# Patient Record
Sex: MALE | Race: WHITE | NOT HISPANIC OR LATINO | Employment: OTHER | URBAN - METROPOLITAN AREA
[De-identification: names, ages, dates, MRNs, and addresses within clinical notes are randomized per-mention and may not be internally consistent; named-entity substitution may affect disease eponyms.]

---

## 2017-08-04 ENCOUNTER — HOSPITAL ENCOUNTER (EMERGENCY)
Facility: MEDICAL CENTER | Age: 67
End: 2017-08-04
Attending: EMERGENCY MEDICINE
Payer: MEDICARE

## 2017-08-04 VITALS
HEIGHT: 72 IN | WEIGHT: 211.86 LBS | TEMPERATURE: 98.8 F | DIASTOLIC BLOOD PRESSURE: 85 MMHG | BODY MASS INDEX: 28.7 KG/M2 | HEART RATE: 88 BPM | OXYGEN SATURATION: 94 % | SYSTOLIC BLOOD PRESSURE: 133 MMHG | RESPIRATION RATE: 17 BRPM

## 2017-08-04 DIAGNOSIS — T82.9XXA COMPLICATION OF VASCULAR DIALYSIS CATHETER, UNSPECIFIED COMPLICATION, INITIAL ENCOUNTER: ICD-10-CM

## 2017-08-04 LAB
ALBUMIN SERPL BCP-MCNC: 3.7 G/DL (ref 3.2–4.9)
ALBUMIN/GLOB SERPL: 1.5 G/DL
ALP SERPL-CCNC: 41 U/L (ref 30–99)
ALT SERPL-CCNC: 38 U/L (ref 2–50)
ANION GAP SERPL CALC-SCNC: 12 MMOL/L (ref 0–11.9)
AST SERPL-CCNC: 15 U/L (ref 12–45)
BASOPHILS # BLD AUTO: 0.2 % (ref 0–1.8)
BASOPHILS # BLD: 0.01 K/UL (ref 0–0.12)
BILIRUB SERPL-MCNC: 0.7 MG/DL (ref 0.1–1.5)
BUN SERPL-MCNC: 17 MG/DL (ref 8–22)
CALCIUM SERPL-MCNC: 9 MG/DL (ref 8.5–10.5)
CHLORIDE SERPL-SCNC: 96 MMOL/L (ref 96–112)
CO2 SERPL-SCNC: 32 MMOL/L (ref 20–33)
CREAT SERPL-MCNC: 4.56 MG/DL (ref 0.5–1.4)
EOSINOPHIL # BLD AUTO: 0.01 K/UL (ref 0–0.51)
EOSINOPHIL NFR BLD: 0.2 % (ref 0–6.9)
ERYTHROCYTE [DISTWIDTH] IN BLOOD BY AUTOMATED COUNT: 55 FL (ref 35.9–50)
GFR SERPL CREATININE-BSD FRML MDRD: 13 ML/MIN/1.73 M 2
GLOBULIN SER CALC-MCNC: 2.4 G/DL (ref 1.9–3.5)
GLUCOSE SERPL-MCNC: 126 MG/DL (ref 65–99)
HCT VFR BLD AUTO: 27.5 % (ref 42–52)
HGB BLD-MCNC: 9.1 G/DL (ref 14–18)
IMM GRANULOCYTES # BLD AUTO: 0.05 K/UL (ref 0–0.11)
IMM GRANULOCYTES NFR BLD AUTO: 0.9 % (ref 0–0.9)
LACTATE BLD-SCNC: 1.8 MMOL/L (ref 0.5–2)
LYMPHOCYTES # BLD AUTO: 0.7 K/UL (ref 1–4.8)
LYMPHOCYTES NFR BLD: 13.1 % (ref 22–41)
MCH RBC QN AUTO: 31.1 PG (ref 27–33)
MCHC RBC AUTO-ENTMCNC: 33.1 G/DL (ref 33.7–35.3)
MCV RBC AUTO: 93.9 FL (ref 81.4–97.8)
MONOCYTES # BLD AUTO: 0.26 K/UL (ref 0–0.85)
MONOCYTES NFR BLD AUTO: 4.9 % (ref 0–13.4)
NEUTROPHILS # BLD AUTO: 4.31 K/UL (ref 1.82–7.42)
NEUTROPHILS NFR BLD: 80.7 % (ref 44–72)
NRBC # BLD AUTO: 0 K/UL
NRBC BLD AUTO-RTO: 0 /100 WBC
PLATELET # BLD AUTO: 189 K/UL (ref 164–446)
PMV BLD AUTO: 9.2 FL (ref 9–12.9)
POTASSIUM SERPL-SCNC: 4.5 MMOL/L (ref 3.6–5.5)
PROT SERPL-MCNC: 6.1 G/DL (ref 6–8.2)
RBC # BLD AUTO: 2.93 M/UL (ref 4.7–6.1)
SODIUM SERPL-SCNC: 140 MMOL/L (ref 135–145)
WBC # BLD AUTO: 5.3 K/UL (ref 4.8–10.8)

## 2017-08-04 PROCEDURE — 85025 COMPLETE CBC W/AUTO DIFF WBC: CPT

## 2017-08-04 PROCEDURE — 83605 ASSAY OF LACTIC ACID: CPT

## 2017-08-04 PROCEDURE — 87040 BLOOD CULTURE FOR BACTERIA: CPT | Mod: 91

## 2017-08-04 PROCEDURE — 80053 COMPREHEN METABOLIC PANEL: CPT

## 2017-08-04 PROCEDURE — 99284 EMERGENCY DEPT VISIT MOD MDM: CPT

## 2017-08-04 ASSESSMENT — LIFESTYLE VARIABLES: DO YOU DRINK ALCOHOL: NO

## 2017-08-04 ASSESSMENT — PAIN SCALES - GENERAL
PAINLEVEL_OUTOF10: 0
PAINLEVEL_OUTOF10: 0

## 2017-08-04 NOTE — ED AVS SNAPSHOT
8/4/2017    Bridger aSvage  [address] [address] [facility] [address]    Dear Bridger:    [facility] wants to ensure your discharge home is safe and you or your loved ones have had all of your questions answered regarding your care after you leave the hospital.    Below is a list of resources and contact information should you have any questions regarding your hospital stay, follow-up instructions, or active medical symptoms.    Questions or Concerns Regarding… Contact   Medical Questions Related to Your Discharge  (7 days a week, 8am-5pm) Contact a Nurse Care Coordinator   [phone]   Medical Questions Not Related to Your Discharge  (24 hours a day / 7 days a week)  Contact the Nurse Health Line   [phone]    Medications or Discharge Instructions Refer to your discharge packet   or contact your [facility] Primary Care Provider   [phone]   Follow-up Appointment(s) Schedule your appointment via [facility]   or contact Scheduling [phone]   Billing Review your statement via [employer]  or contact Billing [phone]   Medical Records Review your records via [employer]   or contact Medical Records [phone]     You may receive a telephone call within two days of discharge. This call is to make certain you understand your discharge instructions and have the opportunity to have any questions answered. You can also easily access your medical information, test results and upcoming appointments via the [employer] free online health management tool. You can learn more and sign up at F[employer]ELUP[employer]. For assistance setting up your [facility] account, please call [phone].    Once again, we want to ensure your discharge home is safe and that you have a clear understanding of any next steps in your care. If you have any questions or concerns, please do not hesitate to contact us, we are here for you. Thank you for choosing [facility] for your healthcare needs.    Sincerely,    Your [facility] Healthcare Team

## 2017-08-04 NOTE — ED AVS SNAPSHOT
[employer] Access Code: [email or web address]  Expires: 9/3/2017 10:18 PM    Your email address is not on file at [facility].  Email Addresses are required for you to sign up for [employer], please contact [phone] to verify your personal information and to provide your email address prior to attempting to register for [employer].    Bridger Gallardo Janette  [address] [address] [place] [address]    [employer]  A secure, online tool to manage your health information     [facility]’s K[employer] Kent® is a secure, online tool that connects you to your personalized health information from the privacy of your home -- day or night - making it very easy for you to manage your healthcare. Once the activation process is completed, you can even access your medical information using the [employer] joanna, which is available for free in the Apple Joanna store or Google Play store.     To learn more about [employer], visit [email or web address]    There are two levels of access available (as shown below):   My Chart Features  [facility] Primary Care Doctor [facility]  Specialists [facility]  Urgent  Care Non-[facility] Primary Care Doctor   Email your healthcare team securely and privately 24/7 X X X    Manage appointments: schedule your next appointment; view details of past/upcoming appointments X      Request prescription refills. X      View recent personal medical records, including lab and immunizations X X X X   View health record, including health history, allergies, medications X X X X   Read reports about your outpatient visits, procedures, consult and ER notes X X X X   See your discharge summary, which is a recap of your hospital and/or ER visit that includes your diagnosis, lab results, and care plan X X  X     How to register for [employer]:  Once your e-mail address has been verified, follow the following steps to sign up for [employer].     1. Go to  [email or web address]  2. Click on the Sign Up Now box, which takes you to the New Member Sign Up page. You will  need to provide the following information:  a. Enter your [employer] Access Code exactly as it appears at the top of this page. (You will not need to use this code after you’ve completed the sign-up process. If you do not sign up before the expiration date, you must request a new code.)   b. Enter your date of birth.   c. Enter your home email address.   d. Click Submit, and follow the next screen’s instructions.  3. Create a Brain Paradet ID. This will be your [employer] login ID and cannot be changed, so think of one that is secure and easy to remember.  4. Create a [employer] password. You can change your password at any time.  5. Enter your Password Reset Question and Answer. This can be used at a later time if you forget your password.   6. Enter your e-mail address. This allows you to receive e-mail notifications when new information is available in [employer].  7. Click Sign Up. You can now view your health information.    For assistance activating your [employer] account, call [phone]

## 2017-08-04 NOTE — ED AVS SNAPSHOT
Home Care Instructions                                                                                                                Bridger Savage   MRN: [id number]    Department:  [facility], Emergency Dept   Date of Visit:  8/4/2017            [facility], Emergency Dept    [address]    [place] [address]    Phone:  [phone]      You were seen by     Lupillo Boss M.D.      Your Diagnosis Was     Complication of vascular dialysis catheter, unspecified complication, initial encounter (CMS-[facility])     T82.9XXA       Medication Information     Review all of your home medications and newly ordered medications with your primary doctor and/or pharmacist as soon as possible. Follow medication instructions as directed by your doctor and/or pharmacist.     Please keep your complete medication list with you and share with your physician. Update the information when medications are discontinued, doses are changed, or new medications (including over-the-counter products) are added; and carry medication information at all times in the event of emergency situations.               Medication List      ASK your doctor about these medications        Instructions    Morning Afternoon Evening Bedtime    Albuterol Sulfate 108 (90 BASE) MCG/ACT Aepb        Inhale 1-2 Puffs by mouth every 6 hours as needed.   Dose:  1-2 Puff                        doxycycline 100 MG Tabs   Commonly known as:  VIBRAMYCIN        Take 1 Tab by mouth 2 times a day.   Dose:  100 mg                        predniSONE 20 MG Tabs   Commonly known as:  DELTASONE        Take 2 Tabs by mouth every day.   Dose:  40 mg                        RENVELA PO        Take  by mouth.                        SENSIPAR PO        Take  by mouth.                                Procedures and tests performed during your visit     Procedure/Test Number of Times Performed    BLOOD CULTURE 2    CBC WITH DIFFERENTIAL 1    COMP  METABOLIC PANEL 1    ESTIMATED GFR 1    LACTIC ACID 1            Patient Information     Patient Information    Following emergency treatment: all patient requiring follow-up care must return either to a private physician or a clinic if your condition worsens before you are able to obtain further medical attention, please return to the emergency room.     Billing Information    At [facility], we work to make the billing process streamlined for our patients.  Our Representatives are here to answer any questions you may have regarding your hospital bill.  If you have insurance coverage and have supplied your insurance information to us, we will submit a claim to your insurer on your behalf.  Should you have any questions regarding your bill, we can be reached online or by phone as follows:  Online: You are able pay your bills online or live chat with our representatives about any billing questions you may have. We are here to help Monday - Friday from 8:00am to 7:30pm and 9:00am - 12:00pm on Saturdays.  Please visit [email or web address]  for more information.   Phone:  [phone] or [phone]    Please note that your emergency physician, surgeon, pathologist, radiologist, anesthesiologist, and other specialists are not employed by [facility] and will therefore bill separately for their services.  Please contact them directly for any questions concerning their bills at the numbers below:     Emergency Physician Services:  [phone]  [facility]:  [phone]  [facility]:  [phone]  [facility]:  [phone]    1. Your final bill may vary from the amount quoted upon discharge if all procedures are not complete at that time, or if your doctor has additional procedures of which we are not aware. You will receive an additional bill if you return to the Emergency Department at [facility] for suture removal regardless of the  facility of which the sutures were placed.     2. Please arrange for settlement of this account at the emergency registration.    3. All self-pay accounts are due in full at the time of treatment.  If you are unable to meet this obligation then payment is expected within 4-5 days.     4. If you have had radiology studies (CT, X-ray, Ultrasound, MRI), you have received a preliminary result during your emergency department visit. Please contact the radiology department [phone] to receive a copy of your final result. Please discuss the Final result with your primary physician or with the follow up physician provided.     Crisis Hotline:  [employer] Crisis Hotline:  3-563-YDBWMVW or 1-618.418.1244  [place] Crisis Hotline:    1-849.975.4021 or 116-872-2028         ED Discharge Follow Up Questions    1. In order to provide you with very good care, we would like to follow up with a phone call in the next few days.  May we have your permission to contact you?     YES /  NO    2. What is the best phone number to call you? (       )_____-__________    3. What is the best time to call you?      Morning  /  Afternoon  /  Evening                   Patient Signature:  ____________________________________________________________    Date:  ____________________________________________________________

## 2017-08-05 NOTE — ED NOTES
Discharge instructions given to patient and daughter; both verbalized understanding. VS WNL upon discharge. Patient ambulatory upon leaving ED w/ daughter.

## 2017-08-05 NOTE — ED PROVIDER NOTES
ED Provider Note    Scribed for Dr. Lupillo Boss M.D. by Laura Cantu. 8/4/2017  7:42 PM    Primary care provider: Pcp Pt States None  Means of arrival: Walk-in   History obtained from: Patient   History limited by: None     CHIEF COMPLAINT  Chief Complaint   Patient presents with   • Sent by MD     sent from access center for possible infection to dialysis port       HPI  Bridger Savage is a 67 y.o. male who presents to the Emergency Department sent from the access center for a possible infection to his dialysis port in his upper right chest. The patient denies pain in this area but it is itchy. He did complete dialysis today using this port. 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 He reports that once this fistula heals they plan to try using it again. Dr. López, nephrology, did this procedure and is the patient's nephrologist. The patient has been on dialysis for 5 years. He states that he was seen at [facility] last week for dizziness, shortness of breath, weakness, fevers, chills. These symptoms have resolved. Per patient, he was called and told that blood cultures from that visit revealed an infection in his blood. The patient has been receiving IV antibiotics at his dialysis appointments. Patient denies fevers, chills, nausea, vomiting, diarrhea, chest pain, shortness of breath.     REVIEW OF SYSTEMS  Pertinent positives include infected dialysis port. Pertinent negatives include no fevers, chills, nausea, vomiting, diarrhea, chest pain, shortness of breath. As above, all other systems reviewed and are negative.   See HPI for further details. C    PAST MEDICAL HISTORY   Kidney disease     SURGICAL HISTORY  patient denies any surgical history    SOCIAL HISTORY  Social History   Substance Use Topics   • Smoking status: Current Every Day Smoker     Types: Cigarettes   • Smokeless tobacco: None   • Alcohol Use: No      History   Drug Use No        FAMILY HISTORY  History reviewed. No pertinent family history.    CURRENT MEDICATIONS  Home Medications     No current facility-administered medications on file prior to encounter.     Current Outpatient Prescriptions on File Prior to Encounter   Medication Sig Dispense Refill   • Cinacalcet HCl (SENSIPAR PO) Take  by mouth.     • Sevelamer Carbonate (RENVELA PO) Take  by mouth.     • Albuterol Sulfate 108 (90 BASE) MCG/ACT AEROSOL POWDER, BREATH ACTIVATED Inhale 1-2 Puffs by mouth every 6 hours as needed. 1 Each 0   • doxycycline (VIBRAMYCIN) 100 MG Tab Take 1 Tab by mouth 2 times a day. 20 Tab 0   • predniSONE (DELTASONE) 20 MG Tab Take 2 Tabs by mouth every day. 14 Tab 0              ALLERGIES  No Known Allergies    PHYSICAL EXAM  VITAL SIGNS: /85 mmHg  Pulse 82  Temp(Src) 37.1 °C (98.8 °F) (Temporal)  Resp 16  Ht 1.829 m (6')  Wt 96.1 kg (211 lb 13.8 oz)  BMI 28.73 kg/m2  SpO2 98%    Constitutional: Well developed, Well nourished, No distress, Non-toxic appearance.   HENT: Normocephalic, Atraumatic, Bilateral external ears normal, Oropharynx moist, No oral exudates.   Eyes: PERRLA, EOMI, Conjunctiva normal, No discharge.   Neck: No tenderness, Supple, No stridor.   Lymphatic: No lymphadenopathy noted.   Cardiovascular: Normal heart rate, Normal rhythm.   Thorax & Lungs: Clear to auscultation bilaterally, No respiratory distress, No wheezing, No crackles. Slight redness around the port to the right chest, purulent drainage to the dressing, non-tender.   Abdomen: Soft, No tenderness, No masses, No pulsatile masses.   Skin: Warm, Dry, No erythema, No rash.   Extremities:, No edema No cyanosis.   Musculoskeletal: No tenderness to palpation or major deformities noted.  Intact distal pulses  Neurologic: Awake, alert. Moves all extremities spontaneously.  Psychiatric: Affect normal, Judgment normal, Mood normal.     LABS  Results for orders placed or performed during the hospital encounter of  08/04/17   CBC WITH DIFFERENTIAL   Result Value Ref Range    WBC 5.3 4.8 - 10.8 K/uL    RBC 2.93 (L) 4.70 - 6.10 M/uL    Hemoglobin 9.1 (L) 14.0 - 18.0 g/dL    Hematocrit 27.5 (L) 42.0 - 52.0 %    MCV 93.9 81.4 - 97.8 fL    MCH 31.1 27.0 - 33.0 pg    MCHC 33.1 (L) 33.7 - 35.3 g/dL    RDW 55.0 (H) 35.9 - 50.0 fL    Platelet Count 189 164 - 446 K/uL    MPV 9.2 9.0 - 12.9 fL    Neutrophils-Polys 80.70 (H) 44.00 - 72.00 %    Lymphocytes 13.10 (L) 22.00 - 41.00 %    Monocytes 4.90 0.00 - 13.40 %    Eosinophils 0.20 0.00 - 6.90 %    Basophils 0.20 0.00 - 1.80 %    Immature Granulocytes 0.90 0.00 - 0.90 %    Nucleated RBC 0.00 /100 WBC    Neutrophils (Absolute) 4.31 1.82 - 7.42 K/uL    Lymphs (Absolute) 0.70 (L) 1.00 - 4.80 K/uL    Monos (Absolute) 0.26 0.00 - 0.85 K/uL    Eos (Absolute) 0.01 0.00 - 0.51 K/uL    Baso (Absolute) 0.01 0.00 - 0.12 K/uL    Immature Granulocytes (abs) 0.05 0.00 - 0.11 K/uL    NRBC (Absolute) 0.00 K/uL   COMP METABOLIC PANEL   Result Value Ref Range    Sodium 140 135 - 145 mmol/L    Potassium 4.5 3.6 - 5.5 mmol/L    Chloride 96 96 - 112 mmol/L    Co2 32 20 - 33 mmol/L    Anion Gap 12.0 (H) 0.0 - 11.9    Glucose 126 (H) 65 - 99 mg/dL    Bun 17 8 - 22 mg/dL    Creatinine 4.56 (H) 0.50 - 1.40 mg/dL    Calcium 9.0 8.5 - 10.5 mg/dL    AST(SGOT) 15 12 - 45 U/L    ALT(SGPT) 38 2 - 50 U/L    Alkaline Phosphatase 41 30 - 99 U/L    Total Bilirubin 0.7 0.1 - 1.5 mg/dL    Albumin 3.7 3.2 - 4.9 g/dL    Total Protein 6.1 6.0 - 8.2 g/dL    Globulin 2.4 1.9 - 3.5 g/dL    A-G Ratio 1.5 g/dL   LACTIC ACID   Result Value Ref Range    Lactic Acid 1.8 0.5 - 2.0 mmol/L   ESTIMATED GFR   Result Value Ref Range    GFR If  16 (A) >60 mL/min/1.73 m 2    GFR If Non  13 (A) >60 mL/min/1.73 m 2   All labs reviewed by me.      COURSE & MEDICAL DECISION MAKING  Pertinent Labs & Imaging studies reviewed. (See chart for details)    7:42 PM - Patient seen and examined at bedside. Ordered for labs  to evaluate his symptoms. The plan of care was discussed with the patient and I answered all of his questions at this time. The patient understands and is agreeable with this plan of care.      8:01 PM Paged nephrology.    8:06 PM Consulted with Dr. Finley (nephrology) to further discuss the details of the patient's case at this time. The port does not need to be removed.       10:05 PM Patient reevaluated at bedside. Discussed that the patient does not need to be admitted. He can continue to get antibiotics at the access center when he has dialysis. Discussed plan for discharge; I advised the patient to return [facility] the [facility] ED with any new or worsening symptoms. Patient was given the opportunity for questions. I addressed all questions or concerns at this time and they verbalize agreement to the plan of care.    Decision Making:  Patient has some drainage around his dialysis temporary catheter and thereby we will develop a infection here and maybe to be removed at some point that I would not pursue that at this point he does not appear toxic and does not appear acutely ill is not febrile his workup is negative he is already on IV antibiotics with each dialysis and dialysis and that will be continued. Did discuss with the nephrologist on call    The patient will return for new or worsening symptoms and is stable at the time of discharge.    The patient is referred to a primary physician for blood pressure management, diabetic screening, and for all other preventative health concerns.    DISPOSITION:  Patient will be discharged home in stable condition.    FOLLOW UP:  No follow-up provider specified.    FINAL IMPRESSION  1. Complication of vascular dialysis catheter, unspecified complication, initial encounter (CMS-[facility])       Laura NG (Drake), am scribing for, and in the presence of, Lupillo Boss M.D..    Electronically signed by: Laura Cantu (Drake), 8/4/2017    Lupillo NG M.D.  personally performed the services described in this documentation, as scribed by Laura Cantu in my presence, and it is both accurate and complete.    The note accurately reflects work and decisions made by me.  Lupillo Boss  8/5/2017  1:13 AM

## 2017-08-09 LAB
BACTERIA BLD CULT: NORMAL
BACTERIA BLD CULT: NORMAL
SIGNIFICANT IND 70042: NORMAL
SIGNIFICANT IND 70042: NORMAL
SITE SITE: NORMAL
SITE SITE: NORMAL
SOURCE SOURCE: NORMAL
SOURCE SOURCE: NORMAL

## 2017-08-27 ENCOUNTER — RESOLUTE PROFESSIONAL BILLING HOSPITAL PROF FEE (OUTPATIENT)
Dept: HOSPITALIST | Facility: MEDICAL CENTER | Age: 67
End: 2017-08-27
Payer: MEDICARE

## 2017-08-27 ENCOUNTER — HOSPITAL ENCOUNTER (INPATIENT)
Facility: MEDICAL CENTER | Age: 67
LOS: 3 days | DRG: 981 | End: 2017-08-30
Attending: EMERGENCY MEDICINE | Admitting: INTERNAL MEDICINE
Payer: MEDICARE

## 2017-08-27 ENCOUNTER — APPOINTMENT (OUTPATIENT)
Dept: RADIOLOGY | Facility: MEDICAL CENTER | Age: 67
DRG: 981 | End: 2017-08-27
Attending: EMERGENCY MEDICINE
Payer: MEDICARE

## 2017-08-27 DIAGNOSIS — J44.1 COPD EXACERBATION (HCC): ICD-10-CM

## 2017-08-27 DIAGNOSIS — J81.0 ACUTE PULMONARY EDEMA (HCC): ICD-10-CM

## 2017-08-27 DIAGNOSIS — J44.1 ACUTE EXACERBATION OF CHRONIC OBSTRUCTIVE PULMONARY DISEASE (COPD) (HCC): ICD-10-CM

## 2017-08-27 PROBLEM — I50.9 CHF (CONGESTIVE HEART FAILURE) (HCC): Status: ACTIVE | Noted: 2017-08-27

## 2017-08-27 PROBLEM — R09.02 HYPOXIA: Status: ACTIVE | Noted: 2017-08-27

## 2017-08-27 PROBLEM — Z99.2 ESRD (END STAGE RENAL DISEASE) ON DIALYSIS (HCC): Status: ACTIVE | Noted: 2017-08-27

## 2017-08-27 PROBLEM — D64.9 NORMOCYTIC ANEMIA: Status: ACTIVE | Noted: 2017-08-27

## 2017-08-27 PROBLEM — N18.6 ESRD (END STAGE RENAL DISEASE) ON DIALYSIS (HCC): Status: ACTIVE | Noted: 2017-08-27

## 2017-08-27 PROBLEM — R07.9 CHEST PAIN: Status: ACTIVE | Noted: 2017-08-27

## 2017-08-27 LAB
ALBUMIN SERPL BCP-MCNC: 3.7 G/DL (ref 3.2–4.9)
ALBUMIN/GLOB SERPL: 1.5 G/DL
ALP SERPL-CCNC: 53 U/L (ref 30–99)
ALT SERPL-CCNC: 19 U/L (ref 2–50)
ANION GAP SERPL CALC-SCNC: 13 MMOL/L (ref 0–11.9)
AST SERPL-CCNC: 24 U/L (ref 12–45)
BASOPHILS # BLD AUTO: 0.4 % (ref 0–1.8)
BASOPHILS # BLD: 0.03 K/UL (ref 0–0.12)
BILIRUB SERPL-MCNC: 1.3 MG/DL (ref 0.1–1.5)
BUN SERPL-MCNC: 15 MG/DL (ref 8–22)
CALCIUM SERPL-MCNC: 8.8 MG/DL (ref 8.4–10.2)
CHLORIDE SERPL-SCNC: 95 MMOL/L (ref 96–112)
CO2 SERPL-SCNC: 27 MMOL/L (ref 20–33)
CREAT SERPL-MCNC: 8.38 MG/DL (ref 0.5–1.4)
EKG IMPRESSION: NORMAL
EOSINOPHIL # BLD AUTO: 0.05 K/UL (ref 0–0.51)
EOSINOPHIL NFR BLD: 0.6 % (ref 0–6.9)
ERYTHROCYTE [DISTWIDTH] IN BLOOD BY AUTOMATED COUNT: 57.8 FL (ref 35.9–50)
GFR SERPL CREATININE-BSD FRML MDRD: 6 ML/MIN/1.73 M 2
GLOBULIN SER CALC-MCNC: 2.4 G/DL (ref 1.9–3.5)
GLUCOSE SERPL-MCNC: 93 MG/DL (ref 65–99)
HAV IGM SERPL QL IA: NEGATIVE
HBV CORE IGM SER QL: NEGATIVE
HBV SURFACE AB SERPL IA-ACNC: <3.1 MIU/ML (ref 0–10)
HBV SURFACE AG SER QL: NEGATIVE
HCT VFR BLD AUTO: 28.9 % (ref 42–52)
HCV AB SER QL: NEGATIVE
HGB BLD-MCNC: 9.6 G/DL (ref 14–18)
IMM GRANULOCYTES # BLD AUTO: 0.04 K/UL (ref 0–0.11)
IMM GRANULOCYTES NFR BLD AUTO: 0.5 % (ref 0–0.9)
LACTATE BLD-SCNC: 1.53 MMOL/L (ref 0.5–2)
LACTATE BLD-SCNC: 2.27 MMOL/L (ref 0.5–2)
LYMPHOCYTES # BLD AUTO: 0.97 K/UL (ref 1–4.8)
LYMPHOCYTES NFR BLD: 11.4 % (ref 22–41)
MCH RBC QN AUTO: 31.3 PG (ref 27–33)
MCHC RBC AUTO-ENTMCNC: 33.2 G/DL (ref 33.7–35.3)
MCV RBC AUTO: 94.1 FL (ref 81.4–97.8)
MONOCYTES # BLD AUTO: 0.66 K/UL (ref 0–0.85)
MONOCYTES NFR BLD AUTO: 7.8 % (ref 0–13.4)
NEUTROPHILS # BLD AUTO: 6.73 K/UL (ref 1.82–7.42)
NEUTROPHILS NFR BLD: 79.3 % (ref 44–72)
NRBC # BLD AUTO: 0 K/UL
NRBC BLD AUTO-RTO: 0 /100 WBC
PLATELET # BLD AUTO: 210 K/UL (ref 164–446)
PMV BLD AUTO: 9.5 FL (ref 9–12.9)
POTASSIUM SERPL-SCNC: 4.1 MMOL/L (ref 3.6–5.5)
PROCALCITONIN SERPL-MCNC: 0.5 NG/ML
PROT SERPL-MCNC: 6.1 G/DL (ref 6–8.2)
RBC # BLD AUTO: 3.07 M/UL (ref 4.7–6.1)
SODIUM SERPL-SCNC: 135 MMOL/L (ref 135–145)
SPECIMEN DRAWN FROM PATIENT: ABNORMAL
SPECIMEN DRAWN FROM PATIENT: NORMAL
TROPONIN I SERPL-MCNC: 0.05 NG/ML (ref 0–0.04)
TROPONIN I SERPL-MCNC: 0.05 NG/ML (ref 0–0.04)
TROPONIN I SERPL-MCNC: 0.06 NG/ML (ref 0–0.04)
WBC # BLD AUTO: 8.5 K/UL (ref 4.8–10.8)

## 2017-08-27 PROCEDURE — 700102 HCHG RX REV CODE 250 W/ 637 OVERRIDE(OP): Performed by: INTERNAL MEDICINE

## 2017-08-27 PROCEDURE — 5A1D60Z PERFORMANCE OF URINARY FILTRATION, MULTIPLE: ICD-10-PCS | Performed by: INTERNAL MEDICINE

## 2017-08-27 PROCEDURE — 700101 HCHG RX REV CODE 250: Performed by: INTERNAL MEDICINE

## 2017-08-27 PROCEDURE — 93005 ELECTROCARDIOGRAM TRACING: CPT

## 2017-08-27 PROCEDURE — 86706 HEP B SURFACE ANTIBODY: CPT

## 2017-08-27 PROCEDURE — 700102 HCHG RX REV CODE 250 W/ 637 OVERRIDE(OP): Performed by: EMERGENCY MEDICINE

## 2017-08-27 PROCEDURE — 700101 HCHG RX REV CODE 250

## 2017-08-27 PROCEDURE — 700101 HCHG RX REV CODE 250: Performed by: EMERGENCY MEDICINE

## 2017-08-27 PROCEDURE — A9270 NON-COVERED ITEM OR SERVICE: HCPCS | Performed by: INTERNAL MEDICINE

## 2017-08-27 PROCEDURE — 700102 HCHG RX REV CODE 250 W/ 637 OVERRIDE(OP)

## 2017-08-27 PROCEDURE — 83605 ASSAY OF LACTIC ACID: CPT

## 2017-08-27 PROCEDURE — 99285 EMERGENCY DEPT VISIT HI MDM: CPT

## 2017-08-27 PROCEDURE — 36415 COLL VENOUS BLD VENIPUNCTURE: CPT

## 2017-08-27 PROCEDURE — A9270 NON-COVERED ITEM OR SERVICE: HCPCS | Performed by: EMERGENCY MEDICINE

## 2017-08-27 PROCEDURE — 700105 HCHG RX REV CODE 258: Performed by: INTERNAL MEDICINE

## 2017-08-27 PROCEDURE — 94640 AIRWAY INHALATION TREATMENT: CPT

## 2017-08-27 PROCEDURE — 700111 HCHG RX REV CODE 636 W/ 250 OVERRIDE (IP): Performed by: EMERGENCY MEDICINE

## 2017-08-27 PROCEDURE — 96375 TX/PRO/DX INJ NEW DRUG ADDON: CPT

## 2017-08-27 PROCEDURE — 84145 PROCALCITONIN (PCT): CPT

## 2017-08-27 PROCEDURE — 90935 HEMODIALYSIS ONE EVALUATION: CPT

## 2017-08-27 PROCEDURE — 99221 1ST HOSP IP/OBS SF/LOW 40: CPT | Mod: AI | Performed by: INTERNAL MEDICINE

## 2017-08-27 PROCEDURE — 87040 BLOOD CULTURE FOR BACTERIA: CPT | Mod: 91

## 2017-08-27 PROCEDURE — 84484 ASSAY OF TROPONIN QUANT: CPT

## 2017-08-27 PROCEDURE — A9270 NON-COVERED ITEM OR SERVICE: HCPCS

## 2017-08-27 PROCEDURE — 93005 ELECTROCARDIOGRAM TRACING: CPT | Performed by: EMERGENCY MEDICINE

## 2017-08-27 PROCEDURE — 700111 HCHG RX REV CODE 636 W/ 250 OVERRIDE (IP)

## 2017-08-27 PROCEDURE — 71010 DX-CHEST-PORTABLE (1 VIEW): CPT

## 2017-08-27 PROCEDURE — 80053 COMPREHEN METABOLIC PANEL: CPT

## 2017-08-27 PROCEDURE — 96365 THER/PROPH/DIAG IV INF INIT: CPT

## 2017-08-27 PROCEDURE — 770020 HCHG ROOM/CARE - TELE (206)

## 2017-08-27 PROCEDURE — 85025 COMPLETE CBC W/AUTO DIFF WBC: CPT

## 2017-08-27 PROCEDURE — 304562 HCHG STAT O2 MASK/CANNULA

## 2017-08-27 PROCEDURE — 80074 ACUTE HEPATITIS PANEL: CPT

## 2017-08-27 PROCEDURE — 94760 N-INVAS EAR/PLS OXIMETRY 1: CPT

## 2017-08-27 PROCEDURE — 700105 HCHG RX REV CODE 258

## 2017-08-27 PROCEDURE — 5A09457 ASSISTANCE WITH RESPIRATORY VENTILATION, 24-96 CONSECUTIVE HOURS, CONTINUOUS POSITIVE AIRWAY PRESSURE: ICD-10-PCS | Performed by: EMERGENCY MEDICINE

## 2017-08-27 PROCEDURE — 700111 HCHG RX REV CODE 636 W/ 250 OVERRIDE (IP): Performed by: INTERNAL MEDICINE

## 2017-08-27 RX ORDER — TIOTROPIUM BROMIDE 18 UG/1
18 CAPSULE ORAL; RESPIRATORY (INHALATION) DAILY
Status: ON HOLD | COMMUNITY
End: 2019-05-21

## 2017-08-27 RX ORDER — AZITHROMYCIN 250 MG/1
500 TABLET, FILM COATED ORAL ONCE
Status: COMPLETED | OUTPATIENT
Start: 2017-08-27 | End: 2017-08-27

## 2017-08-27 RX ORDER — SODIUM CHLORIDE 9 MG/ML
250 INJECTION, SOLUTION INTRAVENOUS
Status: DISCONTINUED | OUTPATIENT
Start: 2017-08-27 | End: 2017-08-30 | Stop reason: HOSPADM

## 2017-08-27 RX ORDER — IPRATROPIUM BROMIDE AND ALBUTEROL SULFATE 2.5; .5 MG/3ML; MG/3ML
3 SOLUTION RESPIRATORY (INHALATION) 4 TIMES DAILY
Status: ON HOLD | COMMUNITY
End: 2017-08-30

## 2017-08-27 RX ORDER — MECLIZINE HYDROCHLORIDE 25 MG/1
25 TABLET ORAL 3 TIMES DAILY PRN
Status: SHIPPED | COMMUNITY
End: 2017-08-27

## 2017-08-27 RX ORDER — ROPINIROLE 1 MG/1
1 TABLET, FILM COATED ORAL
COMMUNITY
End: 2018-08-16

## 2017-08-27 RX ORDER — METHYLPREDNISOLONE SODIUM SUCCINATE 125 MG/2ML
125 INJECTION, POWDER, LYOPHILIZED, FOR SOLUTION INTRAMUSCULAR; INTRAVENOUS ONCE
Status: COMPLETED | OUTPATIENT
Start: 2017-08-27 | End: 2017-08-27

## 2017-08-27 RX ORDER — HEPARIN SODIUM 5000 [USP'U]/ML
5000 INJECTION, SOLUTION INTRAVENOUS; SUBCUTANEOUS EVERY 8 HOURS
Status: DISCONTINUED | OUTPATIENT
Start: 2017-08-27 | End: 2017-08-28

## 2017-08-27 RX ORDER — CINACALCET 60 MG/1
60 TABLET, FILM COATED ORAL EVERY EVENING
Status: ON HOLD | COMMUNITY
End: 2019-09-30

## 2017-08-27 RX ORDER — METHYLPREDNISOLONE SODIUM SUCCINATE 40 MG/ML
40 INJECTION, POWDER, LYOPHILIZED, FOR SOLUTION INTRAMUSCULAR; INTRAVENOUS EVERY 6 HOURS
Status: DISCONTINUED | OUTPATIENT
Start: 2017-08-27 | End: 2017-08-28

## 2017-08-27 RX ORDER — ALBUTEROL SULFATE 90 UG/1
2 AEROSOL, METERED RESPIRATORY (INHALATION) EVERY 6 HOURS PRN
COMMUNITY
End: 2018-09-22

## 2017-08-27 RX ORDER — BISACODYL 10 MG
10 SUPPOSITORY, RECTAL RECTAL
Status: DISCONTINUED | OUTPATIENT
Start: 2017-08-27 | End: 2017-08-30 | Stop reason: HOSPADM

## 2017-08-27 RX ORDER — SEVELAMER CARBONATE 800 MG/1
800 TABLET, FILM COATED ORAL
Status: ON HOLD | COMMUNITY
End: 2019-09-30

## 2017-08-27 RX ORDER — ACETAMINOPHEN 325 MG/1
650 TABLET ORAL EVERY 6 HOURS PRN
Status: DISCONTINUED | OUTPATIENT
Start: 2017-08-27 | End: 2017-08-30 | Stop reason: HOSPADM

## 2017-08-27 RX ORDER — SEVELAMER HYDROCHLORIDE 800 MG/1
1600 TABLET, FILM COATED ORAL 2 TIMES DAILY PRN
Status: DISCONTINUED | OUTPATIENT
Start: 2017-08-27 | End: 2017-08-30 | Stop reason: HOSPADM

## 2017-08-27 RX ORDER — NITROGLYCERIN 0.4 MG/1
TABLET SUBLINGUAL
Status: COMPLETED
Start: 2017-08-27 | End: 2017-08-27

## 2017-08-27 RX ORDER — IPRATROPIUM BROMIDE AND ALBUTEROL SULFATE 2.5; .5 MG/3ML; MG/3ML
3 SOLUTION RESPIRATORY (INHALATION)
Status: COMPLETED | OUTPATIENT
Start: 2017-08-27 | End: 2017-08-27

## 2017-08-27 RX ORDER — POLYETHYLENE GLYCOL 3350 17 G/17G
1 POWDER, FOR SOLUTION ORAL
Status: DISCONTINUED | OUTPATIENT
Start: 2017-08-27 | End: 2017-08-30 | Stop reason: HOSPADM

## 2017-08-27 RX ORDER — SEVELAMER HYDROCHLORIDE 800 MG/1
3200 TABLET, FILM COATED ORAL
Status: DISCONTINUED | OUTPATIENT
Start: 2017-08-27 | End: 2017-08-30 | Stop reason: HOSPADM

## 2017-08-27 RX ORDER — IPRATROPIUM BROMIDE AND ALBUTEROL SULFATE 2.5; .5 MG/3ML; MG/3ML
3 SOLUTION RESPIRATORY (INHALATION)
Status: DISCONTINUED | OUTPATIENT
Start: 2017-08-27 | End: 2017-08-29

## 2017-08-27 RX ORDER — BUDESONIDE AND FORMOTEROL FUMARATE DIHYDRATE 160; 4.5 UG/1; UG/1
2 AEROSOL RESPIRATORY (INHALATION)
Status: DISCONTINUED | OUTPATIENT
Start: 2017-08-27 | End: 2017-08-29

## 2017-08-27 RX ORDER — GUAIFENESIN 200 MG/1
600 TABLET ORAL 3 TIMES DAILY
COMMUNITY
End: 2017-12-28

## 2017-08-27 RX ORDER — ONDANSETRON 4 MG/1
4 TABLET, ORALLY DISINTEGRATING ORAL EVERY 4 HOURS PRN
Status: DISCONTINUED | OUTPATIENT
Start: 2017-08-27 | End: 2017-08-30 | Stop reason: HOSPADM

## 2017-08-27 RX ORDER — CINACALCET 30 MG/1
60 TABLET, FILM COATED ORAL EVERY EVENING
Status: DISCONTINUED | OUTPATIENT
Start: 2017-08-27 | End: 2017-08-30 | Stop reason: HOSPADM

## 2017-08-27 RX ORDER — HYDROCODONE BITARTRATE AND ACETAMINOPHEN 5; 325 MG/1; MG/1
1-2 TABLET ORAL EVERY 4 HOURS PRN
Status: SHIPPED | COMMUNITY
End: 2017-08-27

## 2017-08-27 RX ORDER — NITROGLYCERIN 0.4 MG/1
0.4 TABLET SUBLINGUAL ONCE
Status: COMPLETED | OUTPATIENT
Start: 2017-08-27 | End: 2017-08-27

## 2017-08-27 RX ORDER — LABETALOL HYDROCHLORIDE 5 MG/ML
10 INJECTION, SOLUTION INTRAVENOUS EVERY 4 HOURS PRN
Status: DISCONTINUED | OUTPATIENT
Start: 2017-08-27 | End: 2017-08-30 | Stop reason: HOSPADM

## 2017-08-27 RX ORDER — TIOTROPIUM BROMIDE 18 UG/1
1 CAPSULE ORAL; RESPIRATORY (INHALATION)
Status: DISCONTINUED | OUTPATIENT
Start: 2017-08-28 | End: 2017-08-29

## 2017-08-27 RX ORDER — ONDANSETRON 2 MG/ML
4 INJECTION INTRAMUSCULAR; INTRAVENOUS EVERY 4 HOURS PRN
Status: DISCONTINUED | OUTPATIENT
Start: 2017-08-27 | End: 2017-08-30 | Stop reason: HOSPADM

## 2017-08-27 RX ORDER — AMOXICILLIN 250 MG
2 CAPSULE ORAL 2 TIMES DAILY
Status: DISCONTINUED | OUTPATIENT
Start: 2017-08-27 | End: 2017-08-30 | Stop reason: HOSPADM

## 2017-08-27 RX ORDER — TIOTROPIUM BROMIDE 18 UG/1
1 CAPSULE ORAL; RESPIRATORY (INHALATION) DAILY
Status: DISCONTINUED | OUTPATIENT
Start: 2017-08-28 | End: 2017-08-27

## 2017-08-27 RX ORDER — ROPINIROLE 1 MG/1
1 TABLET, FILM COATED ORAL
Status: DISCONTINUED | OUTPATIENT
Start: 2017-08-27 | End: 2017-08-30 | Stop reason: HOSPADM

## 2017-08-27 RX ORDER — AZITHROMYCIN 250 MG/1
250 TABLET, FILM COATED ORAL DAILY
Status: DISCONTINUED | OUTPATIENT
Start: 2017-08-28 | End: 2017-08-28

## 2017-08-27 RX ADMIN — NITROGLYCERIN 0.4 MG: 0.4 TABLET SUBLINGUAL at 13:51

## 2017-08-27 RX ADMIN — CINACALCET HYDROCHLORIDE 60 MG: 30 TABLET, COATED ORAL at 20:27

## 2017-08-27 RX ADMIN — AZITHROMYCIN 500 MG: 250 TABLET, FILM COATED ORAL at 17:38

## 2017-08-27 RX ADMIN — AMPICILLIN SODIUM AND SULBACTAM SODIUM 3 G: 2; 1 INJECTION, POWDER, FOR SOLUTION INTRAMUSCULAR; INTRAVENOUS at 23:15

## 2017-08-27 RX ADMIN — ASPIRIN 81 MG: 81 TABLET, COATED ORAL at 17:38

## 2017-08-27 RX ADMIN — IPRATROPIUM BROMIDE AND ALBUTEROL SULFATE 3 ML: .5; 3 SOLUTION RESPIRATORY (INHALATION) at 19:10

## 2017-08-27 RX ADMIN — HEPARIN SODIUM 5000 UNITS: 5000 INJECTION, SOLUTION INTRAVENOUS; SUBCUTANEOUS at 17:39

## 2017-08-27 RX ADMIN — BUDESONIDE AND FORMOTEROL FUMARATE DIHYDRATE 2 PUFF: 160; 4.5 AEROSOL RESPIRATORY (INHALATION) at 19:22

## 2017-08-27 RX ADMIN — IPRATROPIUM BROMIDE AND ALBUTEROL SULFATE 3 ML: .5; 3 SOLUTION RESPIRATORY (INHALATION) at 12:59

## 2017-08-27 RX ADMIN — AMPICILLIN AND SULBACTAM 3 G: 2; 1 INJECTION, POWDER, FOR SOLUTION INTRAVENOUS at 15:25

## 2017-08-27 RX ADMIN — ROPINIROLE HYDROCHLORIDE 1 MG: 1 TABLET, FILM COATED ORAL at 20:27

## 2017-08-27 RX ADMIN — METHYLPREDNISOLONE SODIUM SUCCINATE 40 MG: 40 INJECTION, POWDER, FOR SOLUTION INTRAMUSCULAR; INTRAVENOUS at 23:14

## 2017-08-27 RX ADMIN — RENAGEL 3200 MG: 800 TABLET ORAL at 17:39

## 2017-08-27 RX ADMIN — METHYLPREDNISOLONE SODIUM SUCCINATE 125 MG: 125 INJECTION, POWDER, FOR SOLUTION INTRAMUSCULAR; INTRAVENOUS at 13:24

## 2017-08-27 RX ADMIN — METHYLPREDNISOLONE SODIUM SUCCINATE 40 MG: 40 INJECTION, POWDER, FOR SOLUTION INTRAMUSCULAR; INTRAVENOUS at 18:36

## 2017-08-27 RX ADMIN — IPRATROPIUM BROMIDE AND ALBUTEROL SULFATE 3 ML: .5; 3 SOLUTION RESPIRATORY (INHALATION) at 16:47

## 2017-08-27 RX ADMIN — IPRATROPIUM BROMIDE AND ALBUTEROL SULFATE 3 ML: .5; 3 SOLUTION RESPIRATORY (INHALATION) at 22:35

## 2017-08-27 RX ADMIN — NITROGLYCERIN 1 INCH: 20 OINTMENT TOPICAL at 13:42

## 2017-08-27 ASSESSMENT — PAIN SCALES - GENERAL
PAINLEVEL_OUTOF10: 0

## 2017-08-27 ASSESSMENT — ENCOUNTER SYMPTOMS
STRIDOR: 0
DEPRESSION: 0
MYALGIAS: 0
PALPITATIONS: 0
ABDOMINAL PAIN: 0
CONSTIPATION: 0
SPUTUM PRODUCTION: 0
VOMITING: 0
FEVER: 0
DIZZINESS: 0
TINGLING: 0
CHILLS: 0
HEADACHES: 0
DIARRHEA: 0
COUGH: 1
NAUSEA: 0
SHORTNESS OF BREATH: 1
WEAKNESS: 0
LOSS OF CONSCIOUSNESS: 0
FALLS: 0

## 2017-08-27 ASSESSMENT — PATIENT HEALTH QUESTIONNAIRE - PHQ9
SUM OF ALL RESPONSES TO PHQ9 QUESTIONS 1 AND 2: 0
1. LITTLE INTEREST OR PLEASURE IN DOING THINGS: NOT AT ALL
2. FEELING DOWN, DEPRESSED, IRRITABLE, OR HOPELESS: NOT AT ALL
SUM OF ALL RESPONSES TO PHQ QUESTIONS 1-9: 0

## 2017-08-27 ASSESSMENT — LIFESTYLE VARIABLES
EVER_SMOKED: YES
PACK_YEARS: 100
EVER_SMOKED: YES
ALCOHOL_USE: NO

## 2017-08-27 NOTE — PROGRESS NOTES
Admitted from ED, alert and oriented, SOB noted on O2 at 4 liters sating at 90-92%, denies any pain, updated with plan of care, for echo and dialysis as ordered.oriented to room, RT protocol, will continue to monitor.

## 2017-08-27 NOTE — FLOWSHEET NOTE
SVN given in ER.  Breath sounds very diminished with a few mild wheezes that cleared with treatment.  Improved aeration post treatment.     08/27/17 1259   RT Assessment of Delivered Medications   Evaluation of Medication Delivery Daily Yes-- Pt /Family has been Instructed in use of Respiratory Medications and Adverse Reactions   SVN Group   #SVN Performed Yes   Given By: Mouthpiece   Respiratory WDL   Respiratory (WDL) X   Chest Exam   Work Of Breathing / Effort Mild   Respiration (!) 22   Pulse (!) 107   Heart Rate (Monitored) (!) 106   Breath Sounds   RUL Breath Sounds Diminished   RML Breath Sounds Diminished   RLL Breath Sounds Diminished   AMALIA Breath Sounds Diminished   LLL Breath Sounds Diminished   Oxygen   Home O2 Use Prior To Admission? No   Pulse Oximetry 92 %   O2 (LPM) 4   O2 Daily Delivery Respiratory  Silicone Nasal Cannula

## 2017-08-27 NOTE — ASSESSMENT & PLAN NOTE
- improved after dialuysis  Workup negative  Kelly ruvalcaba tear noted on endoscopy  Will follow up with GI in one week after discharge

## 2017-08-27 NOTE — ED NOTES
Patient in room with wife, complains of shortness of breath which has increased in past few days. Today unable to get relief from home nebulizer treatments and inhalers. Patient on 3days/wk dialysis.

## 2017-08-27 NOTE — ED NOTES
"EKG performed - No stemi. Due to VS pt roomed immediately. RA sat 80 %, , Resp 26    Chief Complaint   Patient presents with   • Shortness of Breath     Pt BIB c/o shortness of breath \"for a few days\"      /100   Pulse (!) 111   Temp 36.3 °C (97.4 °F)   Resp (!) 22   Ht 1.829 m (6')   Wt 96.9 kg (213 lb 10 oz)   SpO2 91%   BMI 28.97 kg/m²     "

## 2017-08-27 NOTE — H&P
" Hospital Medicine History and Physical    Date of Service  8/27/2017    Chief Complaint  Chief Complaint   Patient presents with   • Shortness of Breath     Pt BIB c/o shortness of breath \"for a few days\"        History of Presenting Illness  67 y.o. male who presented 8/27/2017 withShortness of breath. Patient states this started on Friday night with shortness of breath, then he developed a dry cough. On Saturday he became much worse, this persisted through today. Patient states today he had a slight chest pain which is why he came to the emergency department. Patient described the pain is located under his left breast, and ache, 1 out of 10 at its worse. Patient does have a history of end-stage renal disease, does hemodialysis Monday Wednesday and Friday, patient states she has not missed any dialysis nor have they had to stop a dialysis session early. Patient states he was recently in [place] with shortness of breath, was also on antibiotics for MRSA, I do not have this information currently.    Primary Care Physician  King Cho D.O.    Consultants  Nephrology-Dr. López    Code Status  Full    Review of Systems  Review of Systems   Constitutional: Negative for chills, fever and malaise/fatigue.   HENT: Negative for congestion.    Respiratory: Positive for cough and shortness of breath. Negative for sputum production and stridor.    Cardiovascular: Positive for chest pain. Negative for palpitations and leg swelling.   Gastrointestinal: Negative for abdominal pain, constipation, diarrhea, nausea and vomiting.   Genitourinary: Negative for dysuria and urgency.   Musculoskeletal: Negative for falls and myalgias.   Neurological: Negative for dizziness, tingling, loss of consciousness, weakness and headaches.   Psychiatric/Behavioral: Negative for depression and suicidal ideas.   All other systems reviewed and are negative.         Past Medical History  Past Medical History:   Diagnosis Date   • Chronic " obstructive pulmonary disease (CMS-[facility])    • Congestive heart failure (CMS-HCC)    • Renal disorder        Surgical History  No past surgical history on file.    Medications  No current facility-administered medications on file prior to encounter.      No current outpatient prescriptions on file prior to encounter.       Family History  No family history on file.    Social History  Social History   Substance Use Topics   • Smoking status: Former Smoker     Packs/day: 2.00     Years: 50.00     Types: Cigarettes     Quit date: 2017   • Smokeless tobacco: Not on file   • Alcohol use No       Allergies  Allergies   Allergen Reactions   • Chloraprep One Step Itching        Physical Exam  Laboratory   Hemodynamics  Temp (24hrs), Av.3 °C (97.4 °F), Min:36.3 °C (97.4 °F), Max:36.3 °C (97.4 °F)   Temperature: 36.3 °C (97.4 °F)  Pulse  Av  Min: 105  Max: 123 Heart Rate (Monitored): (!) 105  Blood Pressure : 136/99, NIBP: 144/86      Respiratory      Respiration: (!) 24, Pulse Oximetry: 98 %, O2 Daily Delivery Respiratory : Silicone Nasal Cannula     Given By:: Mouthpiece, Work Of Breathing / Effort: Mild  RUL Breath Sounds: Diminished, RML Breath Sounds: Diminished, RLL Breath Sounds: Diminished, AMALIA Breath Sounds: Diminished, LLL Breath Sounds: Diminished    Physical Exam   Constitutional: He is oriented to person, place, and time. He appears well-developed.  Non-toxic appearance. No distress.   HENT:   Head: Normocephalic and atraumatic.   Mouth/Throat: Oropharynx is clear and moist. No oropharyngeal exudate.   Eyes: Right eye exhibits no discharge. Left eye exhibits no discharge.   Neck: Neck supple. No tracheal deviation, no edema and no erythema present.   Cardiovascular: Normal rate and regular rhythm.  Exam reveals no gallop and no friction rub.    No murmur heard.  Pulmonary/Chest: Effort normal. No stridor. Tachypnea noted. No respiratory distress. He has decreased breath sounds in the right upper  field, the right middle field, the right lower field, the left upper field, the left middle field and the left lower field. He has no wheezes. He has rales. He exhibits no tenderness.   Abdominal: Soft. Bowel sounds are normal. He exhibits no distension. There is no tenderness.   Musculoskeletal: Normal range of motion. He exhibits no edema or tenderness.   Lymphadenopathy:     He has no cervical adenopathy.   Neurological: He is alert and oriented to person, place, and time. No cranial nerve deficit.   Skin: Skin is warm and dry. No rash noted. He is not diaphoretic. No erythema.   Psychiatric: He has a normal mood and affect. His behavior is normal. Judgment and thought content normal.   Nursing note and vitals reviewed.      Recent Labs      08/27/17   1236   WBC  8.5   RBC  3.07*   HEMOGLOBIN  9.6*   HEMATOCRIT  28.9*   MCV  94.1   MCH  31.3   MCHC  33.2*   RDW  57.8*   PLATELETCT  210   MPV  9.5     Recent Labs      08/27/17   1236   SODIUM  135   POTASSIUM  4.1   CHLORIDE  95*   CO2  27   GLUCOSE  93   BUN  15   CREATININE  8.38*   CALCIUM  8.8     Recent Labs      08/27/17   1236   ALTSGPT  19   ASTSGOT  24   ALKPHOSPHAT  53   TBILIRUBIN  1.3   GLUCOSE  93                 Lab Results   Component Value Date    TROPONINI 0.05 (H) 08/27/2017       Imaging  Chest x-ray-1.  Cardiomegaly with pulmonary edema.  2.  Bibasilar atelectasis and small bilateral pleural effusions.   Assessment/Plan     I anticipate this patient will require at least two midnights for appropriate medical management, necessitating inpatient admission.    Hypoxia   Assessment & Plan    - Due to COPD exacerbation as well as fluid overload  - Was significant upon arrival, patient was satting 80% on room air, is not on oxygen at home        COPD exacerbation (CMS-[facility])   Assessment & Plan    - Significant decrease in breath sounds  - Likely secondary to fluid overload  - will be started on antibiotics due to severity of COPD exacerbation,  however I think pneumonia is less likely  - Patient was recently on antibiotics at [place], attempt to obtain records, if he was on adequate antibiotics will not need them during the stay        Normocytic anemia   Assessment & Plan    - No sign of gross bleeding        CHF (congestive heart failure) (CMS-HCC)   Assessment & Plan    - CHF is noted as a past medical history, no echocardiogram here  - Await echo, patient is not currently medically managed if this is his history        ESRD (end stage renal disease) on dialysis (CMS-HCC)   Assessment & Plan    - Nephrology has been consultation  - Await recommendations, patient may need additional hemodialysis due to fluid overload        Chest pain   Assessment & Plan    - Unlikely to be an acute event however will trend troponins and obtain an echocardiogram  - Monitor patient on telemetry  - No increase with deep inspiration            VTE prophylaxis:Heparin sq.

## 2017-08-27 NOTE — ASSESSMENT & PLAN NOTE
-improved with dialysis  Respiratory therapy  Oxygen need at 3 liters today, patient on no home oxygen  Will order oxygen for discharge home

## 2017-08-27 NOTE — ED PROVIDER NOTES
"ED Provider Note    CHIEF COMPLAINT  Chief Complaint   Patient presents with   • Shortness of Breath     Pt BIB c/o shortness of breath \"for a few days\"        HPI  Bridger Savage is a 67 y.o. male who presents to the emergency department with shortness of breath. He's been dealing with shortness of breath related to his COPD as well as pulmonary edema related to volume overload recently. He's been hospitalized several times at [facility] for this. Describes that he doesn't think that he is getting enough fluid off while he has had outpatient dialysis. He has been compliant with dialysis Monday, Wednesday, Friday. Gradually progressive shortness of breath over the last several days. Mild dry cough. Positive orthopnea and PND. No leg swelling. No pleuritic chest pain or hemoptysis. He has not had a fever.    REVIEW OF SYSTEMS  As per HPI, otherwise a 10 point review of systems is negative    PAST MEDICAL HISTORY  MRSA, COPD, congestive heart failure, end-stage renal disease on dialysis    SOCIAL HISTORY  Social History   Substance Use Topics   • Smoking status: Former Smoker     Packs/day: 2.00     Years: 50.00     Types: Cigarettes     Quit date: 8/13/2017   • Smokeless tobacco: Not on file   • Alcohol use No       SURGICAL HISTORY  No past surgical history on file.    CURRENT MEDICATIONS  Home Medications    **Home medications have not yet been reviewed for this encounter**         ALLERGIES  Allergies   Allergen Reactions   • Chloraprep One Step Itching       PHYSICAL EXAM  VITAL SIGNS: /100   Pulse (!) 107   Temp 36.3 °C (97.4 °F)   Resp (!) 22   Ht 1.829 m (6')   Wt 96.9 kg (213 lb 10 oz)   SpO2 92%   BMI 28.97 kg/m²    Constitutional: Awake and alert, Pale, chronically ill-appearing  HENT:  Atraumatic, Normocephalic.Oropharynx moist mucus membranes, Nose normal inspection.   Eyes: Normal inspection  Neck: Supple  Cardiovascular: Tachycardic heart rate, Normal rhythm.  Symmetric peripheral pulses. "   Thorax & Lungs: Tachypnea and breathless. Decreased breath sounds throughout  Abdomen: Bowel sounds normal, soft, non-distended, nontender, no mass  Skin: Pale  Back: No tenderness  Extremities: No remarkable edema. No asymmetric swelling.  Neurologic: Grossly normal   Psychiatric: Anxious appearing    RADIOLOGY/PROCEDURES  DX-CHEST-PORTABLE (1 VIEW)   Final Result      1.  Cardiomegaly with pulmonary edema.      2.  Bibasilar atelectasis and small bilateral pleural effusions.         Imaging is interpreted by radiologist    Labs:  Results for orders placed or performed during the hospital encounter of 08/27/17   Lactic acid (lactate)   Result Value Ref Range    Lactic Acid 2.27 (H) 0.50 - 2.00 mmol/L    Specimen Venous    CBC WITH DIFFERENTIAL   Result Value Ref Range    WBC 8.5 4.8 - 10.8 K/uL    RBC 3.07 (L) 4.70 - 6.10 M/uL    Hemoglobin 9.6 (L) 14.0 - 18.0 g/dL    Hematocrit 28.9 (L) 42.0 - 52.0 %    MCV 94.1 81.4 - 97.8 fL    MCH 31.3 27.0 - 33.0 pg    MCHC 33.2 (L) 33.7 - 35.3 g/dL    RDW 57.8 (H) 35.9 - 50.0 fL    Platelet Count 210 164 - 446 K/uL    MPV 9.5 9.0 - 12.9 fL    Neutrophils-Polys 79.30 (H) 44.00 - 72.00 %    Lymphocytes 11.40 (L) 22.00 - 41.00 %    Monocytes 7.80 0.00 - 13.40 %    Eosinophils 0.60 0.00 - 6.90 %    Basophils 0.40 0.00 - 1.80 %    Immature Granulocytes 0.50 0.00 - 0.90 %    Nucleated RBC 0.00 /100 WBC    Neutrophils (Absolute) 6.73 1.82 - 7.42 K/uL    Lymphs (Absolute) 0.97 (L) 1.00 - 4.80 K/uL    Monos (Absolute) 0.66 0.00 - 0.85 K/uL    Eos (Absolute) 0.05 0.00 - 0.51 K/uL    Baso (Absolute) 0.03 0.00 - 0.12 K/uL    Immature Granulocytes (abs) 0.04 0.00 - 0.11 K/uL    NRBC (Absolute) 0.00 K/uL   COMP METABOLIC PANEL   Result Value Ref Range    Sodium 135 135 - 145 mmol/L    Potassium 4.1 3.6 - 5.5 mmol/L    Chloride 95 (L) 96 - 112 mmol/L    Co2 27 20 - 33 mmol/L    Anion Gap 13.0 (H) 0.0 - 11.9    Glucose 93 65 - 99 mg/dL    Bun 15 8 - 22 mg/dL    Creatinine 8.38 (HH) 0.50 -  1.40 mg/dL    Calcium 8.8 8.4 - 10.2 mg/dL    AST(SGOT) 24 12 - 45 U/L    ALT(SGPT) 19 2 - 50 U/L    Alkaline Phosphatase 53 30 - 99 U/L    Total Bilirubin 1.3 0.1 - 1.5 mg/dL    Albumin 3.7 3.2 - 4.9 g/dL    Total Protein 6.1 6.0 - 8.2 g/dL    Globulin 2.4 1.9 - 3.5 g/dL    A-G Ratio 1.5 g/dL   TROPONIN   Result Value Ref Range    Troponin I 0.05 (H) 0.00 - 0.04 ng/mL   ESTIMATED GFR   Result Value Ref Range    GFR If  8 (A) >60 mL/min/1.73 m 2    GFR If Non African American 6 (A) >60 mL/min/1.73 m 2   EKG (NOW)   Result Value Ref Range    Report       [facility] Emergency Dept.    Test Date:  2017  Pt Name:    AURELIA DAVIS                  Department: [facility]  MRN:        [id number]                      [facility]   Gender:     M                            Technician: JUAN ANTONIO  :        1950                   Requested By:ER TRIAGE PROTOCOL  Order #:    [id number]                    Reading MD: ENEIDA LAMAS MD    Measurements  Intervals                                Axis  Rate:       109                          P:          77  NM:         124                          QRS:        27  QRSD:       104                          T:          78  QT:         376  QTc:        507    Interpretive Statements  SINUS TACHYCARDIA  BORDERLINE T WAVE ABNORMALITIES  PROLONGED QT INTERVAL  No previous ECG available for comparison    Electronically Signed On 2017 13:17:59 PDT by ENEIDA LAMAS MD           COURSE & MEDICAL DECISION MAKING  Patient presents to the ER with shortness of breath. He has remarkably diminished breath sounds throughout. Describes orthopnea and PND however. He was hypoxic and placed on supplemental oxygen. He is given albuterol and Atrovent nebulizer treatment. 125 mg of Solu-Medrol was administered. Workup was obtained with results as above. His pulmonary edema likely contributed into his COPD exacerbation. I consulted Dr. Ruiz for  admission. I consulted nephrology for dialysis.    FINAL IMPRESSION  1.Acute hypoxic respiratory failure   2.COPD with exacerbation  3.Pulmonary edema  4. End-stage renal disease        This dictation was created using voice recognition software. The accuracy of the dictation is limited to the abilities of the software.  The nursing notes were reviewed and certain aspects of this information were incorporated into this note.      Electronically signed by: Jaziel Jensen, 8/27/2017 1:21 PM

## 2017-08-27 NOTE — ED NOTES
Nic from Lab called with critical result of 8.38 at 1313. Critical lab result read back to Nic.   Dr. Jensen notified of critical lab result at 8.38.  Critical lab result read back by Dr. Jensen.

## 2017-08-27 NOTE — ED NOTES
"Med rec updated and complete  Allergies reviewed  Pt was not sure of his medications, Called [facility] @ [phone] to verify all prescription medications.    Pt states \"No vitamins\".  No antibiotics in the last 30 days\".  Pt states \"I was taking CHANTIX not constantly, I had my RX last about 6 months, some days I would take it and then I would not take on other days\".  \"I smoke and take CHANTIX\".  I informed the doctor.    "

## 2017-08-27 NOTE — FLOWSHEET NOTE
Patient now on the floors, placed on respiratory protocol and nebulizers Q4.     08/27/17 1645   RT Assessment of Delivered Medications   Evaluation of Medication Delivery Daily Yes-- Pt /Family has been Instructed in use of Respiratory Medications and Adverse Reactions   SVN Group   #SVN Performed Yes   Given By: Mouthpiece   Chest Exam   Work Of Breathing / Effort Mild   Respiration (!) 24   Pulse (!) 105   Breath Sounds   RUL Breath Sounds Clear   RML Breath Sounds Diminished   RLL Breath Sounds Diminished   AMALIA Breath Sounds Clear   LLL Breath Sounds Diminished   Oximetry   #Pulse Oximetry (Single Determination) Yes   Oxygen   Home O2 Use Prior To Admission? No   Pulse Oximetry 90 %   O2 (LPM) 4   O2 Daily Delivery Respiratory  Silicone Nasal Cannula
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
## 2017-08-28 PROBLEM — T18.128A ESOPHAGEAL OBSTRUCTION DUE TO FOOD IMPACTION: Status: ACTIVE | Noted: 2017-08-28

## 2017-08-28 LAB
ANION GAP SERPL CALC-SCNC: 15 MMOL/L (ref 0–11.9)
BUN SERPL-MCNC: 19 MG/DL (ref 8–22)
CALCIUM SERPL-MCNC: 9 MG/DL (ref 8.4–10.2)
CHLORIDE SERPL-SCNC: 91 MMOL/L (ref 96–112)
CO2 SERPL-SCNC: 28 MMOL/L (ref 20–33)
CREAT SERPL-MCNC: 10.05 MG/DL (ref 0.5–1.4)
ERYTHROCYTE [DISTWIDTH] IN BLOOD BY AUTOMATED COUNT: 57.1 FL (ref 35.9–50)
GFR SERPL CREATININE-BSD FRML MDRD: 5 ML/MIN/1.73 M 2
GLUCOSE SERPL-MCNC: 128 MG/DL (ref 65–99)
HAV IGM SERPL QL IA: NEGATIVE
HBV CORE IGM SER QL: NEGATIVE
HBV SURFACE AG SER QL: NEGATIVE
HCT VFR BLD AUTO: 28.8 % (ref 42–52)
HCV AB SER QL: NEGATIVE
HGB BLD-MCNC: 9.5 G/DL (ref 14–18)
MCH RBC QN AUTO: 31.1 PG (ref 27–33)
MCHC RBC AUTO-ENTMCNC: 33 G/DL (ref 33.7–35.3)
MCV RBC AUTO: 94.4 FL (ref 81.4–97.8)
PLATELET # BLD AUTO: 219 K/UL (ref 164–446)
PMV BLD AUTO: 9.4 FL (ref 9–12.9)
POTASSIUM SERPL-SCNC: 4.7 MMOL/L (ref 3.6–5.5)
RBC # BLD AUTO: 3.05 M/UL (ref 4.7–6.1)
SODIUM SERPL-SCNC: 134 MMOL/L (ref 135–145)
TROPONIN I SERPL-MCNC: 0.05 NG/ML (ref 0–0.04)
WBC # BLD AUTO: 5.6 K/UL (ref 4.8–10.8)

## 2017-08-28 PROCEDURE — 94640 AIRWAY INHALATION TREATMENT: CPT

## 2017-08-28 PROCEDURE — A9270 NON-COVERED ITEM OR SERVICE: HCPCS | Performed by: INTERNAL MEDICINE

## 2017-08-28 PROCEDURE — 90935 HEMODIALYSIS ONE EVALUATION: CPT

## 2017-08-28 PROCEDURE — 85027 COMPLETE CBC AUTOMATED: CPT

## 2017-08-28 PROCEDURE — 160035 HCHG PACU - 1ST 60 MINS PHASE I: Performed by: INTERNAL MEDICINE

## 2017-08-28 PROCEDURE — 500066 HCHG BITE BLOCK, ECT: Performed by: INTERNAL MEDICINE

## 2017-08-28 PROCEDURE — 96375 TX/PRO/DX INJ NEW DRUG ADDON: CPT

## 2017-08-28 PROCEDURE — 84484 ASSAY OF TROPONIN QUANT: CPT

## 2017-08-28 PROCEDURE — A9270 NON-COVERED ITEM OR SERVICE: HCPCS | Performed by: HOSPITALIST

## 2017-08-28 PROCEDURE — 700105 HCHG RX REV CODE 258: Performed by: INTERNAL MEDICINE

## 2017-08-28 PROCEDURE — 700111 HCHG RX REV CODE 636 W/ 250 OVERRIDE (IP)

## 2017-08-28 PROCEDURE — 160208 HCHG ENDO MINUTES - EA ADDL 1 MIN LEVEL 4: Performed by: INTERNAL MEDICINE

## 2017-08-28 PROCEDURE — 700102 HCHG RX REV CODE 250 W/ 637 OVERRIDE(OP): Performed by: INTERNAL MEDICINE

## 2017-08-28 PROCEDURE — 99233 SBSQ HOSP IP/OBS HIGH 50: CPT | Performed by: HOSPITALIST

## 2017-08-28 PROCEDURE — 700111 HCHG RX REV CODE 636 W/ 250 OVERRIDE (IP): Performed by: INTERNAL MEDICINE

## 2017-08-28 PROCEDURE — 160002 HCHG RECOVERY MINUTES (STAT): Performed by: INTERNAL MEDICINE

## 2017-08-28 PROCEDURE — 700102 HCHG RX REV CODE 250 W/ 637 OVERRIDE(OP): Performed by: HOSPITALIST

## 2017-08-28 PROCEDURE — 160048 HCHG OR STATISTICAL LEVEL 1-5: Performed by: INTERNAL MEDICINE

## 2017-08-28 PROCEDURE — 80048 BASIC METABOLIC PNL TOTAL CA: CPT

## 2017-08-28 PROCEDURE — 700101 HCHG RX REV CODE 250

## 2017-08-28 PROCEDURE — C9113 INJ PANTOPRAZOLE SODIUM, VIA: HCPCS | Performed by: INTERNAL MEDICINE

## 2017-08-28 PROCEDURE — 99285 EMERGENCY DEPT VISIT HI MDM: CPT

## 2017-08-28 PROCEDURE — 502240 HCHG MISC OR SUPPLY RC 0272: Performed by: INTERNAL MEDICINE

## 2017-08-28 PROCEDURE — 0D968ZZ DRAINAGE OF STOMACH, VIA NATURAL OR ARTIFICIAL OPENING ENDOSCOPIC: ICD-10-PCS | Performed by: INTERNAL MEDICINE

## 2017-08-28 PROCEDURE — 160203 HCHG ENDO MINUTES - 1ST 30 MINS LEVEL 4: Performed by: INTERNAL MEDICINE

## 2017-08-28 PROCEDURE — 0DC58ZZ EXTIRPATION OF MATTER FROM ESOPHAGUS, VIA NATURAL OR ARTIFICIAL OPENING ENDOSCOPIC: ICD-10-PCS | Performed by: INTERNAL MEDICINE

## 2017-08-28 PROCEDURE — 94760 N-INVAS EAR/PLS OXIMETRY 1: CPT

## 2017-08-28 PROCEDURE — 700101 HCHG RX REV CODE 250: Performed by: INTERNAL MEDICINE

## 2017-08-28 PROCEDURE — 96365 THER/PROPH/DIAG IV INF INIT: CPT

## 2017-08-28 PROCEDURE — 770020 HCHG ROOM/CARE - TELE (206)

## 2017-08-28 PROCEDURE — 160009 HCHG ANES TIME/MIN: Performed by: INTERNAL MEDICINE

## 2017-08-28 RX ORDER — MORPHINE SULFATE 4 MG/ML
2 INJECTION, SOLUTION INTRAMUSCULAR; INTRAVENOUS EVERY 4 HOURS PRN
Status: DISCONTINUED | OUTPATIENT
Start: 2017-08-28 | End: 2017-08-30 | Stop reason: HOSPADM

## 2017-08-28 RX ORDER — PANTOPRAZOLE SODIUM 40 MG/10ML
80 INJECTION, POWDER, LYOPHILIZED, FOR SOLUTION INTRAVENOUS ONCE
Status: DISCONTINUED | OUTPATIENT
Start: 2017-08-28 | End: 2017-08-28

## 2017-08-28 RX ORDER — SODIUM CHLORIDE, SODIUM LACTATE, POTASSIUM CHLORIDE, CALCIUM CHLORIDE 600; 310; 30; 20 MG/100ML; MG/100ML; MG/100ML; MG/100ML
INJECTION, SOLUTION INTRAVENOUS
Status: DISCONTINUED | OUTPATIENT
Start: 2017-08-28 | End: 2017-08-30 | Stop reason: HOSPADM

## 2017-08-28 RX ORDER — HEPARIN SODIUM 1000 [USP'U]/ML
1500 INJECTION, SOLUTION INTRAVENOUS; SUBCUTANEOUS
Status: DISCONTINUED | OUTPATIENT
Start: 2017-08-28 | End: 2017-08-30 | Stop reason: HOSPADM

## 2017-08-28 RX ORDER — SUCRALFATE ORAL 1 G/10ML
1 SUSPENSION ORAL EVERY 6 HOURS
Status: DISCONTINUED | OUTPATIENT
Start: 2017-08-28 | End: 2017-08-30 | Stop reason: HOSPADM

## 2017-08-28 RX ORDER — MORPHINE SULFATE 4 MG/ML
4 INJECTION, SOLUTION INTRAMUSCULAR; INTRAVENOUS EVERY 4 HOURS PRN
Status: DISCONTINUED | OUTPATIENT
Start: 2017-08-28 | End: 2017-08-30 | Stop reason: HOSPADM

## 2017-08-28 RX ADMIN — IPRATROPIUM BROMIDE AND ALBUTEROL SULFATE 3 ML: .5; 3 SOLUTION RESPIRATORY (INHALATION) at 02:33

## 2017-08-28 RX ADMIN — METHYLPREDNISOLONE SODIUM SUCCINATE 40 MG: 40 INJECTION, POWDER, FOR SOLUTION INTRAMUSCULAR; INTRAVENOUS at 05:53

## 2017-08-28 RX ADMIN — MORPHINE SULFATE 2 MG: 4 INJECTION INTRAVENOUS at 20:59

## 2017-08-28 RX ADMIN — ASPIRIN 81 MG: 81 TABLET, COATED ORAL at 07:49

## 2017-08-28 RX ADMIN — AMPICILLIN SODIUM AND SULBACTAM SODIUM 3 G: 2; 1 INJECTION, POWDER, FOR SOLUTION INTRAMUSCULAR; INTRAVENOUS at 05:53

## 2017-08-28 RX ADMIN — SODIUM CHLORIDE, SODIUM LACTATE, POTASSIUM CHLORIDE, CALCIUM CHLORIDE: 600; 310; 30; 20 INJECTION, SOLUTION INTRAVENOUS at 14:12

## 2017-08-28 RX ADMIN — SODIUM CHLORIDE 80 MG: 9 INJECTION, SOLUTION INTRAVENOUS at 16:38

## 2017-08-28 RX ADMIN — SUCRALFATE 1 G: 1 SUSPENSION ORAL at 23:42

## 2017-08-28 RX ADMIN — IPRATROPIUM BROMIDE AND ALBUTEROL SULFATE 3 ML: .5; 3 SOLUTION RESPIRATORY (INHALATION) at 06:50

## 2017-08-28 RX ADMIN — SODIUM CHLORIDE 8 MG/HR: 9 INJECTION, SOLUTION INTRAVENOUS at 16:59

## 2017-08-28 RX ADMIN — TIOTROPIUM BROMIDE 1 CAPSULE: 18 CAPSULE ORAL; RESPIRATORY (INHALATION) at 06:50

## 2017-08-28 RX ADMIN — LIDOCAINE HYDROCHLORIDE 15 ML: 20 SOLUTION OROPHARYNGEAL at 11:41

## 2017-08-28 RX ADMIN — IPRATROPIUM BROMIDE AND ALBUTEROL SULFATE 3 ML: .5; 3 SOLUTION RESPIRATORY (INHALATION) at 19:44

## 2017-08-28 RX ADMIN — MORPHINE SULFATE 4 MG: 4 INJECTION INTRAVENOUS at 23:42

## 2017-08-28 RX ADMIN — SUCRALFATE 1 G: 1 SUSPENSION ORAL at 17:41

## 2017-08-28 RX ADMIN — BUDESONIDE AND FORMOTEROL FUMARATE DIHYDRATE 2 PUFF: 160; 4.5 AEROSOL RESPIRATORY (INHALATION) at 06:51

## 2017-08-28 RX ADMIN — RENAGEL 3200 MG: 800 TABLET ORAL at 07:49

## 2017-08-28 RX ADMIN — BUDESONIDE AND FORMOTEROL FUMARATE DIHYDRATE 2 PUFF: 160; 4.5 AEROSOL RESPIRATORY (INHALATION) at 19:44

## 2017-08-28 RX ADMIN — IPRATROPIUM BROMIDE AND ALBUTEROL SULFATE 3 ML: .5; 3 SOLUTION RESPIRATORY (INHALATION) at 10:34

## 2017-08-28 RX ADMIN — IPRATROPIUM BROMIDE AND ALBUTEROL SULFATE 3 ML: .5; 3 SOLUTION RESPIRATORY (INHALATION) at 16:26

## 2017-08-28 RX ADMIN — METHYLPREDNISOLONE SODIUM SUCCINATE 40 MG: 40 INJECTION, POWDER, FOR SOLUTION INTRAMUSCULAR; INTRAVENOUS at 12:24

## 2017-08-28 RX ADMIN — AZITHROMYCIN 250 MG: 250 TABLET, FILM COATED ORAL at 07:49

## 2017-08-28 ASSESSMENT — ENCOUNTER SYMPTOMS
SHORTNESS OF BREATH: 0
STRIDOR: 0
COUGH: 0
SORE THROAT: 1
CHILLS: 0
SHORTNESS OF BREATH: 1
ABDOMINAL PAIN: 1
FEVER: 0
FLANK PAIN: 0
GASTROINTESTINAL NEGATIVE: 1
DIZZINESS: 0
BRUISES/BLEEDS EASILY: 0
NEUROLOGICAL NEGATIVE: 1
VOMITING: 0
MYALGIAS: 0
PSYCHIATRIC NEGATIVE: 1
WEAKNESS: 0
RESPIRATORY NEGATIVE: 1
MUSCULOSKELETAL NEGATIVE: 1
CONSTITUTIONAL NEGATIVE: 1
EYES NEGATIVE: 1
NERVOUS/ANXIOUS: 0
LOSS OF CONSCIOUSNESS: 0
CARDIOVASCULAR NEGATIVE: 1
BACK PAIN: 0
DEPRESSION: 0
NAUSEA: 0
WEIGHT LOSS: 0

## 2017-08-28 ASSESSMENT — PAIN SCALES - GENERAL
PAINLEVEL_OUTOF10: ASSUMED PAIN PRESENT
PAINLEVEL_OUTOF10: ASSUMED PAIN PRESENT
PAINLEVEL_OUTOF10: 0
PAINLEVEL_OUTOF10: 5
PAINLEVEL_OUTOF10: 0
PAINLEVEL_OUTOF10: ASSUMED PAIN PRESENT
PAINLEVEL_OUTOF10: 0

## 2017-08-28 NOTE — CARE PLAN
Problem: Safety  Goal: Will remain free from injury    Intervention: Provide assistance with mobility   08/28/17 1044   OTHER   Assistance / Tolerance Standby Assist;Assistance of One;General Weakness

## 2017-08-28 NOTE — FLOWSHEET NOTE
08/28/17 1034   Interdisciplinary Plan of Care-Goals (Indications)   Obstructive Ventilatory Defect or Pulmonary Disease without Obvious Obstruction History / Diagnosis   Interdisciplinary Plan of Care-Outcomes    Bronchodilator Outcome Patient at Stable Baseline   RT Assessment of Delivered Medications   Evaluation of Medication Delivery Daily Yes-- Pt /Family has been Instructed in use of Respiratory Medications and Adverse Reactions   SVN Group   #SVN Performed Yes   Given By: Mouthpiece   Respiratory WDL   Respiratory (WDL) X   Chest Exam   Respiration 18   Heart Rate (Monitored) (!) 104   Breath Sounds   Pre/Post Intervention Pre Intervention Assessment   RUL Breath Sounds Diminished;Clear   RML Breath Sounds Diminished;Clear   RLL Breath Sounds Diminished   AMALIA Breath Sounds Diminished;Clear   LLL Breath Sounds Diminished   Oxygen   O2 (LPM) 3.5

## 2017-08-28 NOTE — FLOWSHEET NOTE
08/28/17 1627   Interdisciplinary Plan of Care-Goals (Indications)   Obstructive Ventilatory Defect or Pulmonary Disease without Obvious Obstruction History / Diagnosis   Interdisciplinary Plan of Care-Outcomes    Bronchodilator Outcome Patient at Stable Baseline   SVN Group   #SVN Performed Yes   Given By: Mouthpiece   Respiratory WDL   Respiratory (WDL) X   Chest Exam   Respiration 18   Heart Rate (Monitored) (!) 104   Breath Sounds   Pre/Post Intervention Pre Intervention Assessment   RUL Breath Sounds Clear;Diminished   RML Breath Sounds Clear;Diminished   RLL Breath Sounds Diminished   AMALIA Breath Sounds Diminished   LLL Breath Sounds Diminished   Oximetry   #Pulse Oximetry (Single Determination) Yes   Oxygen   Pulse Oximetry 90 %   O2 (LPM) 3.5   O2 Daily Delivery Respiratory  Silicone Nasal Cannula

## 2017-08-28 NOTE — FLOWSHEET NOTE
08/27/17 1913   Events/Summary/Plan   Non-Invasive Resp Device Site Inspection Completed Intact   Interdisciplinary Plan of Care-Goals (Indications)   Obstructive Ventilatory Defect or Pulmonary Disease without Obvious Obstruction History / Diagnosis;PFT / Reduced Airflow;Strong Subjective / Objective Improvement   Interdisciplinary Plan of Care-Outcomes    Bronchodilator Outcome Improvement in Airflow (peak flow, PFT);Improved Vital Signs and Measures of Gas Exchange;Patient at Stable Baseline   Education   Education Yes - Pt. / Family has been Instructed in use of Respiratory Equipment;Yes - Pt. / Family has been Instructed in use of Respiratory Medications and Adverse Reactions   RT Assessment of Delivered Medications   Evaluation of Medication Delivery Daily Yes-- Pt /Family has been Instructed in use of Respiratory Medications and Adverse Reactions   SVN Group   #SVN Performed Yes   Given By: Mouthpiece   MDI/DPI Group   #MDI/DPI Given MDI/DPI x 1   Respiratory WDL   Respiratory (WDL) X   Chest Exam   Work Of Breathing / Effort Mild   Respiration 20   Pulse (!) 109   Breath Sounds   Pre/Post Intervention Pre Intervention Assessment   RUL Breath Sounds Clear   RML Breath Sounds Fine Crackles   RLL Breath Sounds Fine Crackles   AMALIA Breath Sounds Clear   LLL Breath Sounds Fine Crackles   Secretions   Cough Non Productive;Strong;Dry   How Sputum Obtained Spontaneous   Sputum Amount Unable to Evaluate   Sputum Color Unable to Evaluate   Sputum Consistency Unable to Evaluate   Oximetry   #Pulse Oximetry (Single Determination) Yes   Oxygen   Home O2 Use Prior To Admission? No   Pulse Oximetry 94 %   O2 (LPM) 4   O2 Daily Delivery Respiratory  Silicone Nasal Cannula

## 2017-08-28 NOTE — CARE PLAN
Problem: Oxygenation:  Goal: Maintain adequate oxygenation dependent on patient condition  Outcome: PROGRESSING SLOWER THAN EXPECTED  Titrate and maintain saturation by pulse oximetry >90%. Current fiO2 4 lpm nasal cannula. Pt does not use home O2. Goals to titrate to room air     Intervention: Levels of oxygenation will improve to baseline  Pt on room air at baseline       Problem: Bronchoconstriction:  Goal: Improve in air movement and diminished wheezing  Outcome: PROGRESSING AS EXPECTED  No wheezing auscultated at this time      Improvement in airflow    Improved vital signs and measures of gas exchange    Improved patient appearance with subjective improvement of symptom alleviation after treatment.      Intervention: Implement inhaled treatments  Duoneb Q4 and PRN and Symbicor t BID    Spiriva will start in AM

## 2017-08-28 NOTE — FLOWSHEET NOTE
08/28/17 0651   Events/Summary/Plan   Non-Invasive Resp Device Site Inspection Completed Intact   Interdisciplinary Plan of Care-Goals (Indications)   Obstructive Ventilatory Defect or Pulmonary Disease without Obvious Obstruction History / Diagnosis;PFT / Reduced Airflow   Interdisciplinary Plan of Care-Outcomes    Bronchodilator Outcome Patient at Stable Baseline   Education   Education Yes - Pt. / Family has been Instructed in use of Respiratory Medications and Adverse Reactions;Yes - Pt. / Family has been Instructed in use of Respiratory Equipment   SVN Group   #SVN Performed Yes   Given By: Mouthpiece   Date SVN Last Changed 08/27/17   Date SVN Next Change Due (Q 7 Days) 09/03/17   MDI/DPI Group   #MDI/DPI Given MDI/DPI x 2   Respiratory WDL   Respiratory (WDL) WDL   Chest Exam   Respiration 18   Heart Rate (Monitored) (!) 102   Breath Sounds   Pre/Post Intervention Pre Intervention Assessment   RUL Breath Sounds Diminished   RML Breath Sounds Diminished   RLL Breath Sounds Diminished   AMALIA Breath Sounds Diminished   LLL Breath Sounds Diminished   Oximetry   #Pulse Oximetry (Single Determination) Yes   Oxygen   Home O2 Use Prior To Admission? No   Pulse Oximetry 92 %   O2 (LPM) 3.5   O2 Daily Delivery Respiratory  Silicone Nasal Cannula   Breath sounds with fine crackles on the right after TX,

## 2017-08-28 NOTE — PROGRESS NOTES
Report received. Care assumed. Resting in bed. Dialysis in progress, dialysis nurse at bedside. Alert and oriented. Respirations even and unlabored. NC in place. No c/o. Call light in reach.

## 2017-08-28 NOTE — PROGRESS NOTES
Resting on and off. Lab in for draw, tolerated well. No c/o. Repositions self. Calls as needed for assist to bathroom. Call light in reach.     Tele Report:  Sinus Tach  Rare PVC  -[phone]  0.16/ 0.08/ 0.40

## 2017-08-28 NOTE — PROGRESS NOTES
[facility] Nephrology Progress Note, Adult, Complex               Author: Caesar López Date & Time created: 8/28/2017  9:42 AM     Interval History:  The patient is a 67-year-old gentleman with past   medical history of end-stage renal disease, on hemodialysis Monday, Wednesday,   and Friday; history of COPD, congestive heart failure, renal osteodystrophy,   anemia of chronic kidney disease, hypertension who presented to the emergency   room at [facility] with complaints of shortness of breath that has   been getting progressively worse over the past 2 days.  He last had his   dialysis uneventfully on Friday.  He was recently discharged past several   months from [facility] with pneumonia.  Nephrology was   called to evaluate him for his ESRD and shortness of breath.    Daily Nephrology Summary:  8/28/17 - S/p PUF yesterday and tolerated well. Feels better today but still some mild SOB.    Review of Systems:  Review of Systems   Constitutional: Negative.    HENT: Negative.    Eyes: Negative.    Respiratory: Positive for shortness of breath.    Cardiovascular: Negative.    Gastrointestinal: Negative.    Musculoskeletal: Negative.    Skin: Negative.    Endo/Heme/Allergies: Negative.    Psychiatric/Behavioral: Negative.        Physical Exam:  Physical Exam   Constitutional: He is oriented to person, place, and time. He appears well-developed and well-nourished.   HENT:   Head: Normocephalic and atraumatic.   Eyes: EOM are normal. Pupils are equal, round, and reactive to light.   Neck: Normal range of motion. Neck supple.   Cardiovascular: Normal rate, regular rhythm and normal heart sounds.    Abdominal: Soft. Bowel sounds are normal.   Musculoskeletal: Normal range of motion.   Neurological: He is alert and oriented to person, place, and time.   Skin: Skin is warm and dry.   Nursing note and vitals reviewed.      Labs:  Recent Labs      08/27/17   1231  08/27/17   1552    ISTATSPEC  Venous  Venous     Recent Labs      17   1552  17   2135  17   0311   TROPONINI  0.06*  0.05*  0.05*     Recent Labs      17   1236  17   0311   SODIUM  135  134*   POTASSIUM  4.1  4.7   CHLORIDE  95*  91*   CO2     BUN  15  19   CREATININE  8.38*  10.05*   CALCIUM  8.8  9.0     Recent Labs      17   1236  17   0311   ALTSGPT  19   --    ASTSGOT  24   --    ALKPHOSPHAT  53   --    TBILIRUBIN  1.3   --    GLUCOSE  93  128*     Recent Labs      17   1236  17   0311   RBC  3.07*  3.05*   HEMOGLOBIN  9.6*  9.5*   HEMATOCRIT  28.9*  28.8*   PLATELETCT  210  219     Recent Labs      17   1236  17   0311   WBC  8.5  5.6   NEUTSPOLYS  79.30*   --    LYMPHOCYTES  11.40*   --    MONOCYTES  7.80   --    EOSINOPHILS  0.60   --    BASOPHILS  0.40   --    ASTSGOT  24   --    ALTSGPT  19   --    ALKPHOSPHAT  53   --    TBILIRUBIN  1.3   --            Hemodynamics:  Temp (24hrs), Av.6 °C (97.8 °F), Min:36.3 °C (97.4 °F), Max:36.7 °C (98.1 °F)  Temperature: 36.7 °C (98.1 °F)  Pulse  Av.5  Min: 103  Max: 123Heart Rate (Monitored): (!) 102  Blood Pressure : 133/78, NIBP: 153/96     Respiratory:    Respiration: 18, Pulse Oximetry: 93 %, O2 Daily Delivery Respiratory : Silicone Nasal Cannula     Given By:: Mouthpiece, #MDI/DPI Given: MDI/DPI x 2, Work Of Breathing / Effort: Mild  RUL Breath Sounds: Diminished, RML Breath Sounds: Diminished, RLL Breath Sounds: Diminished, AMALIA Breath Sounds: Diminished, LLL Breath Sounds: Diminished  Fluids:    Intake/Output Summary (Last 24 hours) at 17 0942  Last data filed at 17 0600   Gross per 24 hour   Intake             2020 ml   Output             3500 ml   Net            -1480 ml     Weight: 97 kg (213 lb 13.5 oz)  GI/Nutrition:  Orders Placed This Encounter   Procedures   • Diet Order     Standing Status:   Standing     Number of Occurrences:   1     Order Specific Question:   Diet:      Answer:   Regular [1]     Medical Decision Making, by Problem:  Active Hospital Problems    Diagnosis   • COPD exacerbation (CMS-HCC) [J44.1]   • Hypoxia [R09.02]   • Chest pain [R07.9]   • ESRD (end stage renal disease) on dialysis (CMS-HCC) [N18.6, Z99.2]   • CHF (congestive heart failure) (CMS-HCC) [I50.9]   • Normocytic anemia [D64.9]         Reviewed items::  Labs reviewed, Medications reviewed and Radiology images reviewed    ASSESSMENT:  1.  End-stage renal disease, on hemodialysis Monday, Wednesday, and Friday.  2.  Renal osteodystrophy.  3.  Anemia of chronic kidney disease.  4.  Volume overload/mild pulmonary congestion.  5.  Possible chronic obstructive pulmonary disease exacerbation, concurrent.  6.  History of hypertension.  7.  Recent pneumonia treated at [place], had methicillin-resistant   Staphylococcus aureus.    HD today  ? Whether SOB is COPD component more than CHF.  ECHO today

## 2017-08-28 NOTE — FLOWSHEET NOTE
08/28/17 0234   Events/Summary/Plan   Non-Invasive Resp Device Site Inspection Completed Intact   Interdisciplinary Plan of Care-Goals (Indications)   Obstructive Ventilatory Defect or Pulmonary Disease without Obvious Obstruction History / Diagnosis;PFT / Reduced Airflow;Physical Exam / Hyperinflation / Wheezing (bronchospasm)   Interdisciplinary Plan of Care-Outcomes    Bronchodilator Outcome Improvement in Airflow (peak flow, PFT);Improved Patient Appearance with Decreased use of Accessory Muscles;Improved Vital Signs and Measures of Gas Exchange   Education   Education Yes - Pt. / Family has been Instructed in use of Respiratory Equipment;Yes - Pt. / Family has been Instructed in use of Respiratory Medications and Adverse Reactions   RT Assessment of Delivered Medications   Evaluation of Medication Delivery Daily Yes-- Pt /Family has been Instructed in use of Respiratory Medications and Adverse Reactions   SVN Group   #SVN Performed Yes   Given By: Mouthpiece   Chest Exam   Work Of Breathing / Effort Mild   Respiration 18   Pulse (!) 103   Breath Sounds   Pre/Post Intervention Post Intervention Assessment   RUL Breath Sounds Diminished   RML Breath Sounds Diminished   RLL Breath Sounds Diminished   AMALIA Breath Sounds Diminished   LLL Breath Sounds Diminished   Secretions   Cough Non Productive   Oximetry   #Pulse Oximetry (Single Determination) Yes   Oxygen   Pulse Oximetry 96 %   O2 (LPM) 4   O2 Daily Delivery Respiratory  Silicone Nasal Cannula

## 2017-08-28 NOTE — CONSULTS
DATE OF SERVICE:  08/27/2017    REASON FOR CONSULTATION:  This is nephrology consultation for evaluation and   management of end-stage renal disease and shortness of breath.    REQUESTING PHYSICIAN:  From the emergency room at [facility].    HISTORY OF PRESENT ILLNESS:  The patient is a 67-year-old gentleman with past   medical history of end-stage renal disease, on hemodialysis Monday, Wednesday,   and Friday; history of COPD, congestive heart failure, renal osteodystrophy,   anemia of chronic kidney disease, hypertension who presented to the emergency   room at [facility] with complaints of shortness of breath that has   been getting progressively worse over the past 2 days.  He last had his   dialysis uneventfully on Friday.  He was recently discharged past several   months from [facility] with pneumonia.  Nephrology was   called to evaluate him for his ESRD and shortness of breath.    PAST MEDICAL HISTORY:  ESRD, on hemodialysis Monday, Wednesday, and Friday,   renal osteodystrophy, anemia of chronic kidney disease, hypertension, COPD,   congestive heart failure.    PAST SURGICAL HISTORY:  AV fistula creation, PermCath placement and AV fistula   revision.    MEDICATIONS:  Completely reviewed and is in the hospital record.    SOCIAL HISTORY:  He is a former smoker, 100-pack year.  He quit on 08/13/2017.    Does not drink alcohol or use IV drugs.    ALLERGIES:  CHLORAPREP CAUSES ITCHING.    FAMILY HISTORY:  Noncontributory for kidney disease.    REVIEW OF SYSTEMS:  PULMONARY:  He has mild dyspnea now, currently on hemodialysis, getting a pure   ultrafiltration done.  No cough or wheezing.  CARDIOVASCULAR:  No chest pain, pedal edema, orthopnea.  SKIN:  With no evidence of rash, pruritus.  GASTROINTESTINAL:  No nausea, vomiting, diarrhea or constipation.  MUSCULOSKELETAL:  Negative for falls or myalgias.  All other systems reviewed   and  negative.    PHYSICAL EXAMINATION:  VITAL SIGNS:  On examination, vitals revealing a blood pressure of 154/105   with a pulse of 114, temperature 36.6.  GENERAL:  He is lying in bed, no major hemodynamic distress.  HEENT:  Normocephalic, atraumatic.  NECK:  Supple, no JVD, no thyromegaly.  CHEST:  Clear bilaterally.  CARDIOVASCULAR:  S1, S2 heard.  ABDOMEN:  Soft, nontender, nondistended.  Bowel sounds are present.  EXTREMITIES:  Without any evidence of cyanosis, clubbing or edema.  SKIN:  With no evidence of rash, pruritus, or wounds.  NEUROLOGIC:  Nonfocal, moving all 4 extremities.    LABORATORY DATA:  White count 8.5, hemoglobin is 9.6, platelet count 210.    Sodium 135, potassium 4.1, chloride 95, 27 bicarbonate, glucose 93, BUN 15 and   creatinine of 8.3.  His albumin is 3.7.    ASSESSMENT:  1.  End-stage renal disease, on hemodialysis Monday, Wednesday, and Friday.  2.  Renal osteodystrophy.  3.  Anemia of chronic kidney disease.  4.  Volume overload/mild pulmonary congestion.  5.  Possible chronic obstructive pulmonary disease exacerbation, concurrent.  6.  History of hypertension.  7.  Recent pneumonia treated at [place], had methicillin-resistant   Staphylococcus aureus.    PLAN:  We will arrange for pure ultrafiltration today for 2-1/2 hours and try   to remove 3 liters as tolerated.  We will arrange for hemodialysis again   tomorrow per his regular Monday, Wednesday, and Friday schedule.    Thank you for allowing us to care for the patient.  We will follow him closely   with you.       ____________________________________     MD MIKIE KAUR / DIONNE    DD:  08/27/2017 19:00:35  DT:  08/27/2017 20:56:04    D#:  [id number]  Job#:  [id number]

## 2017-08-28 NOTE — PROGRESS NOTES
Resting on and off. Respirations even and unlabored. IV ABX hung and infusing without issue. Refused due heparin. No c/o. Call light in reach.

## 2017-08-28 NOTE — PROGRESS NOTES
Due medications given without issue. Dialysis continues without issue. No c/o. Call light in reach.

## 2017-08-28 NOTE — PROGRESS NOTES
"Pt states he is unable to swallow any food or liquid  \"everything gets stuck\"  Pt trying to induce vomiting by sticking end of toothbrush down his throat  MD notified  "

## 2017-08-28 NOTE — OR NURSING
1524- received report from JERMAINE Man.  Pt has OPA in place.  VSS.  1540-VSS.  Pt arouses to voice, VSS on non rebreather mask. Denies pain/nausea. abd distended, flatulance present.  BS Qx4.    1555- VSS.  Report called to Fidelina DEAN.  Pt meets criteria for transfer. Pt has upper denture and lower partial in mouth.

## 2017-08-28 NOTE — CARE PLAN
Problem: Safety  Goal: Will remain free from injury  Outcome: PROGRESSING AS EXPECTED  Oriented to room and equipment. Call light instructions given, in reach at all times. Slipper socks in place. Floor dry and uncluttered. Bed locked and in lowest position.     Problem: Knowledge Deficit  Goal: Knowledge of disease process/condition, treatment plan, diagnostic tests, and medications will improve  Outcome: PROGRESSING AS EXPECTED  Plan of care discussed with patient. Opportunity given for questions. States understanding.

## 2017-08-28 NOTE — PROGRESS NOTES
PUF for 2.5 hrs. UF 3 liters. Tolerated tx well. Good B/T from left UE AVF. He is alert. No complaint. No distress noted. Report given to his primary RN.

## 2017-08-28 NOTE — PROGRESS NOTES
Pt is able to swallow small amount of water but not all is getting through per pt  Again attempting to induce vomiting   Pt given a small amount of GI cocktail and instructed to allow it to slowly dissolve.    Pt offered hot coffee and inst to sip it slowly  Pt refuses states he can not get anything down

## 2017-08-28 NOTE — PROGRESS NOTES
"Sister states he is now sticking a metal fork down his throat trying to induce vomitting   Pt and sister again instructed to stop attempting vomiting  Pt notified that MD will be ordering xrays in order to find out what is obstructing his swallowing  Sister asking \"and when is that gonna happen ! \"  MD notified  "

## 2017-08-28 NOTE — CARE PLAN
Problem: Communication  Goal: The ability to communicate needs accurately and effectively will improve    Intervention: [place] patient and significant other/support system to call light to alert staff of needs   08/28/17 1044   OTHER   Oriented to: All of the Following : Location of Bathroom, Visiting Policy, Unit Routine, Call Light and Bedside Controls, Bedside Rail Policy, Smoking Policy, Rights and Responsibilities, Bedside Report, and Patient Education Notebook

## 2017-08-28 NOTE — PROGRESS NOTES
Resting with eyes closed. Respirations even and unlabored. NC in place. IV ABX infused without issue. No c/o. Call light in reach.

## 2017-08-28 NOTE — FLOWSHEET NOTE
08/27/17 2236   Events/Summary/Plan   Non-Invasive Resp Device Site Inspection Completed Intact   Interdisciplinary Plan of Care-Goals (Indications)   Obstructive Ventilatory Defect or Pulmonary Disease without Obvious Obstruction History / Diagnosis;PFT / Reduced Airflow;Physical Exam / Hyperinflation / Wheezing (bronchospasm)   Interdisciplinary Plan of Care-Outcomes    Bronchodilator Outcome Improvement in Airflow (peak flow, PFT);Improved Patient Appearance with Decreased use of Accessory Muscles;Improved Vital Signs and Measures of Gas Exchange   Education   Education Yes - Pt. / Family has been Instructed in use of Respiratory Equipment;Yes - Pt. / Family has been Instructed in use of Respiratory Medications and Adverse Reactions   RT Assessment of Delivered Medications   Evaluation of Medication Delivery Daily Yes-- Pt /Family has been Instructed in use of Respiratory Medications and Adverse Reactions   SVN Group   #SVN Performed Yes   Given By: Mouthpiece   Chest Exam   Work Of Breathing / Effort Mild   Respiration 20   Pulse (!) 109   Breath Sounds   Pre/Post Intervention Pre Intervention Assessment   RUL Breath Sounds Diminished   RML Breath Sounds Diminished   RLL Breath Sounds Diminished   AMALIA Breath Sounds Diminished   LLL Breath Sounds Diminished   Secretions   Cough Non Productive   Oximetry   #Pulse Oximetry (Single Determination) Yes   Oxygen   Pulse Oximetry 94 %   O2 (LPM) 4   O2 Daily Delivery Respiratory  Silicone Nasal Cannula

## 2017-08-28 NOTE — OR NURSING
1454 received from endo  resp spont Dr Collazo inserted oral airway    nrb mask in place  abd very round but soft  1520  Report to Moshe

## 2017-08-28 NOTE — CATH LAB
1403 pt to pre op to assume care. 1419 Patient allergies and NPO status verified, home medication reconciliation completed and belongings secured. Patient verbalizes understanding of pain scale, expected course of stay and plan of care. Surgical site verified with patient. IVF established.

## 2017-08-28 NOTE — OP REPORT
DATE OF SERVICE:  08/28/2017    PROCEDURE PERFORMED:  Esophagogastroduodenoscopy with esophageal food   disimpaction.    INDICATION FOR PROCEDURE:  Acute esophageal obstruction.    POSTPROCEDURE DIAGNOSES:  1.  Esophagogastroduodenoscopy revealing food obstruction involving the entire   esophagus with disimpaction utilizing a Keller net basket removing the majority   of the food out through the mouth.  2.  Appreciation of an 8 cm distal esophageal Kelly-Solorio tear 8x2 cm with   active oozing slowing down.    PHYSICIAN:  Pankaj Choudhury MD    ANESTHESIOLOGIST:  Lázaro Collazo MD    MEDICATIONS:  General anesthesia.    CONSENT:  Procedure risks and benefits reviewed thoroughly with the patient,   risks including but not limited to bleeding, perforation, side effects of   medication were informed.  Patient voiced understanding and agreed to proceed.    DESCRIPTION OF PROCEDURE:  Patient was maintained in the supine position.    After intubation and sedation, a forward viewing gastroscope was passed   carefully and easily under direct vision into the esophagus.  The entire   esophagus was full of food.  Utilizing a 32 mm Keller net basket, the food was   removed sequentially out through the mouth.  After which a therapeutic   gastroscope was advanced and distally some of the food was pushed into the   stomach.  From 35-43 cm, there was an approximately 7-8x2 cm Kelly-Solorio   tear with oozing throughout.  There was no active bleeding and the oozing was   slowing down.  The stomach revealed no murtaza abnormalities, but it was full of   food, blood and debris, so clear visualization was incomplete.  Stomach was   suctioned, desufflated, the scope was removed.    COMPLICATIONS:  None.    BLOOD LOSS:  5 mL.    SPECIMENS:  None.    RECOMMENDATIONS:  1.  Acute esophageal food obstruction, resolved by disimpaction.  2.  Appreciation of very large Kelly-Solorio tear.  For this type of tear,   options for treatment considering  the amount of oozing was minimal would be   n.p.o. status for the next 24-48 hours, Carafate liquid formulation every 6   hours, Protonix bolus with drip for the next 24-48 hours, discontinuation   methylprednisolone to accelerate healing, keeping the head of the bed greater   than 30 degrees to avoid reflux episodes and as well as educating the patient   to avoid self-induced vomiting as he was very aggressive with this when this   impaction occurred earlier this morning.  The above plan was reviewed   thoroughly with the patient's hospitalist, Dr. Soni.  All orders are   present in the electronic medical record.  We will follow up with the patient   tomorrow.       ____________________________________     Pankaj MD LAURA Solitario / NTS    DD:  08/28/2017 14:58:10  DT:  08/28/2017 16:58:42    D#:  [id number]  Job#:  [id number]

## 2017-08-29 LAB
LV EJECT FRACT  99904: 40
LV EJECT FRACT MOD 2C 99903: 30.26
LV EJECT FRACT MOD 4C 99902: 39.61
LV EJECT FRACT MOD BP 99901: 32.02

## 2017-08-29 PROCEDURE — C9113 INJ PANTOPRAZOLE SODIUM, VIA: HCPCS | Performed by: INTERNAL MEDICINE

## 2017-08-29 PROCEDURE — A9270 NON-COVERED ITEM OR SERVICE: HCPCS | Performed by: INTERNAL MEDICINE

## 2017-08-29 PROCEDURE — 700102 HCHG RX REV CODE 250 W/ 637 OVERRIDE(OP): Performed by: INTERNAL MEDICINE

## 2017-08-29 PROCEDURE — 700111 HCHG RX REV CODE 636 W/ 250 OVERRIDE (IP): Performed by: INTERNAL MEDICINE

## 2017-08-29 PROCEDURE — 93306 TTE W/DOPPLER COMPLETE: CPT

## 2017-08-29 PROCEDURE — 93306 TTE W/DOPPLER COMPLETE: CPT | Mod: 26 | Performed by: INTERNAL MEDICINE

## 2017-08-29 PROCEDURE — 770020 HCHG ROOM/CARE - TELE (206)

## 2017-08-29 PROCEDURE — 99232 SBSQ HOSP IP/OBS MODERATE 35: CPT | Performed by: INTERNAL MEDICINE

## 2017-08-29 PROCEDURE — 94760 N-INVAS EAR/PLS OXIMETRY 1: CPT

## 2017-08-29 PROCEDURE — 700105 HCHG RX REV CODE 258: Performed by: INTERNAL MEDICINE

## 2017-08-29 PROCEDURE — 90935 HEMODIALYSIS ONE EVALUATION: CPT

## 2017-08-29 PROCEDURE — 94640 AIRWAY INHALATION TREATMENT: CPT

## 2017-08-29 PROCEDURE — 700101 HCHG RX REV CODE 250: Performed by: HOSPITALIST

## 2017-08-29 RX ORDER — IPRATROPIUM BROMIDE AND ALBUTEROL SULFATE 2.5; .5 MG/3ML; MG/3ML
3 SOLUTION RESPIRATORY (INHALATION)
Status: DISCONTINUED | OUTPATIENT
Start: 2017-08-29 | End: 2017-08-29

## 2017-08-29 RX ORDER — BUDESONIDE AND FORMOTEROL FUMARATE DIHYDRATE 160; 4.5 UG/1; UG/1
2 AEROSOL RESPIRATORY (INHALATION) 2 TIMES DAILY
Status: DISCONTINUED | OUTPATIENT
Start: 2017-08-29 | End: 2017-08-30 | Stop reason: HOSPADM

## 2017-08-29 RX ORDER — TIOTROPIUM BROMIDE 18 UG/1
1 CAPSULE ORAL; RESPIRATORY (INHALATION) DAILY
Status: DISCONTINUED | OUTPATIENT
Start: 2017-08-30 | End: 2017-08-30 | Stop reason: HOSPADM

## 2017-08-29 RX ORDER — IPRATROPIUM BROMIDE AND ALBUTEROL SULFATE 2.5; .5 MG/3ML; MG/3ML
3 SOLUTION RESPIRATORY (INHALATION)
Status: DISCONTINUED | OUTPATIENT
Start: 2017-08-29 | End: 2017-08-30 | Stop reason: HOSPADM

## 2017-08-29 RX ADMIN — CINACALCET HYDROCHLORIDE 60 MG: 30 TABLET, COATED ORAL at 20:50

## 2017-08-29 RX ADMIN — SUCRALFATE 1 G: 1 SUSPENSION ORAL at 11:37

## 2017-08-29 RX ADMIN — TIOTROPIUM BROMIDE 1 CAPSULE: 18 CAPSULE ORAL; RESPIRATORY (INHALATION) at 06:46

## 2017-08-29 RX ADMIN — MORPHINE SULFATE 2 MG: 4 INJECTION INTRAVENOUS at 08:22

## 2017-08-29 RX ADMIN — SUCRALFATE 1 G: 1 SUSPENSION ORAL at 05:26

## 2017-08-29 RX ADMIN — IPRATROPIUM BROMIDE AND ALBUTEROL SULFATE 3 ML: .5; 3 SOLUTION RESPIRATORY (INHALATION) at 10:28

## 2017-08-29 RX ADMIN — ROPINIROLE HYDROCHLORIDE 1 MG: 1 TABLET, FILM COATED ORAL at 20:50

## 2017-08-29 RX ADMIN — SODIUM CHLORIDE 8 MG/HR: 9 INJECTION, SOLUTION INTRAVENOUS at 14:32

## 2017-08-29 RX ADMIN — BUDESONIDE AND FORMOTEROL FUMARATE DIHYDRATE 2 PUFF: 160; 4.5 AEROSOL RESPIRATORY (INHALATION) at 06:46

## 2017-08-29 RX ADMIN — BUDESONIDE AND FORMOTEROL FUMARATE DIHYDRATE 2 PUFF: 160; 4.5 AEROSOL RESPIRATORY (INHALATION) at 20:50

## 2017-08-29 RX ADMIN — IPRATROPIUM BROMIDE AND ALBUTEROL SULFATE 3 ML: .5; 3 SOLUTION RESPIRATORY (INHALATION) at 06:46

## 2017-08-29 RX ADMIN — SUCRALFATE 1 G: 1 SUSPENSION ORAL at 17:56

## 2017-08-29 RX ADMIN — SODIUM CHLORIDE 8 MG/HR: 9 INJECTION, SOLUTION INTRAVENOUS at 03:06

## 2017-08-29 ASSESSMENT — ENCOUNTER SYMPTOMS
CHILLS: 0
CONSTITUTIONAL NEGATIVE: 1
ABDOMINAL PAIN: 0
BRUISES/BLEEDS EASILY: 0
PALPITATIONS: 0
NAUSEA: 0
SHORTNESS OF BREATH: 0
MYALGIAS: 0
DIAPHORESIS: 0
FEVER: 0
COUGH: 0
SHORTNESS OF BREATH: 1
PSYCHIATRIC NEGATIVE: 1
GASTROINTESTINAL NEGATIVE: 1
DIZZINESS: 0
MUSCULOSKELETAL NEGATIVE: 1
HEADACHES: 0
VOMITING: 0
CARDIOVASCULAR NEGATIVE: 1
EYES NEGATIVE: 1

## 2017-08-29 ASSESSMENT — PAIN SCALES - GENERAL
PAINLEVEL_OUTOF10: 2
PAINLEVEL_OUTOF10: 4
PAINLEVEL_OUTOF10: 0
PAINLEVEL_OUTOF10: 7

## 2017-08-29 NOTE — FLOWSHEET NOTE
08/29/17 1028   Interdisciplinary Plan of Care-Goals (Indications)   Obstructive Ventilatory Defect or Pulmonary Disease without Obvious Obstruction History / Diagnosis   Interdisciplinary Plan of Care-Outcomes    Bronchodilator Outcome Patient at Stable Baseline   Education   Education Yes - Pt. / Family has been Instructed in use of Respiratory Medications and Adverse Reactions;Yes - Pt. / Family has been Instructed in use of Respiratory Equipment   RT Assessment of Delivered Medications   Evaluation of Medication Delivery Daily Yes-- Pt /Family has been Instructed in use of Respiratory Medications and Adverse Reactions   SVN Group   #SVN Performed Yes   Given By: Mouthpiece   Respiratory WDL   Respiratory (WDL) X   Chest Exam   Respiration 16   Heart Rate (Monitored) 100   Breath Sounds   Pre/Post Intervention Pre Intervention Assessment   RUL Breath Sounds Diminished;Clear   RML Breath Sounds Diminished   RLL Breath Sounds Diminished   AMALIA Breath Sounds Diminished;Clear   LLL Breath Sounds Diminished   Oximetry   #Pulse Oximetry (Single Determination) Yes   Oxygen   Pulse Oximetry 93 %   O2 (LPM) 3   O2 Daily Delivery Respiratory  Silicone Nasal Cannula

## 2017-08-29 NOTE — PROGRESS NOTES
Resting in bed. Alert and oriented. C/o back pain and headache. MD paged, orders received. Medicated per MAR. Dialysis complete. VSS. Call light in reach.

## 2017-08-29 NOTE — PROGRESS NOTES
Resting with eyes closed after pain medication given. IV infusing without issue. Repositions self. Respirations even and unlabored. NC in place. Call light in reach.

## 2017-08-29 NOTE — PROGRESS NOTES
Gastroenterology Progress Note    Date of Service: 08/29/2017    Subjective: Pt reports that he is doing well today. No new complaints other than hunger      Objective:    Vitals -   Vitals:    08/28/17 2347 08/29/17 0529 08/29/17 0649 08/29/17 0744   BP: 127/81 134/85  136/94   Pulse: (!) 110 100 96 (!) 105   Resp: 20 20 20 20   Temp: 36.7 °C (98 °F) 36.6 °C (97.9 °F)  36.3 °C (97.3 °F)   SpO2: 90% 92% 95% 96%   Weight:       Height:           Gen: AAOx3, NAD  HEENT: anicteric, Nares patent, MMM  CVS: regular rhythm, normal rate  Pulm: CTAB, no wheezes  Abd: soft Nt, Nd, normal bowel sounds  Ext: no edema    Labs:  Lab Results   Component Value Date/Time    SODIUM 134 (L) 08/28/2017 03:11 AM    POTASSIUM 4.7 08/28/2017 03:11 AM    CHLORIDE 91 (L) 08/28/2017 03:11 AM    CO2 28 08/28/2017 03:11 AM    GLUCOSE 128 (H) 08/28/2017 03:11 AM    BUN 19 08/28/2017 03:11 AM    CREATININE 10.05 (HH) 08/28/2017 03:11 AM      Lab Results   Component Value Date/Time    WBC 5.6 08/28/2017 03:11 AM    RBC 3.05 (L) 08/28/2017 03:11 AM    HEMOGLOBIN 9.5 (L) 08/28/2017 03:11 AM    HEMATOCRIT 28.8 (L) 08/28/2017 03:11 AM    MCV 94.4 08/28/2017 03:11 AM    MCH 31.1 08/28/2017 03:11 AM    MCHC 33.0 (L) 08/28/2017 03:11 AM    MPV 9.4 08/28/2017 03:11 AM    NEUTSPOLYS 79.30 (H) 08/27/2017 12:36 PM    LYMPHOCYTES 11.40 (L) 08/27/2017 12:36 PM    MONOCYTES 7.80 08/27/2017 12:36 PM    EOSINOPHILS 0.60 08/27/2017 12:36 PM    BASOPHILS 0.40 08/27/2017 12:36 PM      No results found for: PROTHROMBTM, INR     Assessment: This is a 68 yo male with ESRD who was admitted for SOB due to volume overload and COPD. He developed acute esophageal food obstruction. Now s/p EGD and food removal. A large tear was identified in the esophagus after food was removed.    Recommendations:  1) Ok for clear liquids only today  2) continue PPI infusion for 24 more hours  3) would start soft diet tomorrow. Pt will need soft diet for  at least 1 week  4) Recommend outpatient EGD in 1 month to assess esophagus and to consider dilation to ensure there are no other food impactions.

## 2017-08-29 NOTE — PROGRESS NOTES
[facility] Hospitalist Progress Note    Date of Service: 2017    Chief Complaint  67 y.o. male admitted 2017 with shortness of breath and evidence of fluid overload.    Interval Problem Update  He is breathing easier today after dialysis.    Consultants/Specialty  GI   nephrology    Disposition  Home        Review of Systems   Constitutional: Negative for chills, diaphoresis and fever.   Respiratory: Negative for cough and shortness of breath.    Cardiovascular: Negative for chest pain, palpitations and leg swelling.   Gastrointestinal: Negative for abdominal pain, nausea and vomiting.   Genitourinary: Negative for frequency and urgency.   Musculoskeletal: Negative for myalgias.   Skin: Negative for rash.   Neurological: Negative for dizziness and headaches.   Endo/Heme/Allergies: Does not bruise/bleed easily.      Physical Exam  Laboratory/Imaging   Hemodynamics  Temp (24hrs), Av.4 °C (97.6 °F), Min:36.1 °C (97 °F), Max:36.7 °C (98 °F)   Temperature: 36.3 °C (97.3 °F)  Pulse  Av.8  Min: 96  Max: 123 Heart Rate (Monitored): 100  Blood Pressure : 128/82      Respiratory      Respiration: 20, Pulse Oximetry: 93 %, O2 Daily Delivery Respiratory : Silicone Nasal Cannula     Given By:: Mouthpiece, #MDI/DPI Given: MDI/DPI x 2, Work Of Breathing / Effort: Mild  RUL Breath Sounds: Diminished;Clear, RML Breath Sounds: Diminished, RLL Breath Sounds: Diminished, AMALIA Breath Sounds: Diminished;Clear, LLL Breath Sounds: Diminished    Fluids    Intake/Output Summary (Last 24 hours) at 17 1458  Last data filed at 17 1300   Gross per 24 hour   Intake             1350 ml   Output             2000 ml   Net             -650 ml       Nutrition  Orders Placed This Encounter   Procedures   • DIET ORDER     Standing Status:   Standing     Number of Occurrences:   1     Order Specific Question:   Diet:     Answer:   Clear Liquid [10]     Physical Exam   Constitutional: He is oriented to person, place, and time.  No distress.   HENT:   Mouth/Throat: Oropharynx is clear and moist.   Eyes: Conjunctivae are normal. No scleral icterus.   Neck: Neck supple.   Cardiovascular: Normal rate, regular rhythm and intact distal pulses.    Pulmonary/Chest: No respiratory distress. He has no wheezes. He has no rales.   Abdominal: Soft. Bowel sounds are normal. He exhibits no distension.   Musculoskeletal: He exhibits no edema.   Neurological: He is alert and oriented to person, place, and time.   Skin: Skin is warm and dry. He is not diaphoretic.   Nursing note and vitals reviewed.      Recent Labs      08/27/17   1236  08/28/17   0311   WBC  8.5  5.6   RBC  3.07*  3.05*   HEMOGLOBIN  9.6*  9.5*   HEMATOCRIT  28.9*  28.8*   MCV  94.1  94.4   MCH  31.3  31.1   MCHC  33.2*  33.0*   RDW  57.8*  57.1*   PLATELETCT  210  219   MPV  9.5  9.4     Recent Labs      08/27/17   1236  08/28/17   0311   SODIUM  135  134*   POTASSIUM  4.1  4.7   CHLORIDE  95*  91*   CO2  27  28   GLUCOSE  93  128*   BUN  15  19   CREATININE  8.38*  10.05*   CALCIUM  8.8  9.0                      Assessment/Plan     Hypoxia   Assessment & Plan    - Due to COPD exacerbation as well as fluid overload  Now improving, may need home oxygen        COPD exacerbation (CMS-HCC)   Assessment & Plan    -improved with dialysis  Respiratory therapy  Oxygen need at 3 liters today, patient on no home oxygen        Esophageal obstruction due to food impaction   Assessment & Plan    Clear liquid diet today  Move to GI soft diet tomorrow  ppi therapy        Normocytic anemia   Assessment & Plan    - No sign of gross bleeding        CHF (congestive heart failure) (CMS-HCC)   Assessment & Plan    -resolved with dialysis and fluid removal        ESRD (end stage renal disease) on dialysis (CMS-HCC)   Assessment & Plan    - Nephrology has been consultation  Dialysis today        Chest pain   Assessment & Plan    - improved after dialuysis  Workup negative  Kelly ruvalcaba tear noted on  endoscopy  Will follow up with GI in one week after discharge            Reviewed items::  Labs reviewed and Medications reviewed  Wylie catheter::  No Wylie  DVT prophylaxis pharmacological::  Heparin  Ulcer Prophylaxis::  Yes

## 2017-08-29 NOTE — PROGRESS NOTES
Report received. Care assumed. Resting in bed. Alert and oriented. Aware of NPO status. Upset cannot have anything to drink. Dialysis in progress. IV infusing without issue. Call light in reach.

## 2017-08-29 NOTE — FLOWSHEET NOTE
08/29/17 0649   Interdisciplinary Plan of Care-Goals (Indications)   Obstructive Ventilatory Defect or Pulmonary Disease without Obvious Obstruction History / Diagnosis   Interdisciplinary Plan of Care-Outcomes    Bronchodilator Outcome Patient at Stable Baseline   RT Assessment of Delivered Medications   Evaluation of Medication Delivery Daily Yes-- Pt /Family has been Instructed in use of Respiratory Medications and Adverse Reactions   SVN Group   #SVN Performed Yes   Given By: Mouthpiece   Date SVN Last Changed 08/27/17   Date SVN Next Change Due (Q 7 Days) 09/03/17   MDI/DPI Group   #MDI/DPI Given MDI/DPI x 2   Respiratory WDL   Respiratory (WDL) X   Chest Exam   Respiration 20   Pulse 96   Breath Sounds   Pre/Post Intervention Pre Intervention Assessment   RUL Breath Sounds Clear   RML Breath Sounds Diminished   RLL Breath Sounds Diminished   AMALIA Breath Sounds Clear   LLL Breath Sounds Diminished   Secretions   Cough Dry;Non Productive   How Sputum Obtained Spontaneous   Oximetry   #Pulse Oximetry (Single Determination) Yes   Oxygen   Pulse Oximetry 95 %   O2 (LPM) 4   O2 Daily Delivery Respiratory  Silicone Nasal Cannula

## 2017-08-29 NOTE — PROGRESS NOTES
Continues to rest. Respirations even and unlabored. Repositions self. IV infusing without issue. No further c/o pain. Call light in reach.     Tele Report:   Sinus Tach   -115  Occasional PVC  0.14/ 0.08/ 0.38

## 2017-08-29 NOTE — CARE PLAN
Problem: Knowledge Deficit  Goal: Knowledge of the prescribed therapeutic regimen will improve  Outcome: PROGRESSING AS EXPECTED  Discussed current NPO status and rationale, states understanding.     Problem: Pain Management  Goal: Pain level will decrease to patient's comfort goal  Outcome: PROGRESSING AS EXPECTED  Uses 0-10 scale appropriately. Pharmacologic and nonpharmacologic interventions in place.

## 2017-08-29 NOTE — CONSULTS
DATE OF SERVICE:  08/28/2017    REFERRING PHYSICIAN:  Dr. Soni.    REASON FOR CONSULTATION:  Acute food obstruction.    HISTORY OF PRESENT ILLNESS:  This is a 67-year-old gentleman who reports after   eating breakfast this morning and swallowing his [place] toast, he has acute   food obstruction with inability to handle liquids as well as saliva.  He feels   that when he continues to drink, there is significant delay in the upper   esophagus.  He reports history of this occurring more frequently over the past   several months.  He reports a 5-pound weight loss.  He has a personal history   of 100-pack-year history of tobacco consumption discontinued in _____ of this   year, 2 weeks ago.  Patient was admitted to the hospital for a non-GI issue,   specifically for evaluation and treatment of shortness of breath secondary to   fluid overload due to end-stage renal disease requiring dialysis and COPD   exacerbation secondary to fluid overload.  Patient's hospitalization was   uncomplicated.  His pulmonary status improved.  He was evaluated by   nephrology, who performed ultrafiltration yesterday with approximately 3   liters removed.    PAST MEDICAL HISTORY:  1.  Gastroesophageal reflux disease.  2.  End-stage renal disease, on hemodialysis Monday, Wednesday, and Friday.  3.  Renal osteodystrophy.  4.  Anemia of chronic disease.  5.  Hypertension.  6.  COPD.  7.  Congestive heart failure.    PAST SURGICAL HISTORY:  Perm-A-Cath placement, AV fistula revision.    MEDICATIONS:  As an inpatient, Unasyn, aspirin, Zithromax, Symbicort,   Sensipar, heparin, ipratropium, methylprednisolone, Requip, Senokot, Renagel,   Spiriva.    ALLERGIES:  TO CHLORAPREP.    FAMILY HISTORY:  Noncontributory for acute esophageal obstruction.    SOCIAL HISTORY:  A 100-pack-year tobacco consumption discontinued 8/13/2017.    No alcohol, no illicit drug use.    REVIEW OF SYSTEMS:  CONSTITUTIONAL:  No nausea, vomiting, fevers or  chills.  CARDIOVASCULAR:  No chest pain, palpitations.  PULMONARY:  No shortness of breath, dyspnea on exertion.  GASTROINTESTINAL:  As above.  DERMATOLOGIC:  No rash or ulcer.  GENITOURINARY:  No dysuria, hematuria.    LABORATORY DATA:  White blood cell count is 5.6, H and H of 9.5/28.8, platelet   count 219.  Sodium is 134, potassium 4.7, chloride 91, BUN and creatinine of   19/10.05.    IMPRESSION:  This is a 67-year-old gentleman with acute esophageal food   obstruction with history of gastroesophageal reflux disease and history of   100-pack-year history of tobacco, who presented to the hospital for shortness   of breath secondary to fluid overload secondary to end-stage renal disease and   chronic obstructive pulmonary disease exacerbation, now well controlled after   appropriate treatment with ultrafiltration performed yesterday whose risk   factors for esophageal obstruction include reflux disease with esophagitis,   squamous cell carcinoma secondary to tobacco consumption, adenocarcinoma   secondary to tobacco consumption, gastroesophageal reflux disease, Zenker's   diverticulum versus _____ esophagitis versus stricture web.  Risks and   benefits of endoscopic evaluation reviewed thoroughly with the patient, risks   including but not limited to bleeding, perforation, side effects of medication   were informed.  Patient voiced understanding and agreed to proceed.  Patient   is at higher risk due to his end-stage renal disease concomitant   hospitalization for fluid overload, chronic obstructive pulmonary disease   exacerbation, concomitant anticoagulant use that could not be stopped,   concomitant steroid use.  Risks of bleeding and perforation were reviewed.    Patient voiced understanding but ultimately will need to perform endoscopic   evaluation for disimpaction and further evaluation and treatment depending   findings.  Patient voiced understanding.       ____________________________________     Pankaj M.  MD LAURA Choudhury / DIONNE    DD:  08/28/2017 14:07:42  DT:  08/28/2017 17:35:30    D#:  [id number]  Job#:  [id number]

## 2017-08-29 NOTE — CARE PLAN
Problem: Communication  Goal: The ability to communicate needs accurately and effectively will improve    Intervention: Eliot patient and significant other/support system to call light to alert staff of needs   08/29/17 1125   OTHER   Oriented to: All of the Following : Location of Bathroom, Visiting Policy, Unit Routine, Call Light and Bedside Controls, Bedside Rail Policy, Smoking Policy, Rights and Responsibilities, Bedside Report, and Patient Education Notebook

## 2017-08-29 NOTE — FLOWSHEET NOTE
08/28/17 1946   Events/Summary/Plan   Events/Summary/Plan svn mdi   Interdisciplinary Plan of Care-Goals (Indications)   Obstructive Ventilatory Defect or Pulmonary Disease without Obvious Obstruction History / Diagnosis   Interdisciplinary Plan of Care-Outcomes    Bronchodilator Outcome Patient at Stable Baseline   Education   Education Yes - Pt. / Family has been Instructed in use of Respiratory Equipment;Yes - Pt. / Family has been Instructed in use of Respiratory Medications and Adverse Reactions   RT Assessment of Delivered Medications   Evaluation of Medication Delivery Daily Yes-- Pt /Family has been Instructed in use of Respiratory Medications and Adverse Reactions   SVN Group   #SVN Performed Yes   Given By: Mouthpiece   MDI/DPI Group   #MDI/DPI Given MDI/DPI x 1   Respiratory WDL   Respiratory (WDL) X   Chest Exam   Pulse (!) 102   Breath Sounds   Pre/Post Intervention Post Intervention Assessment   RUL Breath Sounds Clear;Diminished   RML Breath Sounds Clear;Diminished   RLL Breath Sounds Diminished   AMALIA Breath Sounds Diminished   LLL Breath Sounds Diminished   Oximetry   #Pulse Oximetry (Single Determination) Yes   Oxygen   O2 (LPM) 95   O2 Daily Delivery Respiratory  Silicone Nasal Cannula

## 2017-08-29 NOTE — PROGRESS NOTES
Patient tolerated his HD today. 3.5 hrs with 1500 net UF. L UE AVF with good B/T. Report given to his primary RN. Dialysis RN waited for 90 minutes for patient to return from procedure.

## 2017-08-29 NOTE — PROGRESS NOTES
[facility] Nephrology Progress Note, Adult, Complex               Author: Caesar López Date & Time created: 8/29/2017  9:48 AM     Interval History:  The patient is a 67-year-old gentleman with past   medical history of end-stage renal disease, on hemodialysis Monday, Wednesday,   and Friday; history of COPD, congestive heart failure, renal osteodystrophy,   anemia of chronic kidney disease, hypertension who presented to the emergency   room at [facility] with complaints of shortness of breath that has   been getting progressively worse over the past 2 days.  He last had his   dialysis uneventfully on Friday.  He was recently discharged past several   months from [facility] with pneumonia.  Nephrology was   called to evaluate him for his ESRD and shortness of breath.    Daily Nephrology Summary:  8/28/17 - S/p PUF yesterday and tolerated well. Feels better today but still some mild SOB.  8/29/17 - 1.5 L UF yesterday without complications. S/p Kelly Solorio Tear on EGD.    Review of Systems:  Review of Systems   Constitutional: Negative.    HENT: Negative.    Eyes: Negative.    Respiratory: Positive for shortness of breath.    Cardiovascular: Negative.    Gastrointestinal: Negative.    Musculoskeletal: Negative.    Skin: Negative.    Endo/Heme/Allergies: Negative.    Psychiatric/Behavioral: Negative.        Physical Exam:  Physical Exam   Constitutional: He is oriented to person, place, and time. He appears well-developed and well-nourished.   HENT:   Head: Normocephalic and atraumatic.   Eyes: EOM are normal. Pupils are equal, round, and reactive to light.   Neck: Normal range of motion. Neck supple.   Cardiovascular: Normal rate, regular rhythm and normal heart sounds.    Abdominal: Soft. Bowel sounds are normal.   Musculoskeletal: Normal range of motion.   Neurological: He is alert and oriented to person, place, and time.   Skin: Skin is warm and dry.   Nursing note and  vitals reviewed.      Labs:  Recent Labs      17   1231  17   1552   ISTATSPEC  Venous  Venous     Recent Labs      17   1552  17   2135  17   0311   TROPONINI  0.06*  0.05*  0.05*     Recent Labs      17   1236  17   0311   SODIUM  135  134*   POTASSIUM  4.1  4.7   CHLORIDE  95*  91*   CO2  27  28   BUN  15  19   CREATININE  8.38*  10.05*   CALCIUM  8.8  9.0     Recent Labs      17   1236  17   0311   ALTSGPT  19   --    ASTSGOT  24   --    ALKPHOSPHAT  53   --    TBILIRUBIN  1.3   --    GLUCOSE  93  128*     Recent Labs      17   1236  17   0311   RBC  3.07*  3.05*   HEMOGLOBIN  9.6*  9.5*   HEMATOCRIT  28.9*  28.8*   PLATELETCT  210  219     Recent Labs      17   1236  17   0311   WBC  8.5  5.6   NEUTSPOLYS  79.30*   --    LYMPHOCYTES  11.40*   --    MONOCYTES  7.80   --    EOSINOPHILS  0.60   --    BASOPHILS  0.40   --    ASTSGOT  24   --    ALTSGPT  19   --    ALKPHOSPHAT  53   --    TBILIRUBIN  1.3   --            Hemodynamics:  Temp (24hrs), Av.4 °C (97.5 °F), Min:36 °C (96.8 °F), Max:36.7 °C (98 °F)  Temperature: 36.3 °C (97.3 °F)  Pulse  Av.9  Min: 96  Max: 123Heart Rate (Monitored): (!) 104  Blood Pressure : 136/94     Respiratory:    Respiration: 20, Pulse Oximetry: 96 %, O2 Daily Delivery Respiratory : Silicone Nasal Cannula     Given By:: Mouthpiece, #MDI/DPI Given: MDI/DPI x 2, Work Of Breathing / Effort: Mild  RUL Breath Sounds: Clear, RML Breath Sounds: Diminished, RLL Breath Sounds: Diminished, AMALIA Breath Sounds: Clear, LLL Breath Sounds: Diminished  Fluids:    Intake/Output Summary (Last 24 hours) at 17 0948  Last data filed at 17 0600   Gross per 24 hour   Intake              780 ml   Output             2000 ml   Net            -1220 ml        GI/Nutrition:  Orders Placed This Encounter   Procedures   • DIET ORDER     Standing Status:   Standing     Number of Occurrences:   1     Order Specific  Question:   Diet:     Answer:   Clear Liquid [10]     Medical Decision Making, by Problem:  Active Hospital Problems    Diagnosis   • COPD exacerbation (CMS-HCC) [J44.1]   • Hypoxia [R09.02]   • Chest pain [R07.9]   • ESRD (end stage renal disease) on dialysis (CMS-HCC) [N18.6, Z99.2]   • CHF (congestive heart failure) (CMS-HCC) [I50.9]   • Normocytic anemia [D64.9]         Reviewed items::  Labs reviewed, Medications reviewed and Radiology images reviewed    ASSESSMENT:  1.  End-stage renal disease, on hemodialysis Monday, Wednesday, and Friday.  2.  Renal osteodystrophy.  3.  Anemia of chronic kidney disease.  4.  Volume overload/mild pulmonary congestion.  5.  Possible chronic obstructive pulmonary disease exacerbation, concurrent.  6.  History of hypertension.  7.  Recent pneumonia treated at [facility], had methicillin-resistant   Staphylococcus aureus.  8. Mallor Solorio tear on EGD    PUF today

## 2017-08-29 NOTE — CARE PLAN
Problem: Oxygenation:  Goal: Maintain adequate oxygenation dependent on patient condition  Outcome: PROGRESSING SLOWER THAN EXPECTED  Patient remains on 3 lpm NC with saturations 93 %.  Patient wears no oxygen at home.    Problem: Bronchoconstriction:  Goal: Improve in air movement and diminished wheezing  Outcome: MET Date Met: 08/29/17  Patient has no wheezing noted.  Breath sounds decreased with no improvement after TX.  Intervention: Implement inhaled treatments  Treatments QID per RT protocol.  Intervention: Evaluate and manage medication effects  No subjective improvement noted after TX.

## 2017-08-29 NOTE — PROGRESS NOTES
Pt from surgery via pts bed  Skin warm and dry no distress  Color pale  Dialysis nurse at bedside to start tx  Pt remains npo with hob up 30 degress  MD at bedside to see pt

## 2017-08-29 NOTE — CARE PLAN
Problem: Safety  Goal: Will remain free from injury    Intervention: Provide assistance with mobility   08/29/17 1125   OTHER   Assistance / Tolerance Standby Assist;Assistance of One;General Weakness;Tolerates Well

## 2017-08-29 NOTE — PROGRESS NOTES
Hemodialysis treatment ordered today per Dr. López x 2.5 hours. Treatment initiated at 1215, ended at 1445.     Patient tolerated tx, tachycardic at times during HD, UF Goal met; see paper flow sheet for details.     Net UF 2,500 mL.     Needles removed from access site. Dressings applied and sites held x 10 minutes; verified no bleeding. Positive bruit/thrill post tx. Staff RN to monitor AVF for breakthrough bleeding. Should breakthrough bleeding occur, staff RN to apply pressure to access sites until bleeding resolved. Notify Dialysis and Nephrologist for follow-up.    Report given to Primary RN.

## 2017-08-29 NOTE — PROGRESS NOTES
[facility] Hospitalist Progress Note    Date of Service: 2017    Chief Complaint  67 y.o. male admitted 2017 withcopd exacerbation and volume overload, dialysis patient    Interval Problem Update  Patient c/o dysphagia following ingestion of Maori toast at breakfast, GI cocktail ordered, no improvement, pt retching to try to relive obstruction. Discomfort is around collarbone level. No stridor or respiratory compromise    Consultants/Specialty  GI    Disposition  TBD        Review of Systems   Constitutional: Negative.  Negative for chills, fever, malaise/fatigue and weight loss.   HENT: Positive for sore throat.    Respiratory: Negative.  Negative for cough, shortness of breath and stridor.    Cardiovascular: Negative.  Negative for chest pain and leg swelling.   Gastrointestinal: Positive for abdominal pain. Negative for nausea and vomiting.   Genitourinary: Negative.  Negative for dysuria and flank pain.   Musculoskeletal: Negative.  Negative for back pain and myalgias.   Neurological: Negative.  Negative for dizziness, loss of consciousness and weakness.   Endo/Heme/Allergies: Negative.  Does not bruise/bleed easily.   Psychiatric/Behavioral: Negative.  Negative for depression. The patient is not nervous/anxious.    All other systems reviewed and are negative.     Physical Exam  Laboratory/Imaging   Hemodynamics  Temp (24hrs), Av.4 °C (97.6 °F), Min:36 °C (96.8 °F), Max:36.7 °C (98.1 °F)   Temperature: 36.7 °C (98 °F)  Pulse  Av.6  Min: 100  Max: 123 Heart Rate (Monitored): (!) 104  Blood Pressure : 137/76      Respiratory      Respiration: 20, Pulse Oximetry: 92 %, O2 Daily Delivery Respiratory : Silicone Nasal Cannula     Given By:: Mouthpiece, #MDI/DPI Given: MDI/DPI x 1, Work Of Breathing / Effort: Increased Work of Breathing;Tachypnea  RUL Breath Sounds: Clear;Diminished, RML Breath Sounds: Clear;Diminished, RLL Breath Sounds: Diminished, AMALIA Breath Sounds: Diminished, LLL Breath Sounds:  Diminished    Fluids    Intake/Output Summary (Last 24 hours) at 08/28/17 2227  Last data filed at 08/28/17 2044   Gross per 24 hour   Intake             2060 ml   Output             2000 ml   Net               60 ml       Nutrition  Orders Placed This Encounter   Procedures   • DIET NPO     Standing Status:   Standing     Number of Occurrences:   1     Order Specific Question:   Restrict to:     Answer:   Sips with Medications [3]     Physical Exam    Recent Labs      08/27/17   1236  08/28/17   0311   WBC  8.5  5.6   RBC  3.07*  3.05*   HEMOGLOBIN  9.6*  9.5*   HEMATOCRIT  28.9*  28.8*   MCV  94.1  94.4   MCH  31.3  31.1   MCHC  33.2*  33.0*   RDW  57.8*  57.1*   PLATELETCT  210  219   MPV  9.5  9.4     Recent Labs      08/27/17   1236  08/28/17   0311   SODIUM  135  134*   POTASSIUM  4.1  4.7   CHLORIDE  95*  91*   CO2  27  28   GLUCOSE  93  128*   BUN  15  19   CREATININE  8.38*  10.05*   CALCIUM  8.8  9.0                      Assessment/Plan     Hypoxia   Assessment & Plan    - Due to COPD exacerbation as well as fluid overload  - Was significant upon arrival, patient was satting 80% on room air, is not on oxygen at home        COPD exacerbation (CMS-HCC)   Assessment & Plan    -improved with dialysis  D.c steroids for healing of MW tear        Esophageal obstruction due to food impaction   Assessment & Plan    Stat EGD, Dr. Choudhury consulted  Food impaction removed  Large Kelly Solorio tear noted  NPO for the next 1-3 days per DR. Choudhury  HOB elevated at all times  PPI dirp        Normocytic anemia   Assessment & Plan    - No sign of gross bleeding        CHF (congestive heart failure) (CMS-HCC)   Assessment & Plan    -resol roni with dialysis        ESRD (end stage renal disease) on dialysis (CMS-HCC)   Assessment & Plan    - Nephrology has been consultation  - Await recommendations, patient may need additional hemodialysis due to fluid overload        Chest pain   Assessment & Plan    - improved after  dialuysis  Workup negative          Quality-Core Measures

## 2017-08-30 ENCOUNTER — PATIENT OUTREACH (OUTPATIENT)
Dept: HEALTH INFORMATION MANAGEMENT | Facility: OTHER | Age: 67
End: 2017-08-30

## 2017-08-30 VITALS
TEMPERATURE: 97.6 F | SYSTOLIC BLOOD PRESSURE: 134 MMHG | DIASTOLIC BLOOD PRESSURE: 78 MMHG | RESPIRATION RATE: 16 BRPM | HEIGHT: 72 IN | WEIGHT: 213.85 LBS | BODY MASS INDEX: 28.96 KG/M2 | OXYGEN SATURATION: 98 % | HEART RATE: 93 BPM

## 2017-08-30 PROBLEM — R07.9 CHEST PAIN: Status: RESOLVED | Noted: 2017-08-27 | Resolved: 2017-08-30

## 2017-08-30 PROCEDURE — 700102 HCHG RX REV CODE 250 W/ 637 OVERRIDE(OP): Performed by: INTERNAL MEDICINE

## 2017-08-30 PROCEDURE — A9270 NON-COVERED ITEM OR SERVICE: HCPCS | Performed by: INTERNAL MEDICINE

## 2017-08-30 PROCEDURE — 700111 HCHG RX REV CODE 636 W/ 250 OVERRIDE (IP): Performed by: INTERNAL MEDICINE

## 2017-08-30 PROCEDURE — 94760 N-INVAS EAR/PLS OXIMETRY 1: CPT

## 2017-08-30 PROCEDURE — 94640 AIRWAY INHALATION TREATMENT: CPT

## 2017-08-30 PROCEDURE — 700105 HCHG RX REV CODE 258: Performed by: INTERNAL MEDICINE

## 2017-08-30 PROCEDURE — 99239 HOSP IP/OBS DSCHRG MGMT >30: CPT | Performed by: INTERNAL MEDICINE

## 2017-08-30 PROCEDURE — 700101 HCHG RX REV CODE 250: Performed by: INTERNAL MEDICINE

## 2017-08-30 PROCEDURE — 90935 HEMODIALYSIS ONE EVALUATION: CPT

## 2017-08-30 PROCEDURE — C9113 INJ PANTOPRAZOLE SODIUM, VIA: HCPCS | Performed by: INTERNAL MEDICINE

## 2017-08-30 RX ORDER — OMEPRAZOLE 20 MG/1
20 CAPSULE, DELAYED RELEASE ORAL DAILY
Qty: 30 CAP | Refills: 6 | Status: SHIPPED | OUTPATIENT
Start: 2017-08-30 | End: 2018-08-16

## 2017-08-30 RX ORDER — IPRATROPIUM BROMIDE AND ALBUTEROL SULFATE 2.5; .5 MG/3ML; MG/3ML
3 SOLUTION RESPIRATORY (INHALATION) 4 TIMES DAILY
Qty: 30 BULLET | Refills: 0 | Status: ON HOLD | OUTPATIENT
Start: 2017-08-30 | End: 2019-05-21

## 2017-08-30 RX ORDER — OMEPRAZOLE 20 MG/1
20 CAPSULE, DELAYED RELEASE ORAL DAILY
Status: DISCONTINUED | OUTPATIENT
Start: 2017-08-30 | End: 2017-08-30 | Stop reason: HOSPADM

## 2017-08-30 RX ORDER — SUCRALFATE ORAL 1 G/10ML
1 SUSPENSION ORAL EVERY 6 HOURS
Qty: 560 ML | Refills: 0 | Status: SHIPPED | OUTPATIENT
Start: 2017-08-30 | End: 2017-09-13

## 2017-08-30 RX ADMIN — TIOTROPIUM BROMIDE 1 CAPSULE: 18 CAPSULE ORAL; RESPIRATORY (INHALATION) at 07:50

## 2017-08-30 RX ADMIN — RENAGEL 3200 MG: 800 TABLET ORAL at 08:11

## 2017-08-30 RX ADMIN — ASPIRIN 81 MG: 81 TABLET, COATED ORAL at 08:12

## 2017-08-30 RX ADMIN — ERYTHROPOIETIN 10000 UNITS: 10000 INJECTION, SOLUTION INTRAVENOUS; SUBCUTANEOUS at 15:47

## 2017-08-30 RX ADMIN — RENAGEL 3200 MG: 800 TABLET ORAL at 12:09

## 2017-08-30 RX ADMIN — SUCRALFATE 1 G: 1 SUSPENSION ORAL at 17:52

## 2017-08-30 RX ADMIN — RENAGEL 3200 MG: 800 TABLET ORAL at 17:52

## 2017-08-30 RX ADMIN — SUCRALFATE 1 G: 1 SUSPENSION ORAL at 12:10

## 2017-08-30 RX ADMIN — SODIUM CHLORIDE 8 MG/HR: 9 INJECTION, SOLUTION INTRAVENOUS at 00:38

## 2017-08-30 RX ADMIN — OMEPRAZOLE 20 MG: 20 CAPSULE, DELAYED RELEASE ORAL at 12:09

## 2017-08-30 RX ADMIN — IPRATROPIUM BROMIDE AND ALBUTEROL SULFATE 3 ML: .5; 3 SOLUTION RESPIRATORY (INHALATION) at 07:46

## 2017-08-30 RX ADMIN — BUDESONIDE AND FORMOTEROL FUMARATE DIHYDRATE 2 PUFF: 160; 4.5 AEROSOL RESPIRATORY (INHALATION) at 07:50

## 2017-08-30 RX ADMIN — HEPARIN SODIUM 1500 UNITS: 1000 INJECTION, SOLUTION INTRAVENOUS; SUBCUTANEOUS at 15:25

## 2017-08-30 RX ADMIN — SUCRALFATE 1 G: 1 SUSPENSION ORAL at 06:18

## 2017-08-30 RX ADMIN — SUCRALFATE 1 G: 1 SUSPENSION ORAL at 00:37

## 2017-08-30 ASSESSMENT — ENCOUNTER SYMPTOMS
CONSTITUTIONAL NEGATIVE: 1
GASTROINTESTINAL NEGATIVE: 1
SHORTNESS OF BREATH: 1
PSYCHIATRIC NEGATIVE: 1
MUSCULOSKELETAL NEGATIVE: 1
CARDIOVASCULAR NEGATIVE: 1
EYES NEGATIVE: 1

## 2017-08-30 ASSESSMENT — PAIN SCALES - GENERAL
PAINLEVEL_OUTOF10: 0
PAINLEVEL_OUTOF10: 0

## 2017-08-30 NOTE — PROGRESS NOTES
Tele Summary Report::    Rhythm: SR - ST  HR: 80's to low 100's  WI: 0.14  QRS: 0.10  QT: 0.38  Ectopy: fPVC, triplets, 4 beats of V-Tach

## 2017-08-30 NOTE — DISCHARGE SUMMARY
"CHIEF COMPLAINT ON ADMISSION  Chief Complaint   Patient presents with   • Shortness of Breath     Pt BIB c/o shortness of breath \"for a few days\"        CODE STATUS  Full Code    HPI & HOSPITAL COURSE  This is a 67 y.o. male here with respiratory failure and hypoxia with evidence of fluid overload on chest xray. He was admitted and treated with dialysis as well as supplemental oxygen and respiratory therapy. He had improvement in his work of breathing but continued to require supplemental oxgyen.  He is set up with home oxygen for discharge. He did have food impaction in the esophagus with difficulty swallowing. GI did perform endoscopy with wily ruvalcaba tear visualized and he was treated with carafate and prilosec.    Therefore, he is discharged in good and stable condition with close outpatient follow-up.    SPECIFIC OUTPATIENT FOLLOW-UP  Primary care provider in 2 weeks    DISCHARGE PROBLEM LIST  Active Problems:    COPD exacerbation (CMS-HCC) POA: Unknown    Hypoxia POA: Unknown    ESRD (end stage renal disease) on dialysis (CMS-HCC) POA: Unknown    CHF (congestive heart failure) (CMS-HCC) POA: Unknown    Normocytic anemia POA: Unknown    Esophageal obstruction due to food impaction POA: Unknown  Resolved Problems:    Chest pain POA: Unknown      FOLLOW UP  Future Appointments  Date Time Provider Department Center   8/31/2017 8:30 AM CARE MANAGER [facility]   9/6/2017 10:20 AM Tabby Turner M.D. [facility] PEGGY Choudhury M.D.  [address] [place] [address]  [phone]      The hospital  left a voicemail with the office to call you directly to schedule a follow up appointment in 4 weeks. If you do not receive a call within 2 days please call to schedule an appointment. Thank you.      MEDICATIONS ON DISCHARGE   Bridger Savage   Home Medication Instructions OLENA:[id number]    Printed on:08/30/17 8127   Medication Information                      albuterol 108 (90 Base) MCG/ACT Aero Soln " inhalation aerosol  Inhale 2 Puffs by mouth every 6 hours as needed for Shortness of Breath.             aspirin EC (ECOTRIN) 81 MG Tablet Delayed Response  Take 81 mg by mouth every day.             Cinacalcet HCl (SENSIPAR) 60 MG Tab  Take 60 mg by mouth every evening.             fluticasone-salmeterol (ADVAIR) 250-50 MCG/DOSE AEROSOL POWDER, BREATH ACTIVATED  Inhale 1 Puff by mouth PRN.             guaifenesin 200 MG tablet  Take 600 mg by mouth 3 times a day.             ipratropium-albuterol (DUONEB) 0.5-2.5 (3) MG/3ML nebulizer solution  3 mL by Nebulization route 4 times a day.             omeprazole (PRILOSEC) 20 MG delayed-release capsule  Take 1 Cap by mouth every day.             ropinirole (REQUIP) 1 MG Tab  Take 1 mg by mouth every bedtime.             Sevelamer Carbonate (RENVELA) 800 MG Tab  Take 800 mg by mouth 3 times a day, with meals. 4 caps each meals., 2 cap with snacks.             sucralfate (CARAFATE) 1 GM/10ML Suspension  Take 10 mL by mouth every 6 hours for 14 days.             tiotropium (SPIRIVA) 18 MCG Cap  Inhale 18 mcg by mouth every day.                 DIET  Orders Placed This Encounter   Procedures   • DIET ORDER     Standing Status:   Standing     Number of Occurrences:   1     Order Specific Question:   Diet:     Answer:   Low Fiber(GI Soft) [2]       ACTIVITY  As tolerated.  Weight bearing as tolerated      CONSULTATIONS  Nephrology Dr. Najjar GI Dr. Nourani    PROCEDURES  Dialysis  Endoscopy for food removal    LABORATORY  Lab Results   Component Value Date/Time    SODIUM 134 (L) 08/28/2017 03:11 AM    POTASSIUM 4.7 08/28/2017 03:11 AM    CHLORIDE 91 (L) 08/28/2017 03:11 AM    CO2 28 08/28/2017 03:11 AM    GLUCOSE 128 (H) 08/28/2017 03:11 AM    BUN 19 08/28/2017 03:11 AM    CREATININE 10.05 (HH) 08/28/2017 03:11 AM        Lab Results   Component Value Date/Time    WBC 5.6 08/28/2017 03:11 AM    HEMOGLOBIN 9.5 (L) 08/28/2017 03:11 AM    HEMATOCRIT 28.8 (L) 08/28/2017 03:11 AM     PLATELETCT 219 08/28/2017 03:11 AM        Total time of the discharge process exceeds 45 minutes

## 2017-08-30 NOTE — PROGRESS NOTES
[facility] Nephrology Progress Note, Adult, Complex               Author: Caesar López Date & Time created: 8/30/2017  9:34 AM     Interval History:  The patient is a 67-year-old gentleman with past   medical history of end-stage renal disease, on hemodialysis Monday, Wednesday,   and Friday; history of COPD, congestive heart failure, renal osteodystrophy,   anemia of chronic kidney disease, hypertension who presented to the emergency   room at [facility] with complaints of shortness of breath that has   been getting progressively worse over the past 2 days.  He last had his   dialysis uneventfully on Friday.  He was recently discharged past several   months from  with pneumonia.  Nephrology was   called to evaluate him for his ESRD and shortness of breath.    Daily Nephrology Summary:  8/28/17 - S/p PUF yesterday and tolerated well. Feels better today but still some mild SOB.  8/29/17 - 1.5 L UF yesterday without complications. S/p Kelly Solorio Tear on EGD.  8/30/17 - No new overnight renal events. S/p PUFF yesterday and tolerated well. Net UF was 2500 mL.    Review of Systems:  Review of Systems   Constitutional: Negative.    HENT: Negative.    Eyes: Negative.    Respiratory: Positive for shortness of breath.    Cardiovascular: Negative.    Gastrointestinal: Negative.    Musculoskeletal: Negative.    Skin: Negative.    Endo/Heme/Allergies: Negative.    Psychiatric/Behavioral: Negative.        Physical Exam:  Physical Exam   Constitutional: He is oriented to person, place, and time. He appears well-developed and well-nourished.   HENT:   Head: Normocephalic and atraumatic.   Eyes: EOM are normal. Pupils are equal, round, and reactive to light.   Neck: Normal range of motion. Neck supple.   Cardiovascular: Normal rate, regular rhythm and normal heart sounds.    Abdominal: Soft. Bowel sounds are normal.   Musculoskeletal: Normal range of motion.   Neurological: He is  alert and oriented to person, place, and time.   Skin: Skin is warm and dry.   Nursing note and vitals reviewed.      Labs:  Recent Labs      17   1231  17   1552   ISTATSPEC  Venous  Venous     Recent Labs      17   1552  17   2135  17   0311   TROPONINI  0.06*  0.05*  0.05*     Recent Labs      17   1236  17   0311   SODIUM  135  134*   POTASSIUM  4.1  4.7   CHLORIDE  95*  91*   CO2     BUN  15  19   CREATININE  8.38*  10.05*   CALCIUM  8.8  9.0     Recent Labs      17   1236  17   0311   ALTSGPT  19   --    ASTSGOT  24   --    ALKPHOSPHAT  53   --    TBILIRUBIN  1.3   --    GLUCOSE  93  128*     Recent Labs      17   1236  17   0311   RBC  3.07*  3.05*   HEMOGLOBIN  9.6*  9.5*   HEMATOCRIT  28.9*  28.8*   PLATELETCT  210  219     Recent Labs      17   1236  17   0311   WBC  8.5  5.6   NEUTSPOLYS  79.30*   --    LYMPHOCYTES  11.40*   --    MONOCYTES  7.80   --    EOSINOPHILS  0.60   --    BASOPHILS  0.40   --    ASTSGOT  24   --    ALTSGPT  19   --    ALKPHOSPHAT  53   --    TBILIRUBIN  1.3   --            Hemodynamics:  Temp (24hrs), Av.4 °C (97.6 °F), Min:36.3 °C (97.3 °F), Max:36.7 °C (98.1 °F)  Temperature: 36.3 °C (97.4 °F)  Pulse  Av.5  Min: 88  Max: 123Heart Rate (Monitored): 100  Blood Pressure : 138/89     Respiratory:    Respiration: 20, Pulse Oximetry: 88 %, O2 Daily Delivery Respiratory : Room Air with O2 Available     Given By:: Mouthpiece, #MDI/DPI Given: MDI/DPI x 2, Work Of Breathing / Effort: Mild  RUL Breath Sounds: Clear, RML Breath Sounds: Diminished, RLL Breath Sounds: Diminished;Fine Crackles, AMALIA Breath Sounds: Clear, LLL Breath Sounds: Diminished;Fine Crackles  Fluids:    Intake/Output Summary (Last 24 hours) at 17 0934  Last data filed at 17 1445   Gross per 24 hour   Intake             1100 ml   Output             3000 ml   Net            -1900 ml        GI/Nutrition:  Orders Placed  This Encounter   Procedures   • DIET ORDER     Standing Status:   Standing     Number of Occurrences:   1     Order Specific Question:   Diet:     Answer:   Low Fiber(GI Soft) [2]     Medical Decision Making, by Problem:  Active Hospital Problems    Diagnosis   • COPD exacerbation (CMS-HCC) [J44.1]   • Hypoxia [R09.02]   • Chest pain [R07.9]   • ESRD (end stage renal disease) on dialysis (CMS-HCC) [N18.6, Z99.2]   • CHF (congestive heart failure) (CMS-HCC) [I50.9]   • Normocytic anemia [D64.9]         Reviewed items::  Labs reviewed, Medications reviewed and Radiology images reviewed    ASSESSMENT:  1.  End-stage renal disease, on hemodialysis Monday, Wednesday, and Friday.  2.  Renal osteodystrophy.  3.  Anemia of chronic kidney disease.  4.  Volume overload/mild pulmonary congestion.  5.  Possible chronic obstructive pulmonary disease exacerbation, concurrent.  6.  History of hypertension.  7.  Recent pneumonia treated at [place], had methicillin-resistant   Staphylococcus aureus.  8. Mallor Solorio tear on EGD    HD today per regular MWF schedule

## 2017-08-30 NOTE — DISCHARGE PLANNING
Care Transition Team Assessment    Information Source  Orientation : Oriented x 4  Information Given By: Patient  Who is responsible for making decisions for patient? : Patient    Elopement Risk  Legal Hold: No  Ambulatory or Self Mobile in Wheelchair: No-Not an Elopement Risk  Elopement Risk: Not at Risk for Elopement    Interdisciplinary Discharge Planning  Does Admitting Nurse Feel This Could be a Complex Discharge?: No  Primary Care Physician: DR. JONES  Lives with - Patient's Self Care Capacity: Spouse  Patient or legal guardian wants to designate a caregiver (see row info): No  Support Systems: Spouse / Significant Other, Family Member(s)  Housing / Facility: [address]  Do You Take your Prescribed Medications Regularly: Yes  Able to Return to Previous ADL's: Yes  Mobility Issues: No  Prior Services: None  Patient Expects to be Discharged to:: HOME  Assistance Needed: Unknown at this Time  Durable Medical Equipment: Not Applicable    Discharge Preparedness  What is your plan after discharge?: Home with help  What are your discharge supports?: Spouse    Finances  Financial Barriers to Discharge: No  Prescription Coverage: Yes    Vision / Hearing Impairment  Vision Impairment : Yes  Right Eye Vision: Impaired, Wears Glasses  Left Eye Vision: Impaired, Wears Glasses  Hearing Impairment : Yes  Hearing Impairment: Right Ear, Hearing Device Not Available    Values / Beliefs / Concerns  Values / Beliefs Concerns : No  Special Hospitalization Concerns: NO    Advance Directive  Advance Directive?: None  Advance Directive offered?: AD Booklet refused    Domestic Abuse  Have you ever been the victim of abuse or violence?: No  Physical Abuse or Sexual Abuse: No  Verbal Abuse or Emotional Abuse: No  Possible Abuse Reported to:: Not Applicable    Anticipated Discharge Information  Anticipated discharge disposition: Home

## 2017-08-30 NOTE — FACE TO FACE
Face to Face Note  -  Durable Medical Equipment    Alva Mcclelland M.D. - NPI: [id number]  I certify that this patient is under my care and that they had a durable medical equipment(DME)face to face encounter by myself that meets the physician DME face-to-face encounter requirements with this patient on:    Date of encounter:   Patient:                    MRN:                       YOB: 2015  Bridger Gallardo [facility]  [id number]  1950     The encounter with the patient was in whole, or in part, for the following medical condition, which is the primary reason for durable medical equipment:  COPD    I certify that, based on my findings, the following durable medical equipment is medically necessary:  Oxygen.    HOME O2 Saturation Measurements:(Values must be present for Home Oxygen orders)  Room air sat at rest: 90  Room air sat with amb: 84  With liters of O2: 2, O2 sat at rest with O2: 94  With Liters of O2: 2, O2 sat with amb with O2 : 90  Is the patient mobile?: Yes    My Clinical findings support the need for the above equipment due to:  Hypoxia    Supporting Symptoms: shortness of breath worse on exertion

## 2017-08-30 NOTE — DISCHARGE PLANNING
Current documentation indicates a potential for acute inpatient rehabilitation. Please consider a PMRreferral to assist with discharge planning. Will need therapy evaluations.

## 2017-08-30 NOTE — PROGRESS NOTES
Report received from RAMSES Boone RN. POC discussed, pt resting comfortably in bed, safety precautions in place.

## 2017-08-30 NOTE — DOCUMENTATION QUERY
DOCUMENTATION QUERY    PROVIDERS: Please select “Cosign w/ note”to reply to query.    To better represent the severity of illness of your patient, please review the following information and exercise your independent professional judgment in responding to this query.     CHF is documented in the History and Physical and Progress Notes. Based upon the clinical findings, risk factors, and treatment, can this diagnosis be further specified?    • Acute Systolic heart failure   • Chronic Systolic heart failure  • Acute on Chronic Systolic heart failure  • Acute Diastolic heart failure   • Chronic Diastolic heart failure  • Acute on Chronic Diastolic heart failure  • Acute Systolic and Diastolic heart failure  • Chronic Systolic and Diastolic heart failure  • Acute on Chronic Systolic and diastolic heart failure  • Other explanation of clinical findings  • Unable to determine           The medical record reflects the following:   Clinical Findings 67 yr old pt w/ESRD on HD admitted for c/o SOB   -fluid overload   -COPD exac  -renal osteodystrophy   -anemia of chronic disease  -HTN   -CHF ECHO -> EF 40%   -Trop 0.05 peaked at 0.06   -acute esophageal obstruction (EGD) found Kelly-Solorio tear    Treatment -hemodialysis   -support care   -EGD for removal of food obstruction in the entire esophagus   Risk Factors  clinical status as noted in clinical findings    Location within medical record  History and Physical, Progress Notes and Lab Results      Thank you,   Cielo Cartwright RN  Clinical   Ext [phone]

## 2017-08-30 NOTE — CARE PLAN
Problem: Safety  Goal: Will remain free from falls    Intervention: Implement fall precautions  Room/floor clear. Non skid socks on. Proper signs up. Bed alarm on, bed low and locked. Call light and belonging within reach. Hourly rounding in place to make sure needs are met.        Problem: Infection  Goal: Will remain free from infection    Intervention: Implement standard precautions and perform hand washing before and after patient contact  Hand washing every encounter. IV ports scrubbed with alcohol when hanging medicine. Patient watch for s/s of infection. Patient taught to report s/s of infection, verbalized understanding.        Problem: Venous Thromboembolism (VTW)/Deep Vein Thrombosis (DVT) Prevention:  Goal: Patient will participate in Venous Thrombosis (VTE)/Deep Vein Thrombosis (DVT)Prevention Measures    Intervention: Encourage patient to perform ankle flex, foot rotation, and knee flex exercises in addition to other prophylatic measures every hour while awake  Encouraged to perform flexion of the feet while in bed and awake, verbalized understanding.       Problem: Respiratory:  Goal: Respiratory status will improve    Intervention: Educate and encourage coughing and deep breathing  Encouraged to perform coughing and deep breathing, verbalized understanding.

## 2017-08-30 NOTE — PROGRESS NOTES
Gastroenterology Progress Note    Date of Service: 08/30/2017    Subjective:Pt reports that he was able to tolerate breakfast. Some throat/chest discomfort with swallowing      Objective:    Vitals -   Vitals:    08/30/17 0451 08/30/17 0712 08/30/17 0740 08/30/17 0746   BP: 123/79 138/89     Pulse: 89 89 88 96   Resp: 18 18 18 20   Temp: 36.4 °C (97.5 °F) 36.3 °C (97.4 °F)     SpO2: 98% 95% 96% 88%   Weight:       Height:           Gen: AAOx3, NAD  HEENT: anicteric, Nares patent, MMM  CVS: regular rhythm, normal rate  Pulm: CTAB, no wheezes  Abd: soft Nt, Nd, normal bowel sounds      Labs:  Lab Results   Component Value Date/Time    SODIUM 134 (L) 08/28/2017 03:11 AM    POTASSIUM 4.7 08/28/2017 03:11 AM    CHLORIDE 91 (L) 08/28/2017 03:11 AM    CO2 28 08/28/2017 03:11 AM    GLUCOSE 128 (H) 08/28/2017 03:11 AM    BUN 19 08/28/2017 03:11 AM    CREATININE 10.05 (HH) 08/28/2017 03:11 AM      Lab Results   Component Value Date/Time    WBC 5.6 08/28/2017 03:11 AM    RBC 3.05 (L) 08/28/2017 03:11 AM    HEMOGLOBIN 9.5 (L) 08/28/2017 03:11 AM    HEMATOCRIT 28.8 (L) 08/28/2017 03:11 AM    MCV 94.4 08/28/2017 03:11 AM    MCH 31.1 08/28/2017 03:11 AM    MCHC 33.0 (L) 08/28/2017 03:11 AM    MPV 9.4 08/28/2017 03:11 AM    NEUTSPOLYS 79.30 (H) 08/27/2017 12:36 PM    LYMPHOCYTES 11.40 (L) 08/27/2017 12:36 PM    MONOCYTES 7.80 08/27/2017 12:36 PM    EOSINOPHILS 0.60 08/27/2017 12:36 PM    BASOPHILS 0.40 08/27/2017 12:36 PM      No results found for: PROTHROMBTM, INR     Assessment: This is a 68 yo male with ESRD who was admitted for SOB due to volume overload and COPD. He developed acute esophageal food obstruction. Now s/p EGD and food removal. A large tear was identified in the esophagus after food was removed.      Plan:  1) Continue GI soft diet for one week. Pt needs to chew food well  2) stop protonix infusion switch to oral PPI. Pt should be discharged on PPI to help control stomach acid and  allow esophageal healing  3) Pt should f/u with [facility] in 1 month for repeat EGD to assess for stricture and to prevent future food impactions  4) from the GI standpoint, this patient is ok to be discharged since he is tolerating soft diet    Please call GI if we can be of further assistance

## 2017-08-30 NOTE — PROGRESS NOTES
BS report received. Patient in bed, alert and oriented. No c/o pain, nausea or sob at this time. HOB> 30 degrees per MD order. POC discussed, verbalized understanding. Bed low and locked. Call light and belonging within reach and demonstrated use of call light. Fall precaution in effect. Hourly rounding in place.

## 2017-08-30 NOTE — FLOWSHEET NOTE
Oximetry 96% on 2 L.  Room air trial x 5 minutes with oximetry down to 88%.     08/30/17 0746   Interdisciplinary Plan of Care-Goals (Indications)   Obstructive Ventilatory Defect or Pulmonary Disease without Obvious Obstruction History / Diagnosis   RT Assessment of Delivered Medications   Evaluation of Medication Delivery Daily Yes-- Pt /Family has been Instructed in use of Respiratory Medications and Adverse Reactions   SVN Group   #SVN Performed Yes   Given By: Mouthpiece   MDI/DPI Group   #MDI/DPI Given MDI/DPI x 2   Chest Exam   Respiration 20   Pulse 96   Breath Sounds   RUL Breath Sounds Clear   RML Breath Sounds Diminished   RLL Breath Sounds Diminished;Fine Crackles   AMALIA Breath Sounds Clear   LLL Breath Sounds Diminished;Fine Crackles   Oximetry   Continuous Oximetry Yes   Oxygen   Home O2 Use Prior To Admission? No   Pulse Oximetry 88 %   O2 (LPM) 0   O2 Daily Delivery Respiratory  Room Air with O2 Available

## 2017-08-31 ENCOUNTER — PATIENT OUTREACH (OUTPATIENT)
Dept: HEALTH INFORMATION MANAGEMENT | Facility: OTHER | Age: 67
End: 2017-08-31

## 2017-08-31 NOTE — PROGRESS NOTES
D/c instructions explained to daughter Zee. All questions answered. Patient left the building with staff in stable condition and with home O2.

## 2017-08-31 NOTE — PROGRESS NOTES
D/c instructions discussed with patient especially  new prescriptions, home O2, and appointment, verbalized understanding and no additional questions.

## 2017-08-31 NOTE — PROGRESS NOTES
2109  Paged Tanvi Sanz again to get prescriptions for albuterol ampule for home instead of albuterol inhaler.     2113  Spoke to Tanvi Sanz APRN. Per Tanvi will send prescription of the albuterol ampule to patient's pharmacy.

## 2017-08-31 NOTE — PROGRESS NOTES
Report given to RAMSES Boone RN. POC discussed, pt resting comfortably in bed, safety precautions in place.

## 2017-08-31 NOTE — PROGRESS NOTES
08/31/2017 0830 - Discharge Outreach attempt - LM  09/05/2017 1320 - Discharge Outreach - received call from patient. States he just needs to clarify when his appt is with the discharge clinic. Appt clarified and patient stated he will be there. No other questions.

## 2017-08-31 NOTE — PROGRESS NOTES
Hd treatment ordered by Dr López started at 1525 and ended at 1753 with net UF of 2014 ml. Hd treatment ended 1 hr and 2 minutes early due to infiltration on left FA AVF. See flow sheet for details.

## 2017-08-31 NOTE — PROGRESS NOTES
Daughter arrived to  patient. Has more questions re nebulizer vs inhaler. RN called Toya RT to explain better.

## 2017-08-31 NOTE — DISCHARGE INSTRUCTIONS
Discharge Instructions    Discharged to home by car with relative. Discharged via wheelchair, hospital escort: Yes.  Special equipment needed: Oxygen    Be sure to schedule a follow-up appointment with your primary care doctor or any specialists as instructed.     Discharge Plan:   Smoking Cessation Offered: Patient Refused  Influenza Vaccine Indication: Not indicated: Previously immunized this influenza season and > 8 years of age    I understand that a diet low in cholesterol, fat, and sodium is recommended for good health. Unless I have been given specific instructions below for another diet, I accept this instruction as my diet prescription.   Other diet: Low Fiber GI Soft     Special Instructions:     HF Patient Discharge Instructions  · Monitor your weight daily, and maintain a weight chart, to track your weight changes.   · Activity as tolerated, unless your Doctor has ordered otherwise. Other activity order: As Tolerated   · Follow a low fat, low cholesterol, low salt diet unless instructed otherwise by your Doctor. Read the labels on the back of food products and track your intake of fat, cholesterol and salt.   · Fluid Restriction No. If a Fluid Restriction has been ordered by your Doctor, measure fluids with a measuring cup to ensure that you are not exceeding the restriction.   · No smoking.  · Oxygen Yes. If your Doctor has ordered that you wear Oxygen at home, it is important to wear it as ordered.  · Did you receive an explanation from staff on the importance of taking each of your medications and why it is necessary to stay on the medications the physician/care provider has ordered? Yes  · Do you have any questions concerning how to manage your heart failure and what to do should you have any increased signs and symptoms after you go home? No  · Do you feel like your heart failure care team involved you in the care treatment plan and allowed you to make decisions regarding your care while in the  hospital and addressed any discharge needs you might have? No    See the educational handout provided at discharge for more information on monitoring your daily weight, activity and diet. This also explains more about Heart Failure, symptoms of a flare-up and some of the tests that you have undergone.     Warning Signs of a Flare-Up include:  · Swelling in the ankles or lower legs.  · Shortness of breath, while at rest, or while doing normal activities.   · Shortness of breath at night when in bed, or coughing in bed.   · Requiring more pillows to sleep at night, or needing to sit up at night to sleep.  · Feeling weak, dizzy or fatigued.     When to call your Doctor:  · Call [facility] about questions regarding the discharge instructions you were given [phone].  (Discharge Unit Med Tele )  · Call your Primary Care Physician or Cardiologist if:   1. You experience any pain radiating to your jaw or neck.  2. You have any difficulty breathing.  3. You experience weight gain of 2 lbs in a day or 5 lbs in a week.   4. You feel any palpitations or irregular heartbeats.  5. You become dizzy or lose consciousness.   If you have had an angiogram or had a pacemaker or AICD placed, and experience:  1. Bleeding, drainage or swelling at the surgical / puncture site.  2. Fever greater than 100.0 F  3. Shock from internal defibrillator.  4. Cool and / or numb extremities.      · Is patient discharged on Warfarin / Coumadin?   No     · Is patient Post Blood Transfusion?  No      Gastroentology Plan:    1) Continue GI soft diet for one week. Pt needs to chew food well  2) stop protonix infusion switch to oral PPI. Pt should be discharged on PPI to help control stomach acid and allow esophageal healing  3) Pt should f/u with [facility] in 1 month for repeat EGD to assess for stricture and to prevent future food impactions  4) from the GI standpoint, this patient is ok to be  discharged since he is tolerating soft diet      Depression / Suicide Risk    As you are discharged from this [facility] facility, it is important to learn how to keep safe from harming yourself.    Recognize the warning signs:  · Abrupt changes in personality, positive or negative- including increase in energy   · Giving away possessions  · Change in eating patterns- significant weight changes-  positive or negative  · Change in sleeping patterns- unable to sleep or sleeping all the time   · Unwillingness or inability to communicate  · Depression  · Unusual sadness, discouragement and loneliness  · Talk of wanting to die  · Neglect of personal appearance   · Rebelliousness- reckless behavior  · Withdrawal from people/activities they love  · Confusion- inability to concentrate     If you or a loved one observes any of these behaviors or has concerns about self-harm, here's what you can do:  · Talk about it- your feelings and reasons for harming yourself  · Remove any means that you might use to hurt yourself (examples: pills, rope, extension cords, firearm)  · Get professional help from the community (Mental Health, Substance Abuse, psychological counseling)  · Do not be alone:Call your Safe Contact- someone whom you trust who will be there for you.  · Call your local CRISIS HOTLINE 548-1342 or 056-289-1545  · Call your local Children's Mobile Crisis Response Team [place] (649) 883-6246 or www.Anki  · Call the toll free National Suicide Prevention Hotlines   · National Suicide Prevention Lifeline 670-004-DBZI (0222)  · National Hope Line Network 800-SUICIDE (225-3538)

## 2017-08-31 NOTE — DISCHARGE PLANNING
Outpatient Dialysis Note    Unclear where patient dialyzes. Will follow up in am with patient to confirm MWF schedule and facility.    Dialysis Coonrdinator-[facility],  Brandon Toth [phone]

## 2017-08-31 NOTE — DISCHARGE PLANNING
9/1/2017 13:53    Confirmed patient is active at:    [facility]  [address], [place] [address]  Phone: [phone]: [phone]     Schedule: Monday, Wednesday, Friday  Time: 07:00am    Dialysis Coordinator- [facility] [phone]    Outpatient Dialysis Note     Unclear where patient dialyzes. Will follow up in am with patient to confirm MWF schedule and facility.     Dialysis Coordinator[facility],  Brandon Toth [phone]

## 2017-08-31 NOTE — PROGRESS NOTES
Spoke to Candy Sanz, A.P.N. D/t patient requesting prescriptions for nebulizer or albuterol for home. Per Tanvi Sanz APN, will place prescriptions and patient should be able to pick it up sometime tomorrow.

## 2017-09-14 ENCOUNTER — OFFICE VISIT (OUTPATIENT)
Dept: CARDIOLOGY | Facility: MEDICAL CENTER | Age: 67
End: 2017-09-14
Payer: MEDICARE

## 2017-09-14 VITALS
BODY MASS INDEX: 27.3 KG/M2 | DIASTOLIC BLOOD PRESSURE: 90 MMHG | OXYGEN SATURATION: 92 % | HEART RATE: 107 BPM | WEIGHT: 201.6 LBS | SYSTOLIC BLOOD PRESSURE: 136 MMHG | HEIGHT: 72 IN

## 2017-09-14 DIAGNOSIS — I51.89 LEFT VENTRICULAR SYSTOLIC DYSFUNCTION, NYHA CLASS 2: ICD-10-CM

## 2017-09-14 DIAGNOSIS — I50.20 ACC/AHA STAGE C SYSTOLIC HEART FAILURE (HCC): ICD-10-CM

## 2017-09-14 DIAGNOSIS — Z99.2 ESRD (END STAGE RENAL DISEASE) ON DIALYSIS (HCC): ICD-10-CM

## 2017-09-14 DIAGNOSIS — J44.1 COPD EXACERBATION (HCC): ICD-10-CM

## 2017-09-14 DIAGNOSIS — N18.6 ESRD (END STAGE RENAL DISEASE) ON DIALYSIS (HCC): ICD-10-CM

## 2017-09-14 PROCEDURE — 99205 OFFICE O/P NEW HI 60 MIN: CPT | Performed by: INTERNAL MEDICINE

## 2017-09-14 RX ORDER — CARVEDILOL 6.25 MG/1
6.25 TABLET ORAL 2 TIMES DAILY WITH MEALS
Qty: 60 TAB | Refills: 11 | Status: SHIPPED | OUTPATIENT
Start: 2017-09-14 | End: 2017-09-22

## 2017-09-14 ASSESSMENT — ENCOUNTER SYMPTOMS
DIZZINESS: 0
ABDOMINAL PAIN: 0
PALPITATIONS: 0
CLAUDICATION: 0
WEIGHT LOSS: 0
COUGH: 0
DEPRESSION: 0
HEADACHES: 0
EYE DISCHARGE: 0
NAUSEA: 0
SENSORY CHANGE: 0
PND: 0
BLURRED VISION: 0
FEVER: 0
BRUISES/BLEEDS EASILY: 0
CHILLS: 0
EYE PAIN: 0
LOSS OF CONSCIOUSNESS: 0
VOMITING: 0
HALLUCINATIONS: 0
BLOOD IN STOOL: 0
SHORTNESS OF BREATH: 1
ORTHOPNEA: 0
SPEECH CHANGE: 0
FALLS: 0
DOUBLE VISION: 0
MYALGIAS: 0

## 2017-09-14 NOTE — PROGRESS NOTES
Subjective:   Bridger Savage is a 67 y.o. male who presents today For cardiac care and evaluation in the heart failure clinic after his recent hospital stay. Patient was found to be volume overloaded. He does have a history of end-stage renal disease for which he is currently on hemodialysis. This has been going on for 5 years. He doesn't know the reason for that. His left ventricular systolic function was found to be 40% as well. No prior ischemic workup that we know of.    Patient is feeling better these days. Does get winded upon walking up inclines or for distance. No symptoms at rest or with daily living activities.      Past Medical History:   Diagnosis Date   • Chronic obstructive pulmonary disease (CMS-HCC)    • Congestive heart failure (CMS-HCC)    • Renal disorder      Past Surgical History:   Procedure Laterality Date   • GASTROSCOPY-ENDO N/A 8/28/2017    Procedure: GASTROSCOPY-ENDO;  Surgeon: Pankaj Choudhury M.D.;  Location: SURGERY [facility];  Service: EUS     Family History   Problem Relation Age of Onset   • Heart Disease Mother      History   Smoking Status   • Former Smoker   • Packs/day: 2.00   • Years: 50.00   • Types: Cigarettes   • Quit date: 8/13/2017   Smokeless Tobacco   • Never Used     Allergies   Allergen Reactions   • Chloraprep One Step Itching     Outpatient Encounter Prescriptions as of 9/14/2017   Medication Sig Dispense Refill   • carvedilol (COREG) 6.25 MG Tab Take 1 Tab by mouth 2 times a day, with meals. 60 Tab 11   • omeprazole (PRILOSEC) 20 MG delayed-release capsule Take 1 Cap by mouth every day. 30 Cap 6   • ipratropium-albuterol (DUONEB) 0.5-2.5 (3) MG/3ML nebulizer solution 3 mL by Nebulization route 4 times a day. 30 Bullet 0   • albuterol 108 (90 Base) MCG/ACT Aero Soln inhalation aerosol Inhale 2 Puffs by mouth every 6 hours as needed for Shortness of Breath.     • fluticasone-salmeterol (ADVAIR) 250-50 MCG/DOSE AEROSOL POWDER, BREATH ACTIVATED Inhale 1 Puff by  mouth PRN.     • tiotropium (SPIRIVA) 18 MCG Cap Inhale 18 mcg by mouth every day.     • aspirin EC (ECOTRIN) 81 MG Tablet Delayed Response Take 81 mg by mouth every day.     • ropinirole (REQUIP) 1 MG Tab Take 1 mg by mouth every bedtime.     • Cinacalcet HCl (SENSIPAR) 60 MG Tab Take 60 mg by mouth every evening.     • Sevelamer Carbonate (RENVELA) 800 MG Tab Take 800 mg by mouth 3 times a day, with meals. 4 caps each meals., 2 cap with snacks.     • guaifenesin 200 MG tablet Take 600 mg by mouth 3 times a day.       No facility-administered encounter medications on file as of 9/14/2017.      Review of Systems   Constitutional: Negative for chills, fever, malaise/fatigue and weight loss.   HENT: Negative for ear discharge, ear pain, hearing loss and nosebleeds.    Eyes: Negative for blurred vision, double vision, pain and discharge.   Respiratory: Positive for shortness of breath. Negative for cough.    Cardiovascular: Negative for chest pain, palpitations, orthopnea, claudication, leg swelling and PND.   Gastrointestinal: Negative for abdominal pain, blood in stool, melena, nausea and vomiting.   Genitourinary: Negative for dysuria and hematuria.   Musculoskeletal: Negative for falls, joint pain and myalgias.   Skin: Negative for itching and rash.   Neurological: Negative for dizziness, sensory change, speech change, loss of consciousness and headaches.   Endo/Heme/Allergies: Negative for environmental allergies. Does not bruise/bleed easily.   Psychiatric/Behavioral: Negative for depression, hallucinations and suicidal ideas.        Objective:   /90   Pulse (!) 107   Ht 1.829 m (6')   Wt 91.4 kg (201 lb 9.6 oz)   SpO2 92%   BMI 27.34 kg/m²     Physical Exam   Constitutional: He is oriented to person, place, and time. He appears well-developed and well-nourished.   HENT:   Head: Normocephalic and atraumatic.   Eyes: EOM are normal.   Neck: Normal range of motion. No JVD present.   Cardiovascular:  Normal rate, regular rhythm, normal heart sounds and intact distal pulses.  Exam reveals no gallop and no friction rub.    No murmur heard.  Bilateral femoral pulses are 2+, bilateral dorsalis pedis pulses are 2+, bilateral posterior tibialis pulses are 2+.   Pulmonary/Chest: No respiratory distress. He has no wheezes. He has no rales. He exhibits no tenderness.   Abdominal: Soft. Bowel sounds are normal. There is no tenderness. There is no rebound and no guarding.   The is no presence of abdominal bruits   Musculoskeletal: Normal range of motion. He exhibits no edema or tenderness.   Neurological: He is alert and oriented to person, place, and time.   Skin: Skin is warm and dry.   Psychiatric: He has a normal mood and affect.   Nursing note and vitals reviewed.      Assessment:     1. ACC/AHA stage C systolic heart failure (CMS-HCC)  carvedilol (COREG) 6.25 MG Tab    NM-CARDIAC PET   2. Left ventricular systolic dysfunction, NYHA class 2  carvedilol (COREG) 6.25 MG Tab    NM-CARDIAC PET   3. COPD exacerbation (CMS-HCC)     4. ESRD (end stage renal disease) on dialysis (CMS-HCC)         Medical Decision Making:  Today's Assessment / Status / Plan:   Today, based on physical examination findings, patient is euvolemic. No JVD, lungs are clear to auscultation, no pitting edema in bilateral lower extremities, no ascites.    His dry weight is 200 pounds.    He is not on cardio myopathy medical therapy at this point.  I will start patient on carvedilol at 6.25 twice a day.  We will add further therapies in the future based on guidelines after carvedilol is titrated.    I will also submit for myocardial PET scan stress test to workup for coronary ischemia.    No indication for ICD at this time.    Patient will be enrolled into or heart failure program at [facility].    I will see patient back in our Heart Failure Clinic with lab tests and studies results in 1 weeks.    I thank you Dr. Cho for referring patient to our Heart  Failure Clinic today.

## 2017-09-14 NOTE — LETTER
[facility]   [address]  [place] [address]  Phone: [phone]  Fax: [phone]              Bridger Savage  1950    Encounter Date: 9/14/2017    Lizzy Haider M.D.          PROGRESS NOTE:  Subjective:   Bridger Savage is a 67 y.o. male who presents today For cardiac care and evaluation in the heart failure clinic after his recent hospital stay. Patient was found to be volume overloaded. He does have a history of end-stage renal disease for which he is currently on hemodialysis. This has been going on for 5 years. He doesn't know the reason for that. His left ventricular systolic function was found to be 40% as well. No prior ischemic workup that we know of.    Patient is feeling better these days. Does get winded upon walking up inclines or for distance. No symptoms at rest or with daily living activities.      Past Medical History:   Diagnosis Date   • Chronic obstructive pulmonary disease (CMS-HCC)    • Congestive heart failure (CMS-HCC)    • Renal disorder      Past Surgical History:   Procedure Laterality Date   • GASTROSCOPY-ENDO N/A 8/28/2017    Procedure: GASTROSCOPY-ENDO;  Surgeon: Pankaj Choudhury M.D.;  Location: SURGERY [facility];  Service: EUS     Family History   Problem Relation Age of Onset   • Heart Disease Mother      History   Smoking Status   • Former Smoker   • Packs/day: 2.00   • Years: 50.00   • Types: Cigarettes   • Quit date: 8/13/2017   Smokeless Tobacco   • Never Used     Allergies   Allergen Reactions   • Chloraprep One Step Itching     Outpatient Encounter Prescriptions as of 9/14/2017   Medication Sig Dispense Refill   • carvedilol (COREG) 6.25 MG Tab Take 1 Tab by mouth 2 times a day, with meals. 60 Tab 11   • omeprazole (PRILOSEC) 20 MG delayed-release capsule Take 1 Cap by mouth every day. 30 Cap 6   • ipratropium-albuterol (DUONEB) 0.5-2.5 (3) MG/3ML nebulizer solution 3 mL by Nebulization route 4  times a day. 30 Bullet 0   • albuterol 108 (90 Base) MCG/ACT Aero Soln inhalation aerosol Inhale 2 Puffs by mouth every 6 hours as needed for Shortness of Breath.     • fluticasone-salmeterol (ADVAIR) 250-50 MCG/DOSE AEROSOL POWDER, BREATH ACTIVATED Inhale 1 Puff by mouth PRN.     • tiotropium (SPIRIVA) 18 MCG Cap Inhale 18 mcg by mouth every day.     • aspirin EC (ECOTRIN) 81 MG Tablet Delayed Response Take 81 mg by mouth every day.     • ropinirole (REQUIP) 1 MG Tab Take 1 mg by mouth every bedtime.     • Cinacalcet HCl (SENSIPAR) 60 MG Tab Take 60 mg by mouth every evening.     • Sevelamer Carbonate (RENVELA) 800 MG Tab Take 800 mg by mouth 3 times a day, with meals. 4 caps each meals., 2 cap with snacks.     • guaifenesin 200 MG tablet Take 600 mg by mouth 3 times a day.       No facility-administered encounter medications on file as of 9/14/2017.      Review of Systems   Constitutional: Negative for chills, fever, malaise/fatigue and weight loss.   HENT: Negative for ear discharge, ear pain, hearing loss and nosebleeds.    Eyes: Negative for blurred vision, double vision, pain and discharge.   Respiratory: Positive for shortness of breath. Negative for cough.    Cardiovascular: Negative for chest pain, palpitations, orthopnea, claudication, leg swelling and PND.   Gastrointestinal: Negative for abdominal pain, blood in stool, melena, nausea and vomiting.   Genitourinary: Negative for dysuria and hematuria.   Musculoskeletal: Negative for falls, joint pain and myalgias.   Skin: Negative for itching and rash.   Neurological: Negative for dizziness, sensory change, speech change, loss of consciousness and headaches.   Endo/Heme/Allergies: Negative for environmental allergies. Does not bruise/bleed easily.   Psychiatric/Behavioral: Negative for depression, hallucinations and suicidal ideas.        Objective:   /90   Pulse (!) 107   Ht 1.829 m (6')   Wt 91.4 kg (201 lb 9.6 oz)   SpO2 92%   BMI 27.34 kg/m²       Physical Exam   Constitutional: He is oriented to person, place, and time. He appears well-developed and well-nourished.   HENT:   Head: Normocephalic and atraumatic.   Eyes: EOM are normal.   Neck: Normal range of motion. No JVD present.   Cardiovascular: Normal rate, regular rhythm, normal heart sounds and intact distal pulses.  Exam reveals no gallop and no friction rub.    No murmur heard.  Bilateral femoral pulses are 2+, bilateral dorsalis pedis pulses are 2+, bilateral posterior tibialis pulses are 2+.   Pulmonary/Chest: No respiratory distress. He has no wheezes. He has no rales. He exhibits no tenderness.   Abdominal: Soft. Bowel sounds are normal. There is no tenderness. There is no rebound and no guarding.   The is no presence of abdominal bruits   Musculoskeletal: Normal range of motion. He exhibits no edema or tenderness.   Neurological: He is alert and oriented to person, place, and time.   Skin: Skin is warm and dry.   Psychiatric: He has a normal mood and affect.   Nursing note and vitals reviewed.      Assessment:     1. ACC/AHA stage C systolic heart failure (CMS-HCC)  carvedilol (COREG) 6.25 MG Tab    NM-CARDIAC PET   2. Left ventricular systolic dysfunction, NYHA class 2  carvedilol (COREG) 6.25 MG Tab    NM-CARDIAC PET   3. COPD exacerbation (CMS-HCC)     4. ESRD (end stage renal disease) on dialysis (CMS-HCC)         Medical Decision Making:  Today's Assessment / Status / Plan:   Today, based on physical examination findings, patient is euvolemic. No JVD, lungs are clear to auscultation, no pitting edema in bilateral lower extremities, no ascites.    His dry weight is 200 pounds.    He is not on cardio myopathy medical therapy at this point.  I will start patient on carvedilol at 6.25 twice a day.  We will add further therapies in the future based on guidelines after carvedilol is titrated.    I will also submit for myocardial PET scan stress test to workup for coronary ischemia.    No  indication for ICD at this time.    Patient will be enrolled into or heart failure program at [facility].    I will see patient back in our Heart Failure Clinic with lab tests and studies results in 1 weeks.    I thank you Dr. Cho for referring patient to our Heart Failure Clinic today.        King Cho D.O.  [address]   [address]  [facility] [address]  VIA Facsimile: [phone]

## 2017-09-20 ENCOUNTER — HOSPITAL ENCOUNTER (OUTPATIENT)
Dept: RADIOLOGY | Facility: MEDICAL CENTER | Age: 67
End: 2017-09-20
Attending: INTERNAL MEDICINE
Payer: MEDICARE

## 2017-09-20 DIAGNOSIS — I50.20 ACC/AHA STAGE C SYSTOLIC HEART FAILURE (HCC): ICD-10-CM

## 2017-09-20 DIAGNOSIS — I51.89 LEFT VENTRICULAR SYSTOLIC DYSFUNCTION, NYHA CLASS 2: ICD-10-CM

## 2017-09-20 PROCEDURE — A9555 RB82 RUBIDIUM: HCPCS

## 2017-09-22 ENCOUNTER — OFFICE VISIT (OUTPATIENT)
Dept: CARDIOLOGY | Facility: MEDICAL CENTER | Age: 67
End: 2017-09-22
Payer: MEDICARE

## 2017-09-22 VITALS
DIASTOLIC BLOOD PRESSURE: 90 MMHG | HEIGHT: 72 IN | WEIGHT: 201 LBS | BODY MASS INDEX: 27.22 KG/M2 | OXYGEN SATURATION: 90 % | HEART RATE: 97 BPM | SYSTOLIC BLOOD PRESSURE: 122 MMHG

## 2017-09-22 DIAGNOSIS — I50.20 ACC/AHA STAGE C SYSTOLIC HEART FAILURE (HCC): ICD-10-CM

## 2017-09-22 DIAGNOSIS — I42.8 NON-ISCHEMIC CARDIOMYOPATHY (HCC): ICD-10-CM

## 2017-09-22 DIAGNOSIS — D64.9 NORMOCYTIC ANEMIA: ICD-10-CM

## 2017-09-22 DIAGNOSIS — R06.09 DYSPNEA ON EXERTION: ICD-10-CM

## 2017-09-22 DIAGNOSIS — I51.89 LEFT VENTRICULAR SYSTOLIC DYSFUNCTION, NYHA CLASS 2: ICD-10-CM

## 2017-09-22 DIAGNOSIS — Z99.2 ESRD (END STAGE RENAL DISEASE) ON DIALYSIS (HCC): ICD-10-CM

## 2017-09-22 DIAGNOSIS — N18.6 ESRD (END STAGE RENAL DISEASE) ON DIALYSIS (HCC): ICD-10-CM

## 2017-09-22 PROCEDURE — 94620 PR PULMONARY STRESS TESTING,SIMPLE: CPT | Performed by: INTERNAL MEDICINE

## 2017-09-22 PROCEDURE — 99215 OFFICE O/P EST HI 40 MIN: CPT | Mod: 25 | Performed by: INTERNAL MEDICINE

## 2017-09-22 RX ORDER — CARVEDILOL 12.5 MG/1
12.5 TABLET ORAL 2 TIMES DAILY WITH MEALS
Qty: 60 TAB | Refills: 11 | Status: SHIPPED | OUTPATIENT
Start: 2017-09-22 | End: 2017-10-30

## 2017-09-22 ASSESSMENT — ENCOUNTER SYMPTOMS
MYALGIAS: 0
DIZZINESS: 0
WEIGHT LOSS: 0
PALPITATIONS: 0
SPEECH CHANGE: 0
LOSS OF CONSCIOUSNESS: 0
COUGH: 0
EYE PAIN: 0
DEPRESSION: 0
HEADACHES: 0
BLOOD IN STOOL: 0
PND: 0
FEVER: 0
BLURRED VISION: 0
VOMITING: 0
HALLUCINATIONS: 0
FALLS: 0
ORTHOPNEA: 0
BRUISES/BLEEDS EASILY: 0
DOUBLE VISION: 0
SENSORY CHANGE: 0
CHILLS: 0
ABDOMINAL PAIN: 0
EYE DISCHARGE: 0
CLAUDICATION: 0
NAUSEA: 0
SHORTNESS OF BREATH: 1

## 2017-09-22 ASSESSMENT — MINNESOTA LIVING WITH HEART FAILURE QUESTIONNAIRE (MLHF)
MAKING YOU FEEL DEPRESSED: 1
MAKING YOU WORRY: 1
TIRED, FATIGUED OR LOW ON ENERGY: 5
WALKING ABOUT OR CLIMBING STAIRS DIFFICULT: 3
DIFFICULTY WITH RECREATIONAL PASTIMES, SPORTS, HOBBIES: 4
HAVING TO SIT OR LIE DOWN DURING THE DAY: 3
WORKING AROUND THE HOUSE OR YARD DIFFICULT: 4
DIFFICULTY SLEEPING WELL AT NIGHT: 2
DIFFICULTY TO CONCENTRATE OR REMEMBERING THINGS: 3
MAKING YOU STAY IN A HOSPITAL: 1
TOTAL_SCORE: 57
SWELLING IN ANKLES OR LEGS: 1
DIFFICULTY WITH SEXUAL ACTIVITIES: 5
GIVING YOU SIDE EFFECTS FROM TREATMENTS: 1
LOSS OF SELF CONTROL IN YOUR LIFE: 2
FEELING LIKE A BURDEN TO FAMILY AND FRIENDS: 2
MAKING YOU SHORT OF BREATH: 5
EATING LESS FOODS YOU LIKE: 4
COSTING YOU MONEY FOR MEDICAL CARE: 1
DIFFICULTY WORKING TO EARN A LIVING: 5
DIFFICULTY SOCIALIZING WITH FAMILY OR FRIENDS: 2
DIFFICULTY GOING AWAY FROM HOME: 2

## 2017-09-22 ASSESSMENT — 6 MINUTE WALK TEST (6MWT): TOTAL DISTANCE WALKED (METERS): 115.82

## 2017-09-22 ASSESSMENT — NEW YORK HEART ASSOCIATION (NYHA) CLASSIFICATION: NYHA FUNCTIONAL CLASS: CLASS II

## 2017-09-22 NOTE — PROGRESS NOTES
Heart Failure New Appointment     6MWT- 115.82 meters  MLWHF- 57    OP Heart Failure  Vitals  Appointment Type: Heart Failure New  Weight: 91.2 kg (201 lb)  How Weight Obtained: Stand Up Scale  Height: 182.9 cm (6')  BMI (Calculated): 27.26  Blood Pressure : 122/90  Pulse: 97    System Assessment  NYHA Functional Class Assessment: Class II  ACC/AHA HF Stage: C    Smoking Hx  Have you Ever Smoked: Yes  Have you Smoked in the Last 12 Mos: Yes  Have you Quit Smoking: Yes  Confirm Quit Date: 17     Alcohol Hx  Do you Drink?: No    Illicit Drug Hx  Illicit Drug History: No    MN Living with Heart Failure  Swelling in Ankles or Legs: 1  Having to Sit or Lie Down During the Day: 3  Walking About or Climbing Stairs Difficult: 3  Working Around the House or Yard Difficult: 4  Difficulty Going Away from Home: 2  Difficulty Sleeping Well at Night: 2  Difficulty Socializing with Family or Friends: 2  Difficulty Working to Earn a Livin  Difficulty with Recreational Pastimes, Sports, Hobbies: 4  Difficulty with Sexual Activities: 5  Eating Less Foods You Like: 4  Making you Short of Breath: 5  Tired, Fatigued or Low on Energy: 5  Making you Stay in a Hospital: 1  Costing you Money for Medical Care?: 1  Giving you Side Effects from Treatments: 1  Feeling like a [place] to Family and Friends: 2  Loss of Self Control in your Life: 2  Making You Worry: 1  Difficulty to Concentrate or Remembering Things: 3  Making you Feel Depressed: 1  MLHF Total Score : 57    6 Minute Walk Test  Baseline to end of test: 2:31  Total meters walked: 115.82       LEDY Olson RN  [phone]

## 2017-09-22 NOTE — PROGRESS NOTES
Subjective:   Bridger Savage is a 67 y.o. male who presents today For cardiac care and evaluation in the heart failure clinic after his recent hospital stay. Patient was found to be volume overloaded. He does have a history of end-stage renal disease for which he is currently on hemodialysis. This has been going on for 5 years. He doesn't know the reason for that. His left ventricular systolic function was found to be 40% as well. No prior ischemic workup that we know of.     Patient is feeling better these days. Does get winded upon walking up inclines or for distance. No symptoms at rest or with daily living activities.    He is tolerating carvedilol 6.25 mg by mouth twice a day.    Patient was able to complete 116 m during his 6 minute walk test. his O2 saturation at baseline was 90% and at the end of the test, the O2 saturation was 97%. he reported 3 level of dyspnea on Trevon scale.      Past Medical History:   Diagnosis Date   • Chronic obstructive pulmonary disease (CMS-HCC)    • Congestive heart failure (CMS-HCC)    • Renal disorder      Past Surgical History:   Procedure Laterality Date   • GASTROSCOPY-ENDO N/A 8/28/2017    Procedure: GASTROSCOPY-ENDO;  Surgeon: Pankaj Choudhury M.D.;  Location: SURGERY [facility];  Service: EUS     Family History   Problem Relation Age of Onset   • Heart Disease Mother      History   Smoking Status   • Former Smoker   • Packs/day: 2.00   • Years: 50.00   • Types: Cigarettes   • Quit date: 8/13/2017   Smokeless Tobacco   • Never Used     Allergies   Allergen Reactions   • Chloraprep One Step Itching     Outpatient Encounter Prescriptions as of 9/22/2017   Medication Sig Dispense Refill   • carvedilol (COREG) 12.5 MG Tab Take 1 Tab by mouth 2 times a day, with meals. 60 Tab 11   • omeprazole (PRILOSEC) 20 MG delayed-release capsule Take 1 Cap by mouth every day. 30 Cap 6   • ipratropium-albuterol (DUONEB) 0.5-2.5 (3) MG/3ML nebulizer solution 3 mL by Nebulization route  4 times a day. 30 Bullet 0   • albuterol 108 (90 Base) MCG/ACT Aero Soln inhalation aerosol Inhale 2 Puffs by mouth every 6 hours as needed for Shortness of Breath.     • fluticasone-salmeterol (ADVAIR) 250-50 MCG/DOSE AEROSOL POWDER, BREATH ACTIVATED Inhale 1 Puff by mouth PRN.     • tiotropium (SPIRIVA) 18 MCG Cap Inhale 18 mcg by mouth every day.     • aspirin EC (ECOTRIN) 81 MG Tablet Delayed Response Take 81 mg by mouth every day.     • ropinirole (REQUIP) 1 MG Tab Take 1 mg by mouth every bedtime.     • Cinacalcet HCl (SENSIPAR) 60 MG Tab Take 60 mg by mouth every evening.     • Sevelamer Carbonate (RENVELA) 800 MG Tab Take 800 mg by mouth 3 times a day, with meals. 4 caps each meals., 2 cap with snacks.     • [DISCONTINUED] carvedilol (COREG) 6.25 MG Tab Take 1 Tab by mouth 2 times a day, with meals. 60 Tab 11   • guaifenesin 200 MG tablet Take 600 mg by mouth 3 times a day.       No facility-administered encounter medications on file as of 9/22/2017.      Review of Systems   Constitutional: Negative for chills, fever, malaise/fatigue and weight loss.   HENT: Negative for ear discharge, ear pain, hearing loss and nosebleeds.    Eyes: Negative for blurred vision, double vision, pain and discharge.   Respiratory: Positive for shortness of breath. Negative for cough.    Cardiovascular: Negative for chest pain, palpitations, orthopnea, claudication, leg swelling and PND.   Gastrointestinal: Negative for abdominal pain, blood in stool, melena, nausea and vomiting.   Genitourinary: Negative for dysuria and hematuria.   Musculoskeletal: Negative for falls, joint pain and myalgias.   Skin: Negative for itching and rash.   Neurological: Negative for dizziness, sensory change, speech change, loss of consciousness and headaches.   Endo/Heme/Allergies: Negative for environmental allergies. Does not bruise/bleed easily.   Psychiatric/Behavioral: Negative for depression, hallucinations and suicidal ideas.        Objective:    /90   Pulse 97   Ht 1.829 m (6')   Wt 91.2 kg (201 lb)   SpO2 90%   BMI 27.26 kg/m²     Physical Exam   Constitutional: He is oriented to person, place, and time. He appears well-developed and well-nourished.   HENT:   Head: Normocephalic and atraumatic.   Eyes: EOM are normal.   Neck: Normal range of motion. No JVD present.   Cardiovascular: Normal rate, regular rhythm, normal heart sounds and intact distal pulses.  Exam reveals no gallop and no friction rub.    No murmur heard.  Bilateral femoral pulses are 2+, bilateral dorsalis pedis pulses are 2+, bilateral posterior tibialis pulses are 2+.   Pulmonary/Chest: No respiratory distress. He has no wheezes. He has no rales. He exhibits no tenderness.   Abdominal: Soft. Bowel sounds are normal. There is no tenderness. There is no rebound and no guarding.   The is no presence of abdominal bruits   Musculoskeletal: Normal range of motion.   Neurological: He is alert and oriented to person, place, and time.   Skin: Skin is warm and dry.   Psychiatric: He has a normal mood and affect.   Nursing note and vitals reviewed.      Assessment:     1. ACC/AHA stage C systolic heart failure (CMS-HCC)  [facility] PULMONARY STRESS TESTING,SIMPLE    carvedilol (COREG) 12.5 MG Tab   2. Left ventricular systolic dysfunction, NYHA class 2  [facility] PULMONARY STRESS TESTING,SIMPLE    carvedilol (COREG) 12.5 MG Tab   3. ESRD (end stage renal disease) on dialysis (CMS-HCC)     4. Normocytic anemia     5. Non-ischemic cardiomyopathy (CMS-HCC)  [facility] PULMONARY STRESS TESTING,SIMPLE    carvedilol (COREG) 12.5 MG Tab   6. Dyspnea on exertion  [facility] PULMONARY STRESS TESTING,SIMPLE       Medical Decision Making:  Today's Assessment / Status / Plan:   Today, based on physical examination findings, patient is euvolemic. No JVD, lungs are clear to auscultation, no pitting edema in bilateral lower extremities, no ascites.     His dry weight is 200 pounds.     Will increase Carvedilol to 12.5 mg po twice  daily.    We will add further therapies in the future based on guidelines after carvedilol is titrated.     Myocardial PET scan stress was negative for coronary ischemia.      No indication for ICD at this time.     I will see patient back in our Heart Failure Clinic with lab tests and studies results in 1 month.     I thank you Dr. Cho for referring patient to our Heart Failure Clinic today.

## 2017-10-30 ENCOUNTER — OFFICE VISIT (OUTPATIENT)
Dept: CARDIOLOGY | Facility: MEDICAL CENTER | Age: 67
End: 2017-10-30
Payer: MEDICARE

## 2017-10-30 VITALS
BODY MASS INDEX: 27.09 KG/M2 | HEIGHT: 72 IN | SYSTOLIC BLOOD PRESSURE: 140 MMHG | HEART RATE: 90 BPM | WEIGHT: 200 LBS | DIASTOLIC BLOOD PRESSURE: 70 MMHG

## 2017-10-30 DIAGNOSIS — I51.89 LEFT VENTRICULAR SYSTOLIC DYSFUNCTION, NYHA CLASS 2: ICD-10-CM

## 2017-10-30 DIAGNOSIS — Z99.2 ESRD (END STAGE RENAL DISEASE) ON DIALYSIS (HCC): ICD-10-CM

## 2017-10-30 DIAGNOSIS — N18.6 ESRD (END STAGE RENAL DISEASE) ON DIALYSIS (HCC): ICD-10-CM

## 2017-10-30 DIAGNOSIS — I42.8 NON-ISCHEMIC CARDIOMYOPATHY (HCC): ICD-10-CM

## 2017-10-30 DIAGNOSIS — I50.20 ACC/AHA STAGE C SYSTOLIC HEART FAILURE (HCC): ICD-10-CM

## 2017-10-30 PROCEDURE — 99214 OFFICE O/P EST MOD 30 MIN: CPT | Performed by: INTERNAL MEDICINE

## 2017-10-30 RX ORDER — CARVEDILOL 25 MG/1
25 TABLET ORAL 2 TIMES DAILY WITH MEALS
Qty: 60 TAB | Refills: 11 | Status: SHIPPED | OUTPATIENT
Start: 2017-10-30 | End: 2018-11-16 | Stop reason: SDUPTHER

## 2017-10-30 ASSESSMENT — ENCOUNTER SYMPTOMS
MYALGIAS: 0
DEPRESSION: 0
BLOOD IN STOOL: 0
SPEECH CHANGE: 0
ORTHOPNEA: 0
SHORTNESS OF BREATH: 1
DIZZINESS: 0
PND: 0
LOSS OF CONSCIOUSNESS: 0
COUGH: 0
CLAUDICATION: 0
BLURRED VISION: 0
EYE PAIN: 0
PALPITATIONS: 0
NAUSEA: 0
FEVER: 0
WEIGHT LOSS: 0
VOMITING: 0
ABDOMINAL PAIN: 0
FALLS: 0
SENSORY CHANGE: 0
DOUBLE VISION: 0
BRUISES/BLEEDS EASILY: 0
CHILLS: 0
EYE DISCHARGE: 0
HALLUCINATIONS: 0
HEADACHES: 0

## 2017-10-30 NOTE — LETTER
[facility]   [address]  [place] [address]  Phone: [phone]  Fax: [phone]              Bridger Savage  1950    Encounter Date: 10/30/2017    Lizzy Haider M.D.          PROGRESS NOTE:  Subjective:   Bridger Savage is a 67 y.o. male who presents today for cardiac care and evaluation in the heart failure clinic after his recent hospital stay. Patient was found to be volume overloaded. He does have a history of end-stage renal disease for which he is currently on hemodialysis. This has been going on for 5 years. He doesn't know the reason for that. His left ventricular systolic function was found to be 40% as well. No prior ischemic workup that we know of.     Patient is feeling better these days. Does get winded upon walking up inclines or for distance. No symptoms at rest or with daily living activities.     He is tolerating carvedilol 12.5 mg by mouth twice a day.     At prior visit, patient was able to complete 116 m during his 6 minute walk test. his O2 saturation at baseline was 90% and at the end of the test, the O2 saturation was 97%. he reported 3 level of dyspnea on Trevon scale.    Past Medical History:   Diagnosis Date   • Chronic obstructive pulmonary disease (CMS-HCC)    • Congestive heart failure (CMS-HCC)    • Renal disorder      Past Surgical History:   Procedure Laterality Date   • GASTROSCOPY-ENDO N/A 8/28/2017    Procedure: GASTROSCOPY-ENDO;  Surgeon: Pankaj Choudhury M.D.;  Location: SURGERY [facility];  Service: EUS     Family History   Problem Relation Age of Onset   • Heart Disease Mother      History   Smoking Status   • Former Smoker   • Packs/day: 2.00   • Years: 50.00   • Types: Cigarettes   • Quit date: 8/13/2017   Smokeless Tobacco   • Never Used     Allergies   Allergen Reactions   • Chloraprep One Step Itching     Outpatient Encounter Prescriptions as of 10/30/2017   Medication Sig Dispense Refill   •  carvedilol (COREG) 25 MG Tab Take 1 Tab by mouth 2 times a day, with meals. 60 Tab 11   • ipratropium-albuterol (DUONEB) 0.5-2.5 (3) MG/3ML nebulizer solution 3 mL by Nebulization route 4 times a day. 30 Bullet 0   • albuterol 108 (90 Base) MCG/ACT Aero Soln inhalation aerosol Inhale 2 Puffs by mouth every 6 hours as needed for Shortness of Breath.     • fluticasone-salmeterol (ADVAIR) 250-50 MCG/DOSE AEROSOL POWDER, BREATH ACTIVATED Inhale 1 Puff by mouth PRN.     • tiotropium (SPIRIVA) 18 MCG Cap Inhale 18 mcg by mouth every day.     • aspirin EC (ECOTRIN) 81 MG Tablet Delayed Response Take 81 mg by mouth every day.     • ropinirole (REQUIP) 1 MG Tab Take 1 mg by mouth every bedtime.     • Cinacalcet HCl (SENSIPAR) 60 MG Tab Take 60 mg by mouth every evening.     • Sevelamer Carbonate (RENVELA) 800 MG Tab Take 800 mg by mouth 3 times a day, with meals. 4 caps each meals., 2 cap with snacks.     • [DISCONTINUED] carvedilol (COREG) 12.5 MG Tab Take 1 Tab by mouth 2 times a day, with meals. 60 Tab 11   • omeprazole (PRILOSEC) 20 MG delayed-release capsule Take 1 Cap by mouth every day. 30 Cap 6   • guaifenesin 200 MG tablet Take 600 mg by mouth 3 times a day.       No facility-administered encounter medications on file as of 10/30/2017.      Review of Systems   Constitutional: Negative for chills, fever, malaise/fatigue and weight loss.   HENT: Negative for ear discharge, ear pain, hearing loss and nosebleeds.    Eyes: Negative for blurred vision, double vision, pain and discharge.   Respiratory: Positive for shortness of breath. Negative for cough.    Cardiovascular: Negative for chest pain, palpitations, orthopnea, claudication, leg swelling and PND.   Gastrointestinal: Negative for abdominal pain, blood in stool, melena, nausea and vomiting.   Genitourinary: Negative for dysuria and hematuria.   Musculoskeletal: Negative for falls, joint pain and myalgias.   Skin: Negative for itching and rash.   Neurological:  Negative for dizziness, sensory change, speech change, loss of consciousness and headaches.   Endo/Heme/Allergies: Negative for environmental allergies. Does not bruise/bleed easily.   Psychiatric/Behavioral: Negative for depression, hallucinations and suicidal ideas.        Objective:   /70   Pulse 90   Ht 1.829 m (6')   Wt 90.7 kg (200 lb)   BMI 27.12 kg/m²      Physical Exam   Constitutional: He is oriented to person, place, and time. No distress.   HENT:   Head: Normocephalic and atraumatic.   Eyes: EOM are normal.   Neck: Normal range of motion. No JVD present.   Cardiovascular: Normal rate, regular rhythm, normal heart sounds and intact distal pulses.  Exam reveals no gallop and no friction rub.    No murmur heard.  Bilateral femoral pulses are 2+, bilateral dorsalis pedis pulses are 2+, bilateral posterior tibialis pulses are 2+.   Pulmonary/Chest: No respiratory distress. He has no wheezes. He has no rales. He exhibits no tenderness.   Abdominal: Soft. Bowel sounds are normal. There is no tenderness. There is no rebound and no guarding.   The is no presence of abdominal bruits   Musculoskeletal: Normal range of motion.   Neurological: He is alert and oriented to person, place, and time.   Skin: Skin is warm and dry.   Psychiatric: He has a normal mood and affect.   Nursing note and vitals reviewed.      Assessment:     1. ACC/AHA stage C systolic heart failure (CMS-[facility])  carvedilol (COREG) 25 MG Tab   2. Left ventricular systolic dysfunction, NYHA class 2  carvedilol (COREG) 25 MG Tab   3. ESRD (end stage renal disease) on dialysis (CMS-HCC)     4. Non-ischemic cardiomyopathy (CMS-HCC)         Medical Decision Making:  Today's Assessment / Status / Plan:   Today, based on physical examination findings, patient is euvolemic. No JVD, lungs are clear to auscultation, no pitting edema in bilateral lower extremities, no ascites.     His dry weight is 200 pounds.     Will increase Carvedilol to 25 mg po  twice daily.     We will add further therapies in the future based on guidelines after carvedilol is titrated.     Myocardial PET scan stress was negative for coronary ischemia.      No indication for ICD at this time.     I will see patient back in our Heart Failure Clinic with lab tests and studies results in 1 month.     I thank you Dr. Cho for referring patient to our Heart Failure Clinic today.      King Cho D.O.  [address]  [place] [address]  VIA Facsimile: [phone]

## 2017-10-31 NOTE — PROGRESS NOTES
Subjective:   Bridger Savage is a 67 y.o. male who presents today for cardiac care and evaluation in the heart failure clinic after his recent hospital stay. Patient was found to be volume overloaded. He does have a history of end-stage renal disease for which he is currently on hemodialysis. This has been going on for 5 years. He doesn't know the reason for that. His left ventricular systolic function was found to be 40% as well. No prior ischemic workup that we know of.     Patient is feeling better these days. Does get winded upon walking up inclines or for distance. No symptoms at rest or with daily living activities.     He is tolerating carvedilol 12.5 mg by mouth twice a day.     At prior visit, patient was able to complete 116 m during his 6 minute walk test. his O2 saturation at baseline was 90% and at the end of the test, the O2 saturation was 97%. he reported 3 level of dyspnea on Trevon scale.    Past Medical History:   Diagnosis Date   • Chronic obstructive pulmonary disease (CMS-HCC)    • Congestive heart failure (CMS-HCC)    • Renal disorder      Past Surgical History:   Procedure Laterality Date   • GASTROSCOPY-ENDO N/A 8/28/2017    Procedure: GASTROSCOPY-ENDO;  Surgeon: Pankaj Choudhury M.D.;  Location: SURGERY [facility];  Service: EUS     Family History   Problem Relation Age of Onset   • Heart Disease Mother      History   Smoking Status   • Former Smoker   • Packs/day: 2.00   • Years: 50.00   • Types: Cigarettes   • Quit date: 8/13/2017   Smokeless Tobacco   • Never Used     Allergies   Allergen Reactions   • Chloraprep One Step Itching     Outpatient Encounter Prescriptions as of 10/30/2017   Medication Sig Dispense Refill   • carvedilol (COREG) 25 MG Tab Take 1 Tab by mouth 2 times a day, with meals. 60 Tab 11   • ipratropium-albuterol (DUONEB) 0.5-2.5 (3) MG/3ML nebulizer solution 3 mL by Nebulization route 4 times a day. 30 Bullet 0   • albuterol 108 (90 Base) MCG/ACT Aero Soln  inhalation aerosol Inhale 2 Puffs by mouth every 6 hours as needed for Shortness of Breath.     • fluticasone-salmeterol (ADVAIR) 250-50 MCG/DOSE AEROSOL POWDER, BREATH ACTIVATED Inhale 1 Puff by mouth PRN.     • tiotropium (SPIRIVA) 18 MCG Cap Inhale 18 mcg by mouth every day.     • aspirin EC (ECOTRIN) 81 MG Tablet Delayed Response Take 81 mg by mouth every day.     • ropinirole (REQUIP) 1 MG Tab Take 1 mg by mouth every bedtime.     • Cinacalcet HCl (SENSIPAR) 60 MG Tab Take 60 mg by mouth every evening.     • Sevelamer Carbonate (RENVELA) 800 MG Tab Take 800 mg by mouth 3 times a day, with meals. 4 caps each meals., 2 cap with snacks.     • [DISCONTINUED] carvedilol (COREG) 12.5 MG Tab Take 1 Tab by mouth 2 times a day, with meals. 60 Tab 11   • omeprazole (PRILOSEC) 20 MG delayed-release capsule Take 1 Cap by mouth every day. 30 Cap 6   • guaifenesin 200 MG tablet Take 600 mg by mouth 3 times a day.       No facility-administered encounter medications on file as of 10/30/2017.      Review of Systems   Constitutional: Negative for chills, fever, malaise/fatigue and weight loss.   HENT: Negative for ear discharge, ear pain, hearing loss and nosebleeds.    Eyes: Negative for blurred vision, double vision, pain and discharge.   Respiratory: Positive for shortness of breath. Negative for cough.    Cardiovascular: Negative for chest pain, palpitations, orthopnea, claudication, leg swelling and PND.   Gastrointestinal: Negative for abdominal pain, blood in stool, melena, nausea and vomiting.   Genitourinary: Negative for dysuria and hematuria.   Musculoskeletal: Negative for falls, joint pain and myalgias.   Skin: Negative for itching and rash.   Neurological: Negative for dizziness, sensory change, speech change, loss of consciousness and headaches.   Endo/Heme/Allergies: Negative for environmental allergies. Does not bruise/bleed easily.   Psychiatric/Behavioral: Negative for depression, hallucinations and suicidal  ideas.        Objective:   /70   Pulse 90   Ht 1.829 m (6')   Wt 90.7 kg (200 lb)   BMI 27.12 kg/m²     Physical Exam   Constitutional: He is oriented to person, place, and time. No distress.   HENT:   Head: Normocephalic and atraumatic.   Eyes: EOM are normal.   Neck: Normal range of motion. No JVD present.   Cardiovascular: Normal rate, regular rhythm, normal heart sounds and intact distal pulses.  Exam reveals no gallop and no friction rub.    No murmur heard.  Bilateral femoral pulses are 2+, bilateral dorsalis pedis pulses are 2+, bilateral posterior tibialis pulses are 2+.   Pulmonary/Chest: No respiratory distress. He has no wheezes. He has no rales. He exhibits no tenderness.   Abdominal: Soft. Bowel sounds are normal. There is no tenderness. There is no rebound and no guarding.   The is no presence of abdominal bruits   Musculoskeletal: Normal range of motion.   Neurological: He is alert and oriented to person, place, and time.   Skin: Skin is warm and dry.   Psychiatric: He has a normal mood and affect.   Nursing note and vitals reviewed.      Assessment:     1. ACC/AHA stage C systolic heart failure (CMS-[facility])  carvedilol (COREG) 25 MG Tab   2. Left ventricular systolic dysfunction, NYHA class 2  carvedilol (COREG) 25 MG Tab   3. ESRD (end stage renal disease) on dialysis (CMS-HCC)     4. Non-ischemic cardiomyopathy (CMS-HCC)         Medical Decision Making:  Today's Assessment / Status / Plan:   Today, based on physical examination findings, patient is euvolemic. No JVD, lungs are clear to auscultation, no pitting edema in bilateral lower extremities, no ascites.     His dry weight is 200 pounds.     Will increase Carvedilol to 25 mg po twice daily.     We will add further therapies in the future based on guidelines after carvedilol is titrated.     Myocardial PET scan stress was negative for coronary ischemia.      No indication for ICD at this time.     I will see patient back in our Heart  Failure Clinic with lab tests and studies results in 1 month.     I thank you Dr. Cho for referring patient to our Heart Failure Clinic today.

## 2017-12-28 ENCOUNTER — OFFICE VISIT (OUTPATIENT)
Dept: CARDIOLOGY | Facility: MEDICAL CENTER | Age: 67
End: 2017-12-28
Payer: MEDICARE

## 2017-12-28 VITALS
HEART RATE: 73 BPM | SYSTOLIC BLOOD PRESSURE: 160 MMHG | BODY MASS INDEX: 28.17 KG/M2 | HEIGHT: 72 IN | OXYGEN SATURATION: 91 % | WEIGHT: 208 LBS | DIASTOLIC BLOOD PRESSURE: 90 MMHG

## 2017-12-28 DIAGNOSIS — I42.8 NON-ISCHEMIC CARDIOMYOPATHY (HCC): ICD-10-CM

## 2017-12-28 DIAGNOSIS — I10 HTN (HYPERTENSION), MALIGNANT: ICD-10-CM

## 2017-12-28 DIAGNOSIS — I51.89 LEFT VENTRICULAR SYSTOLIC DYSFUNCTION, NYHA CLASS 2: ICD-10-CM

## 2017-12-28 DIAGNOSIS — N18.6 ESRD (END STAGE RENAL DISEASE) ON DIALYSIS (HCC): ICD-10-CM

## 2017-12-28 DIAGNOSIS — R06.09 DYSPNEA ON EXERTION: ICD-10-CM

## 2017-12-28 DIAGNOSIS — Z99.2 ESRD (END STAGE RENAL DISEASE) ON DIALYSIS (HCC): ICD-10-CM

## 2017-12-28 DIAGNOSIS — I50.20 ACC/AHA STAGE C SYSTOLIC HEART FAILURE (HCC): ICD-10-CM

## 2017-12-28 PROCEDURE — 99215 OFFICE O/P EST HI 40 MIN: CPT | Mod: 25 | Performed by: INTERNAL MEDICINE

## 2017-12-28 PROCEDURE — 94620 PR PULMONARY STRESS TESTING,SIMPLE: CPT | Performed by: INTERNAL MEDICINE

## 2017-12-28 RX ORDER — CARVEDILOL 12.5 MG/1
TABLET ORAL
COMMUNITY
Start: 2017-10-29 | End: 2017-12-28

## 2017-12-28 RX ORDER — AZITHROMYCIN 250 MG/1
TABLET, FILM COATED ORAL
COMMUNITY
Start: 2017-12-11 | End: 2018-08-16

## 2017-12-28 RX ORDER — LISINOPRIL 10 MG/1
10 TABLET ORAL DAILY
Qty: 30 TAB | Refills: 11 | Status: ON HOLD | OUTPATIENT
Start: 2017-12-28 | End: 2019-05-21

## 2017-12-28 RX ORDER — POLYETHYLENE GLYCOL-3350 AND ELECTROLYTES 236; 6.74; 5.86; 2.97; 22.74 G/274.31G; G/274.31G; G/274.31G; G/274.31G; G/274.31G
POWDER, FOR SOLUTION ORAL
COMMUNITY
Start: 2017-10-25 | End: 2017-12-28

## 2017-12-28 ASSESSMENT — MINNESOTA LIVING WITH HEART FAILURE QUESTIONNAIRE (MLHF)
TOTAL_SCORE: 42
DIFFICULTY WITH RECREATIONAL PASTIMES, SPORTS, HOBBIES: 4
DIFFICULTY TO CONCENTRATE OR REMEMBERING THINGS: 2
MAKING YOU SHORT OF BREATH: 5
DIFFICULTY GOING AWAY FROM HOME: 0
MAKING YOU WORRY: 0
COSTING YOU MONEY FOR MEDICAL CARE: 0
DIFFICULTY WITH SEXUAL ACTIVITIES: 5
MAKING YOU STAY IN A HOSPITAL: 0
WORKING AROUND THE HOUSE OR YARD DIFFICULT: 1
EATING LESS FOODS YOU LIKE: 5
DIFFICULTY WORKING TO EARN A LIVING: 5
FEELING LIKE A BURDEN TO FAMILY AND FRIENDS: 3
GIVING YOU SIDE EFFECTS FROM TREATMENTS: 0
TIRED, FATIGUED OR LOW ON ENERGY: 4
DIFFICULTY SLEEPING WELL AT NIGHT: 2
DIFFICULTY SOCIALIZING WITH FAMILY OR FRIENDS: 1
LOSS OF SELF CONTROL IN YOUR LIFE: 5
SWELLING IN ANKLES OR LEGS: 0
MAKING YOU FEEL DEPRESSED: 0
WALKING ABOUT OR CLIMBING STAIRS DIFFICULT: 0
HAVING TO SIT OR LIE DOWN DURING THE DAY: 0

## 2017-12-28 ASSESSMENT — ENCOUNTER SYMPTOMS
DIZZINESS: 0
DIAPHORESIS: 0
MYALGIAS: 0
DOUBLE VISION: 0
FALLS: 0
HEADACHES: 0
SENSORY CHANGE: 0
SHORTNESS OF BREATH: 1
MEMORY LOSS: 0
DEPRESSION: 0
COUGH: 0
ABDOMINAL PAIN: 0
FEVER: 0
PALPITATIONS: 0
BLURRED VISION: 0
BRUISES/BLEEDS EASILY: 0

## 2017-12-28 ASSESSMENT — NEW YORK HEART ASSOCIATION (NYHA) CLASSIFICATION: NYHA FUNCTIONAL CLASS: CLASS II

## 2017-12-28 NOTE — PROGRESS NOTES
Subjective:   Bridger Savage is a 67 y.o. male who presents today for cardiac care and evaluation in the heart failure clinic for chronic heart failure care. He does have a history of end-stage renal disease for which he is currently on hemodialysis. This has been going on for 5 years. He doesn't know the reason for that. His left ventricular systolic function was found to be 40% as well. No prior ischemic workup that we know of.     Patient is feeling better these days. Does get winded upon walking up inclines or for distance. No symptoms at rest or with daily living activities.     He is tolerating carvedilol 25 mg by mouth twice a day.     At prior visit, patient was able to complete 116 m during his 6 minute walk test. his O2 saturation at baseline was 90% and at the end of the test, the O2 saturation was 97%. he reported 3 level of dyspnea on Trevon scale.       Past Medical History:   Diagnosis Date   • Chronic obstructive pulmonary disease (CMS-HCC)    • Congestive heart failure (CMS-HCC)    • Renal disorder      Past Surgical History:   Procedure Laterality Date   • GASTROSCOPY-ENDO N/A 8/28/2017    Procedure: GASTROSCOPY-ENDO;  Surgeon: Pankaj Choudhury M.D.;  Location: SURGERY [facility];  Service: EUS     Family History   Problem Relation Age of Onset   • Heart Disease Mother      History   Smoking Status   • Former Smoker   • Packs/day: 2.00   • Years: 50.00   • Types: Cigarettes   • Quit date: 8/13/2017   Smokeless Tobacco   • Never Used     Allergies   Allergen Reactions   • Chloraprep One Step Itching     Outpatient Encounter Prescriptions as of 12/28/2017   Medication Sig Dispense Refill   • lisinopril (PRINIVIL) 10 MG Tab Take 1 Tab by mouth every day. 30 Tab 11   • carvedilol (COREG) 25 MG Tab Take 1 Tab by mouth 2 times a day, with meals. 60 Tab 11   • omeprazole (PRILOSEC) 20 MG delayed-release capsule Take 1 Cap by mouth every day. 30 Cap 6   • ipratropium-albuterol (DUONEB) 0.5-2.5 (3)  MG/3ML nebulizer solution 3 mL by Nebulization route 4 times a day. 30 Bullet 0   • albuterol 108 (90 Base) MCG/ACT Aero Soln inhalation aerosol Inhale 2 Puffs by mouth every 6 hours as needed for Shortness of Breath.     • fluticasone-salmeterol (ADVAIR) 250-50 MCG/DOSE AEROSOL POWDER, BREATH ACTIVATED Inhale 1 Puff by mouth PRN.     • tiotropium (SPIRIVA) 18 MCG Cap Inhale 18 mcg by mouth every day.     • aspirin EC (ECOTRIN) 81 MG Tablet Delayed Response Take 81 mg by mouth every day.     • ropinirole (REQUIP) 1 MG Tab Take 1 mg by mouth every bedtime.     • Cinacalcet HCl (SENSIPAR) 60 MG Tab Take 60 mg by mouth every evening.     • Sevelamer Carbonate (RENVELA) 800 MG Tab Take 800 mg by mouth 3 times a day, with meals. 4 caps each meals., 2 cap with snacks.     • azithromycin (ZITHROMAX) 250 MG Tab      • [DISCONTINUED] carvedilol (COREG) 12.5 MG Tab      • [DISCONTINUED] GAVILYTE-G 236 g Recon Soln      • [DISCONTINUED] guaifenesin 200 MG tablet Take 600 mg by mouth 3 times a day.       No facility-administered encounter medications on file as of 12/28/2017.      Review of Systems   Constitutional: Negative for diaphoresis and fever.   HENT: Negative for nosebleeds.    Eyes: Negative for blurred vision and double vision.   Respiratory: Positive for shortness of breath. Negative for cough.    Cardiovascular: Negative for chest pain and palpitations.   Gastrointestinal: Negative for abdominal pain.   Genitourinary: Negative for dysuria and frequency.   Musculoskeletal: Negative for falls and myalgias.   Skin: Negative for rash.   Neurological: Negative for dizziness, sensory change and headaches.   Endo/Heme/Allergies: Does not bruise/bleed easily.   Psychiatric/Behavioral: Negative for depression and memory loss.        Objective:   /90   Pulse 73   Ht 1.829 m (6')   Wt 94.3 kg (208 lb)   SpO2 91%   BMI 28.21 kg/m²     Physical Exam   Constitutional: He is oriented to person, place, and time. He  appears well-developed and well-nourished.   HENT:   Head: Normocephalic and atraumatic.   Eyes: EOM are normal.   Neck: Normal range of motion. No JVD present.   Cardiovascular: Normal rate, regular rhythm, normal heart sounds and intact distal pulses.  Exam reveals no gallop and no friction rub.    No murmur heard.  Bilateral femoral pulses are 2+, bilateral dorsalis pedis pulses are 2+, bilateral posterior tibialis pulses are 2+.   Pulmonary/Chest: No respiratory distress. He has no wheezes. He has no rales. He exhibits no tenderness.   Abdominal: Soft. Bowel sounds are normal. There is no tenderness. There is no rebound and no guarding.   The is no presence of abdominal bruits   Musculoskeletal: Normal range of motion.   Neurological: He is alert and oriented to person, place, and time.   Skin: Skin is warm and dry.   Psychiatric: He has a normal mood and affect.   Nursing note and vitals reviewed.      Assessment:     1. ACC/AHA stage C systolic heart failure (CMS-HCC)  CO PULMONARY STRESS TESTING,SIMPLE    lisinopril (PRINIVIL) 10 MG Tab   2. Left ventricular systolic dysfunction, NYHA class 2  CO PULMONARY STRESS TESTING,SIMPLE    lisinopril (PRINIVIL) 10 MG Tab   3. Non-ischemic cardiomyopathy (CMS-HCC)  CO PULMONARY STRESS TESTING,SIMPLE    lisinopril (PRINIVIL) 10 MG Tab   4. Dyspnea on exertion  CO PULMONARY STRESS TESTING,SIMPLE   5. ESRD (end stage renal disease) on dialysis (CMS-[facility])     6. HTN (hypertension), malignant  lisinopril (PRINIVIL) 10 MG Tab       Medical Decision Making:  Today's Assessment / Status / Plan:   Today, based on physical examination findings, patient is euvolemic. No JVD, lungs are clear to auscultation, no pitting edema in bilateral lower extremities, no ascites.    Dry weight is 208 lbs.    Will start Lisinopril for HTN at 10 mg daily.  Continue Coreg 25 mg po bid.  No Spironolactone due to ESRD.

## 2017-12-28 NOTE — LETTER
[facility]   [address]  [place] [address]  Phone: [phone]  Fax: [phone]              Bridger Savage  1950    Encounter Date: 12/28/2017    Lizzy Haider M.D.          PROGRESS NOTE:  Subjective:   Bridger Savage is a 67 y.o. male who presents today for cardiac care and evaluation in the heart failure clinic for chronic heart failure care. He does have a history of end-stage renal disease for which he is currently on hemodialysis. This has been going on for 5 years. He doesn't know the reason for that. His left ventricular systolic function was found to be 40% as well. No prior ischemic workup that we know of.     Patient is feeling better these days. Does get winded upon walking up inclines or for distance. No symptoms at rest or with daily living activities.     He is tolerating carvedilol 25 mg by mouth twice a day.     At prior visit, patient was able to complete 116 m during his 6 minute walk test. his O2 saturation at baseline was 90% and at the end of the test, the O2 saturation was 97%. he reported 3 level of dyspnea on Trevon scale.       Past Medical History:   Diagnosis Date   • Chronic obstructive pulmonary disease (CMS-HCC)    • Congestive heart failure (CMS-HCC)    • Renal disorder      Past Surgical History:   Procedure Laterality Date   • GASTROSCOPY-ENDO N/A 8/28/2017    Procedure: GASTROSCOPY-ENDO;  Surgeon: Pankaj Choudhury M.D.;  Location: SURGERY [facility];  Service: EUS     Family History   Problem Relation Age of Onset   • Heart Disease Mother      History   Smoking Status   • Former Smoker   • Packs/day: 2.00   • Years: 50.00   • Types: Cigarettes   • Quit date: 8/13/2017   Smokeless Tobacco   • Never Used     Allergies   Allergen Reactions   • Chloraprep One Step Itching     Outpatient Encounter Prescriptions as of 12/28/2017   Medication Sig Dispense Refill   • lisinopril (PRINIVIL) 10 MG Tab Take 1 Tab  by mouth every day. 30 Tab 11   • carvedilol (COREG) 25 MG Tab Take 1 Tab by mouth 2 times a day, with meals. 60 Tab 11   • omeprazole (PRILOSEC) 20 MG delayed-release capsule Take 1 Cap by mouth every day. 30 Cap 6   • ipratropium-albuterol (DUONEB) 0.5-2.5 (3) MG/3ML nebulizer solution 3 mL by Nebulization route 4 times a day. 30 Bullet 0   • albuterol 108 (90 Base) MCG/ACT Aero Soln inhalation aerosol Inhale 2 Puffs by mouth every 6 hours as needed for Shortness of Breath.     • fluticasone-salmeterol (ADVAIR) 250-50 MCG/DOSE AEROSOL POWDER, BREATH ACTIVATED Inhale 1 Puff by mouth PRN.     • tiotropium (SPIRIVA) 18 MCG Cap Inhale 18 mcg by mouth every day.     • aspirin EC (ECOTRIN) 81 MG Tablet Delayed Response Take 81 mg by mouth every day.     • ropinirole (REQUIP) 1 MG Tab Take 1 mg by mouth every bedtime.     • Cinacalcet HCl (SENSIPAR) 60 MG Tab Take 60 mg by mouth every evening.     • Sevelamer Carbonate (RENVELA) 800 MG Tab Take 800 mg by mouth 3 times a day, with meals. 4 caps each meals., 2 cap with snacks.     • azithromycin (ZITHROMAX) 250 MG Tab      • [DISCONTINUED] carvedilol (COREG) 12.5 MG Tab      • [DISCONTINUED] GAVILYTE-G 236 g Recon Soln      • [DISCONTINUED] guaifenesin 200 MG tablet Take 600 mg by mouth 3 times a day.       No facility-administered encounter medications on file as of 12/28/2017.      Review of Systems   Constitutional: Negative for diaphoresis and fever.   HENT: Negative for nosebleeds.    Eyes: Negative for blurred vision and double vision.   Respiratory: Positive for shortness of breath. Negative for cough.    Cardiovascular: Negative for chest pain and palpitations.   Gastrointestinal: Negative for abdominal pain.   Genitourinary: Negative for dysuria and frequency.   Musculoskeletal: Negative for falls and myalgias.   Skin: Negative for rash.   Neurological: Negative for dizziness, sensory change and headaches.   Endo/Heme/Allergies: Does not bruise/bleed easily.      Psychiatric/Behavioral: Negative for depression and memory loss.        Objective:   /90   Pulse 73   Ht 1.829 m (6')   Wt 94.3 kg (208 lb)   SpO2 91%   BMI 28.21 kg/m²      Physical Exam   Constitutional: He is oriented to person, place, and time. He appears well-developed and well-nourished.   HENT:   Head: Normocephalic and atraumatic.   Eyes: EOM are normal.   Neck: Normal range of motion. No JVD present.   Cardiovascular: Normal rate, regular rhythm, normal heart sounds and intact distal pulses.  Exam reveals no gallop and no friction rub.    No murmur heard.  Bilateral femoral pulses are 2+, bilateral dorsalis pedis pulses are 2+, bilateral posterior tibialis pulses are 2+.   Pulmonary/Chest: No respiratory distress. He has no wheezes. He has no rales. He exhibits no tenderness.   Abdominal: Soft. Bowel sounds are normal. There is no tenderness. There is no rebound and no guarding.   The is no presence of abdominal bruits   Musculoskeletal: Normal range of motion.   Neurological: He is alert and oriented to person, place, and time.   Skin: Skin is warm and dry.   Psychiatric: He has a normal mood and affect.   Nursing note and vitals reviewed.      Assessment:     1. ACC/AHA stage C systolic heart failure (CMS-HCC)  [place] PULMONARY STRESS TESTING,SIMPLE    lisinopril (PRINIVIL) 10 MG Tab   2. Left ventricular systolic dysfunction, NYHA class 2  TX PULMONARY STRESS TESTING,SIMPLE    lisinopril (PRINIVIL) 10 MG Tab   3. Non-ischemic cardiomyopathy (CMS-[facility])  TX PULMONARY STRESS TESTING,SIMPLE    lisinopril (PRINIVIL) 10 MG Tab   4. Dyspnea on exertion  TX PULMONARY STRESS TESTING,SIMPLE   5. ESRD (end stage renal disease) on dialysis (CMS-[facility])     6. HTN (hypertension), malignant  lisinopril (PRINIVIL) 10 MG Tab       Medical Decision Making:  Today's Assessment / Status / Plan:   Today, based on physical examination findings, patient is euvolemic. No JVD, lungs are clear to auscultation, no pitting  edema in bilateral lower extremities, no ascites.    Dry weight is 208 lbs.    Will start Lisinopril for HTN at 10 mg daily.  Continue Coreg 25 mg po bid.  No Spironolactone due to ESRD.          King Cho D.O.  [address]  [address]  [place] [address]  VIA Facsimile: [phone]

## 2017-12-29 NOTE — PROGRESS NOTES
Reevaluation of 6MWT/MLWHF Questionnaire    Date: 2017  6MWT: 115.82 meters. Walked for 2:31 minutes  MLWHF: 57    Date: 2017  6MWT: 271 meters  MLWHF: 42    OP Heart Failure  Vitals  Appointment Type: Heart Failure Established  Weight: 94.3 kg (208 lb)  How Weight Obtained: Stand Up Scale  Height: 182.9 cm (6')  BMI (Calculated): 28.21  Blood Pressure : 160/90  Pulse: 73    System Assessment  NYHA Functional Class Assessment: Class II  ACC/AHA HF Stage: C    Smoking Hx  Have you Ever Smoked: Yes  Have you Smoked in the Last 12 Mos: Yes  Have you Quit Smoking: Yes  Confirm Quit Date: 17     MN Living with Heart Failure  Swelling in Ankles or Legs: 0  Having to Sit or Lie Down During the Day: 0  Walking About or Climbing Stairs Difficult: 0  Working Around the House or Yard Difficult: 1  Difficulty Going Away from Home: 0  Difficulty Sleeping Well at Night: 2  Difficulty Socializing with Family or Friends: 1  Difficulty Working to Earn a Livin  Difficulty with Recreational Pastimes, Sports, Hobbies: 4  Difficulty with Sexual Activities: 5  Eating Less Foods You Like: 5  Making you Short of Breath: 5  Tired, Fatigued or Low on Energy: 4  Making you Stay in a Hospital: 0  Costing you Money for Medical Care?: 0  Giving you Side Effects from Treatments: 0  Feeling like a [place] to Family and Friends: 3  Loss of Self Control in your Life: 5  Making You Worry: 0  Difficulty to Concentrate or Remembering Things: 2  Making you Feel Depressed: 0  MLHF Total Score : 42    6 Minute Walk Test             LEDY Olson RN  [phone]

## 2018-01-03 ENCOUNTER — HOSPITAL ENCOUNTER (OUTPATIENT)
Dept: RADIOLOGY | Facility: MEDICAL CENTER | Age: 68
End: 2018-01-03
Attending: NURSE PRACTITIONER
Payer: MEDICARE

## 2018-01-03 DIAGNOSIS — Z99.2: ICD-10-CM

## 2018-01-03 PROCEDURE — 71046 X-RAY EXAM CHEST 2 VIEWS: CPT

## 2018-01-24 DIAGNOSIS — Z01.810 PRE-OPERATIVE CARDIOVASCULAR EXAMINATION: ICD-10-CM

## 2018-01-24 DIAGNOSIS — Z01.812 PRE-OPERATIVE LABORATORY EXAMINATION: ICD-10-CM

## 2018-01-24 LAB
EKG IMPRESSION: NORMAL
ERYTHROCYTE [DISTWIDTH] IN BLOOD BY AUTOMATED COUNT: 55.9 FL (ref 35.9–50)
HCT VFR BLD AUTO: 41.6 % (ref 42–52)
HGB BLD-MCNC: 14 G/DL (ref 14–18)
MCH RBC QN AUTO: 32.6 PG (ref 27–33)
MCHC RBC AUTO-ENTMCNC: 33.7 G/DL (ref 33.7–35.3)
MCV RBC AUTO: 96.7 FL (ref 81.4–97.8)
PLATELET # BLD AUTO: 213 K/UL (ref 164–446)
PMV BLD AUTO: 9.9 FL (ref 9–12.9)
RBC # BLD AUTO: 4.3 M/UL (ref 4.7–6.1)
WBC # BLD AUTO: 7.9 K/UL (ref 4.8–10.8)

## 2018-01-24 PROCEDURE — 85027 COMPLETE CBC AUTOMATED: CPT

## 2018-01-24 PROCEDURE — 80048 BASIC METABOLIC PNL TOTAL CA: CPT

## 2018-01-24 PROCEDURE — 93005 ELECTROCARDIOGRAM TRACING: CPT

## 2018-01-24 PROCEDURE — 93010 ELECTROCARDIOGRAM REPORT: CPT | Performed by: INTERNAL MEDICINE

## 2018-01-24 PROCEDURE — 36415 COLL VENOUS BLD VENIPUNCTURE: CPT

## 2018-01-24 NOTE — OR NURSING
Patient uses oxygen 3.5 liters prn. [facility] is the provider. Advised to bring oxygen tank in case he needs it for the ride home.     He is on dialysis and has a fistula in his left fore arm    Has a history of MRSA but has no open wounds at this time.

## 2018-01-25 LAB
ANION GAP SERPL CALC-SCNC: 13 MMOL/L (ref 0–11.9)
BUN SERPL-MCNC: 14 MG/DL (ref 8–22)
CALCIUM SERPL-MCNC: 9.3 MG/DL (ref 8.4–10.2)
CHLORIDE SERPL-SCNC: 90 MMOL/L (ref 96–112)
CO2 SERPL-SCNC: 32 MMOL/L (ref 20–33)
CREAT SERPL-MCNC: 5.86 MG/DL (ref 0.5–1.4)
GLUCOSE SERPL-MCNC: 75 MG/DL (ref 65–99)
POTASSIUM SERPL-SCNC: 4.2 MMOL/L (ref 3.6–5.5)
SODIUM SERPL-SCNC: 135 MMOL/L (ref 135–145)

## 2018-02-01 ENCOUNTER — HOSPITAL ENCOUNTER (OUTPATIENT)
Facility: MEDICAL CENTER | Age: 68
End: 2018-02-01
Attending: INTERNAL MEDICINE | Admitting: INTERNAL MEDICINE
Payer: MEDICARE

## 2018-02-01 VITALS
HEIGHT: 72 IN | OXYGEN SATURATION: 95 % | DIASTOLIC BLOOD PRESSURE: 78 MMHG | BODY MASS INDEX: 27.26 KG/M2 | WEIGHT: 201.28 LBS | HEART RATE: 62 BPM | SYSTOLIC BLOOD PRESSURE: 137 MMHG | TEMPERATURE: 97 F | RESPIRATION RATE: 16 BRPM

## 2018-02-01 LAB — PATHOLOGY CONSULT NOTE: NORMAL

## 2018-02-01 PROCEDURE — 160035 HCHG PACU - 1ST 60 MINS PHASE I: Performed by: INTERNAL MEDICINE

## 2018-02-01 PROCEDURE — 502240 HCHG MISC OR SUPPLY RC 0272: Performed by: INTERNAL MEDICINE

## 2018-02-01 PROCEDURE — 160036 HCHG PACU - EA ADDL 30 MINS PHASE I: Performed by: INTERNAL MEDICINE

## 2018-02-01 PROCEDURE — 160204 HCHG ENDO MINUTES - 1ST 30 MINS LEVEL 5: Performed by: INTERNAL MEDICINE

## 2018-02-01 PROCEDURE — 501629 HCHG TUBE, LUKI TRAP STERILE DISP: Performed by: INTERNAL MEDICINE

## 2018-02-01 PROCEDURE — 88305 TISSUE EXAM BY PATHOLOGIST: CPT | Mod: 59

## 2018-02-01 PROCEDURE — 160025 RECOVERY II MINUTES (STATS): Performed by: INTERNAL MEDICINE

## 2018-02-01 PROCEDURE — 160048 HCHG OR STATISTICAL LEVEL 1-5: Performed by: INTERNAL MEDICINE

## 2018-02-01 PROCEDURE — 160046 HCHG PACU - 1ST 60 MINS PHASE II: Performed by: INTERNAL MEDICINE

## 2018-02-01 PROCEDURE — 700111 HCHG RX REV CODE 636 W/ 250 OVERRIDE (IP)

## 2018-02-01 PROCEDURE — 500066 HCHG BITE BLOCK, ECT: Performed by: INTERNAL MEDICINE

## 2018-02-01 PROCEDURE — 700101 HCHG RX REV CODE 250

## 2018-02-01 PROCEDURE — 700105 HCHG RX REV CODE 258: Performed by: INTERNAL MEDICINE

## 2018-02-01 PROCEDURE — 160002 HCHG RECOVERY MINUTES (STAT): Performed by: INTERNAL MEDICINE

## 2018-02-01 PROCEDURE — 160209 HCHG ENDO MINUTES - EA ADDL 1 MIN LEVEL 5: Performed by: INTERNAL MEDICINE

## 2018-02-01 PROCEDURE — 160009 HCHG ANES TIME/MIN: Performed by: INTERNAL MEDICINE

## 2018-02-01 RX ORDER — LIDOCAINE HYDROCHLORIDE 10 MG/ML
INJECTION, SOLUTION INFILTRATION; PERINEURAL
Status: DISCONTINUED
Start: 2018-02-01 | End: 2018-02-01 | Stop reason: HOSPADM

## 2018-02-01 RX ORDER — SODIUM CHLORIDE 9 MG/ML
1000 INJECTION, SOLUTION INTRAVENOUS
Status: DISCONTINUED | OUTPATIENT
Start: 2018-02-01 | End: 2018-02-01 | Stop reason: HOSPADM

## 2018-02-01 RX ADMIN — ALBUTEROL SULFATE 2.5 MG: 2.5 SOLUTION RESPIRATORY (INHALATION) at 10:50

## 2018-02-01 ASSESSMENT — PAIN SCALES - GENERAL
PAINLEVEL_OUTOF10: 0
PAINLEVEL_OUTOF10: 2
PAINLEVEL_OUTOF10: 0
PAINLEVEL_OUTOF10: 2
PAINLEVEL_OUTOF10: ASSUMED PAIN PRESENT
PAINLEVEL_OUTOF10: 2
PAINLEVEL_OUTOF10: 0

## 2018-02-01 NOTE — DISCHARGE INSTRUCTIONS
ENDOSCOPY HOME CARE INSTRUCTIONS    GASTROSCOPY OR ERCP  1. Don't eat or drink anything for about an hour after the test. You can then resume your regular diet.  2. Don't drive or drink alcohol for 24 hours. The medication you received will make you too drowsy.  3. Don't take any coffee, tea, or aspirin products until after you see your doctor. These can harm the lining of your stomach.  4. If you begin to vomit bloody material, or develop black or bloody stools, call your doctor as soon as possible.  5. If you have any neck, chest, abdominal pain or temp of 100 degrees, call your doctor.  6. See your doctor as instructed  7. Additional instructions: May resume pre-procedural diet and meds  RECOMMENDATION:    1. Repeat colonoscopy in 3 years  2. Daily PPI  3. Repeat dilation if needed  8. Prescriptions: Ensure that you are taking Omeprazole/Prilosec; call Dr Olivo to clarify your medication    COLONOSCOPY OR FLEXIBLE SIGMOIDOSCOPY  1. If you received a barium enema, take a mild laxative such as dulcolax, Lizette's M.O., or Milk of Magnesia to clean out the barium.  2. Drink plenty of fluids. Eat a diet high in fiber (whole-grain breads, fresh fruit and vegetables).  3. You may notice a few drops of blood with your first bowel movement. If you develop any large amount of bleeding, black stools, a fever, or abdominal pain, call your doctor right away.  4. Call your doctor for test results in 1 week(s).  5. Don't drive or drink alcohol for 24 hours. The medication you received will make you too drowsy.  6. No heavy lifting, no Aspirin products or Aspirin for 5 days   7. Additional instructions: none  8. Prescriptions: none    You should call 911 if you develop problems with breathing or chest pain.  If any questions arise, call your doctor. If your doctor is not available, please feel free to call [phone]. You can also call the [employer] open 24 hours/day, 7 days/week and speak to a nurse at [phone],  or toll free [phone].    Depression / Suicide Risk    As you are discharged from this [facility] facility, it is important to learn how to keep safe from harming yourself.    Recognize the warning signs:  · Abrupt changes in personality, positive or negative- including increase in energy   · Giving away possessions  · Change in eating patterns- significant weight changes-  positive or negative  · Change in sleeping patterns- unable to sleep or sleeping all the time   · Unwillingness or inability to communicate  · Depression  · Unusual sadness, discouragement and loneliness  · Talk of wanting to die  · Neglect of personal appearance   · Rebelliousness- reckless behavior  · Withdrawal from people/activities they love  · Confusion- inability to concentrate     If you or a loved one observes any of these behaviors or has concerns about self-harm, here's what you can do:  · Talk about it- your feelings and reasons for harming yourself  · Remove any means that you might use to hurt yourself (examples: pills, rope, extension cords, firearm)  · Get professional help from the community (Mental Health, Substance Abuse, psychological counseling)  · Do not be alone:Call your Safe Contact- someone whom you trust who will be there for you.  · Call your local CRISIS HOTLINE 819-5964 or 721-245-9851  · Call your local Children's Mobile Crisis Response Team [place] (016) 205-6471 or www.Unfold  · Call the toll free National Suicide Prevention Hotlines   · National Suicide Prevention Lifeline 008-104-YAIM (2618)  · National Hope Line Network 800-SUICIDE (772-8462)    I acknowledge receipt and understanding of these Home Care Instructions.

## 2018-02-01 NOTE — OP REPORT
DATE OF SERVICE:  2/1/2018     INDICATION FOR PROCEDURE:  dysphagia, screening for colon cancer    PROCEDURE PERFORMED: EGD with dilation, colonoscopy with polypectomy     CONSENT:  Informed consent was obtained directly from the patient after benefits, risks and possible alternatives were discussed.     MEDICATIONS:  The patient received propofol for this procedure. Please see the anesthesia record for details     EGD PROCEDURE DESCRIPTION:  The patient was placed in the left lateral decubitus position and provided with supplemental oxygen via nasal cannula.  His vital signs were monitored continuously throughout the procedure.  When ready, the   upper endoscope was placed in the patient's mouth and advanced easily and carefully to the esophagus and subsequently to the stomach and duodenum.     FINDINGS:    Esophagus: mild LA class A esophagitis with mild narrowing noted at the GE junction. The GEJ was dilated to 18mm using CRE balloon    Stomach: there was mild cobblestoning in the stomach consistent with mild gastropathy    Duodenum: normal       COLONOSCOPY PROCEDURE DESCRIPTION:  The patient was placed in the left lateral decubitus position and provided with supplemental oxygen via nasal cannula.  Vital signs were monitored continuously throughout the procedure. After appropriate sedation, CARLOS was performed. The colonoscopy was then introduced into the rectum through the anus. The colonoscope was advanced through the colon under direct visualization to the cecum. The scope was then withdrawn and mucosa examined     FINDINGS:    1)  7mm sessile transverse colon polyp removed with snare cautery  2) (2) 7-10mm sessile polyps in the ascending colon removed with snare cautery  3) 5mm sessile rectosigmoid colon polyp removed with snare cautery         COMPLICATIONS:  No complications or blood loss during or in the immediate postoperative period.     IMPRESSION:  1.  mild esophagitis  2. Esophageal stricture dilated  to 18mm  3. Colon polyps     RECOMMENDATION:    1. Repeat colonoscopy in 3 years  2. Daily PPI  3. Repeat dilation if needed  4. Discharge home when stable  5. Restart diet meds and activity

## 2018-02-01 NOTE — OR NURSING
0821 pt to pre op to assume care.  0899 Patient allergies and NPO status verified, home medication reconciliation completed and belongings secured. Patient verbalizes understanding of pain scale, expected course of stay and plan of care. Surgical site verified with patient. IV access established.

## 2018-02-01 NOTE — OR NURSING
Patient states he is on Prilosec. Dr. Olivo here in PACU 2 talking with patient and his wife. They verbalize good understanding of discharge instructions.

## 2018-02-01 NOTE — OR NURSING
1038 To PACU from Endo via gurney, side rails up x 2 for safety, lungs clear bilaterally, pt arouses to voice and demonstrates ability to follow commands by lifting head from pillow; OPA removed by RN at this time. Breathing remains easy and unlabored.   1039 RN noted hypotension; trendelenberg position placed for patient. Skin pink and warm; no diaphoresis.   1042 Unable to start IVF; IV unable to flush with NS; no redness or swelling noted at IV site.   1045 Called Dr Garza to recovery room for evaluation.   1050 Started albuterol per verbal order by Dr Garza. BP improving and Dr Garza able to fix current IV and running without difficulty or problem.   1105 Pt resting quietly on gurney. Denies nausea and reports minimal abdominal pain but tolerable.   1120 No changes  1135 Meets criteria for transfer to stage II.

## 2018-02-06 ENCOUNTER — OFFICE VISIT (OUTPATIENT)
Dept: CARDIOLOGY | Facility: MEDICAL CENTER | Age: 68
End: 2018-02-06
Payer: MEDICARE

## 2018-02-06 VITALS
WEIGHT: 213 LBS | HEART RATE: 80 BPM | BODY MASS INDEX: 28.85 KG/M2 | DIASTOLIC BLOOD PRESSURE: 72 MMHG | HEIGHT: 72 IN | OXYGEN SATURATION: 96 % | SYSTOLIC BLOOD PRESSURE: 126 MMHG

## 2018-02-06 DIAGNOSIS — Z99.2 ESRD (END STAGE RENAL DISEASE) ON DIALYSIS (HCC): ICD-10-CM

## 2018-02-06 DIAGNOSIS — I51.89 LEFT VENTRICULAR SYSTOLIC DYSFUNCTION, NYHA CLASS 2: ICD-10-CM

## 2018-02-06 DIAGNOSIS — I10 HTN (HYPERTENSION), MALIGNANT: ICD-10-CM

## 2018-02-06 DIAGNOSIS — N18.6 ESRD (END STAGE RENAL DISEASE) ON DIALYSIS (HCC): ICD-10-CM

## 2018-02-06 DIAGNOSIS — I50.20 ACC/AHA STAGE C SYSTOLIC HEART FAILURE (HCC): ICD-10-CM

## 2018-02-06 DIAGNOSIS — R06.09 DYSPNEA ON EXERTION: ICD-10-CM

## 2018-02-06 DIAGNOSIS — I42.8 NON-ISCHEMIC CARDIOMYOPATHY (HCC): ICD-10-CM

## 2018-02-06 PROCEDURE — 99214 OFFICE O/P EST MOD 30 MIN: CPT | Performed by: INTERNAL MEDICINE

## 2018-02-06 ASSESSMENT — ENCOUNTER SYMPTOMS
ABDOMINAL PAIN: 0
ORTHOPNEA: 0
DIAPHORESIS: 0
EYE DISCHARGE: 0
MEMORY LOSS: 0
MYALGIAS: 0
SPEECH CHANGE: 0
BLOOD IN STOOL: 0
BLURRED VISION: 0
PALPITATIONS: 0
SHORTNESS OF BREATH: 1
FALLS: 0
CHILLS: 0
BRUISES/BLEEDS EASILY: 0
HALLUCINATIONS: 0
NAUSEA: 0
LOSS OF CONSCIOUSNESS: 0
VOMITING: 0
EYE PAIN: 0
HEADACHES: 0
SENSORY CHANGE: 0
CLAUDICATION: 0
FEVER: 0
PND: 0
DOUBLE VISION: 0
WEIGHT LOSS: 0
DIZZINESS: 0
DEPRESSION: 0
COUGH: 0

## 2018-02-06 NOTE — PROGRESS NOTES
Subjective:   Bridger Savage is a 67 y.o. male who presents today for cardiac care and evaluation in the heart failure clinic for chronic heart failure care. He does have a history of end-stage renal disease for which he is currently on hemodialysis. This has been going on for 5 years. He doesn't know the reason for that. His left ventricular systolic function was found to be 40% as well. Nuclear PET scan stress test was negative 09/2017.     Patient is feeling better these days. Does get winded upon walking up inclines or for distance. No symptoms at rest or with daily living activities.     He is tolerating carvedilol 25 mg by mouth twice a day.     At prior visit, patient was able to complete 116 m during his 6 minute walk test. his O2 saturation at baseline was 90% and at the end of the test, the O2 saturation was 97%. he reported 3 level of dyspnea on Trevon scale.    Chief Complaint: dyspnea.    Past Medical History:   Diagnosis Date   • Asthma    • Breath shortness     uses oxygen 3.5 L as needed   • Chronic obstructive pulmonary disease (CMS-HCC)    • Cold     12/27/17 cold   • Congestive heart failure (CMS-HCC)    • Dental disorder     upper and lower dentures   • Dialysis patient (CMS-HCC)    • Emphysema of lung (CMS-HCC)    • Renal disorder    • Snoring    • Urinary incontinence      Past Surgical History:   Procedure Laterality Date   • GASTROSCOPY  2/1/2018    Procedure: GASTROSCOPY;  Surgeon: Morteza Olivo M.D.;  Location: [facility];  Service: Gastroenterology   • COLONOSCOPY  2/1/2018    Procedure: COLONOSCOPY;  Surgeon: Morteza Olivo M.D.;  Location: [facility];  Service: Gastroenterology   • GASTROSCOPY-ENDO N/A 8/28/2017    Procedure: GASTROSCOPY-ENDO;  Surgeon: Pankaj Choudhury M.D.;  Location: [facility];  Service: EUS     Family History   Problem Relation Age of Onset   • Heart Disease Mother      History   Smoking Status   • Former Smoker   •  Packs/day: 2.00   • Years: 50.00   • Types: Cigarettes   • Quit date: 8/13/2017   Smokeless Tobacco   • Never Used     Allergies   Allergen Reactions   • Chloraprep One Step Itching     Outpatient Encounter Prescriptions as of 2/6/2018   Medication Sig Dispense Refill   • lisinopril (PRINIVIL) 10 MG Tab Take 1 Tab by mouth every day. 30 Tab 11   • carvedilol (COREG) 25 MG Tab Take 1 Tab by mouth 2 times a day, with meals. 60 Tab 11   • omeprazole (PRILOSEC) 20 MG delayed-release capsule Take 1 Cap by mouth every day. 30 Cap 6   • ipratropium-albuterol (DUONEB) 0.5-2.5 (3) MG/3ML nebulizer solution 3 mL by Nebulization route 4 times a day. 30 Bullet 0   • albuterol 108 (90 Base) MCG/ACT Aero Soln inhalation aerosol Inhale 2 Puffs by mouth every 6 hours as needed for Shortness of Breath.     • fluticasone-salmeterol (ADVAIR) 250-50 MCG/DOSE AEROSOL POWDER, BREATH ACTIVATED Inhale 1 Puff by mouth PRN.     • aspirin EC (ECOTRIN) 81 MG Tablet Delayed Response Take 81 mg by mouth every day.     • ropinirole (REQUIP) 1 MG Tab Take 1 mg by mouth every bedtime.     • Cinacalcet HCl (SENSIPAR) 60 MG Tab Take 60 mg by mouth every evening.     • Sevelamer Carbonate (RENVELA) 800 MG Tab Take 800 mg by mouth 3 times a day, with meals. 4 caps each meals., 2 cap with snacks.     • azithromycin (ZITHROMAX) 250 MG Tab      • tiotropium (SPIRIVA) 18 MCG Cap Inhale 18 mcg by mouth every day.       No facility-administered encounter medications on file as of 2/6/2018.      Review of Systems   Constitutional: Negative for chills, diaphoresis, fever, malaise/fatigue and weight loss.   HENT: Negative for ear discharge, ear pain, hearing loss and nosebleeds.    Eyes: Negative for blurred vision, double vision, pain and discharge.   Respiratory: Positive for shortness of breath. Negative for cough.    Cardiovascular: Negative for chest pain, palpitations, orthopnea, claudication, leg swelling and PND.   Gastrointestinal: Negative for  abdominal pain, blood in stool, melena, nausea and vomiting.   Genitourinary: Negative for dysuria, frequency and hematuria.   Musculoskeletal: Negative for falls, joint pain and myalgias.   Skin: Negative for itching and rash.   Neurological: Negative for dizziness, sensory change, speech change, loss of consciousness and headaches.   Endo/Heme/Allergies: Negative for environmental allergies. Does not bruise/bleed easily.   Psychiatric/Behavioral: Negative for depression, hallucinations, memory loss and suicidal ideas.        Objective:   /72   Pulse 80   Ht 1.829 m (6')   Wt 96.6 kg (213 lb)   SpO2 96%   BMI 28.89 kg/m²     Physical Exam   Constitutional: He is oriented to person, place, and time. No distress.   HENT:   Head: Normocephalic and atraumatic.   Eyes: EOM are normal.   Neck: Normal range of motion. No JVD present.   Cardiovascular: Normal rate, regular rhythm, normal heart sounds and intact distal pulses.  Exam reveals no gallop and no friction rub.    No murmur heard.  Bilateral femoral pulses are 2+, bilateral dorsalis pedis pulses are 2+, bilateral posterior tibialis pulses are 2+.   Pulmonary/Chest: No respiratory distress. He has no wheezes. He has no rales. He exhibits no tenderness.   Abdominal: Soft. Bowel sounds are normal. There is no tenderness. There is no rebound and no guarding.   The is no presence of abdominal bruits   Musculoskeletal: Normal range of motion.   Neurological: He is alert and oriented to person, place, and time.   Skin: Skin is warm and dry.   Psychiatric: He has a normal mood and affect.   Nursing note and vitals reviewed.      Assessment:     1. ACC/AHA stage C systolic heart failure (CMS-HCC)     2. Left ventricular systolic dysfunction, NYHA class 2     3. Non-ischemic cardiomyopathy (CMS-HCC)     4. ESRD (end stage renal disease) on dialysis (CMS-HCC)     5. Dyspnea on exertion     6. HTN (hypertension), malignant         Medical Decision Making:  Today's  Assessment / Status / Plan:   Today, based on physical examination findings, patient is euvolemic. No JVD, lungs are clear to auscultation, no pitting edema in bilateral lower extremities, no ascites.     Dry weight is 213 lbs.     Lisinopril for HTN at 10 mg daily.  Continue Coreg 25 mg po bid.  No Spironolactone due to ESRD.    No indication for ICD.    Will continue to closely monitor for side effects of patient's high risk medication(s) including liver, renal function and electrolytes.    I will see patient back in our Heart Failure Clinic with lab tests and studies results in 24 weeks.    I thank you Dr. Cho for referring patient to our Heart Failure Clinic today.

## 2018-02-06 NOTE — LETTER
[facility]   [address], [address]  [place] [address]  Phone: [phone]  Fax: [phone]              Bridger Savage  1950    Encounter Date: 2/6/2018    Lizzy Haider M.D.          PROGRESS NOTE:  Subjective:   Bridger Savage is a 67 y.o. male who presents today for cardiac care and evaluation in the heart failure clinic for chronic heart failure care. He does have a history of end-stage renal disease for which he is currently on hemodialysis. This has been going on for 5 years. He doesn't know the reason for that. His left ventricular systolic function was found to be 40% as well. Nuclear PET scan stress test was negative 09/2017.     Patient is feeling better these days. Does get winded upon walking up inclines or for distance. No symptoms at rest or with daily living activities.     He is tolerating carvedilol 25 mg by mouth twice a day.     At prior visit, patient was able to complete 116 m during his 6 minute walk test. his O2 saturation at baseline was 90% and at the end of the test, the O2 saturation was 97%. he reported 3 level of dyspnea on Trevon scale.    Chief Complaint: dyspnea.    Past Medical History:   Diagnosis Date   • Asthma    • Breath shortness     uses oxygen 3.5 L as needed   • Chronic obstructive pulmonary disease (CMS-[facility])    • Cold     12/27/17 cold   • Congestive heart failure (CMS-[facility])    • Dental disorder     upper and lower dentures   • Dialysis patient (CMS-HCC)    • Emphysema of lung (CMS-HCC)    • Renal disorder    • Snoring    • Urinary incontinence      Past Surgical History:   Procedure Laterality Date   • GASTROSCOPY  2/1/2018    Procedure: GASTROSCOPY;  Surgeon: Morteza Olivo M.D.;  Location: [facility];  Service: Gastroenterology   • COLONOSCOPY  2/1/2018    Procedure: COLONOSCOPY;  Surgeon: Morteza Olivo M.D.;  Location: [facility];  Service: Gastroenterology   •  GASTROSCOPY-ENDO N/A 8/28/2017    Procedure: GASTROSCOPY-ENDO;  Surgeon: Pankaj Choudhury M.D.;  Location: [facility];  Service: EUS     Family History   Problem Relation Age of Onset   • Heart Disease Mother      History   Smoking Status   • Former Smoker   • Packs/day: 2.00   • Years: 50.00   • Types: Cigarettes   • Quit date: 8/13/2017   Smokeless Tobacco   • Never Used     Allergies   Allergen Reactions   • Chloraprep One Step Itching     Outpatient Encounter Prescriptions as of 2/6/2018   Medication Sig Dispense Refill   • lisinopril (PRINIVIL) 10 MG Tab Take 1 Tab by mouth every day. 30 Tab 11   • carvedilol (COREG) 25 MG Tab Take 1 Tab by mouth 2 times a day, with meals. 60 Tab 11   • omeprazole (PRILOSEC) 20 MG delayed-release capsule Take 1 Cap by mouth every day. 30 Cap 6   • ipratropium-albuterol (DUONEB) 0.5-2.5 (3) MG/3ML nebulizer solution 3 mL by Nebulization route 4 times a day. 30 Bullet 0   • albuterol 108 (90 Base) MCG/ACT Aero Soln inhalation aerosol Inhale 2 Puffs by mouth every 6 hours as needed for Shortness of Breath.     • fluticasone-salmeterol (ADVAIR) 250-50 MCG/DOSE AEROSOL POWDER, BREATH ACTIVATED Inhale 1 Puff by mouth PRN.     • aspirin EC (ECOTRIN) 81 MG Tablet Delayed Response Take 81 mg by mouth every day.     • ropinirole (REQUIP) 1 MG Tab Take 1 mg by mouth every bedtime.     • Cinacalcet HCl (SENSIPAR) 60 MG Tab Take 60 mg by mouth every evening.     • Sevelamer Carbonate (RENVELA) 800 MG Tab Take 800 mg by mouth 3 times a day, with meals. 4 caps each meals., 2 cap with snacks.     • azithromycin (ZITHROMAX) 250 MG Tab      • tiotropium (SPIRIVA) 18 MCG Cap Inhale 18 mcg by mouth every day.       No facility-administered encounter medications on file as of 2/6/2018.      Review of Systems   Constitutional: Negative for chills, diaphoresis, fever, malaise/fatigue and weight loss.   HENT: Negative for ear discharge, ear pain, hearing loss and nosebleeds.    Eyes:  Negative for blurred vision, double vision, pain and discharge.   Respiratory: Positive for shortness of breath. Negative for cough.    Cardiovascular: Negative for chest pain, palpitations, orthopnea, claudication, leg swelling and PND.   Gastrointestinal: Negative for abdominal pain, blood in stool, melena, nausea and vomiting.   Genitourinary: Negative for dysuria, frequency and hematuria.   Musculoskeletal: Negative for falls, joint pain and myalgias.   Skin: Negative for itching and rash.   Neurological: Negative for dizziness, sensory change, speech change, loss of consciousness and headaches.   Endo/Heme/Allergies: Negative for environmental allergies. Does not bruise/bleed easily.   Psychiatric/Behavioral: Negative for depression, hallucinations, memory loss and suicidal ideas.        Objective:   /72   Pulse 80   Ht 1.829 m (6')   Wt 96.6 kg (213 lb)   SpO2 96%   BMI 28.89 kg/m²      Physical Exam   Constitutional: He is oriented to person, place, and time. No distress.   HENT:   Head: Normocephalic and atraumatic.   Eyes: EOM are normal.   Neck: Normal range of motion. No JVD present.   Cardiovascular: Normal rate, regular rhythm, normal heart sounds and intact distal pulses.  Exam reveals no gallop and no friction rub.    No murmur heard.  Bilateral femoral pulses are 2+, bilateral dorsalis pedis pulses are 2+, bilateral posterior tibialis pulses are 2+.   Pulmonary/Chest: No respiratory distress. He has no wheezes. He has no rales. He exhibits no tenderness.   Abdominal: Soft. Bowel sounds are normal. There is no tenderness. There is no rebound and no guarding.   The is no presence of abdominal bruits   Musculoskeletal: Normal range of motion.   Neurological: He is alert and oriented to person, place, and time.   Skin: Skin is warm and dry.   Psychiatric: He has a normal mood and affect.   Nursing note and vitals reviewed.      Assessment:     1. ACC/AHA stage C systolic heart failure  (CMS-HCC)     2. Left ventricular systolic dysfunction, NYHA class 2     3. Non-ischemic cardiomyopathy (CMS-HCC)     4. ESRD (end stage renal disease) on dialysis (CMS-HCC)     5. Dyspnea on exertion     6. HTN (hypertension), malignant         Medical Decision Making:  Today's Assessment / Status / Plan:   Today, based on physical examination findings, patient is euvolemic. No JVD, lungs are clear to auscultation, no pitting edema in bilateral lower extremities, no ascites.     Dry weight is 213 lbs.     Lisinopril for HTN at 10 mg daily.  Continue Coreg 25 mg po bid.  No Spironolactone due to ESRD.    No indication for ICD.    Will continue to closely monitor for side effects of patient's high risk medication(s) including liver, renal function and electrolytes.    I will see patient back in our Heart Failure Clinic with lab tests and studies results in 24 weeks.    I thank you Dr. Cho for referring patient to our Heart Failure Clinic today.        King Cho D.O.  [address]  [address]  [place] [address]  VIA Facsimile: [phone]

## 2018-08-10 ENCOUNTER — APPOINTMENT (OUTPATIENT)
Dept: RADIOLOGY | Facility: MEDICAL CENTER | Age: 68
DRG: 604 | End: 2018-08-10
Attending: EMERGENCY MEDICINE
Payer: COMMERCIAL

## 2018-08-10 ENCOUNTER — HOSPITAL ENCOUNTER (INPATIENT)
Facility: MEDICAL CENTER | Age: 68
LOS: 1 days | DRG: 604 | End: 2018-08-11
Attending: EMERGENCY MEDICINE | Admitting: INTERNAL MEDICINE
Payer: COMMERCIAL

## 2018-08-10 DIAGNOSIS — Z99.2 ESRD (END STAGE RENAL DISEASE) ON DIALYSIS (HCC): ICD-10-CM

## 2018-08-10 DIAGNOSIS — I50.9 CHRONIC CONGESTIVE HEART FAILURE, UNSPECIFIED HEART FAILURE TYPE (HCC): ICD-10-CM

## 2018-08-10 DIAGNOSIS — N18.6 ESRD (END STAGE RENAL DISEASE) ON DIALYSIS (HCC): ICD-10-CM

## 2018-08-10 DIAGNOSIS — R07.9 CHEST PAIN, UNSPECIFIED TYPE: ICD-10-CM
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
## 2018-08-10 LAB
ABO GROUP BLD: NORMAL
ABO GROUP BLD: NORMAL
ALBUMIN SERPL BCP-MCNC: 4.8 G/DL (ref 3.2–4.9)
ALBUMIN/GLOB SERPL: 1.9 G/DL
ALP SERPL-CCNC: 46 U/L (ref 30–99)
ALT SERPL-CCNC: 11 U/L (ref 2–50)
ANION GAP SERPL CALC-SCNC: 14 MMOL/L (ref 0–11.9)
APTT PPP: 28.7 SEC (ref 24.7–36)
AST SERPL-CCNC: 15 U/L (ref 12–45)
BASOPHILS # BLD AUTO: 0.6 % (ref 0–1.8)
BASOPHILS # BLD: 0.06 K/UL (ref 0–0.12)
BILIRUB SERPL-MCNC: 0.5 MG/DL (ref 0.1–1.5)
BLD GP AB SCN SERPL QL: NORMAL
BNP SERPL-MCNC: 468 PG/ML (ref 0–100)
BUN SERPL-MCNC: 6 MG/DL (ref 8–22)
CALCIUM SERPL-MCNC: 9.8 MG/DL (ref 8.5–10.5)
CHLORIDE SERPL-SCNC: 99 MMOL/L (ref 96–112)
CO2 SERPL-SCNC: 29 MMOL/L (ref 20–33)
CREAT SERPL-MCNC: 3.56 MG/DL (ref 0.5–1.4)
EKG IMPRESSION: NORMAL
EOSINOPHIL # BLD AUTO: 0.14 K/UL (ref 0–0.51)
EOSINOPHIL NFR BLD: 1.4 % (ref 0–6.9)
ERYTHROCYTE [DISTWIDTH] IN BLOOD BY AUTOMATED COUNT: 46.5 FL (ref 35.9–50)
ERYTHROCYTE [DISTWIDTH] IN BLOOD BY AUTOMATED COUNT: 47.3 FL (ref 35.9–50)
ETHANOL BLD-MCNC: 0 G/DL
GLOBULIN SER CALC-MCNC: 2.5 G/DL (ref 1.9–3.5)
GLUCOSE SERPL-MCNC: 95 MG/DL (ref 65–99)
HCT VFR BLD AUTO: 36 % (ref 42–52)
HCT VFR BLD AUTO: 37.6 % (ref 42–52)
HGB BLD-MCNC: 12.1 G/DL (ref 14–18)
HGB BLD-MCNC: 12.6 G/DL (ref 14–18)
IMM GRANULOCYTES # BLD AUTO: 0.04 K/UL (ref 0–0.11)
IMM GRANULOCYTES NFR BLD AUTO: 0.4 % (ref 0–0.9)
INR PPP: 1.12 (ref 0.87–1.13)
LIPASE SERPL-CCNC: 28 U/L (ref 11–82)
LYMPHOCYTES # BLD AUTO: 2.3 K/UL (ref 1–4.8)
LYMPHOCYTES NFR BLD: 23.3 % (ref 22–41)
MCH RBC QN AUTO: 32.1 PG (ref 27–33)
MCH RBC QN AUTO: 32.3 PG (ref 27–33)
MCHC RBC AUTO-ENTMCNC: 33.5 G/DL (ref 33.7–35.3)
MCHC RBC AUTO-ENTMCNC: 33.6 G/DL (ref 33.7–35.3)
MCV RBC AUTO: 95.5 FL (ref 81.4–97.8)
MCV RBC AUTO: 96.4 FL (ref 81.4–97.8)
MONOCYTES # BLD AUTO: 0.95 K/UL (ref 0–0.85)
MONOCYTES NFR BLD AUTO: 9.6 % (ref 0–13.4)
NEUTROPHILS # BLD AUTO: 6.39 K/UL (ref 1.82–7.42)
NEUTROPHILS NFR BLD: 64.7 % (ref 44–72)
NRBC # BLD AUTO: 0 K/UL
NRBC BLD-RTO: 0 /100 WBC
PLATELET # BLD AUTO: 276 K/UL (ref 164–446)
PLATELET # BLD AUTO: 308 K/UL (ref 164–446)
PMV BLD AUTO: 9.8 FL (ref 9–12.9)
PMV BLD AUTO: 9.9 FL (ref 9–12.9)
POTASSIUM SERPL-SCNC: 4.1 MMOL/L (ref 3.6–5.5)
PROT SERPL-MCNC: 7.3 G/DL (ref 6–8.2)
PROTHROMBIN TIME: 14.1 SEC (ref 12–14.6)
RBC # BLD AUTO: 3.77 M/UL (ref 4.7–6.1)
RBC # BLD AUTO: 3.9 M/UL (ref 4.7–6.1)
RH BLD: NORMAL
RH BLD: NORMAL
SODIUM SERPL-SCNC: 142 MMOL/L (ref 135–145)
TROPONIN I SERPL-MCNC: 0.01 NG/ML (ref 0–0.04)
TROPONIN I SERPL-MCNC: 0.02 NG/ML (ref 0–0.04)
WBC # BLD AUTO: 11.3 K/UL (ref 4.8–10.8)
WBC # BLD AUTO: 9.9 K/UL (ref 4.8–10.8)

## 2018-08-10 PROCEDURE — 86900 BLOOD TYPING SEROLOGIC ABO: CPT

## 2018-08-10 PROCEDURE — 80053 COMPREHEN METABOLIC PANEL: CPT

## 2018-08-10 PROCEDURE — 70450 CT HEAD/BRAIN W/O DYE: CPT

## 2018-08-10 PROCEDURE — 84484 ASSAY OF TROPONIN QUANT: CPT | Mod: 91

## 2018-08-10 PROCEDURE — 770020 HCHG ROOM/CARE - TELE (206)

## 2018-08-10 PROCEDURE — 93005 ELECTROCARDIOGRAM TRACING: CPT

## 2018-08-10 PROCEDURE — 93005 ELECTROCARDIOGRAM TRACING: CPT | Performed by: EMERGENCY MEDICINE

## 2018-08-10 PROCEDURE — 74176 CT ABD & PELVIS W/O CONTRAST: CPT

## 2018-08-10 PROCEDURE — 85025 COMPLETE CBC W/AUTO DIFF WBC: CPT

## 2018-08-10 PROCEDURE — A9270 NON-COVERED ITEM OR SERVICE: HCPCS | Performed by: STUDENT IN AN ORGANIZED HEALTH CARE EDUCATION/TRAINING PROGRAM

## 2018-08-10 PROCEDURE — 83690 ASSAY OF LIPASE: CPT

## 2018-08-10 PROCEDURE — 700102 HCHG RX REV CODE 250 W/ 637 OVERRIDE(OP): Performed by: STUDENT IN AN ORGANIZED HEALTH CARE EDUCATION/TRAINING PROGRAM

## 2018-08-10 PROCEDURE — 76705 ECHO EXAM OF ABDOMEN: CPT

## 2018-08-10 PROCEDURE — 85610 PROTHROMBIN TIME: CPT

## 2018-08-10 PROCEDURE — 36415 COLL VENOUS BLD VENIPUNCTURE: CPT

## 2018-08-10 PROCEDURE — 99285 EMERGENCY DEPT VISIT HI MDM: CPT

## 2018-08-10 PROCEDURE — 86850 RBC ANTIBODY SCREEN: CPT

## 2018-08-10 PROCEDURE — 305948 HCHG GREEN TRAUMA ACT PRE-NOTIFY NO CC

## 2018-08-10 PROCEDURE — 85027 COMPLETE CBC AUTOMATED: CPT

## 2018-08-10 PROCEDURE — 85730 THROMBOPLASTIN TIME PARTIAL: CPT

## 2018-08-10 PROCEDURE — 86901 BLOOD TYPING SEROLOGIC RH(D): CPT

## 2018-08-10 PROCEDURE — 83880 ASSAY OF NATRIURETIC PEPTIDE: CPT

## 2018-08-10 PROCEDURE — 94760 N-INVAS EAR/PLS OXIMETRY 1: CPT

## 2018-08-10 PROCEDURE — 71045 X-RAY EXAM CHEST 1 VIEW: CPT

## 2018-08-10 PROCEDURE — 80307 DRUG TEST PRSMV CHEM ANLYZR: CPT

## 2018-08-10 RX ORDER — POLYETHYLENE GLYCOL 3350 17 G/17G
1 POWDER, FOR SOLUTION ORAL
Status: DISCONTINUED | OUTPATIENT
Start: 2018-08-10 | End: 2018-08-11 | Stop reason: HOSPADM

## 2018-08-10 RX ORDER — LISINOPRIL 10 MG/1
10 TABLET ORAL DAILY
COMMUNITY
End: 2019-05-20

## 2018-08-10 RX ORDER — AMOXICILLIN 250 MG
2 CAPSULE ORAL 2 TIMES DAILY
Status: DISCONTINUED | OUTPATIENT
Start: 2018-08-10 | End: 2018-08-11 | Stop reason: HOSPADM

## 2018-08-10 RX ORDER — ALBUTEROL SULFATE 90 UG/1
2 AEROSOL, METERED RESPIRATORY (INHALATION) EVERY 6 HOURS PRN
Status: ON HOLD | COMMUNITY
End: 2018-08-11

## 2018-08-10 RX ORDER — HEPARIN SODIUM 5000 [USP'U]/ML
5000 INJECTION, SOLUTION INTRAVENOUS; SUBCUTANEOUS EVERY 8 HOURS
Status: DISCONTINUED | OUTPATIENT
Start: 2018-08-10 | End: 2018-08-11 | Stop reason: HOSPADM

## 2018-08-10 RX ORDER — CARVEDILOL 25 MG/1
25 TABLET ORAL 2 TIMES DAILY WITH MEALS
Status: ON HOLD | COMMUNITY
End: 2019-05-21

## 2018-08-10 RX ORDER — ALBUTEROL SULFATE 90 UG/1
2 AEROSOL, METERED RESPIRATORY (INHALATION) EVERY 6 HOURS PRN
Status: DISCONTINUED | OUTPATIENT
Start: 2018-08-10 | End: 2018-08-11 | Stop reason: HOSPADM

## 2018-08-10 RX ORDER — BISACODYL 10 MG
10 SUPPOSITORY, RECTAL RECTAL
Status: DISCONTINUED | OUTPATIENT
Start: 2018-08-10 | End: 2018-08-11 | Stop reason: HOSPADM

## 2018-08-10 RX ORDER — IPRATROPIUM BROMIDE AND ALBUTEROL SULFATE 2.5; .5 MG/3ML; MG/3ML
3 SOLUTION RESPIRATORY (INHALATION)
Status: DISCONTINUED | OUTPATIENT
Start: 2018-08-10 | End: 2018-08-10

## 2018-08-10 RX ORDER — CARVEDILOL 25 MG/1
25 TABLET ORAL 2 TIMES DAILY WITH MEALS
Status: DISCONTINUED | OUTPATIENT
Start: 2018-08-11 | End: 2018-08-11 | Stop reason: HOSPADM

## 2018-08-10 RX ORDER — CINACALCET 30 MG/1
60 TABLET, FILM COATED ORAL
Status: DISCONTINUED | OUTPATIENT
Start: 2018-08-10 | End: 2018-08-11 | Stop reason: HOSPADM

## 2018-08-10 RX ORDER — LISINOPRIL 10 MG/1
10 TABLET ORAL DAILY
Status: DISCONTINUED | OUTPATIENT
Start: 2018-08-11 | End: 2018-08-11 | Stop reason: HOSPADM

## 2018-08-10 RX ORDER — IPRATROPIUM BROMIDE AND ALBUTEROL SULFATE 2.5; .5 MG/3ML; MG/3ML
3 SOLUTION RESPIRATORY (INHALATION)
Status: DISCONTINUED | OUTPATIENT
Start: 2018-08-10 | End: 2018-08-11 | Stop reason: HOSPADM

## 2018-08-10 RX ORDER — CINACALCET 60 MG/1
60 TABLET, FILM COATED ORAL
COMMUNITY
End: 2019-05-20

## 2018-08-10 RX ORDER — SEVELAMER CARBONATE 800 MG/1
TABLET, FILM COATED ORAL
Status: DISCONTINUED | OUTPATIENT
Start: 2018-08-11 | End: 2018-08-11 | Stop reason: HOSPADM

## 2018-08-10 RX ORDER — ACETAMINOPHEN 325 MG/1
650 TABLET ORAL EVERY 6 HOURS PRN
Status: DISCONTINUED | OUTPATIENT
Start: 2018-08-10 | End: 2018-08-11 | Stop reason: HOSPADM

## 2018-08-10 RX ORDER — SEVELAMER CARBONATE 800 MG/1
TABLET, FILM COATED ORAL
COMMUNITY
End: 2019-05-20

## 2018-08-10 RX ADMIN — ALBUTEROL SULFATE 2 PUFF: 90 AEROSOL, METERED RESPIRATORY (INHALATION) at 23:56

## 2018-08-10 RX ADMIN — ASPIRIN 81 MG: 81 TABLET, COATED ORAL at 23:52

## 2018-08-10 ASSESSMENT — COPD QUESTIONNAIRES
HAVE YOU SMOKED AT LEAST 100 CIGARETTES IN YOUR ENTIRE LIFE: YES
DURING THE PAST 4 WEEKS HOW MUCH DID YOU FEEL SHORT OF BREATH: NONE/LITTLE OF THE TIME
COPD SCREENING SCORE: 5
DO YOU EVER COUGH UP ANY MUCUS OR PHLEGM?: NO/ONLY WITH OCCASIONAL COLDS OR INFECTIONS

## 2018-08-10 ASSESSMENT — ENCOUNTER SYMPTOMS
ABDOMINAL PAIN: 0
BACK PAIN: 0
SHORTNESS OF BREATH: 1

## 2018-08-10 ASSESSMENT — LIFESTYLE VARIABLES: EVER_SMOKED: YES

## 2018-08-11 ENCOUNTER — PATIENT OUTREACH (OUTPATIENT)
Dept: HEALTH INFORMATION MANAGEMENT | Facility: OTHER | Age: 68
End: 2018-08-11

## 2018-08-11 VITALS
BODY MASS INDEX: 26.61 KG/M2 | HEART RATE: 60 BPM | HEIGHT: 72 IN | WEIGHT: 196.43 LBS | OXYGEN SATURATION: 95 % | DIASTOLIC BLOOD PRESSURE: 80 MMHG | RESPIRATION RATE: 17 BRPM | TEMPERATURE: 97 F | SYSTOLIC BLOOD PRESSURE: 147 MMHG

## 2018-08-11 PROBLEM — N18.6 ESRD (END STAGE RENAL DISEASE) (HCC): Status: ACTIVE | Noted: 2018-08-11

## 2018-08-11 PROBLEM — J44.9 COPD (CHRONIC OBSTRUCTIVE PULMONARY DISEASE) (HCC): Status: ACTIVE | Noted: 2018-08-11

## 2018-08-11 PROBLEM — D64.9 ANEMIA: Status: ACTIVE | Noted: 2018-08-11

## 2018-08-11 PROBLEM — I50.9 CHF (CONGESTIVE HEART FAILURE) (HCC): Status: ACTIVE | Noted: 2018-08-11
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
## 2018-08-11 PROBLEM — R07.9 CHEST PAIN: Status: ACTIVE | Noted: 2018-08-11

## 2018-08-11 LAB
ANION GAP SERPL CALC-SCNC: 11 MMOL/L (ref 0–11.9)
BUN SERPL-MCNC: 9 MG/DL (ref 8–22)
CALCIUM SERPL-MCNC: 9.5 MG/DL (ref 8.5–10.5)
CHLORIDE SERPL-SCNC: 99 MMOL/L (ref 96–112)
CO2 SERPL-SCNC: 32 MMOL/L (ref 20–33)
CREAT SERPL-MCNC: 4.53 MG/DL (ref 0.5–1.4)
EKG IMPRESSION: NORMAL
GLUCOSE SERPL-MCNC: 91 MG/DL (ref 65–99)
POTASSIUM SERPL-SCNC: 3.8 MMOL/L (ref 3.6–5.5)
SODIUM SERPL-SCNC: 142 MMOL/L (ref 135–145)
TROPONIN I SERPL-MCNC: 0.02 NG/ML (ref 0–0.04)

## 2018-08-11 PROCEDURE — 94640 AIRWAY INHALATION TREATMENT: CPT

## 2018-08-11 PROCEDURE — 80048 BASIC METABOLIC PNL TOTAL CA: CPT

## 2018-08-11 PROCEDURE — 93010 ELECTROCARDIOGRAM REPORT: CPT | Performed by: INTERNAL MEDICINE

## 2018-08-11 PROCEDURE — 93005 ELECTROCARDIOGRAM TRACING: CPT | Performed by: STUDENT IN AN ORGANIZED HEALTH CARE EDUCATION/TRAINING PROGRAM

## 2018-08-11 PROCEDURE — 700102 HCHG RX REV CODE 250 W/ 637 OVERRIDE(OP): Performed by: STUDENT IN AN ORGANIZED HEALTH CARE EDUCATION/TRAINING PROGRAM

## 2018-08-11 PROCEDURE — 36415 COLL VENOUS BLD VENIPUNCTURE: CPT

## 2018-08-11 PROCEDURE — A9270 NON-COVERED ITEM OR SERVICE: HCPCS | Performed by: STUDENT IN AN ORGANIZED HEALTH CARE EDUCATION/TRAINING PROGRAM

## 2018-08-11 PROCEDURE — 84484 ASSAY OF TROPONIN QUANT: CPT

## 2018-08-11 PROCEDURE — 700101 HCHG RX REV CODE 250: Performed by: STUDENT IN AN ORGANIZED HEALTH CARE EDUCATION/TRAINING PROGRAM

## 2018-08-11 PROCEDURE — 99238 HOSP IP/OBS DSCHRG MGMT 30/<: CPT | Mod: GC | Performed by: INTERNAL MEDICINE

## 2018-08-11 RX ORDER — ALBUTEROL SULFATE 90 UG/1
2 AEROSOL, METERED RESPIRATORY (INHALATION) EVERY 6 HOURS PRN
Qty: 8.5 G | Refills: 0 | Status: SHIPPED | OUTPATIENT
Start: 2018-08-11 | End: 2018-09-22

## 2018-08-11 RX ADMIN — CARVEDILOL 25 MG: 25 TABLET, FILM COATED ORAL at 08:42

## 2018-08-11 RX ADMIN — IPRATROPIUM BROMIDE AND ALBUTEROL SULFATE 3 ML: .5; 3 SOLUTION RESPIRATORY (INHALATION) at 02:16

## 2018-08-11 RX ADMIN — LISINOPRIL 10 MG: 10 TABLET ORAL at 05:43

## 2018-08-11 RX ADMIN — SEVELAMER CARBONATE 3200 MG: 800 TABLET, FILM COATED ORAL at 09:20

## 2018-08-11 ASSESSMENT — ENCOUNTER SYMPTOMS
DIAPHORESIS: 0
SHORTNESS OF BREATH: 0
PALPITATIONS: 0
NAUSEA: 0
VOMITING: 0
TINGLING: 0
FOCAL WEAKNESS: 0
BACK PAIN: 0
SINUS PAIN: 0
NECK PAIN: 0
HEARTBURN: 0
BLURRED VISION: 0
BLOOD IN STOOL: 0
FLANK PAIN: 0
WEAKNESS: 0
SEIZURES: 0
HEADACHES: 0
SPEECH CHANGE: 0
EYE PAIN: 0
CONSTIPATION: 0
CHILLS: 0
ABDOMINAL PAIN: 0
COUGH: 0
SENSORY CHANGE: 0
SORE THROAT: 0
DIZZINESS: 0
DIARRHEA: 0
FEVER: 0
TREMORS: 0

## 2018-08-11 ASSESSMENT — PAIN SCALES - GENERAL
PAINLEVEL_OUTOF10: 0
PAINLEVEL_OUTOF10: 0

## 2018-08-11 ASSESSMENT — COGNITIVE AND FUNCTIONAL STATUS - GENERAL
SUGGESTED CMS G CODE MODIFIER DAILY ACTIVITY: CH
DAILY ACTIVITIY SCORE: 24
MOBILITY SCORE: 24
SUGGESTED CMS G CODE MODIFIER MOBILITY: CH

## 2018-08-11 ASSESSMENT — LIFESTYLE VARIABLES
EVER_SMOKED: YES
ALCOHOL_USE: NO

## 2018-08-11 ASSESSMENT — PATIENT HEALTH QUESTIONNAIRE - PHQ9
2. FEELING DOWN, DEPRESSED, IRRITABLE, OR HOPELESS: NOT AT ALL
SUM OF ALL RESPONSES TO PHQ9 QUESTIONS 1 AND 2: 0
2. FEELING DOWN, DEPRESSED, IRRITABLE, OR HOPELESS: NOT AT ALL
1. LITTLE INTEREST OR PLEASURE IN DOING THINGS: NOT AT ALL
1. LITTLE INTEREST OR PLEASURE IN DOING THINGS: NOT AT ALL
SUM OF ALL RESPONSES TO PHQ9 QUESTIONS 1 AND 2: 0

## 2018-08-11 NOTE — PROGRESS NOTES
Assumed care of Pt. At 0700, report given from night shift JERMAINE Peralta. Patient resting in bed at this time, safety measures in place, bed in lowest locked position.
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
## 2018-08-11 NOTE — DISCHARGE INSTRUCTIONS
Discharge Instructions    Discharged to home by car with Family. Discharged via wheelchair, hospital escort: Yes.  Special equipment needed: Not Applicable    Be sure to schedule a follow-up appointment with your primary care doctor or any specialists as instructed.     Discharge Plan:   Smoking Cessation Offered: Patient Refused  Influenza Vaccine Indication: Not indicated: Previously immunized this influenza season and > 8 years of age    I understand that a diet low in cholesterol, fat, and sodium is recommended for good health. Unless I have been given specific instructions below for another diet, I accept this instruction as my diet prescription.   Other diet: Cardiac    · Special Instructions: None   · Is patient discharged on Warfarin / Coumadin?   No     Chest Pain, Nonspecific  It is often hard to give a specific diagnosis for the cause of chest pain. There is always a chance that your pain could be related to something serious, like a heart attack or a blood clot in the lungs. You need to follow up with your caregiver for further evaluation. More lab tests or other studies such as X-rays, electrocardiography, stress testing, or cardiac imaging may be needed to find the cause of your pain.  Most of the time, nonspecific chest pain improves within 2 to 3 days with rest and mild pain medicine. For the next few days, avoid physical exertion or activities that bring on pain. Do not smoke. Avoid drinking alcohol. Call your caregiver for routine follow-up as advised.   SEEK IMMEDIATE MEDICAL CARE IF:  · You develop increased chest pain or pain that radiates to the arm, neck, jaw, back, or abdomen.   · You develop shortness of breath, increased coughing, or you start coughing up blood.   · You have severe back or abdominal pain, nausea, or vomiting.   · You develop severe weakness, fainting, fever, or chills.   Document Released: 12/18/2006 Document Revised: 03/11/2013 Document Reviewed: 06/06/2008  ExitCare®  "Patient Information ©2013 [employer] [facility].        Heart-Healthy Eating Plan  Introduction  Heart-healthy meal planning includes:  · Limiting unhealthy fats.  · Increasing healthy fats.  · Making other small dietary changes.  You may need to talk with your doctor or a diet specialist (dietitian) to create an eating plan that is right for you.  What types of fat should I choose?  · Choose healthy fats. These include olive oil and canola oil, flaxseeds, walnuts, almonds, and seeds.  · Eat more omega-3 fats. These include salmon, mackerel, sardines, tuna, flaxseed oil, and ground flaxseeds. Try to eat fish at least twice each week.  · Limit saturated fats.  ¨ Saturated fats are often found in animal products, such as meats, butter, and cream.  ¨ Plant sources of saturated fats include palm oil, palm kernel oil, and coconut oil.  · Avoid foods with partially hydrogenated oils in them. These include stick margarine, some tub margarines, cookies, crackers, and other baked goods. These contain trans fats.  What general guidelines do I need to follow?  · Check food labels carefully. Identify foods with trans fats or high amounts of saturated fat.  · Fill one half of your plate with vegetables and green salads. Eat 4-5 servings of vegetables per day. A serving of vegetables is:  ¨ 1 cup of raw leafy vegetables.  ¨ ½ cup of raw or cooked cut-up vegetables.  ¨ ½ cup of vegetable juice.  · Fill one fourth of your plate with whole grains. Look for the word \"whole\" as the first word in the ingredient list.  · Fill one fourth of your plate with lean protein foods.  · Eat 4-5 servings of fruit per day. A serving of fruit is:  ¨ One medium whole fruit.  ¨ ¼ cup of dried fruit.  ¨ ½ cup of fresh, frozen, or canned fruit.  ¨ ½ cup of 100% fruit juice.  · Eat more foods that contain soluble fiber. These include apples, broccoli, carrots, beans, peas, and barley. Try to get 20-30 g of fiber per day.  · Eat more home-cooked food. Eat less " restaurant, buffet, and fast food.  · Limit or avoid alcohol.  · Limit foods high in starch and sugar.  · Avoid fried foods.  · Avoid frying your food. Try baking, boiling, grilling, or broiling it instead. You can also reduce fat by:  ¨ Removing the skin from poultry.  ¨ Removing all visible fats from meats.  ¨ Skimming the fat off of stews, soups, and gravies before serving them.  ¨ Steaming vegetables in water or broth.  · Lose weight if you are overweight.  · Eat 4-5 servings of nuts, legumes, and seeds per week:  ¨ One serving of dried beans or legumes equals ½ cup after being cooked.  ¨ One serving of nuts equals 1½ ounces.  ¨ One serving of seeds equals ½ ounce or one tablespoon.  · You may need to keep track of how much salt or sodium you eat. This is especially true if you have high blood pressure. Talk with your doctor or dietitian to get more information.  What foods can I eat?  Grains   Breads, including Cypriot, white, carmen, wheat, raisin, rye, oatmeal, and Italian. Tortillas that are neither fried nor made with lard or trans fat. Low-fat rolls, including hotdog and hamburger buns and English muffins. Biscuits. Muffins. Waffles. Pancakes. Light popcorn. Whole-grain cereals. Flatbread. [place] toast. Pretzels. Breadsticks. Rusks. Low-fat snacks. Low-fat crackers, including oyster, saltine, matzo, zaire, animal, and rye. Rice and pasta, including brown rice and pastas that are made with whole wheat.  Vegetables   All vegetables.  Fruits   All fruits, but limit coconut.  Meats and Other Protein Sources   Lean, well-trimmed beef, veal, pork, and lamb. Chicken and turkey without skin. All fish and shellfish. Wild duck, rabbit, pheasant, and venison. Egg whites or low-cholesterol egg substitutes. Dried beans, peas, lentils, and tofu. Seeds and most nuts.  Dairy   Low-fat or nonfat cheeses, including ricotta, string, and mozzarella. Skim or 1% milk that is liquid, powdered, or evaporated. Buttermilk that is made  with low-fat milk. Nonfat or low-fat yogurt.  Beverages   Mineral water. Diet carbonated beverages.  Sweets and Desserts   Sherbets and fruit ices. Honey, jam, marmalade, jelly, and syrups. Meringues and gelatins. Pure sugar candy, such as hard candy, jelly beans, gumdrops, mints, marshmallows, and small amounts of dark chocolate. Henri food cake.  Eat all sweets and desserts in moderation.  Fats and Oils   Nonhydrogenated (trans-free) margarines. Vegetable oils, including soybean, sesame, sunflower, olive, peanut, safflower, corn, canola, and cottonseed. Salad dressings or mayonnaise made with a vegetable oil. Limit added fats and oils that you use for cooking, baking, salads, and as spreads.  Other   Cocoa powder. Coffee and tea. All seasonings and condiments.  The items listed above may not be a complete list of recommended foods or beverages. Contact your dietitian for more options.   What foods are not recommended?  Grains   Breads that are made with saturated or trans fats, oils, or whole milk. Croissants. Butter rolls. Cheese breads. Sweet rolls. Donuts. Buttered popcorn. Chow mein noodles. High-fat crackers, such as cheese or butter crackers.  Meats and Other Protein Sources   Fatty meats, such as hotdogs, short ribs, sausage, spareribs, mora, rib eye roast or steak, and mutton. High-fat deli meats, such as salami and bologna. Caviar. Domestic duck and goose. Organ meats, such as kidney, liver, sweetbreads, and heart.  Dairy   Cream, sour cream, cream cheese, and creamed cottage cheese. Whole-milk cheeses, including blue (scooter), [place] Ruben, Brie, Noah, American, Havarti, Swiss, cheddar, Camembert, and [place]. Whole or 2% milk that is liquid, evaporated, or condensed. Whole buttermilk. Cream sauce or high-fat cheese sauce. Yogurt that is made from whole milk.  Beverages   Regular sodas and juice drinks with added sugar.  Sweets and Desserts   Frosting. Pudding. Cookies. Cakes other than henri food  cake. Candy that has milk chocolate or white chocolate, hydrogenated fat, butter, coconut, or unknown ingredients. Buttered syrups. Full-fat ice cream or ice cream drinks.  Fats and Oils   Gravy that has suet, meat fat, or shortening. Cocoa butter, hydrogenated oils, palm oil, coconut oil, palm kernel oil. These can often be found in baked products, candy, fried foods, nondairy creamers, and whipped toppings. Solid fats and shortenings, including mora fat, salt pork, lard, and butter. Nondairy cream substitutes, such as coffee creamers and sour cream substitutes. Salad dressings that are made of unknown oils, cheese, or sour cream.  The items listed above may not be a complete list of foods and beverages to avoid. Contact your dietitian for more information.   This information is not intended to replace advice given to you by your health care provider. Make sure you discuss any questions you have with your health care provider.  Document Released: 06/18/2013 Document Revised: 05/25/2017 Document Reviewed: 06/11/2015  © 2017 [employer]      Heart Failure  Heart failure means your heart has trouble pumping blood. This makes it hard for your body to work well. Heart failure is usually a long-term (chronic) condition. You must take good care of yourself and follow your doctor's treatment plan.  HOME CARE  · Take your heart medicine as told by your doctor.  ¨ Do not stop taking medicine unless your doctor tells you to.  ¨ Do not skip any dose of medicine.  ¨ Refill your medicines before they run out.  ¨ Take other medicines only as told by your doctor or pharmacist.  · Stay active if told by your doctor. The elderly and people with severe heart failure should talk with a doctor about physical activity.  · Eat heart-healthy foods. Choose foods that are without trans fat and are low in saturated fat, cholesterol, and salt (sodium). This includes fresh or frozen fruits and vegetables, fish, lean meats, fat-free or low-fat  dairy foods, whole grains, and high-fiber foods. Lentils and dried peas and beans (legumes) are also good choices.  · Limit salt if told by your doctor.  · Cook in a healthy way. Roast, grill, broil, bake, poach, steam, or stir-sexton foods.  · Limit fluids as told by your doctor.  · Weigh yourself every morning. Do this after you pee (urinate) and before you eat breakfast. Write down your weight to give to your doctor.  · Take your blood pressure and write it down if your doctor tells you to.  · Ask your doctor how to check your pulse. Check your pulse as told.  · Lose weight if told by your doctor.  · Stop smoking or chewing tobacco. Do not use gum or patches that help you quit without your doctor's approval.  · Schedule and go to doctor visits as told.  · Nonpregnant women should have no more than 1 drink a day. Men should have no more than 2 drinks a day. Talk to your doctor about drinking alcohol.  · Stop illegal drug use.  · Stay current with shots (immunizations).  · Manage your health conditions as told by your doctor.  · Learn to manage your stress.  · Rest when you are tired.  · If it is really hot outside:  ¨ Avoid intense activities.  ¨ Use air conditioning or fans, or get in a cooler place.  ¨ Avoid caffeine and alcohol.  ¨ Wear loose-fitting, lightweight, and light-colored clothing.  · If it is really cold outside:  ¨ Avoid intense activities.  ¨ Layer your clothing.  ¨ Wear mittens or gloves, a hat, and a scarf when going outside.  ¨ Avoid alcohol.  · Learn about heart failure and get support as needed.  · Get help to maintain or improve your quality of life and your ability to care for yourself as needed.  GET HELP IF:   · You gain weight quickly.  · You are more short of breath than usual.  · You cannot do your normal activities.  · You tire easily.  · You cough more than normal, especially with activity.  · You have any or more puffiness (swelling) in areas such as your hands, feet, ankles, or belly  (abdomen).  · You cannot sleep because it is hard to breathe.  · You feel like your heart is beating fast (palpitations).  · You get dizzy or light-headed when you stand up.  GET HELP RIGHT AWAY IF:   · You have trouble breathing.  · There is a change in mental status, such as becoming less alert or not being able to focus.  · You have chest pain or discomfort.  · You faint.  MAKE SURE YOU:   · Understand these instructions.  · Will watch your condition.  · Will get help right away if you are not doing well or get worse.  This information is not intended to replace advice given to you by your health care provider. Make sure you discuss any questions you have with your health care provider.  Document Released: 09/26/2009 Document Revised: 01/08/2016 Document Reviewed: 02/03/2014  [employer] Patient Education © 2017 [employer]      Depression / Suicide Risk    As you are discharged from this [facility] facility, it is important to learn how to keep safe from harming yourself.    Recognize the warning signs:  · Abrupt changes in personality, positive or negative- including increase in energy   · Giving away possessions  · Change in eating patterns- significant weight changes-  positive or negative  · Change in sleeping patterns- unable to sleep or sleeping all the time   · Unwillingness or inability to communicate  · Depression  · Unusual sadness, discouragement and loneliness  · Talk of wanting to die  · Neglect of personal appearance   · Rebelliousness- reckless behavior  · Withdrawal from people/activities they love  · Confusion- inability to concentrate     If you or a loved one observes any of these behaviors or has concerns about self-harm, here's what you can do:  · Talk about it- your feelings and reasons for harming yourself  · Remove any means that you might use to hurt yourself (examples: pills, rope, extension cords, firearm)  · Get professional help from the community (Mental Health,  Substance Abuse, psychological counseling)  · Do not be alone:Call your Safe Contact- someone whom you trust who will be there for you.  · Call your local CRISIS HOTLINE 046-8050 or 784-280-7476  · Call your local Children's Mobile Crisis Response Team [place] (380) 558-3618 or www.[facility].com  · Call the toll free National Suicide Prevention Hotlines   · National Suicide Prevention Lifeline 858-854-OKYR (4213)  · National Hope Line Network 800-SUICIDE (823-9088)

## 2018-08-11 NOTE — ASSESSMENT & PLAN NOTE
- pain does not seem cardiac in nature  - chest wall is tender and no chest tightness, therefore most likley musculoskeletal   - first set of EKG and troponins were negative for ishchemia   - will follow with serial EKG and trops
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
## 2018-08-11 NOTE — RESPIRATORY CARE
COPD EDUCATION by COPD CLINICAL EDUCATOR  8/11/2018 at 9:45 AM by Zoey Hall     Patient reviewed by COPD education team. Patient does not qualify for COPD program.

## 2018-08-11 NOTE — RESPIRATORY CARE
COPD EDUCATION by COPD CLINICAL EDUCATOR  8/11/2018 at 9:50 AM by Zoey Hall     Patient interviewed by COPD education team. Patient refused COPD program at this time.
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
## 2018-08-11 NOTE — PROGRESS NOTES
Discharge instructions given and discussed, signed copy in chart. Pt A&O x4 and verbalized understanding and all questions answered. All prescriptions sent to home pharmacy. Pt discharged home in stable condition on noO2 via wheelchair escorted by flex RN. Personal belongings with patient. IV removed and tolerated well. Tele box removed, monitor room notified.
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
## 2018-08-11 NOTE — ASSESSMENT & PLAN NOTE
- uses home o2 at 3L, only when O2 sat is less than 88  - currently not needing O2, and good O2 sat, some wheezing heard  - continue albuterol inhaler PRN, continue Anoro Ellipta inhaler  - start duoneb inhaler  - reassess for resolution of wheezing
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
## 2018-08-11 NOTE — CARE PLAN
Problem: Safety  Goal: Will remain free from falls  Outcome: PROGRESSING AS EXPECTED   Patient has remained free of falls with precautions and call light in place. Bed alarm on.     Problem: Infection  Goal: Will remain free from infection  Outcome: PROGRESSING AS EXPECTED  Patient demonstrating no S/S of infection.

## 2018-08-11 NOTE — ED NOTES
[facility], he is taking binders. Pharmacy does not have record of medication. Per pt, his only pharmacy is [facility].

## 2018-08-11 NOTE — ASSESSMENT & PLAN NOTE
- denies dysnpnea, palpitations, or dizziness  - continue aspirin 81, carvedilol 25, lisinopril 10
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
## 2018-08-16 ENCOUNTER — OFFICE VISIT (OUTPATIENT)
Dept: CARDIOLOGY | Facility: MEDICAL CENTER | Age: 68
End: 2018-08-16
Payer: MEDICARE

## 2018-08-16 VITALS
SYSTOLIC BLOOD PRESSURE: 102 MMHG | OXYGEN SATURATION: 96 % | HEIGHT: 72 IN | DIASTOLIC BLOOD PRESSURE: 64 MMHG | HEART RATE: 74 BPM | WEIGHT: 201 LBS | BODY MASS INDEX: 27.22 KG/M2

## 2018-08-16 DIAGNOSIS — I10 HTN (HYPERTENSION), MALIGNANT: ICD-10-CM

## 2018-08-16 DIAGNOSIS — Z99.2 ESRD (END STAGE RENAL DISEASE) ON DIALYSIS (HCC): ICD-10-CM

## 2018-08-16 DIAGNOSIS — Z79.899 HIGH RISK MEDICATION USE: ICD-10-CM

## 2018-08-16 DIAGNOSIS — I51.89 LEFT VENTRICULAR SYSTOLIC DYSFUNCTION, NYHA CLASS 2: ICD-10-CM

## 2018-08-16 DIAGNOSIS — I50.20 ACC/AHA STAGE C SYSTOLIC HEART FAILURE (HCC): ICD-10-CM

## 2018-08-16 DIAGNOSIS — N18.6 ESRD (END STAGE RENAL DISEASE) ON DIALYSIS (HCC): ICD-10-CM

## 2018-08-16 DIAGNOSIS — R06.09 DYSPNEA ON EXERTION: ICD-10-CM

## 2018-08-16 DIAGNOSIS — I42.8 NON-ISCHEMIC CARDIOMYOPATHY (HCC): ICD-10-CM

## 2018-08-16 PROCEDURE — 99214 OFFICE O/P EST MOD 30 MIN: CPT | Mod: 25 | Performed by: INTERNAL MEDICINE

## 2018-08-16 PROCEDURE — 94618 PULMONARY STRESS TESTING: CPT | Performed by: INTERNAL MEDICINE

## 2018-08-16 ASSESSMENT — MINNESOTA LIVING WITH HEART FAILURE QUESTIONNAIRE (MLHF)
WALKING ABOUT OR CLIMBING STAIRS DIFFICULT: 4
EATING LESS FOODS YOU LIKE: 3
DIFFICULTY SLEEPING WELL AT NIGHT: 2
DIFFICULTY GOING AWAY FROM HOME: 0
DIFFICULTY WITH RECREATIONAL PASTIMES, SPORTS, HOBBIES: 3
TOTAL_SCORE: 38
COSTING YOU MONEY FOR MEDICAL CARE: 0
GIVING YOU SIDE EFFECTS FROM TREATMENTS: 0
LOSS OF SELF CONTROL IN YOUR LIFE: 3
FEELING LIKE A BURDEN TO FAMILY AND FRIENDS: 3
MAKING YOU SHORT OF BREATH: 3
DIFFICULTY WITH SEXUAL ACTIVITIES: 5
DIFFICULTY TO CONCENTRATE OR REMEMBERING THINGS: 2
MAKING YOU FEEL DEPRESSED: 0
SWELLING IN ANKLES OR LEGS: 0
DIFFICULTY SOCIALIZING WITH FAMILY OR FRIENDS: 2
HAVING TO SIT OR LIE DOWN DURING THE DAY: 0
TIRED, FATIGUED OR LOW ON ENERGY: 4
MAKING YOU WORRY: 0
DIFFICULTY WORKING TO EARN A LIVING: 4
MAKING YOU STAY IN A HOSPITAL: 0
WORKING AROUND THE HOUSE OR YARD DIFFICULT: 0

## 2018-08-16 ASSESSMENT — ENCOUNTER SYMPTOMS
DEPRESSION: 0
SHORTNESS OF BREATH: 0
PND: 0
HALLUCINATIONS: 0
COUGH: 0
CLAUDICATION: 0
EYE PAIN: 0
VOMITING: 0
DIZZINESS: 0
SENSORY CHANGE: 0
FALLS: 0
ORTHOPNEA: 0
HEADACHES: 0
NAUSEA: 0
EYE DISCHARGE: 0
BLOOD IN STOOL: 0
ABDOMINAL PAIN: 0
PALPITATIONS: 0
MYALGIAS: 0
BRUISES/BLEEDS EASILY: 0
FEVER: 0
BLURRED VISION: 0
SPEECH CHANGE: 0
DOUBLE VISION: 0
LOSS OF CONSCIOUSNESS: 0
WEIGHT LOSS: 0
CHILLS: 0

## 2018-08-16 ASSESSMENT — 6 MINUTE WALK TEST (6MWT): TOTAL DISTANCE WALKED (METERS): 110

## 2018-08-16 NOTE — PROGRESS NOTES
Chief Complaint   Patient presents with   • HTN (Controlled)       Subjective:   Bridger Savage is a 68 y.o. male who presents today for cardiac care and evaluation in the heart failure clinic for chronic heart failure care. He does have a history of end-stage renal disease for which he is currently on hemodialysis. This has been going on for 5 years. He doesn't know the reason for that. His left ventricular systolic function was found to be 40% as well. Nuclear PET scan stress test was negative 09/2017.     Patient is feeling better these days. Does get winded upon walking up inclines or for distance. No symptoms at rest or with daily living activities.     He is tolerating carvedilol 25 mg by mouth twice a day.     At prior visit, patient was able to complete 116 m during his 6 minute walk test. his O2 saturation at baseline was 90% and at the end of the test, the O2 saturation was 97%. he reported 3 level of dyspnea on Trevon scale.    Patient was able to complete 110 m during his 6 minute walk test. his O2 saturation at baseline was 94% and at the end of the test, the O2 saturation was 96%. he reported 3 level of dyspnea on Trevon scale.    No family history of sudden cardiac death.      Past Medical History:   Diagnosis Date   • Asthma    • Breath shortness     uses oxygen 3.5 L as needed   • Chronic obstructive pulmonary disease (HCC)    • Cold     12/27/17 cold   • Congestive heart failure (HCC)    • Dental disorder     upper and lower dentures   • Dialysis patient (HCC)    • Emphysema of lung (HCC)    • Renal disorder    • Snoring    • Urinary incontinence      Past Surgical History:   Procedure Laterality Date   • GASTROSCOPY  2/1/2018    Procedure: GASTROSCOPY;  Surgeon: Morteza Olivo M.D.;  Location: [facility];  Service: Gastroenterology   • COLONOSCOPY  2/1/2018    Procedure: COLONOSCOPY;  Surgeon: Morteza Olivo M.D.;  Location: [facility];  Service: Gastroenterology    • GASTROSCOPY-ENDO N/A 8/28/2017    Procedure: GASTROSCOPY-ENDO;  Surgeon: Pankaj Choudhury M.D.;  Location: [facility];  Service: EUS     Family History   Problem Relation Age of Onset   • Heart Disease Mother      Social History     Social History   • Marital status:      Spouse name: N/A   • Number of children: N/A   • Years of education: N/A     Occupational History   • Not on file.     Social History Main Topics   • Smoking status: Former Smoker     Packs/day: 2.00     Years: 50.00     Types: Cigarettes     Quit date: 8/13/2017   • Smokeless tobacco: Never Used   • Alcohol use No   • Drug use: No   • Sexual activity: Not on file     Other Topics Concern   • Not on file     Social History Narrative   • No narrative on file     Allergies   Allergen Reactions   • Chloraprep One Step Itching     Outpatient Encounter Prescriptions as of 8/16/2018   Medication Sig Dispense Refill   • lisinopril (PRINIVIL) 10 MG Tab Take 1 Tab by mouth every day. 30 Tab 11   • carvedilol (COREG) 25 MG Tab Take 1 Tab by mouth 2 times a day, with meals. 60 Tab 11   • aspirin EC (ECOTRIN) 81 MG Tablet Delayed Response Take 81 mg by mouth every day.     • Cinacalcet HCl (SENSIPAR) 60 MG Tab Take 60 mg by mouth every evening.     • Sevelamer Carbonate (RENVELA) 800 MG Tab Take 800 mg by mouth 3 times a day, with meals. 4 caps each meals., 2 cap with snacks.     • [DISCONTINUED] azithromycin (ZITHROMAX) 250 MG Tab      • ipratropium-albuterol (DUONEB) 0.5-2.5 (3) MG/3ML nebulizer solution 3 mL by Nebulization route 4 times a day. 30 Bullet 0   • [DISCONTINUED] omeprazole (PRILOSEC) 20 MG delayed-release capsule Take 1 Cap by mouth every day. 30 Cap 6   • albuterol 108 (90 Base) MCG/ACT Aero Soln inhalation aerosol Inhale 2 Puffs by mouth every 6 hours as needed for Shortness of Breath.     • fluticasone-salmeterol (ADVAIR) 250-50 MCG/DOSE AEROSOL POWDER, BREATH ACTIVATED Inhale 1 Puff by mouth PRN.     • tiotropium  (SPIRIVA) 18 MCG Cap Inhale 18 mcg by mouth every day.     • [DISCONTINUED] ropinirole (REQUIP) 1 MG Tab Take 1 mg by mouth every bedtime.       No facility-administered encounter medications on file as of 8/16/2018.      Review of Systems   Constitutional: Negative for chills, fever, malaise/fatigue and weight loss.   HENT: Negative for ear discharge, ear pain, hearing loss and nosebleeds.    Eyes: Negative for blurred vision, double vision, pain and discharge.   Respiratory: Negative for cough and shortness of breath.    Cardiovascular: Negative for chest pain, palpitations, orthopnea, claudication, leg swelling and PND.   Gastrointestinal: Negative for abdominal pain, blood in stool, melena, nausea and vomiting.   Genitourinary: Negative for dysuria and hematuria.   Musculoskeletal: Negative for falls, joint pain and myalgias.   Skin: Negative for itching and rash.   Neurological: Negative for dizziness, sensory change, speech change, loss of consciousness and headaches.   Endo/Heme/Allergies: Negative for environmental allergies. Does not bruise/bleed easily.   Psychiatric/Behavioral: Negative for depression, hallucinations and suicidal ideas.        Objective:   /64   Pulse 74   Ht 1.829 m (6')   Wt 91.2 kg (201 lb)   SpO2 96%   BMI 27.26 kg/m²     Physical Exam   Constitutional: He is oriented to person, place, and time. No distress.   HENT:   Head: Normocephalic and atraumatic.   Right Ear: External ear normal.   Left Ear: External ear normal.   Eyes: Right eye exhibits no discharge. Left eye exhibits no discharge.   Neck: No JVD present. No thyromegaly present.   Cardiovascular: Normal rate, regular rhythm, normal heart sounds and intact distal pulses.  Exam reveals no gallop and no friction rub.    No murmur heard.  Pulmonary/Chest: Breath sounds normal. No respiratory distress.   Abdominal: Bowel sounds are normal. He exhibits no distension. There is no tenderness.   Musculoskeletal: He exhibits  no edema or tenderness.   Neurological: He is alert and oriented to person, place, and time. No cranial nerve deficit.   Skin: Skin is warm and dry. He is not diaphoretic.   Psychiatric: He has a normal mood and affect. His behavior is normal.   Nursing note and vitals reviewed.      Assessment:     1. ACC/AHA stage C systolic heart failure (HCC)  [facility] PULMONARY STRESS TESTING 6 MIN WALK   2. Left ventricular systolic dysfunction, NYHA class 2  [facility] PULMONARY STRESS TESTING 6 MIN WALK   3. Non-ischemic cardiomyopathy (HCC)     4. HTN (hypertension), malignant     5. ESRD (end stage renal disease) on dialysis (HCC)     6. Dyspnea on exertion  [facility] PULMONARY STRESS TESTING 6 MIN WALK   7. High risk medication use         Medical Decision Making:  Today's Assessment / Status / Plan:   Today, based on physical examination findings, patient is euvolemic. No JVD, lungs are clear to auscultation, no pitting edema in bilateral lower extremities, no ascites.     Dry weight is 201 lbs.     Lisinopril for HTN at 10 mg daily.  Continue Coreg 25 mg po bid sometimes once daily (based on patient's feeling).  No Spironolactone due to ESRD.     No indication for ICD, LVEF of 40%.     Will continue to closely monitor for side effects of patient's high risk medication(s) including liver, renal function and electrolytes.     I will see patient back in our Heart Failure Clinic with lab tests and studies results in 6 months.     I thank you Dr. Cho for referring patient to our Heart Failure Clinic today.

## 2018-09-22 ENCOUNTER — OFFICE VISIT (OUTPATIENT)
Dept: URGENT CARE | Facility: CLINIC | Age: 68
End: 2018-09-22
Payer: MEDICARE

## 2018-09-22 VITALS
SYSTOLIC BLOOD PRESSURE: 128 MMHG | BODY MASS INDEX: 28.44 KG/M2 | WEIGHT: 210 LBS | HEART RATE: 69 BPM | DIASTOLIC BLOOD PRESSURE: 74 MMHG | RESPIRATION RATE: 20 BRPM | TEMPERATURE: 97.5 F | HEIGHT: 72 IN | OXYGEN SATURATION: 98 %

## 2018-09-22 DIAGNOSIS — J01.00 ACUTE NON-RECURRENT MAXILLARY SINUSITIS: ICD-10-CM

## 2018-09-22 PROCEDURE — 99214 OFFICE O/P EST MOD 30 MIN: CPT | Performed by: FAMILY MEDICINE

## 2018-09-22 RX ORDER — ALBUTEROL SULFATE 90 UG/1
2 AEROSOL, METERED RESPIRATORY (INHALATION) EVERY 6 HOURS PRN
Qty: 8.5 G | Refills: 0 | Status: ON HOLD | OUTPATIENT
Start: 2018-09-22 | End: 2019-05-21

## 2018-09-22 RX ORDER — AMOXICILLIN AND CLAVULANATE POTASSIUM 875; 125 MG/1; MG/1
1 TABLET, FILM COATED ORAL 2 TIMES DAILY
Qty: 14 TAB | Refills: 0 | Status: SHIPPED | OUTPATIENT
Start: 2018-09-22 | End: 2018-09-29

## 2018-09-22 RX ORDER — AMOXICILLIN 500 MG/1
CAPSULE ORAL
Refills: 0 | COMMUNITY
Start: 2018-08-06 | End: 2018-09-22

## 2018-09-22 ASSESSMENT — ENCOUNTER SYMPTOMS
SINUS PRESSURE: 1
HEADACHES: 1
SHORTNESS OF BREATH: 1
COUGH: 1
SORE THROAT: 1

## 2018-09-22 ASSESSMENT — PAIN SCALES - GENERAL: PAINLEVEL: NO PAIN

## 2018-09-22 NOTE — PROGRESS NOTES
Subjective:   Bridger Savage is a 68 y.o. male who presents for Congestion (x5 days. Chest congestion, sinus congestion/pressure, productive cough.)        Sinus Problem   This is a new problem. The current episode started in the past 7 days. The problem has been gradually worsening since onset. There has been no fever. The pain is moderate. Associated symptoms include congestion, coughing, headaches, shortness of breath, sinus pressure and a sore throat.     Review of Systems   HENT: Positive for congestion, sinus pressure and sore throat.    Respiratory: Positive for cough and shortness of breath.    Neurological: Positive for headaches.     Allergies   Allergen Reactions   • Chloraprep One Step Itching      Objective:   /74   Pulse 69   Temp 36.4 °C (97.5 °F)   Resp 20   Ht 1.829 m (6')   Wt 95.3 kg (210 lb)   SpO2 98%   BMI 28.48 kg/m²   Physical Exam   Constitutional: He is oriented to person, place, and time. He appears well-developed and well-nourished. No distress.   HENT:   Head: Normocephalic and atraumatic.   Nose: Mucosal edema and rhinorrhea present. Right sinus exhibits maxillary sinus tenderness. Left sinus exhibits maxillary sinus tenderness.   Eyes: Pupils are equal, round, and reactive to light. Conjunctivae and EOM are normal.   Cardiovascular: Normal rate and regular rhythm.    No murmur heard.  Pulmonary/Chest: Effort normal and breath sounds normal. No respiratory distress. He has no wheezes. He has no rales.   Abdominal: Soft. He exhibits no distension. There is no tenderness.   Neurological: He is alert and oriented to person, place, and time. He has normal reflexes. No sensory deficit.   Skin: Skin is warm and dry.   Psychiatric: He has a normal mood and affect.   Vitals reviewed.        Assessment/Plan:   Assessment    1. Acute non-recurrent maxillary sinusitis  - amoxicillin-clavulanate (AUGMENTIN) 875-125 MG Tab; Take 1 Tab by mouth 2 times a day for 7 days.  Dispense: 14  Tab; Refill: 0  - albuterol 108 (90 Base) MCG/ACT Aero Soln inhalation aerosol; Inhale 2 Puffs by mouth every 6 hours as needed for Shortness of Breath.  Dispense: 8.5 g; Refill: 0    Differential diagnosis, natural history, supportive care, and indications for immediate follow-up discussed.

## 2018-11-16 DIAGNOSIS — I51.89 LEFT VENTRICULAR SYSTOLIC DYSFUNCTION, NYHA CLASS 2: ICD-10-CM

## 2018-11-16 DIAGNOSIS — I50.20 ACC/AHA STAGE C SYSTOLIC HEART FAILURE (HCC): ICD-10-CM

## 2018-11-16 RX ORDER — CARVEDILOL 25 MG/1
TABLET ORAL
Qty: 180 TAB | Refills: 1 | Status: SHIPPED | OUTPATIENT
Start: 2018-11-16 | End: 2019-05-20

## 2018-11-21 ENCOUNTER — OFFICE VISIT (OUTPATIENT)
Dept: URGENT CARE | Facility: CLINIC | Age: 68
End: 2018-11-21
Payer: MEDICARE

## 2018-11-21 ENCOUNTER — HOSPITAL ENCOUNTER (EMERGENCY)
Facility: MEDICAL CENTER | Age: 68
End: 2018-11-21
Attending: EMERGENCY MEDICINE
Payer: MEDICARE

## 2018-11-21 ENCOUNTER — APPOINTMENT (OUTPATIENT)
Dept: RADIOLOGY | Facility: MEDICAL CENTER | Age: 68
End: 2018-11-21
Attending: EMERGENCY MEDICINE
Payer: MEDICARE

## 2018-11-21 VITALS
BODY MASS INDEX: 26.81 KG/M2 | TEMPERATURE: 98.8 F | HEIGHT: 72 IN | HEART RATE: 86 BPM | DIASTOLIC BLOOD PRESSURE: 70 MMHG | WEIGHT: 197.97 LBS | SYSTOLIC BLOOD PRESSURE: 107 MMHG | OXYGEN SATURATION: 92 % | RESPIRATION RATE: 14 BRPM

## 2018-11-21 VITALS
HEART RATE: 70 BPM | DIASTOLIC BLOOD PRESSURE: 68 MMHG | SYSTOLIC BLOOD PRESSURE: 108 MMHG | HEIGHT: 72 IN | WEIGHT: 210 LBS | OXYGEN SATURATION: 94 % | BODY MASS INDEX: 28.44 KG/M2 | RESPIRATION RATE: 20 BRPM | TEMPERATURE: 98.9 F

## 2018-11-21 DIAGNOSIS — R22.1 SUBMENTAL MASS: ICD-10-CM

## 2018-11-21 DIAGNOSIS — L02.91 ABSCESS: ICD-10-CM

## 2018-11-21 LAB
ANION GAP SERPL CALC-SCNC: 14 MMOL/L (ref 0–11.9)
BASOPHILS # BLD AUTO: 0.3 % (ref 0–1.8)
BASOPHILS # BLD: 0.03 K/UL (ref 0–0.12)
BUN SERPL-MCNC: 12 MG/DL (ref 8–22)
CALCIUM SERPL-MCNC: 9.5 MG/DL (ref 8.5–10.5)
CHLORIDE SERPL-SCNC: 90 MMOL/L (ref 96–112)
CO2 SERPL-SCNC: 30 MMOL/L (ref 20–33)
CREAT SERPL-MCNC: 4.87 MG/DL (ref 0.5–1.4)
EOSINOPHIL # BLD AUTO: 0.04 K/UL (ref 0–0.51)
EOSINOPHIL NFR BLD: 0.4 % (ref 0–6.9)
ERYTHROCYTE [DISTWIDTH] IN BLOOD BY AUTOMATED COUNT: 51.4 FL (ref 35.9–50)
GLUCOSE SERPL-MCNC: 85 MG/DL (ref 65–99)
HCT VFR BLD AUTO: 40.4 % (ref 42–52)
HGB BLD-MCNC: 13.7 G/DL (ref 14–18)
IMM GRANULOCYTES # BLD AUTO: 0.06 K/UL (ref 0–0.11)
IMM GRANULOCYTES NFR BLD AUTO: 0.6 % (ref 0–0.9)
LACTATE BLD-SCNC: 2 MMOL/L (ref 0.5–2)
LYMPHOCYTES # BLD AUTO: 1.46 K/UL (ref 1–4.8)
LYMPHOCYTES NFR BLD: 13.9 % (ref 22–41)
MCH RBC QN AUTO: 31.9 PG (ref 27–33)
MCHC RBC AUTO-ENTMCNC: 33.9 G/DL (ref 33.7–35.3)
MCV RBC AUTO: 94.2 FL (ref 81.4–97.8)
MONOCYTES # BLD AUTO: 1.12 K/UL (ref 0–0.85)
MONOCYTES NFR BLD AUTO: 10.6 % (ref 0–13.4)
NEUTROPHILS # BLD AUTO: 7.81 K/UL (ref 1.82–7.42)
NEUTROPHILS NFR BLD: 74.2 % (ref 44–72)
NRBC # BLD AUTO: 0 K/UL
NRBC BLD-RTO: 0 /100 WBC
PLATELET # BLD AUTO: 218 K/UL (ref 164–446)
PMV BLD AUTO: 9.8 FL (ref 9–12.9)
POTASSIUM SERPL-SCNC: 4.6 MMOL/L (ref 3.6–5.5)
RBC # BLD AUTO: 4.29 M/UL (ref 4.7–6.1)
SODIUM SERPL-SCNC: 134 MMOL/L (ref 135–145)
WBC # BLD AUTO: 10.5 K/UL (ref 4.8–10.8)

## 2018-11-21 PROCEDURE — 85025 COMPLETE CBC W/AUTO DIFF WBC: CPT

## 2018-11-21 PROCEDURE — 83605 ASSAY OF LACTIC ACID: CPT

## 2018-11-21 PROCEDURE — 700111 HCHG RX REV CODE 636 W/ 250 OVERRIDE (IP): Performed by: EMERGENCY MEDICINE

## 2018-11-21 PROCEDURE — 700105 HCHG RX REV CODE 258: Performed by: EMERGENCY MEDICINE

## 2018-11-21 PROCEDURE — 700117 HCHG RX CONTRAST REV CODE 255: Performed by: EMERGENCY MEDICINE

## 2018-11-21 PROCEDURE — 99214 OFFICE O/P EST MOD 30 MIN: CPT | Performed by: PHYSICIAN ASSISTANT

## 2018-11-21 PROCEDURE — 70491 CT SOFT TISSUE NECK W/DYE: CPT

## 2018-11-21 PROCEDURE — 80048 BASIC METABOLIC PNL TOTAL CA: CPT

## 2018-11-21 PROCEDURE — 99284 EMERGENCY DEPT VISIT MOD MDM: CPT

## 2018-11-21 PROCEDURE — 10160 PNXR ASPIR ABSC HMTMA BULLA: CPT

## 2018-11-21 PROCEDURE — 96365 THER/PROPH/DIAG IV INF INIT: CPT | Mod: XU

## 2018-11-21 RX ORDER — LIDOCAINE HYDROCHLORIDE 10 MG/ML
INJECTION, SOLUTION INFILTRATION; PERINEURAL
Status: DISCONTINUED
Start: 2018-11-21 | End: 2018-11-21 | Stop reason: HOSPADM

## 2018-11-21 RX ORDER — LIDOCAINE HYDROCHLORIDE 10 MG/ML
20 INJECTION, SOLUTION INFILTRATION; PERINEURAL ONCE
Status: DISCONTINUED | OUTPATIENT
Start: 2018-11-21 | End: 2018-11-21 | Stop reason: HOSPADM

## 2018-11-21 RX ORDER — AMOXICILLIN 500 MG/1
500 CAPSULE ORAL ONCE
Status: DISCONTINUED | OUTPATIENT
Start: 2018-11-21 | End: 2018-11-21

## 2018-11-21 RX ORDER — AMOXICILLIN 500 MG/1
500 CAPSULE ORAL DAILY
Qty: 10 CAP | Refills: 0 | Status: SHIPPED | OUTPATIENT
Start: 2018-11-21 | End: 2018-12-01

## 2018-11-21 RX ADMIN — AMPICILLIN SODIUM AND SULBACTAM SODIUM 3 G: 2; 1 INJECTION, POWDER, FOR SOLUTION INTRAMUSCULAR; INTRAVENOUS at 15:47

## 2018-11-21 RX ADMIN — IOHEXOL 100 ML: 350 INJECTION, SOLUTION INTRAVENOUS at 17:09

## 2018-11-21 ASSESSMENT — ENCOUNTER SYMPTOMS
COUGH: 0
CHILLS: 0
CHILLS: 0
FEVER: 0
TINGLING: 0
MYALGIAS: 0
SENSORY CHANGE: 0
VOMITING: 0
FEVER: 0
NAUSEA: 0
FOCAL WEAKNESS: 0
HEADACHES: 0
SORE THROAT: 1

## 2018-11-21 NOTE — ED PROVIDER NOTES
ED Provider Note    Scribed for Garo Miller M.D. by Star Hidalgo. 11/21/2018, 2:34 PM.    Means of arrival: Walk in  History obtained from: Patient  History limited by: None    CHIEF COMPLAINT  Chief Complaint   Patient presents with   • Lump     Neck   • Sore Throat   • Sent from Urgent Care       HPI  Bridger Savage is a 68 y.o. male who presents to the Emergency Department complaining of a lump under his chin that has been present for the past 3 days. The lump is painful and it makes it difficult and painful for him to swallow due to a sore throat. Patient has had this lump in the past and he was treated with antibiotics and the lump had been resolved. He denies any fever, chills, cough, or runny nose. Patient has a history of kidney failure and had dialysis this morning. His nephrologist is Dr. Chavez.     REVIEW OF SYSTEMS  Review of Systems   Constitutional: Negative for chills and fever.   HENT: Positive for sore throat.         Negative runny nose  Positive painful lump under chin   Respiratory: Negative for cough.    All other systems reviewed and are negative.      PAST MEDICAL HISTORY   has a past medical history of Asthma; Breath shortness; Chronic obstructive pulmonary disease (HCC); Cold; Congestive heart failure (HCC); Dental disorder; Dialysis patient (HCC); Emphysema of lung (HCC); Renal disorder; Snoring; and Urinary incontinence.    SURGICAL HISTORY   has a past surgical history that includes gastroscopy-endo (N/A, 8/28/2017); gastroscopy (2/1/2018); and colonoscopy (2/1/2018).    SOCIAL HISTORY  Social History   Substance Use Topics   • Smoking status: Former Smoker     Packs/day: 2.00     Years: 50.00     Types: Cigarettes     Quit date: 8/13/2017   • Smokeless tobacco: Never Used   • Alcohol use No      History   Drug Use No       FAMILY HISTORY  Family History   Problem Relation Age of Onset   • Heart Disease Mother        CURRENT MEDICATIONS  No current facility-administered  medications on file prior to encounter.      Current Outpatient Prescriptions on File Prior to Encounter   Medication Sig Dispense Refill   • carvedilol (COREG) 25 MG Tab TAKE 1 TABLET BY MOUTH TWICE DAILY WITH MEALS 180 Tab 1   • albuterol 108 (90 Base) MCG/ACT Aero Soln inhalation aerosol Inhale 2 Puffs by mouth every 6 hours as needed for Shortness of Breath. 8.5 g 0   • aspirin 81 MG tablet Take 81 mg by mouth every bedtime.     • Cinacalcet HCl (SENSIPAR) 60 MG Tab Take 60 mg by mouth every bedtime.     • lisinopril (PRINIVIL) 10 MG Tab Take 10 mg by mouth every day.     • carvedilol (COREG) 25 MG Tab Take 25 mg by mouth 2 times a day, with meals.     • umeclidinium-vilanterol (ANORO ELLIPTA) 62.5-25 MCG/INH AEROSOL POWDER, BREATH ACTIVATED inhaler Inhale 1 Puff by mouth every day.     • sevelamer carbonate (RENVELA) 800 MG Tab tablet Take 1,600-3,200 mg by mouth 3 times a day, with meals.     • lisinopril (PRINIVIL) 10 MG Tab Take 1 Tab by mouth every day. 30 Tab 11   • ipratropium-albuterol (DUONEB) 0.5-2.5 (3) MG/3ML nebulizer solution 3 mL by Nebulization route 4 times a day. 30 Bullet 0   • fluticasone-salmeterol (ADVAIR) 250-50 MCG/DOSE AEROSOL POWDER, BREATH ACTIVATED Inhale 1 Puff by mouth PRN.     • tiotropium (SPIRIVA) 18 MCG Cap Inhale 18 mcg by mouth every day.     • aspirin EC (ECOTRIN) 81 MG Tablet Delayed Response Take 81 mg by mouth every day.     • Cinacalcet HCl (SENSIPAR) 60 MG Tab Take 60 mg by mouth every evening.     • Sevelamer Carbonate (RENVELA) 800 MG Tab Take 800 mg by mouth 3 times a day, with meals. 4 caps each meals., 2 cap with snacks.        ALLERGIES  Allergies   Allergen Reactions   • Chloraprep One Step Itching       PHYSICAL EXAM  VITAL SIGNS: /70   Pulse 87   Temp 37.1 °C (98.8 °F) (Oral)   Resp 14   Ht 1.829 m (6')   Wt 89.8 kg (197 lb 15.6 oz)   SpO2 94%   BMI 26.85 kg/m²     Constitutional:  Mild distress  HENT:  Moist mucous membranes  Eyes:  No  conjunctivitis or icterus  Neck: 5-7cm spherical tender mass centered under the mandible near the chin. trachea is midline  Lymphatic:  No cervical lymphadenopathy  Cardiovascular:  Regular rate and rhythm, no murmurs  Thorax & Lungs: Few wheezes.  Abdomen:  Soft, Non-tender  Skin:.  no rash  Back:  Non-tender, no CVA tenderness  Extremities:   no edema  Vascular:  symmetric radial pulse  Neurologic:  Normal gross motor    LABS  Labs Reviewed   CBC WITH DIFFERENTIAL - Abnormal; Notable for the following:        Result Value    RBC 4.29 (*)     Hemoglobin 13.7 (*)     Hematocrit 40.4 (*)     RDW 51.4 (*)     Neutrophils-Polys 74.20 (*)     Lymphocytes 13.90 (*)     Neutrophils (Absolute) 7.81 (*)     Monos (Absolute) 1.12 (*)     All other components within normal limits   BASIC METABOLIC PANEL - Abnormal; Notable for the following:     Sodium 134 (*)     Chloride 90 (*)     Creatinine 4.87 (*)     Anion Gap 14.0 (*)     All other components within normal limits   ESTIMATED GFR - Abnormal; Notable for the following:     GFR If  15 (*)     GFR If Non  12 (*)     All other components within normal limits   LACTIC ACID     All labs reviewed by me.      RADIOLOGY  CT-SOFT TISSUE NECK WITH   Final Result      No suspicious mass lesions in the neck. No enlarged cervical nodes by size criteria.        The radiologist's interpretation of all radiological studies have been reviewed by me.    COURSE & MEDICAL DECISION MAKING  Pertinent Labs & Imaging studies reviewed. (See chart for details)    2:34 PM - Patient seen and examined at bedside. Patient will be treated with unasyn 3g. Ordered CBC, BMP, lactic acid to evaluate his symptoms. The differential diagnoses include but are not limited to: rule out abscess vs cyst.     3:05 PM - Paged nephrology.    3:16 PM - Spoke with Dr. Townsend, nephrology, who advises to proceed with the CT with contrast.    Procedure: The area of the abscess was  anesthetized with 2% lidocaine is then prepped with ChloraPrep and an 18-gauge was used to attempt to aspirate the abscess cavity and was unsuccessful    Medical Decision Making:   I did review the CT reading.  There is an area of lucency consistent with fluid collection in the region under and posterior to the chin that is symmetrically aligned.  Patient has no sign of sepsis normal white count no left shift no lactic acidosis he has no fever chills he otherwise feels well.    I did talk to Dr. Guerrero of ENT.  She wanted me to attempt an aspiration which I did.  Nothing was aspirated.'s been elected to discharge patient on p.o. antibiotics.  I did tell him that if it worsens or worsens he is to come back for surgical intervention.  It is noted however a month ago he had antibiotics for the same condition to resolve the situation.  He is given return precautions and follow-up.        FINAL IMPRESSION  1. Abscess          Star NG (Scribe), am scribing for, and in the presence of, Garo Miller M.D..    Electronically signed by: Star Hidalgo (Scribe), 11/21/2018    Garo NG M.D. personally performed the services described in this documentation, as scribed by Star Hidalgo in my presence, and it is both accurate and complete. C.    The note accurately reflects work and decisions made by me.  Garo Miller  11/21/2018  6:54 PM

## 2018-11-21 NOTE — ED TRIAGE NOTES
Chief Complaint   Patient presents with   • Lump     Neck   • Sore Throat   • Sent from Urgent Care     ./70   Pulse 89   Temp 37.1 °C (98.8 °F) (Oral)   Resp 14   Ht 1.829 m (6')   Wt 89.8 kg (197 lb 15.6 oz)   SpO2 95%   BMI 26.85 kg/m²      Ambulatory to triage with above complaints x 3 days, sent from Urgent Care, able to manage secretions, patent airway, educated on triage process, placed in lobby, told to inform staff of any changes in condition.

## 2018-11-21 NOTE — PROGRESS NOTES
Subjective:      Bridger Savage is a 68 y.o. male who presents with Jaw Pain (Swelling under chin for 3 days )            Patient is here with complaints of swelling under her chin that started 3 days ago. He complains of pain and swelling. He denies fever or chills. Patient has history of end stage renal disease on Dialysis. Her reports similar episode that occurred 5 months ago and was given antibiotics with improvement in symptoms. He reports moderate pain.           Past Medical History:   Diagnosis Date   • Asthma    • Breath shortness     uses oxygen 3.5 L as needed   • Chronic obstructive pulmonary disease (HCC)    • Cold     12/27/17 cold   • Congestive heart failure (HCC)    • Dental disorder     upper and lower dentures   • Dialysis patient (HCC)    • Emphysema of lung (HCC)    • Renal disorder    • Snoring    • Urinary incontinence        Past Surgical History:   Procedure Laterality Date   • GASTROSCOPY  2/1/2018    Procedure: GASTROSCOPY;  Surgeon: Morteza Olivo M.D.;  Location: [facility];  Service: Gastroenterology   • COLONOSCOPY  2/1/2018    Procedure: COLONOSCOPY;  Surgeon: Morteza Olivo M.D.;  Location: [facility];  Service: Gastroenterology   • GASTROSCOPY-ENDO N/A 8/28/2017    Procedure: GASTROSCOPY-ENDO;  Surgeon: Pankaj Choudhury M.D.;  Location: [facility];  Service: EUS       Family History   Problem Relation Age of Onset   • Heart Disease Mother        Allergies   Allergen Reactions   • Chloraprep One Step Itching       Medications, Allergies, and current problem list reviewed today in Epic    Review of Systems   Constitutional: Negative for chills, fever and malaise/fatigue.   HENT:        Mass under chin, swelling, redness    Gastrointestinal: Negative for nausea and vomiting.   Musculoskeletal: Negative for myalgias.   Skin: Negative for rash.   Neurological: Negative for tingling, sensory change, focal weakness and headaches.     All  "other systems reviewed and are negative.        Objective:     /68 (BP Location: Right arm, Patient Position: Sitting, BP Cuff Size: Adult)   Pulse 70   Temp 37.2 °C (98.9 °F) (Temporal)   Resp 20   Ht 1.829 m (6' 0.01\")   Wt 95.3 kg (210 lb)   SpO2 94%   BMI 28.47 kg/m²      Physical Exam   Constitutional: He is oriented to person, place, and time. He appears well-developed and well-nourished. No distress.   HENT:   Head: Normocephalic and atraumatic.       Eyes: Conjunctivae are normal.   Neck: Neck supple.   Lymphadenopathy:     He has no cervical adenopathy.   Neurological: He is alert and oriented to person, place, and time. No cranial nerve deficit.   Psychiatric: He has a normal mood and affect. His behavior is normal. Judgment and thought content normal.               Assessment/Plan:     1. Submental mass    Large, tender mass below chin worrisome for large abscess.  Due to patient's complicated medical history and co-morbidities recommended ER evaluation for more appropriate and safer work-up given his end-stage renal function.   Patient agreed to go to the ER. Suggested [facility] as ENT is available if needed.  Risk of not going to the ER discussed.    Edith Perez P.A.-C.        "

## 2018-11-22 NOTE — ED NOTES
Pt clear for d/c. Educated on d/c instructions, verbalized understanding. No questions or concerned at time of d/c. Ambulated out of ED with steady gait.

## 2018-12-29 NOTE — ED NOTES
Pt ambulatory to triage, sent here from access center for possible infection to dialysis port.  Pt reports having port placed due to malfunction with shunt.     no difficulties

## 2019-02-14 ENCOUNTER — HOSPITAL ENCOUNTER (OUTPATIENT)
Dept: RADIOLOGY | Facility: MEDICAL CENTER | Age: 69
End: 2019-02-14
Attending: NURSE PRACTITIONER
Payer: MEDICARE

## 2019-02-14 DIAGNOSIS — R07.1 PAINFUL RESPIRATION: ICD-10-CM

## 2019-02-14 PROCEDURE — 71046 X-RAY EXAM CHEST 2 VIEWS: CPT

## 2019-05-20 ENCOUNTER — HOSPITAL ENCOUNTER (INPATIENT)
Facility: MEDICAL CENTER | Age: 69
LOS: 1 days | DRG: 190 | End: 2019-05-21
Attending: EMERGENCY MEDICINE | Admitting: HOSPITALIST
Payer: MEDICARE

## 2019-05-20 ENCOUNTER — APPOINTMENT (OUTPATIENT)
Dept: RADIOLOGY | Facility: MEDICAL CENTER | Age: 69
DRG: 190 | End: 2019-05-20
Attending: EMERGENCY MEDICINE
Payer: MEDICARE

## 2019-05-20 DIAGNOSIS — N28.9 RENAL INSUFFICIENCY: ICD-10-CM

## 2019-05-20 DIAGNOSIS — J44.1 ACUTE EXACERBATION OF CHRONIC OBSTRUCTIVE PULMONARY DISEASE (COPD) (HCC): ICD-10-CM

## 2019-05-20 DIAGNOSIS — R09.02 HYPOXIA: ICD-10-CM

## 2019-05-20 DIAGNOSIS — J01.00 ACUTE NON-RECURRENT MAXILLARY SINUSITIS: ICD-10-CM

## 2019-05-20 PROBLEM — J96.20 ACUTE ON CHRONIC RESPIRATORY FAILURE (HCC): Status: ACTIVE | Noted: 2019-05-20

## 2019-05-20 PROBLEM — I10 ESSENTIAL HYPERTENSION: Status: ACTIVE | Noted: 2019-05-20

## 2019-05-20 LAB
ALBUMIN SERPL BCP-MCNC: 3.8 G/DL (ref 3.2–4.9)
ALBUMIN/GLOB SERPL: 1.1 G/DL
ALP SERPL-CCNC: 61 U/L (ref 30–99)
ALT SERPL-CCNC: 18 U/L (ref 2–50)
ANION GAP SERPL CALC-SCNC: 16 MMOL/L (ref 0–11.9)
AST SERPL-CCNC: 18 U/L (ref 12–45)
BASOPHILS # BLD AUTO: 0.5 % (ref 0–1.8)
BASOPHILS # BLD: 0.04 K/UL (ref 0–0.12)
BILIRUB SERPL-MCNC: 1 MG/DL (ref 0.1–1.5)
BNP SERPL-MCNC: 146 PG/ML (ref 0–100)
BUN SERPL-MCNC: 10 MG/DL (ref 8–22)
CALCIUM SERPL-MCNC: 9.6 MG/DL (ref 8.4–10.2)
CHLORIDE SERPL-SCNC: 90 MMOL/L (ref 96–112)
CO2 SERPL-SCNC: 31 MMOL/L (ref 20–33)
CREAT SERPL-MCNC: 7.29 MG/DL (ref 0.5–1.4)
EKG IMPRESSION: NORMAL
EOSINOPHIL # BLD AUTO: 0.12 K/UL (ref 0–0.51)
EOSINOPHIL NFR BLD: 1.5 % (ref 0–6.9)
ERYTHROCYTE [DISTWIDTH] IN BLOOD BY AUTOMATED COUNT: 57.4 FL (ref 35.9–50)
FLUAV RNA SPEC QL NAA+PROBE: NEGATIVE
FLUBV RNA SPEC QL NAA+PROBE: NEGATIVE
GLOBULIN SER CALC-MCNC: 3.5 G/DL (ref 1.9–3.5)
GLUCOSE SERPL-MCNC: 79 MG/DL (ref 65–99)
HCT VFR BLD AUTO: 50.5 % (ref 42–52)
HGB BLD-MCNC: 16.7 G/DL (ref 14–18)
IMM GRANULOCYTES # BLD AUTO: 0.05 K/UL (ref 0–0.11)
IMM GRANULOCYTES NFR BLD AUTO: 0.6 % (ref 0–0.9)
LACTATE BLD-SCNC: 2 MMOL/L (ref 0.5–2)
LACTATE BLD-SCNC: 2.8 MMOL/L (ref 0.5–2)
LYMPHOCYTES # BLD AUTO: 0.87 K/UL (ref 1–4.8)
LYMPHOCYTES NFR BLD: 10.6 % (ref 22–41)
MCH RBC QN AUTO: 31.4 PG (ref 27–33)
MCHC RBC AUTO-ENTMCNC: 33.1 G/DL (ref 33.7–35.3)
MCV RBC AUTO: 94.9 FL (ref 81.4–97.8)
MONOCYTES # BLD AUTO: 0.79 K/UL (ref 0–0.85)
MONOCYTES NFR BLD AUTO: 9.6 % (ref 0–13.4)
NEUTROPHILS # BLD AUTO: 6.36 K/UL (ref 1.82–7.42)
NEUTROPHILS NFR BLD: 77.2 % (ref 44–72)
NRBC # BLD AUTO: 0 K/UL
NRBC BLD-RTO: 0 /100 WBC
PLATELET # BLD AUTO: 203 K/UL (ref 164–446)
PMV BLD AUTO: 9.9 FL (ref 9–12.9)
POTASSIUM SERPL-SCNC: 4.6 MMOL/L (ref 3.6–5.5)
PROT SERPL-MCNC: 7.3 G/DL (ref 6–8.2)
RBC # BLD AUTO: 5.32 M/UL (ref 4.7–6.1)
SODIUM SERPL-SCNC: 137 MMOL/L (ref 135–145)
TROPONIN I SERPL-MCNC: <0.02 NG/ML (ref 0–0.04)
WBC # BLD AUTO: 8.2 K/UL (ref 4.8–10.8)

## 2019-05-20 PROCEDURE — 99285 EMERGENCY DEPT VISIT HI MDM: CPT | Mod: XU

## 2019-05-20 PROCEDURE — 80053 COMPREHEN METABOLIC PANEL: CPT

## 2019-05-20 PROCEDURE — 71045 X-RAY EXAM CHEST 1 VIEW: CPT

## 2019-05-20 PROCEDURE — 85025 COMPLETE CBC W/AUTO DIFF WBC: CPT

## 2019-05-20 PROCEDURE — 770006 HCHG ROOM/CARE - MED/SURG/GYN SEMI*

## 2019-05-20 PROCEDURE — 87040 BLOOD CULTURE FOR BACTERIA: CPT | Mod: 91

## 2019-05-20 PROCEDURE — 304561 HCHG STAT O2

## 2019-05-20 PROCEDURE — 93005 ELECTROCARDIOGRAM TRACING: CPT | Performed by: EMERGENCY MEDICINE

## 2019-05-20 PROCEDURE — 83605 ASSAY OF LACTIC ACID: CPT

## 2019-05-20 PROCEDURE — 700102 HCHG RX REV CODE 250 W/ 637 OVERRIDE(OP): Performed by: HOSPITALIST

## 2019-05-20 PROCEDURE — 96374 THER/PROPH/DIAG INJ IV PUSH: CPT

## 2019-05-20 PROCEDURE — 700101 HCHG RX REV CODE 250: Performed by: HOSPITALIST

## 2019-05-20 PROCEDURE — A9270 NON-COVERED ITEM OR SERVICE: HCPCS | Performed by: HOSPITALIST

## 2019-05-20 PROCEDURE — 94640 AIRWAY INHALATION TREATMENT: CPT

## 2019-05-20 PROCEDURE — 700111 HCHG RX REV CODE 636 W/ 250 OVERRIDE (IP): Performed by: EMERGENCY MEDICINE

## 2019-05-20 PROCEDURE — 83880 ASSAY OF NATRIURETIC PEPTIDE: CPT

## 2019-05-20 PROCEDURE — 93005 ELECTROCARDIOGRAM TRACING: CPT

## 2019-05-20 PROCEDURE — 36415 COLL VENOUS BLD VENIPUNCTURE: CPT

## 2019-05-20 PROCEDURE — 84484 ASSAY OF TROPONIN QUANT: CPT

## 2019-05-20 PROCEDURE — 700111 HCHG RX REV CODE 636 W/ 250 OVERRIDE (IP): Performed by: HOSPITALIST

## 2019-05-20 PROCEDURE — 87502 INFLUENZA DNA AMP PROBE: CPT

## 2019-05-20 PROCEDURE — 700101 HCHG RX REV CODE 250: Performed by: EMERGENCY MEDICINE

## 2019-05-20 PROCEDURE — 99223 1ST HOSP IP/OBS HIGH 75: CPT | Mod: AI | Performed by: HOSPITALIST

## 2019-05-20 RX ORDER — METHYLPREDNISOLONE SODIUM SUCCINATE 125 MG/2ML
125 INJECTION, POWDER, LYOPHILIZED, FOR SOLUTION INTRAMUSCULAR; INTRAVENOUS ONCE
Status: COMPLETED | OUTPATIENT
Start: 2019-05-20 | End: 2019-05-20

## 2019-05-20 RX ORDER — METHYLPREDNISOLONE SODIUM SUCCINATE 40 MG/ML
40 INJECTION, POWDER, LYOPHILIZED, FOR SOLUTION INTRAMUSCULAR; INTRAVENOUS EVERY 6 HOURS
Status: DISCONTINUED | OUTPATIENT
Start: 2019-05-21 | End: 2019-05-21 | Stop reason: HOSPADM

## 2019-05-20 RX ORDER — CARVEDILOL 6.25 MG/1
25 TABLET ORAL 2 TIMES DAILY WITH MEALS
Status: DISCONTINUED | OUTPATIENT
Start: 2019-05-20 | End: 2019-05-21 | Stop reason: HOSPADM

## 2019-05-20 RX ORDER — AMOXICILLIN 250 MG
2 CAPSULE ORAL 2 TIMES DAILY
Status: DISCONTINUED | OUTPATIENT
Start: 2019-05-20 | End: 2019-05-21 | Stop reason: HOSPADM

## 2019-05-20 RX ORDER — ONDANSETRON 2 MG/ML
4 INJECTION INTRAMUSCULAR; INTRAVENOUS EVERY 4 HOURS PRN
Status: DISCONTINUED | OUTPATIENT
Start: 2019-05-20 | End: 2019-05-21 | Stop reason: HOSPADM

## 2019-05-20 RX ORDER — CINACALCET 30 MG/1
60 TABLET, FILM COATED ORAL EVERY EVENING
Status: DISCONTINUED | OUTPATIENT
Start: 2019-05-20 | End: 2019-05-21 | Stop reason: HOSPADM

## 2019-05-20 RX ORDER — BISACODYL 10 MG
10 SUPPOSITORY, RECTAL RECTAL
Status: DISCONTINUED | OUTPATIENT
Start: 2019-05-20 | End: 2019-05-21 | Stop reason: HOSPADM

## 2019-05-20 RX ORDER — LISINOPRIL 5 MG/1
10 TABLET ORAL DAILY
Status: DISCONTINUED | OUTPATIENT
Start: 2019-05-21 | End: 2019-05-21 | Stop reason: HOSPADM

## 2019-05-20 RX ORDER — IPRATROPIUM BROMIDE AND ALBUTEROL SULFATE 2.5; .5 MG/3ML; MG/3ML
SOLUTION RESPIRATORY (INHALATION)
Status: ACTIVE
Start: 2019-05-20 | End: 2019-05-21

## 2019-05-20 RX ORDER — TIOTROPIUM BROMIDE 18 UG/1
1 CAPSULE ORAL; RESPIRATORY (INHALATION) DAILY
Status: DISCONTINUED | OUTPATIENT
Start: 2019-05-21 | End: 2019-05-21 | Stop reason: HOSPADM

## 2019-05-20 RX ORDER — ASPIRIN 81 MG/1
81 TABLET, CHEWABLE ORAL
Status: DISCONTINUED | OUTPATIENT
Start: 2019-05-21 | End: 2019-05-21 | Stop reason: HOSPADM

## 2019-05-20 RX ORDER — POLYETHYLENE GLYCOL 3350 17 G/17G
1 POWDER, FOR SOLUTION ORAL
Status: DISCONTINUED | OUTPATIENT
Start: 2019-05-20 | End: 2019-05-21 | Stop reason: HOSPADM

## 2019-05-20 RX ORDER — ONDANSETRON 4 MG/1
4 TABLET, ORALLY DISINTEGRATING ORAL EVERY 4 HOURS PRN
Status: DISCONTINUED | OUTPATIENT
Start: 2019-05-20 | End: 2019-05-21 | Stop reason: HOSPADM

## 2019-05-20 RX ORDER — SEVELAMER CARBONATE 800 MG/1
800 TABLET, FILM COATED ORAL
Status: DISCONTINUED | OUTPATIENT
Start: 2019-05-20 | End: 2019-05-21 | Stop reason: HOSPADM

## 2019-05-20 RX ORDER — ACETAMINOPHEN 325 MG/1
650 TABLET ORAL EVERY 6 HOURS PRN
Status: DISCONTINUED | OUTPATIENT
Start: 2019-05-20 | End: 2019-05-21 | Stop reason: HOSPADM

## 2019-05-20 RX ORDER — IPRATROPIUM BROMIDE AND ALBUTEROL SULFATE 2.5; .5 MG/3ML; MG/3ML
3 SOLUTION RESPIRATORY (INHALATION)
Status: DISCONTINUED | OUTPATIENT
Start: 2019-05-21 | End: 2019-05-21 | Stop reason: HOSPADM

## 2019-05-20 RX ORDER — BUDESONIDE AND FORMOTEROL FUMARATE DIHYDRATE 160; 4.5 UG/1; UG/1
2 AEROSOL RESPIRATORY (INHALATION) 2 TIMES DAILY
Status: DISCONTINUED | OUTPATIENT
Start: 2019-05-20 | End: 2019-05-21 | Stop reason: HOSPADM

## 2019-05-20 RX ORDER — DOXYCYCLINE 100 MG/1
100 TABLET ORAL EVERY 12 HOURS
Status: DISCONTINUED | OUTPATIENT
Start: 2019-05-20 | End: 2019-05-21 | Stop reason: HOSPADM

## 2019-05-20 RX ADMIN — ALBUTEROL SULFATE 2.5 MG: 2.5 SOLUTION RESPIRATORY (INHALATION) at 19:42

## 2019-05-20 RX ADMIN — METHYLPREDNISOLONE SODIUM SUCCINATE 125 MG: 125 INJECTION, POWDER, FOR SOLUTION INTRAMUSCULAR; INTRAVENOUS at 19:22

## 2019-05-20 RX ADMIN — IPRATROPIUM BROMIDE 0.5 MG: 0.5 SOLUTION RESPIRATORY (INHALATION) at 19:42

## 2019-05-20 RX ADMIN — DOXYCYCLINE 100 MG: 100 TABLET, FILM COATED ORAL at 23:04

## 2019-05-20 RX ADMIN — BUDESONIDE AND FORMOTEROL FUMARATE DIHYDRATE 2 PUFF: 160; 4.5 AEROSOL RESPIRATORY (INHALATION) at 23:48

## 2019-05-20 RX ADMIN — METHYLPREDNISOLONE SODIUM SUCCINATE 40 MG: 40 INJECTION, POWDER, FOR SOLUTION INTRAMUSCULAR; INTRAVENOUS at 23:02

## 2019-05-20 RX ADMIN — SEVELAMER CARBONATE 800 MG: 800 TABLET, FILM COATED ORAL at 23:04

## 2019-05-20 RX ADMIN — IPRATROPIUM BROMIDE AND ALBUTEROL SULFATE 3 ML: .5; 3 SOLUTION RESPIRATORY (INHALATION) at 23:48

## 2019-05-20 RX ADMIN — CARVEDILOL 25 MG: 6.25 TABLET, FILM COATED ORAL at 23:04

## 2019-05-20 RX ADMIN — CINACALCET HYDROCHLORIDE 60 MG: 30 TABLET, COATED ORAL at 23:04

## 2019-05-20 ASSESSMENT — COGNITIVE AND FUNCTIONAL STATUS - GENERAL
MOBILITY SCORE: 24
DAILY ACTIVITIY SCORE: 24
SUGGESTED CMS G CODE MODIFIER DAILY ACTIVITY: CH
SUGGESTED CMS G CODE MODIFIER MOBILITY: CH

## 2019-05-20 ASSESSMENT — COPD QUESTIONNAIRES
COPD SCREENING SCORE: 7
DURING THE PAST 4 WEEKS HOW MUCH DID YOU FEEL SHORT OF BREATH: SOME OF THE TIME
DO YOU EVER COUGH UP ANY MUCUS OR PHLEGM?: YES, A FEW DAYS A WEEK OR MONTH
HAVE YOU SMOKED AT LEAST 100 CIGARETTES IN YOUR ENTIRE LIFE: YES

## 2019-05-20 ASSESSMENT — ENCOUNTER SYMPTOMS
NERVOUS/ANXIOUS: 0
FLANK PAIN: 0
NEUROLOGICAL NEGATIVE: 1
MYALGIAS: 0
COUGH: 1
GASTROINTESTINAL NEGATIVE: 1
WEIGHT LOSS: 0
BRUISES/BLEEDS EASILY: 0
SINUS PAIN: 1
CHILLS: 0
DIZZINESS: 0
WHEEZING: 1
SHORTNESS OF BREATH: 1
DEPRESSION: 0
VOMITING: 0
LOSS OF CONSCIOUSNESS: 0
ABDOMINAL PAIN: 0
CARDIOVASCULAR NEGATIVE: 1
SPUTUM PRODUCTION: 1
PSYCHIATRIC NEGATIVE: 1
HEMOPTYSIS: 0
NAUSEA: 0
BACK PAIN: 0
FEVER: 1
WEAKNESS: 0
MUSCULOSKELETAL NEGATIVE: 1

## 2019-05-20 ASSESSMENT — LIFESTYLE VARIABLES
EVER_SMOKED: YES
ALCOHOL_USE: NO

## 2019-05-20 ASSESSMENT — PATIENT HEALTH QUESTIONNAIRE - PHQ9
SUM OF ALL RESPONSES TO PHQ9 QUESTIONS 1 AND 2: 0
2. FEELING DOWN, DEPRESSED, IRRITABLE, OR HOPELESS: NOT AT ALL
1. LITTLE INTEREST OR PLEASURE IN DOING THINGS: NOT AT ALL

## 2019-05-21 VITALS
DIASTOLIC BLOOD PRESSURE: 80 MMHG | WEIGHT: 200.62 LBS | RESPIRATION RATE: 18 BRPM | HEIGHT: 72 IN | SYSTOLIC BLOOD PRESSURE: 104 MMHG | OXYGEN SATURATION: 94 % | TEMPERATURE: 98.1 F | HEART RATE: 105 BPM | BODY MASS INDEX: 27.17 KG/M2

## 2019-05-21 DIAGNOSIS — I10 HTN (HYPERTENSION), MALIGNANT: ICD-10-CM

## 2019-05-21 DIAGNOSIS — I50.20 ACC/AHA STAGE C SYSTOLIC HEART FAILURE (HCC): ICD-10-CM

## 2019-05-21 DIAGNOSIS — I42.8 NON-ISCHEMIC CARDIOMYOPATHY (HCC): ICD-10-CM

## 2019-05-21 DIAGNOSIS — I51.89 LEFT VENTRICULAR SYSTOLIC DYSFUNCTION, NYHA CLASS 2: ICD-10-CM

## 2019-05-21 PROCEDURE — 700111 HCHG RX REV CODE 636 W/ 250 OVERRIDE (IP): Performed by: HOSPITALIST

## 2019-05-21 PROCEDURE — 94760 N-INVAS EAR/PLS OXIMETRY 1: CPT

## 2019-05-21 PROCEDURE — 99239 HOSP IP/OBS DSCHRG MGMT >30: CPT | Performed by: INTERNAL MEDICINE

## 2019-05-21 PROCEDURE — A9270 NON-COVERED ITEM OR SERVICE: HCPCS | Performed by: HOSPITALIST

## 2019-05-21 PROCEDURE — 700101 HCHG RX REV CODE 250: Performed by: HOSPITALIST

## 2019-05-21 PROCEDURE — 700102 HCHG RX REV CODE 250 W/ 637 OVERRIDE(OP): Performed by: HOSPITALIST

## 2019-05-21 PROCEDURE — 94640 AIRWAY INHALATION TREATMENT: CPT

## 2019-05-21 RX ORDER — BUDESONIDE AND FORMOTEROL FUMARATE DIHYDRATE 160; 4.5 UG/1; UG/1
2 AEROSOL RESPIRATORY (INHALATION) 2 TIMES DAILY
Qty: 1 INHALER | Refills: 1 | Status: ON HOLD | OUTPATIENT
Start: 2019-05-21 | End: 2019-09-30

## 2019-05-21 RX ORDER — DOXYCYCLINE 100 MG/1
100 TABLET ORAL EVERY 12 HOURS
Qty: 10 TAB | Refills: 0 | Status: SHIPPED | OUTPATIENT
Start: 2019-05-21 | End: 2019-05-26

## 2019-05-21 RX ORDER — TIOTROPIUM BROMIDE 18 UG/1
18 CAPSULE ORAL; RESPIRATORY (INHALATION) DAILY
Qty: 30 CAP | Refills: 1 | Status: SHIPPED | OUTPATIENT
Start: 2019-05-22 | End: 2019-07-10 | Stop reason: SDUPTHER

## 2019-05-21 RX ORDER — PREDNISONE 20 MG/1
40 TABLET ORAL DAILY
Qty: 10 TAB | Refills: 0 | Status: SHIPPED | OUTPATIENT
Start: 2019-05-21 | End: 2019-05-26

## 2019-05-21 RX ORDER — LISINOPRIL 10 MG/1
10 TABLET ORAL EVERY EVENING
Status: ON HOLD | COMMUNITY
End: 2019-09-30

## 2019-05-21 RX ORDER — ALBUTEROL SULFATE 90 UG/1
2 AEROSOL, METERED RESPIRATORY (INHALATION) EVERY 6 HOURS PRN
Qty: 8.5 G | Refills: 0 | Status: SHIPPED | OUTPATIENT
Start: 2019-05-21 | End: 2019-07-10 | Stop reason: SDUPTHER

## 2019-05-21 RX ADMIN — DOXYCYCLINE 100 MG: 100 TABLET, FILM COATED ORAL at 05:57

## 2019-05-21 RX ADMIN — IPRATROPIUM BROMIDE AND ALBUTEROL SULFATE 3 ML: .5; 3 SOLUTION RESPIRATORY (INHALATION) at 06:49

## 2019-05-21 RX ADMIN — METHYLPREDNISOLONE SODIUM SUCCINATE 40 MG: 40 INJECTION, POWDER, FOR SOLUTION INTRAMUSCULAR; INTRAVENOUS at 05:57

## 2019-05-21 RX ADMIN — LISINOPRIL 10 MG: 5 TABLET ORAL at 06:01

## 2019-05-21 RX ADMIN — BUDESONIDE AND FORMOTEROL FUMARATE DIHYDRATE 2 PUFF: 160; 4.5 AEROSOL RESPIRATORY (INHALATION) at 06:49

## 2019-05-21 RX ADMIN — IPRATROPIUM BROMIDE AND ALBUTEROL SULFATE 3 ML: .5; 3 SOLUTION RESPIRATORY (INHALATION) at 03:17

## 2019-05-21 RX ADMIN — IPRATROPIUM BROMIDE AND ALBUTEROL SULFATE 3 ML: .5; 3 SOLUTION RESPIRATORY (INHALATION) at 11:05

## 2019-05-21 RX ADMIN — TIOTROPIUM BROMIDE 1 CAPSULE: 18 CAPSULE ORAL; RESPIRATORY (INHALATION) at 06:49

## 2019-05-21 RX ADMIN — SEVELAMER CARBONATE 800 MG: 800 TABLET, FILM COATED ORAL at 08:34

## 2019-05-21 NOTE — PROGRESS NOTES
Med rec updated and complete  Allergies reviewed  Pt reports that he has been out of his inhales for over a month (ADVARI 250-50MCG, DUONEB, SPIRIVA 18MCG, and ANORO 62.5-25MCG).  Pt reports no antibiotics in the last 30 days.  Pt reports no vitamins.

## 2019-05-21 NOTE — FLOWSHEET NOTE
05/20/19 8473   Type of Assessment   Assessment Yes   Patient History   Pulmonary Diagnosis Hx COPD Smoking   Surgical Procedures No recent    Home O2 Yes   Oxygen liter flow 2   Oxygen at night only No   Nocturnal CPAP No   Home Treatments/Frequency Yes   MDI 1/Frequency ALbuterol PRN   MDI 2/Frequency Advair bid   DPI 1/Frequency Spiriva D   COPD Risk Screening   Do you have a history of COPD? Yes   Do you have a Pulmonologist? No   COPD Population Screener   During the past 4 weeks, how much did you feel short of breath? 1   Do you ever cough up any mucus or phlegm? 1   In the past 12 months, you do less than you used to because of your breathing problems 1   Have you smoked at least 100 cigarettes in your entire life? 2   How old are you? 2   COPD Screening Score 7   Level Of Consciousness   Level of Consciousness Alert   Respiratory WDL   Respiratory (WDL) X   Chest Exam   Respiration 18   Pulse (!) 101   Breath Sounds   Pre/Post Intervention Post Intervention Assessment   RUL Breath Sounds Diminished   RML Breath Sounds Diminished   RLL Breath Sounds Diminished   AMALIA Breath Sounds Diminished   LLL Breath Sounds Diminished   Oxygen   Pulse Oximetry 95 %   O2 (LPM) 2   O2 Daily Delivery Respiratory  Nasal Cannula   Bronchodilator Protocol   Med Order With RCP Yes - Initial Order by MD for Therapy - Follow Order for 24 Hours, Reassess Patient   Is this an exacerbation of COPD/Asthma? Yes - Bronchodilators Q4 hours for 24 hours   Bronchodilator Indications History / Diagnosis   Breath Sounds Criteria 1    Benefit Criteria 1    Pulse Criteria 1    Respiratory Rate Criteria 1    S.O.B. Criteria 1    Criteria Total 5   Point Values 4-6 - QID and PRN   O2 Protocol   O2 Protocol Indications Room Air SpO2 Less Than 90%   PRN oxygen initiated for: SpO2 89-92% on Room Air   O2 Protocol Goals/Outcome Return to home Oxygen therapy baseline

## 2019-05-21 NOTE — DISCHARGE INSTRUCTIONS
Discharge Instructions    Discharged to home by car with relative. Discharged via wheelchair, hospital escort: Yes.  Special equipment needed: Not Applicable    Be sure to schedule a follow-up appointment with your primary care doctor or any specialists as instructed.     Discharge Plan:   Diet Plan: Discussed  Activity Level: Discussed  Smoking Cessation Offered: Patient Counseled  Confirmed Follow up Appointment: Appointment Scheduled  Confirmed Symptoms Management: Discussed  Medication Reconciliation Updated: Yes  Pneumococcal Vaccine Administered/Refused: Not given - Patient refused pneumococcal vaccine  Influenza Vaccine Indication: Not indicated: Previously immunized this influenza season and > 8 years of age    I understand that a diet low in cholesterol, fat, and sodium is recommended for good health. Unless I have been given specific instructions below for another diet, I accept this instruction as my diet prescription.   Other diet: renal    Special Instructions: None    · Is patient discharged on Warfarin / Coumadin?   No     Oxygen Use at Home  Oxygen can be prescribed for home use. The prescription will show the flow rate. This is how much oxygen is to be used per minute. This will be listed in liters per minute (LPM or L/M). A liter is a metric measurement of volume.  You will use oxygen therapy as directed. It can be used while exercising, sleeping, or at rest. You may need oxygen continuously. Your health care provider may order a blood oxygen test (arterial blood gas or pulse oximetry test) that will show what your oxygen level is. Your health care provider will use these measurements to learn about your needs and follow your progress.  Home oxygen therapy is commonly used on patients with various lung (pulmonary) related conditions. Some of these conditions include:  · Asthma.  · Lung cancer.  · Pneumonia.  · Emphysema.  · Chronic bronchitis.  · Cystic fibrosis.  · Other lung  diseases.  · Pulmonary fibrosis.  · Occupational lung disease.  · Heart failure.  · Chronic obstructive pulmonary disease (COPD).  3 COMMON WAYS OF PROVIDING OXYGEN THERAPY  · Gas: The gas form of oxygen is put into variously sized cylinders or tanks. The cylinders or oxygen tanks contain compressed oxygen. The cylinder is equipped with a regulator that controls the flow rate. Because the flow of oxygen out of the cylinder is constant, an oxygen conserving device may be attached to the system to avoid waste. This device releases the gas only when you inhale and cuts it off when you exhale. Oxygen can be provided in a small cylinder that can be carried with you. Large tanks are heavy and are only for stationary use. After use, empty tanks must be exchanged for full tanks.  · Liquid: The liquid form of oxygen is put into a container similar to a thermos. When released, the liquid converts to a gas and you breathe it in just like the compressed gas. This storage method takes up less space than the compressed gas cylinder, and you can transfer the liquid to a small, portable vessel at home. Liquid oxygen is more expensive than the compressed gas, and the vessel vents when not in use. An oxygen conserving device may be built into the vessel to conserve the oxygen. Liquid oxygen is very cold, around 297° below zero.  · Oxygen concentrator: This medical device filters oxygen from room air and gives almost 100% oxygen to the patient. Oxygen concentrators are powered by electricity. Benefits of this system are:  ¨ It does not need to be resupplied.  ¨ It is not as costly as liquid oxygen.  ¨ Extra tubing permits the user to move around easier.  There are several types of small, portable oxygen systems available which can help you remain active and mobile. You must have a cylinder of oxygen as a backup in the event of a power failure. Advise your electric power company that you are on oxygen therapy in order to get priority  "service when there is a power failure.  OXYGEN DELIVERY DEVICES  There are 3 common ways to deliver oxygen to your body.  · Nasal cannula. This is a 2-pronged device inserted in the nostrils that is connected to tubing carrying the oxygen. The tubing can rest on the ears or be attached to the frame of eyeglasses.  · Mask. People who need a high flow of oxygen generally use a mask.  · Transtracheal catheter. Transtracheal oxygen therapy requires the insertion of a small, flexible tube (catheter) in the windpipe (trachea). This catheter is held in place by a necklace. Since transtracheal oxygen bypasses the mouth, nose, and throat, a humidifier is absolutely required at flow rates of 1 LPM or greater.  OXYGEN USE SAFETY TIPS  · Never smoke while using oxygen. Oxygen does not burn or explode, but flammable materials will burn faster in the presence of oxygen.  · Keep a fire extinguisher close by. Let your fire department know that you have oxygen in your home.  · Warn visitors not to smoke near you when you are using oxygen. Put up \"no smoking\" signs in your home where you most often use the oxygen.  · When you go to a restaurant with your portable oxygen source, ask to be seated in the nonsmoking section.  · Stay at least 5 feet away from gas stoves, candles, lighted fireplaces, or other heat sources.  · Do not use materials that burn easily (flammable) while using your oxygen.  · If you use an oxygen cylinder, make sure it is secured to some fixed object or in a stand. If you use liquid oxygen, make sure the vessel is kept upright to keep the oxygen from pouring out. Liquid oxygen is so cold it can hurt your skin.  · If you use an oxygen concentrator, call your electric company so you will be given priority service if your power goes out. Avoid using extension cords, if possible.  · Regularly test your smoke detectors at home to make sure they work. If you receive care in your home from a nurse or other health care " provider, he or she may also check to make sure your smoke detectors work.  GUIDELINES FOR CLEANING YOUR EQUIPMENT  · Wash the nasal prongs with a liquid soap. Thoroughly rinse them once or twice a week.  · Replace the prongs every 2 to 4 weeks. If you have an infection (cold, pneumonia) change them when you are well.  · Your health care provider will give you instructions on how to clean your transtracheal catheter.  · The humidifier bottle should be washed with soap and warm water and rinsed thoroughly between each refill. Air-dry the bottle before filling it with sterile or distilled water. The bottle and its top should be disinfected after they are cleaned.  · If you use an oxygen concentrator, unplug the unit. Then wipe down the cabinet with a damp cloth and dry it daily. The air filter should be cleaned at least twice a week.  · Follow your home medical equipment and service company's directions for cleaning the compressor filter.  HOME CARE INSTRUCTIONS   · Do not change the flow of oxygen unless directed by your health care provider.  · Do not use alcohol or other sedating drugs unless instructed. They slow your breathing rate.  · Do not use materials that burn easily (flammable) while using your oxygen.  · Always keep a spare tank of oxygen. Plan ahead for holidays when you may not be able to get a prescription filled.  · Use water-based lubricants on your lips or nostrils. Do not use an oil-based product like petroleum jelly.  · To prevent your cheeks or the skin behind your ears from becoming irritated, tuck some gauze under the tubing.  · If you have persistent redness under your nose, call your health care provider.  · When you no longer need oxygen, your doctor will have the oxygen discontinued. Oxygen is not addicting or habit forming.  · Use the oxygen as instructed. Too much oxygen can be harmful and too little will not give you the benefit you need.  · Shortness of breath is not always from a lack  of oxygen. If your oxygen level is not the cause of your shortness of breath, taking oxygen will not help.  SEEK MEDICAL CARE IF:   · You have frequent headaches.  · You have shortness of breath or a lasting cough.  · You have anxiety.  · You are confused.  · You are drowsy or sleepy all the time.  · You develop an illness which aggravates your breathing.  · You cannot exercise.  · You are restless.  · You have blue lips or fingernails.  · You have difficult or irregular breathing and it is getting worse.  · You have a fever.     This information is not intended to replace advice given to you by your health care provider. Make sure you discuss any questions you have with your health care provider.     Document Released: 03/09/2005 Document Revised: 01/08/2016 Document Reviewed: 07/30/2014  [employer] Interactive Patient Education ©2016 [employer]      Chronic Obstructive Pulmonary Disease Exacerbation  Chronic obstructive pulmonary disease (COPD) is a common lung condition in which airflow from the lungs is limited. COPD is a general term that can be used to describe many different lung problems that limit airflow, including chronic bronchitis and emphysema. COPD exacerbations are episodes when breathing symptoms become much worse and require extra treatment. Without treatment, COPD exacerbations can be life threatening, and frequent COPD exacerbations can cause further damage to your lungs.  What are the causes?  · Respiratory infections.  · Exposure to smoke.  · Exposure to air pollution, chemical fumes, or dust.  Sometimes there is no apparent cause or trigger.  What increases the risk?  · Smoking cigarettes.  · Older age.  · Frequent prior COPD exacerbations.  What are the signs or symptoms?  · Increased coughing.  · Increased thick spit (sputum) production.  · Increased wheezing.  · Increased shortness of breath.  · Rapid breathing.  · Chest tightness.  How is this diagnosed?  Your medical history, a physical  exam, and tests will help your health care provider make a diagnosis. Tests may include:  · A chest X-ray.  · Basic lab tests.  · Sputum testing.  · An arterial blood gas test.  How is this treated?  Depending on the severity of your COPD exacerbation, you may need to be admitted to a hospital for treatment. Some of the treatments commonly used to treat COPD exacerbations are:  · Antibiotic medicines.  · Bronchodilators. These are drugs that expand the air passages. They may be given with an inhaler or nebulizer. Spacer devices may be needed to help improve drug delivery.  · Corticosteroid medicines.  · Supplemental oxygen therapy.  · Airway clearing techniques, such as noninvasive ventilation (NIV) and positive expiratory pressure (PEP). These provide respiratory support through a mask or other noninvasive device.  Follow these instructions at home:  · Do not smoke. Quitting smoking is very important to prevent COPD from getting worse and exacerbations from happening as often.  · Avoid exposure to all substances that irritate the airway, especially to tobacco smoke.  · If you were prescribed an antibiotic medicine, finish it all even if you start to feel better.  · Take all medicines as directed by your health care provider. It is important to use correct technique with inhaled medicines.  · Drink enough fluids to keep your urine clear or pale yellow (unless you have a medical condition that requires fluid restriction).  · Use a cool mist vaporizer. This makes it easier to clear your chest when you cough.  · If you have a home nebulizer and oxygen, continue to use them as directed.  · Maintain all necessary vaccinations to prevent infections.  · Exercise regularly.  · Eat a healthy diet.  · Keep all follow-up appointments as directed by your health care provider.  Get help right away if:  · You have worsening shortness of breath.  · You have trouble talking.  · You have severe chest pain.  · You have blood in your  sputum.  · You have a fever.  · You have weakness, vomit repeatedly, or faint.  · You feel confused.  · You continue to get worse.  This information is not intended to replace advice given to you by your health care provider. Make sure you discuss any questions you have with your health care provider.  Document Released: 10/14/2008 Document Revised: 05/25/2017 Document Reviewed: 08/22/2014  [employer] Interactive Patient Education © 2017 [employer]      Depression / Suicide Risk    As you are discharged from this [facility] facility, it is important to learn how to keep safe from harming yourself.    Recognize the warning signs:  · Abrupt changes in personality, positive or negative- including increase in energy   · Giving away possessions  · Change in eating patterns- significant weight changes-  positive or negative  · Change in sleeping patterns- unable to sleep or sleeping all the time   · Unwillingness or inability to communicate  · Depression  · Unusual sadness, discouragement and loneliness  · Talk of wanting to die  · Neglect of personal appearance   · Rebelliousness- reckless behavior  · Withdrawal from people/activities they love  · Confusion- inability to concentrate     If you or a loved one observes any of these behaviors or has concerns about self-harm, here's what you can do:  · Talk about it- your feelings and reasons for harming yourself  · Remove any means that you might use to hurt yourself (examples: pills, rope, extension cords, firearm)  · Get professional help from the community (Mental Health, Substance Abuse, psychological counseling)  · Do not be alone:Call your Safe Contact- someone whom you trust who will be there for you.  · Call your local CRISIS HOTLINE 181-5121 or 872-337-2163  · Call your local Children's Mobile Crisis Response Team [place] (988) 046-4757 or www.[employer]  · Call the toll free National Suicide Prevention Hotlines   · National Suicide Prevention  Lifeline 938-392-DBLI (7027)  · National [place] Line Network 800-SUICIDE (576-2799)

## 2019-05-21 NOTE — FLOWSHEET NOTE
05/21/19 0321   Events/Summary/Plan   Events/Summary/Plan SVN   Interdisciplinary Plan of Care-Goals (Indications)   Bronchodilator Indications History / Diagnosis   Interdisciplinary Plan of Care-Outcomes    Bronchodilator Outcome Patient at Stable Baseline   SVN Group   #SVN Performed Yes   Given By: Luis   Respiratory WDL   Respiratory (WDL) X   Chest Exam   Respiration 16   Pulse 92   Breath Sounds   Pre/Post Intervention Post Intervention Assessment   RUL Breath Sounds Diminished   RML Breath Sounds Diminished   RLL Breath Sounds Diminished   AMALIA Breath Sounds Diminished   LLL Breath Sounds Diminished   Oxygen   Pulse Oximetry 96 %   O2 (LPM) 2   O2 Daily Delivery Respiratory  Nasal Cannula

## 2019-05-21 NOTE — FLOWSHEET NOTE
05/21/19 1105   SVN Group   #SVN Performed Yes   Given By: Mouthpiece   Chest Exam   Respiration 18   Pulse (!) 105   Breath Sounds   Pre/Post Intervention (wheze is faint/mild)   RUL Breath Sounds Expiratory Wheezes   RML Breath Sounds Diminished;Expiratory Wheezes   RLL Breath Sounds Diminished;Expiratory Wheezes   AMALIA Breath Sounds Expiratory Wheezes   LLL Breath Sounds Diminished;Expiratory Wheezes   Secretions   Cough Congested;Non Productive   Oxygen   Pulse Oximetry 94 %   O2 Daily Delivery Respiratory  Room Air with O2 Available

## 2019-05-21 NOTE — FLOWSHEET NOTE
05/21/19 0645   Interdisciplinary Plan of Care-Goals (Indications)   Bronchodilator Indications History / Diagnosis   RT Assessment of Delivered Medications   Evaluation of Medication Delivery Daily Yes-- Pt /Family has been Instructed in use of Respiratory Medications and Adverse Reactions   SVN Group   #SVN Performed Yes   Given By: Mouthpiece   MDI/DPI Group   #MDI/DPI Given MDI/DPI x 2   Chest Exam   Work Of Breathing / Effort Mild   Respiration 18   Pulse 92   Breath Sounds   Pre/Post Intervention (occ'l faint wheeze after tx)   RUL Breath Sounds Diminished   RML Breath Sounds Diminished   RLL Breath Sounds Diminished   AMALIA Breath Sounds Diminished   LLL Breath Sounds Diminished   Secretions   Cough Congested;Moist;Non Productive   Oxygen   Home O2 Use Prior To Admission? No   Pulse Oximetry 90 %  (runs 88-92%)   O2 (LPM) 0   O2 Daily Delivery Respiratory  Room Air with O2 Available

## 2019-05-21 NOTE — PROGRESS NOTES
Resting with eyes closed. Respirations even and unlabored. No c/o. Repositions self. NC in place. Call light in reach.

## 2019-05-21 NOTE — ASSESSMENT & PLAN NOTE
Monday Wednesday Friday dialysis  Seen by [facility] nephrology, call them in the morning to notify of patient's admission  He did have dialysis today

## 2019-05-21 NOTE — PROGRESS NOTES
Admit to 309-2 from ED. Oriented to room and equipment. Plan of care discussed. Due medications given per MAR. RT paged for Tx. Assessment completed. No c/o. Reminded to keep NC in place. Call light in reach.

## 2019-05-21 NOTE — PROGRESS NOTES
2 RN skin check completed with Hannah DEAN.   Devices in place oxygen.  Skin assessed under devices yes, intact.  Confirmed pressure ulcers found on none.  New potential pressure ulcers noted on none. Wound consult placed N/A.  The following interventions in place pillows in use for positioning, encouraged frequent position changes

## 2019-05-21 NOTE — H&P
Hospital Medicine History & Physical Note    Date of Service  5/20/2019    Primary Care Physician  Pcp Pt States None    Consultants  Needs nephrology in the morning    Code Status  Full code    Chief Complaint  Shortness of breath    History of Presenting Illness  68 y.o. male who presented 5/20/2019 with shortness of breath and sinus pressure.  Patient is a very poor historian.  This is a dialysis patient who dialyzes Monday Wednesday and Friday, he is seen by [facility] nephrology.  For the past several days he has been having shortness of breath, wheezing and sinus pain.  Apparently he does have oxygen at home but is vague about whether he uses it or not and it seems that he sometimes uses it when he feels like it.  He does have COPD.  Complaining of tactile fevers and chills.    Review of Systems  Review of Systems   Constitutional: Positive for fever. Negative for chills, malaise/fatigue and weight loss.   HENT: Positive for congestion and sinus pain.    Respiratory: Positive for cough, sputum production, shortness of breath and wheezing. Negative for hemoptysis.    Cardiovascular: Negative.  Negative for chest pain and leg swelling.   Gastrointestinal: Negative.  Negative for abdominal pain, nausea and vomiting.   Genitourinary: Negative.  Negative for dysuria and flank pain.   Musculoskeletal: Negative.  Negative for back pain and myalgias.   Neurological: Negative.  Negative for dizziness, loss of consciousness and weakness.   Endo/Heme/Allergies: Negative.  Does not bruise/bleed easily.   Psychiatric/Behavioral: Negative.  Negative for depression. The patient is not nervous/anxious.    All other systems reviewed and are negative.      Past Medical History   has a past medical history of Asthma; Breath shortness; Chronic obstructive pulmonary disease (HCC); Cold; Congestive heart failure (HCC); Dental disorder; Dialysis patient (HCC); Emphysema of lung (HCC); Renal disorder; Snoring; and Urinary  incontinence.    Surgical History   has a past surgical history that includes gastroscopy-endo (N/A, 8/28/2017); gastroscopy (2/1/2018); and colonoscopy (2/1/2018).     Family History  family history includes Heart Disease in his mother.     Social History   reports that he quit smoking about 21 months ago. His smoking use included Cigarettes. He has a 100.00 pack-year smoking history. He has never used smokeless tobacco. He reports that he does not drink alcohol or use drugs.    Allergies  Allergies   Allergen Reactions   • Chloraprep One Step Itching       Medications  Prior to Admission Medications   Prescriptions Last Dose Informant Patient Reported? Taking?   Cinacalcet HCl (SENSIPAR) 60 MG Tab  Patient Yes No   Sig: Take 60 mg by mouth every evening.   Sevelamer Carbonate (RENVELA) 800 MG Tab 5/19/2019 at 1800  Yes No   Sig: Take 800 mg by mouth 3 times a day, with meals. 4 caps each meals., 2 cap with snacks.   albuterol 108 (90 Base) MCG/ACT Aero Soln inhalation aerosol 5/20/2019 at Unknown time  No No   Sig: Inhale 2 Puffs by mouth every 6 hours as needed for Shortness of Breath.   aspirin 81 MG tablet 5/20/2019 at Unknown time  Yes No   Sig: Take 81 mg by mouth every bedtime.   aspirin EC (ECOTRIN) 81 MG Tablet Delayed Response  Patient Yes No   Sig: Take 81 mg by mouth every day.   carvedilol (COREG) 25 MG Tab   Yes No   Sig: Take 25 mg by mouth 2 times a day, with meals.   fluticasone-salmeterol (ADVAIR) 250-50 MCG/DOSE AEROSOL POWDER, BREATH ACTIVATED  Patient's Home Pharmacy Yes No   Sig: Inhale 1 Puff by mouth PRN.   ipratropium-albuterol (DUONEB) 0.5-2.5 (3) MG/3ML nebulizer solution   No No   Sig: 3 mL by Nebulization route 4 times a day.   lisinopril (PRINIVIL) 10 MG Tab   No No   Sig: Take 1 Tab by mouth every day.   tiotropium (SPIRIVA) 18 MCG Cap  Patient's Home Pharmacy Yes No   Sig: Inhale 18 mcg by mouth every day.   umeclidinium-vilanterol (ANORO ELLIPTA) 62.5-25 MCG/INH AEROSOL POWDER,  BREATH ACTIVATED inhaler   Yes No   Sig: Inhale 1 Puff by mouth every day.      Facility-Administered Medications: None       Physical Exam  Temp:  [36.9 °C (98.5 °F)-38.1 °C (100.5 °F)] 36.9 °C (98.5 °F)  Pulse:  [] 100  Resp:  [15-20] 17  BP: ()/(37-74) 114/74  SpO2:  [86 %-94 %] 93 %    Physical Exam   Constitutional: He is oriented to person, place, and time. He appears well-developed and well-nourished. No distress.   Plan of care discussed with bedside RN.   HENT:   Right Ear: External ear normal.   Left Ear: External ear normal.   Nose: Nose normal.   Mouth/Throat: Oropharynx is clear and moist. No oropharyngeal exudate.   Eyes: Conjunctivae are normal. Right eye exhibits no discharge. Left eye exhibits no discharge. No scleral icterus.   Neck: No JVD present. No tracheal deviation present.   Cardiovascular: Normal rate, regular rhythm and normal heart sounds.    Pulmonary/Chest: Effort normal. No stridor. No respiratory distress. He has wheezes. He has no rales. He exhibits no tenderness.   Abdominal: Soft. Bowel sounds are normal. He exhibits no distension. There is no tenderness.   Musculoskeletal: He exhibits no edema or tenderness.   Fistula left forearm with thrill   Lymphadenopathy:     He has no cervical adenopathy.   Neurological: He is alert and oriented to person, place, and time. No cranial nerve deficit. He exhibits normal muscle tone.   Skin: Skin is warm and dry. He is not diaphoretic. No pallor.   Psychiatric: He has a normal mood and affect. His behavior is normal. Judgment and thought content normal.   Nursing note and vitals reviewed.      Laboratory:  Recent Labs      05/20/19   1751   WBC  8.2   RBC  5.32   HEMOGLOBIN  16.7   HEMATOCRIT  50.5   MCV  94.9   MCH  31.4   MCHC  33.1*   RDW  57.4*   PLATELETCT  203   MPV  9.9     Recent Labs      05/20/19   1751   SODIUM  137   POTASSIUM  4.6   CHLORIDE  90*   CO2  31   GLUCOSE  79   BUN  10   CREATININE  7.29*   CALCIUM  9.6      Recent Labs      05/20/19   1751   ALTSGPT  18   ASTSGOT  18   ALKPHOSPHAT  61   TBILIRUBIN  1.0   GLUCOSE  79         Recent Labs      05/20/19   1751   BNPBTYPENAT  146*         Recent Labs      05/20/19   1828   TROPONINI  <0.02       Urinalysis:    No results found     Imaging:  DX-CHEST-PORTABLE (1 VIEW)   Final Result      Minimal bibasilar atelectasis.            Assessment/Plan:  I anticipate this patient will require at least two midnights for appropriate medical management, necessitating inpatient admission.    COPD exacerbation ([facility])- (present on admission)   Assessment & Plan    Suspect upper respiratory infection negative flu  methylprednisolone 40 mg IV every 6 hours, Advair twice daily, continue T ectropion  Respiratory therapy protocol  Doxycycline 100 mg twice daily  COPD exacerbation order set completed     Acute on chronic respiratory failure ([facility])   Assessment & Plan    Patient is vague but apparently does have supplemental oxygen at home although he does not use on a regular basis  He is hypoxic here in the low 80s  Supplemental oxygen to keep saturations above 90% and treat COPD exacerbation     Essential hypertension- (present on admission)   Assessment & Plan    Continue carvedilol and lisinopril.     ESRD (end stage renal disease) on dialysis ([facility])- (present on admission)   Assessment & Plan    Monday Wednesday Friday dialysis  Seen by [facility] nephrology, call them in the morning to notify of patient's admission  He did have dialysis today         VTE prophylaxis: SCD.

## 2019-05-21 NOTE — ED PROVIDER NOTES
ED Provider Note    CHIEF COMPLAINT  Chief Complaint   Patient presents with   • Shortness of Breath   • Difficulty Breathing       HPI  Bridger Savage is a 68 y.o. male who presents with worsening of breathing, cough and sinus pressure over the past 24 hours.  He states he is having nasal drainage.  No difficulty swallowing.  Activity worsens cough and breathing.  Patient states he usually only wears oxygen at night, required oxygen today secondary to hypoxia.  No acute chest pain.  No new leg swelling.  Patient states he also has history of congestive heart failure.  Patient presents today with low-grade fever of 100.0, tachycardic, meeting criteria to start sepsis work-up.    REVIEW OF SYSTEMS    Constitutional: Fever  Respiratory: Cough, shortness of breath  Cardiac: Denies chest pain or syncope  Gastrointestinal: No acute abdominal pain  Musculoskeletal: No acute neck or back pain  Neurologic: Denies headache  ENT: Nasal congestion       All other systems are negative.       PAST MEDICAL HISTORY  Past Medical History:   Diagnosis Date   • Asthma    • Breath shortness     uses oxygen 3.5 L as needed   • Chronic obstructive pulmonary disease (HCC)    • Cold     12/27/17 cold   • Congestive heart failure (HCC)    • Dental disorder     upper and lower dentures   • Dialysis patient (HCC)     MWF   • Emphysema of lung (HCC)    • Renal disorder    • Snoring    • Urinary incontinence        FAMILY HISTORY  Family History   Problem Relation Age of Onset   • Heart Disease Mother        SOCIAL HISTORY  Social History     Social History   • Marital status:      Spouse name: N/A   • Number of children: N/A   • Years of education: N/A     Social History Main Topics   • Smoking status: Former Smoker     Packs/day: 2.00     Years: 50.00     Types: Cigarettes     Quit date: 8/13/2017   • Smokeless tobacco: Never Used   • Alcohol use No   • Drug use: No   • Sexual activity: Not on file     Other Topics Concern   • Not  on file     Social History Narrative    ** Merged History Encounter **            SURGICAL HISTORY  Past Surgical History:   Procedure Laterality Date   • GASTROSCOPY  2/1/2018    Procedure: GASTROSCOPY;  Surgeon: Morteza Olivo M.D.;  Location: SURGERY [facility];  Service: Gastroenterology   • COLONOSCOPY  2/1/2018    Procedure: COLONOSCOPY;  Surgeon: Morteza Olivo M.D.;  Location: SURGERY [facility];  Service: Gastroenterology   • GASTROSCOPY-ENDO N/A 8/28/2017    Procedure: GASTROSCOPY-ENDO;  Surgeon: Pankaj Choudhury M.D.;  Location: SURGERY [facility];  Service: EUS       CURRENT MEDICATIONS  Home Medications     Reviewed by Angelica Schafer R.N. (Registered Nurse) on 05/20/19 at 1810  Med List Status: Partial   Medication Last Dose Status   albuterol 108 (90 Base) MCG/ACT Aero Soln inhalation aerosol 5/20/2019 Active   aspirin 81 MG tablet 5/20/2019 Active   aspirin EC (ECOTRIN) 81 MG Tablet Delayed Response  Active   carvedilol (COREG) 25 MG Tab  Active   carvedilol (COREG) 25 MG Tab  Active   Cinacalcet HCl (SENSIPAR) 60 MG Tab  Active   Cinacalcet HCl (SENSIPAR) 60 MG Tab  Active   fluticasone-salmeterol (ADVAIR) 250-50 MCG/DOSE AEROSOL POWDER, BREATH ACTIVATED  Active   ipratropium-albuterol (DUONEB) 0.5-2.5 (3) MG/3ML nebulizer solution  Active   lisinopril (PRINIVIL) 10 MG Tab  Active   lisinopril (PRINIVIL) 10 MG Tab  Active   Sevelamer Carbonate (RENVELA) 800 MG Tab 5/19/2019 Active   sevelamer carbonate (RENVELA) 800 MG Tab tablet  Active   tiotropium (SPIRIVA) 18 MCG Cap  Active   umeclidinium-vilanterol (ANORO ELLIPTA) 62.5-25 MCG/INH AEROSOL POWDER, BREATH ACTIVATED inhaler  Active                ALLERGIES  Allergies   Allergen Reactions   • Chloraprep One Step Itching       PHYSICAL EXAM  VITAL SIGNS: /74   Pulse 100   Temp 36.9 °C (98.5 °F) (Oral)   Resp 17   Ht 1.829 m (6')   Wt 90.9 kg (200 lb 6.4 oz)   SpO2 93%   BMI 27.18 kg/m²   Constitutional:  Ill, dyspneic appearance.   HENT: No facial swelling.  Posterior pharynx normal  Eyes: Anicteric, no conjunctivitis.     Cardiovascular: Tachycardic heart rate, Normal rhythm  Pulmonary: Bilateral severe wheezing, No rales.  Diminished air movement bilateral  Gastrointestinal: Soft, No tenderness, No distention  Skin: Warm, Dry, No cyanosis.  No asymmetric edema.  Neurologic: Speech is clear, follows commands, facial expression is symmetrical.  Psychiatric: Affect normal,  Mood normal.  Patient is calm and cooperative  Musculoskeletal: No chest wall retractions.  No flank tenderness    EKG/Labs  Results for orders placed or performed during the hospital encounter of 05/20/19   CBC WITH DIFFERENTIAL   Result Value Ref Range    WBC 8.2 4.8 - 10.8 K/uL    RBC 5.32 4.70 - 6.10 M/uL    Hemoglobin 16.7 14.0 - 18.0 g/dL    Hematocrit 50.5 42.0 - 52.0 %    MCV 94.9 81.4 - 97.8 fL    MCH 31.4 27.0 - 33.0 pg    MCHC 33.1 (L) 33.7 - 35.3 g/dL    RDW 57.4 (H) 35.9 - 50.0 fL    Platelet Count 203 164 - 446 K/uL    MPV 9.9 9.0 - 12.9 fL    Neutrophils-Polys 77.20 (H) 44.00 - 72.00 %    Lymphocytes 10.60 (L) 22.00 - 41.00 %    Monocytes 9.60 0.00 - 13.40 %    Eosinophils 1.50 0.00 - 6.90 %    Basophils 0.50 0.00 - 1.80 %    Immature Granulocytes 0.60 0.00 - 0.90 %    Nucleated RBC 0.00 /100 WBC    Neutrophils (Absolute) 6.36 1.82 - 7.42 K/uL    Lymphs (Absolute) 0.87 (L) 1.00 - 4.80 K/uL    Monos (Absolute) 0.79 0.00 - 0.85 K/uL    Eos (Absolute) 0.12 0.00 - 0.51 K/uL    Baso (Absolute) 0.04 0.00 - 0.12 K/uL    Immature Granulocytes (abs) 0.05 0.00 - 0.11 K/uL    NRBC (Absolute) 0.00 K/uL   COMP METABOLIC PANEL   Result Value Ref Range    Sodium 137 135 - 145 mmol/L    Potassium 4.6 3.6 - 5.5 mmol/L    Chloride 90 (L) 96 - 112 mmol/L    Co2 31 20 - 33 mmol/L    Anion Gap 16.0 (H) 0.0 - 11.9    Glucose 79 65 - 99 mg/dL    Bun 10 8 - 22 mg/dL    Creatinine 7.29 (HH) 0.50 - 1.40 mg/dL    Calcium 9.6 8.4 - 10.2 mg/dL    AST(SGOT) 18  12 - 45 U/L    ALT(SGPT) 18 2 - 50 U/L    Alkaline Phosphatase 61 30 - 99 U/L    Total Bilirubin 1.0 0.1 - 1.5 mg/dL    Albumin 3.8 3.2 - 4.9 g/dL    Total Protein 7.3 6.0 - 8.2 g/dL    Globulin 3.5 1.9 - 3.5 g/dL    A-G Ratio 1.1 g/dL   Lactic acid (lactate)   Result Value Ref Range    Lactic Acid 2.0 0.5 - 2.0 mmol/L   BTYPE NATRIURETIC PEPTIDE   Result Value Ref Range    B Natriuretic Peptide 146 (H) 0 - 100 pg/mL   TROPONIN   Result Value Ref Range    Troponin I <0.02 0.00 - 0.04 ng/mL   ESTIMATED GFR   Result Value Ref Range    GFR If  9 (A) >60 mL/min/1.73 m 2    GFR If Non  8 (A) >60 mL/min/1.73 m 2   Influenza A/B By PCR (Adult - Flu Only)   Result Value Ref Range    Influenza virus A RNA Negative Negative    Influenza virus B, PCR Negative Negative   EKG   Result Value Ref Range    Report       [facility]    Test Date:  2019  Pt Name:    AURELIA DAVIS                  Department: [facility]  MRN:        [id number]                      Room:       -OFF THE FLOOR  Gender:     Male                         Technician: JUAN ANTONIO  :        1950                   Requested By:ER TRIAGE PROTOCOL  Order #:    [id number]                    Reading MD: GRACIELA BENSON MD    Measurements  Intervals                                Axis  Rate:       107                          P:          75  MI:         124                          QRS:        14  QRSD:       88                           T:          77  QT:         340  QTc:        454    Interpretive Statements  SINUS TACHYCARDIA  LOW VOLTAGE IN FRONTAL LEADS  Compared to ECG 2018 05:35:19  Low QRS voltage now present  Sinus rhythm no longer present  Prolonged QT interval no longer present    Electronically Signed On 2019 22:12:02 PDT by GRACIELA BENSON MD           RADIOLOGY/PROCEDURES  DX-CHEST-PORTABLE (1 VIEW)   Final Result      Minimal bibasilar atelectasis.            COURSE  & MEDICAL DECISION MAKING  Pertinent Labs & Imaging studies reviewed. (See chart for details)  Patient with low-grade fever, suspect viral infection is worsened his COPD.  No evidence of pneumonia on chest x-ray.  Patient given IV steroids, breathing treatment, is admitted for ongoing work-up and stabilization.  Of note patient's creatinine is increased from 4 to7 compared to previous.  No evidence of congestive heart failure at this time.  Flu test negative.  Sepsis is unlikely with a normal white blood cell count, normal lactic acid    FINAL IMPRESSION     1. Acute exacerbation of chronic obstructive pulmonary disease (COPD) (HCC)    2. Hypoxia    3. Renal insufficiency                    Electronically signed by: Lupillo Sow, 5/20/2019 8:44 PM

## 2019-05-21 NOTE — ED NOTES
EKG performed - No stemi Blood drawn per protocol and sent to lab. Pt returned to lobby to await room assignment.

## 2019-05-21 NOTE — FLOWSHEET NOTE
05/20/19 1942   Events/Summary/Plan   Events/Summary/Plan SVN   Interdisciplinary Plan of Care-Goals (Indications)   Bronchodilator Indications Strong Subjective / Objective Improvement   Interdisciplinary Plan of Care-Outcomes    Bronchodilator Outcome Patient at Stable Baseline   Education   Education Yes - Pt. / Family has been Instructed in use of Respiratory Equipment;Yes - Pt. / Family has been Instructed in use of Respiratory Medications and Adverse Reactions   RT Assessment of Delivered Medications   Evaluation of Medication Delivery Daily Yes-- Pt /Family has been Instructed in use of Respiratory Medications and Adverse Reactions   SVN Group   #SVN Performed Yes   Given By: Mouthpiece   Date SVN Last Changed 05/20/19   Date SVN Next Change Due (Q 7 Days) 05/27/19   Respiratory WDL   Respiratory (WDL) X   Chest Exam   Respiration 15   Pulse 91   Breath Sounds   Pre/Post Intervention Post Intervention Assessment   RUL Breath Sounds Transmitted Upper Airway Sound;Expiratory Wheezes   RML Breath Sounds Diminished;Coarse Crackles   RLL Breath Sounds Diminished   AMALIA Breath Sounds Transmitted Upper Airway Sound;Expiratory Wheezes   LLL Breath Sounds Coarse Crackles;Diminished   Oxygen   Home O2 Use Prior To Admission? No   Pulse Oximetry 94 %   O2 (LPM) 2   O2 Daily Delivery Respiratory  Nasal Cannula

## 2019-05-21 NOTE — ASSESSMENT & PLAN NOTE
Patient is vague but apparently does have supplemental oxygen at home although he does not use on a regular basis  He is hypoxic here in the low 80s  Supplemental oxygen to keep saturations above 90% and treat COPD exacerbation

## 2019-05-21 NOTE — PROGRESS NOTES
Due medications given without issue. No c/o. Receiving breathing Tx. Report given to Kar DEAN. Care released.

## 2019-05-21 NOTE — FLOWSHEET NOTE
05/20/19 2339   Interdisciplinary Plan of Care-Goals (Indications)   Bronchodilator Indications History / Diagnosis   Interdisciplinary Plan of Care-Outcomes    Bronchodilator Outcome Patient at Stable Baseline   Education   Education Yes - Pt. / Family has been Instructed in use of Respiratory Medications and Adverse Reactions;Yes - Pt. / Family has been Instructed in use of Respiratory Equipment   RT Assessment of Delivered Medications   Evaluation of Medication Delivery Daily Yes-- Pt /Family has been Instructed in use of Respiratory Medications and Adverse Reactions   SVN Group   #SVN Performed Yes   Given By: Mouthpiece   MDI/DPI Group   #MDI/DPI Given MDI/DPI x 1   Respiratory WDL   Respiratory (WDL) X   Chest Exam   Respiration 18   Pulse (!) 101   Breath Sounds   Pre/Post Intervention Post Intervention Assessment   RUL Breath Sounds Diminished   RML Breath Sounds Diminished   RLL Breath Sounds Diminished   AMALIA Breath Sounds Diminished   LLL Breath Sounds Diminished   Oxygen   Pulse Oximetry 95 %   O2 (LPM) 2   O2 Daily Delivery Respiratory  Nasal Cannula

## 2019-05-21 NOTE — ASSESSMENT & PLAN NOTE
Suspect upper respiratory infection negative flu  methylprednisolone 40 mg IV every 6 hours, Advair twice daily, continue T ectropion  Respiratory therapy protocol  Doxycycline 100 mg twice daily  COPD exacerbation order set completed

## 2019-05-22 ENCOUNTER — PATIENT OUTREACH (OUTPATIENT)
Dept: HEALTH INFORMATION MANAGEMENT | Facility: OTHER | Age: 69
End: 2019-05-22

## 2019-05-22 RX ORDER — LISINOPRIL 10 MG/1
TABLET ORAL
Qty: 90 TAB | Refills: 0 | Status: ON HOLD | OUTPATIENT
Start: 2019-05-22 | End: 2019-09-30

## 2019-05-22 NOTE — DISCHARGE SUMMARY
Discharge Summary    CHIEF COMPLAINT ON ADMISSION  Chief Complaint   Patient presents with   • Shortness of Breath   • Difficulty Breathing       Reason for Admission  Difficulty Breathing     Admission Date  5/20/2019    CODE STATUS  Full    HPI & HOSPITAL COURSE  This is a 68 y.o. male with a history of end-stage renal disease, medical noncompliance, COPD with ongoing tobacco use here with shortness of breath and wheezing found to have COPD with acute exacerbation as well as acute respiratory failure with hypoxia.  He was started on supplemental oxygen.  He was started on IV steroids as well as doxycycline.  He was started on nebulizer treatments with respiratory therapy.  He had a more rapid than expected recovery based on his initial presentation and was weaned to room air.  With ambulation, he still was requiring as needed oxygen however the patient stated that he has been prescribed oxygen in the past and has this at home to use with exertion.  His wheezing had significantly improved.  He had no evidence of pneumonia.  He was continued on doxycycline for decontamination of the airways and was continued on an oral steroid on discharge.  He was given prescriptions for a rescue inhaler as well as Spiriva and Symbicort and was encouraged to follow-up with his primary care physician so he can have access to pulmonology as an outpatient as well as PFTs as needed in addition to medication refills.  He will continue his regular dialysis schedule Monday Wednesday Friday after discharge.       Therefore, he is discharged in fair and stable condition to home with close outpatient follow-up.    The patient recovered much more quickly than anticipated on admission.    Discharge Date  5/21/2019    FOLLOW UP ITEMS POST DISCHARGE  Follow-up with nephrology for dialysis  Follow-up with PCP to ensure he has ongoing pulmonology referrals as needed    DISCHARGE DIAGNOSES  Active Problems:    COPD exacerbation (HCC) POA: Yes     ESRD (end stage renal disease) on dialysis (HCC) POA: Yes    Essential hypertension POA: Yes    Acute on chronic respiratory failure (HCC) POA: Unknown  Resolved Problems:    * No resolved hospital problems. *      FOLLOW UP  Future Appointments  Date Time Provider Department Center   5/22/2019 11:00 AM [facility] [address]   5/29/2019 11:40 AM Tabby Turner M.D. [address]   6/24/2019 10:00 AM Jasmine Shields M.D. [facility] None     No follow-up provider specified.    MEDICATIONS ON DISCHARGE     Medication List      START taking these medications      Instructions   budesonide-formoterol 160-4.5 MCG/ACT Aero  Commonly known as:  SYMBICORT   Inhale 2 Puffs by mouth 2 Times a Day.  Dose:  2 Puff     doxycycline monohydrate 100 MG tablet  Commonly known as:  ADOXA   Take 1 Tab by mouth every 12 hours for 5 days.  Dose:  100 mg     predniSONE 20 MG Tabs  Commonly known as:  DELTASONE   Take 2 Tabs by mouth every day for 5 days.  Dose:  40 mg        CONTINUE taking these medications      Instructions   albuterol 108 (90 Base) MCG/ACT Aers inhalation aerosol   Inhale 2 Puffs by mouth every 6 hours as needed for Shortness of Breath.  Dose:  2 Puff     aspirin 81 MG tablet   Take 81 mg by mouth every evening.  Dose:  81 mg     lisinopril 10 MG Tabs  Commonly known as:  PRINIVIL   Take 10 mg by mouth every evening.  Dose:  10 mg     RENVELA 800 MG Tabs tablet  Generic drug:  sevelamer carbonate   Take 800 mg by mouth 3 times a day, with meals. 4 caps each meals., 2 cap with snacks.  Dose:  800 mg     SENSIPAR 60 MG Tabs  Generic drug:  Cinacalcet HCl   Take 60 mg by mouth every evening.  Dose:  60 mg     tiotropium 18 MCG Caps  Start taking on:  5/22/2019  Commonly known as:  SPIRIVA   Inhale 1 Cap by mouth every day.  Dose:  18 mcg            Allergies  Allergies   Allergen Reactions   • Chloraprep One Step Itching       DIET  No orders of the defined types were placed in this  encounter.      ACTIVITY  As tolerated.  Weight bearing as tolerated    CONSULTATIONS  None    PROCEDURES  None    LABORATORY  Lab Results   Component Value Date    SODIUM 137 05/20/2019    POTASSIUM 4.6 05/20/2019    CHLORIDE 90 (L) 05/20/2019    CO2 31 05/20/2019    GLUCOSE 79 05/20/2019    BUN 10 05/20/2019    CREATININE 7.29 (HH) 05/20/2019        Lab Results   Component Value Date    WBC 8.2 05/20/2019    HEMOGLOBIN 16.7 05/20/2019    HEMATOCRIT 50.5 05/20/2019    PLATELETCT 203 05/20/2019        Total time of the discharge process exceeds 40 minutes.

## 2019-05-22 NOTE — PROGRESS NOTES
5/22/19 11:55am:  CM post discharge outreach call first attempt.  VM left requesting return call to  at [phone].    5/22/19 3:50pm:  CM post discharge outreach call second attempt.  VM left requesting return call to  at [phone].

## 2019-06-13 ENCOUNTER — TELEPHONE (OUTPATIENT)
Dept: MEDICAL GROUP | Facility: LAB | Age: 69
End: 2019-06-13

## 2019-06-13 RX ORDER — CARVEDILOL 25 MG/1
25 TABLET ORAL DAILY
Status: ON HOLD | COMMUNITY
End: 2020-01-28

## 2019-06-13 NOTE — TELEPHONE ENCOUNTER
NEW PATIENT VISIT PRE-VISIT PLANNING    1.  [facility] Patient is checked in Patient Demographics? YES    2.  Immunizations were updated in [facility] using WebIZ?: Yes       •  Web Iz Recommendations: PNEUMOVAX (PPSV23), PREVNAR (PCV13)  and SHINGRIX (Shingles)    3.  Is this appointment scheduled as a Hospital Follow-Up? No    4.  Patient is due for the following Health Maintenance Topics:   Health Maintenance Due   Topic Date Due   • Annual Wellness Visit  1950   • PFT SCREENING-FEV1 AND FEV/FVC RATIO / SPIROMETRY SHOULD BE PERFORMED ANNUALLY  07/29/1970   • IMM HEP B VACCINE (1 of 3 - Risk 3-dose series) 07/29/1971   • IMM DTaP/Tdap/Td Vaccine (1 - Tdap) 07/29/1971   • IMM ZOSTER VACCINES (1 of 2) 07/29/2002   • IMM PNEUMOCOCCAL 65+ (ADULT) HIGH/HIGHEST RISK SERIES (2 of 2 - PPSV23) 11/06/2017           5. Orders for overdue Health Maintenance topics pended in Pre-Charting? N\A    6.  Reviewed/Updated the following with patient:   •   Preferred Pharmacy? NO       •   Preferred Lab? NO       •   Preferred Communication? NO       •   Allergies? NO       •   Medications? NO       •   Social History? NO       •   Family History (document living status of immediate family members and if + hx of cancer, diabetes, hypertension, hyperlipidemia, heart attack, stroke) NO    7.  Updated Care Team?       •   DME Company (gait device, O2, CPAP, etc.) NO       •   Other Specialists (eye doctor, derm, GYN, cardiology, endo, etc): NO    8.  Patient was informed to arrive 15 min prior to their   scheduled appointment and bring in their medication bottles.

## 2019-07-10 ENCOUNTER — OFFICE VISIT (OUTPATIENT)
Dept: MEDICAL GROUP | Facility: LAB | Age: 69
End: 2019-07-10
Payer: MEDICARE

## 2019-07-10 VITALS
WEIGHT: 201 LBS | BODY MASS INDEX: 28.14 KG/M2 | SYSTOLIC BLOOD PRESSURE: 128 MMHG | HEART RATE: 90 BPM | RESPIRATION RATE: 12 BRPM | TEMPERATURE: 100 F | OXYGEN SATURATION: 96 % | HEIGHT: 71 IN | DIASTOLIC BLOOD PRESSURE: 80 MMHG

## 2019-07-10 DIAGNOSIS — J01.00 ACUTE NON-RECURRENT MAXILLARY SINUSITIS: ICD-10-CM

## 2019-07-10 DIAGNOSIS — J44.9 CHRONIC OBSTRUCTIVE PULMONARY DISEASE, UNSPECIFIED COPD TYPE (HCC): ICD-10-CM

## 2019-07-10 PROCEDURE — 99214 OFFICE O/P EST MOD 30 MIN: CPT | Performed by: FAMILY MEDICINE

## 2019-07-10 RX ORDER — TIOTROPIUM BROMIDE 18 UG/1
18 CAPSULE ORAL; RESPIRATORY (INHALATION) DAILY
Qty: 30 CAP | Refills: 1 | Status: ON HOLD | OUTPATIENT
Start: 2019-07-10 | End: 2019-09-30

## 2019-07-10 RX ORDER — ALBUTEROL SULFATE 90 UG/1
2 AEROSOL, METERED RESPIRATORY (INHALATION) EVERY 6 HOURS PRN
Qty: 8.5 G | Refills: 3 | Status: ON HOLD | OUTPATIENT
Start: 2019-07-10 | End: 2019-09-30

## 2019-09-30 ENCOUNTER — APPOINTMENT (OUTPATIENT)
Dept: RADIOLOGY | Facility: MEDICAL CENTER | Age: 69
DRG: 207 | End: 2019-09-30
Attending: EMERGENCY MEDICINE
Payer: MEDICARE

## 2019-09-30 ENCOUNTER — HOSPITAL ENCOUNTER (INPATIENT)
Facility: MEDICAL CENTER | Age: 69
LOS: 7 days | DRG: 207 | End: 2019-10-07
Attending: EMERGENCY MEDICINE | Admitting: INTERNAL MEDICINE
Payer: MEDICARE

## 2019-09-30 DIAGNOSIS — R94.31 ABNORMAL EKG: ICD-10-CM

## 2019-09-30 DIAGNOSIS — N18.6 ESRD (END STAGE RENAL DISEASE) (HCC): ICD-10-CM

## 2019-09-30 DIAGNOSIS — J96.00 ACUTE RESPIRATORY FAILURE, UNSPECIFIED WHETHER WITH HYPOXIA OR HYPERCAPNIA (HCC): ICD-10-CM

## 2019-09-30 DIAGNOSIS — E87.5 HYPERKALEMIA: ICD-10-CM

## 2019-09-30 DIAGNOSIS — J44.9 CHRONIC OBSTRUCTIVE PULMONARY DISEASE, UNSPECIFIED COPD TYPE (HCC): ICD-10-CM

## 2019-09-30 PROBLEM — J96.01 ACUTE RESPIRATORY FAILURE WITH HYPOXIA (HCC): Status: ACTIVE | Noted: 2019-09-30

## 2019-09-30 LAB
ACTION RANGE TRIGGERED IACRT: NO
ALBUMIN SERPL BCP-MCNC: 3.7 G/DL (ref 3.2–4.9)
ALBUMIN SERPL BCP-MCNC: 3.8 G/DL (ref 3.2–4.9)
ALBUMIN/GLOB SERPL: 1.6 G/DL
ALP SERPL-CCNC: 52 U/L (ref 30–99)
ALT SERPL-CCNC: 60 U/L (ref 2–50)
ANION GAP SERPL CALC-SCNC: 13 MMOL/L (ref 0–11.9)
APPEARANCE UR: ABNORMAL
AST SERPL-CCNC: 85 U/L (ref 12–45)
BACTERIA #/AREA URNS HPF: ABNORMAL /HPF
BASE EXCESS BLDA CALC-SCNC: 10 MMOL/L (ref -4–3)
BASE EXCESS BLDA CALC-SCNC: 9 MMOL/L (ref -4–3)
BASOPHILS # BLD AUTO: 0.7 % (ref 0–1.8)
BASOPHILS # BLD: 0.07 K/UL (ref 0–0.12)
BILIRUB SERPL-MCNC: 0.8 MG/DL (ref 0.1–1.5)
BILIRUB UR QL STRIP.AUTO: NEGATIVE
BODY TEMPERATURE: ABNORMAL CENTIGRADE
BODY TEMPERATURE: ABNORMAL DEGREES
BUN SERPL-MCNC: 14 MG/DL (ref 8–22)
BUN SERPL-MCNC: 32 MG/DL (ref 8–22)
CALCIUM SERPL-MCNC: 9 MG/DL (ref 8.4–10.2)
CALCIUM SERPL-MCNC: 9.1 MG/DL (ref 8.4–10.2)
CHLORIDE SERPL-SCNC: 91 MMOL/L (ref 96–112)
CHLORIDE SERPL-SCNC: 94 MMOL/L (ref 96–112)
CO2 BLDA-SCNC: 33 MMOL/L (ref 20–33)
CO2 SERPL-SCNC: 33 MMOL/L (ref 20–33)
CO2 SERPL-SCNC: 40 MMOL/L (ref 20–33)
COLOR UR: YELLOW
CREAT SERPL-MCNC: 5.45 MG/DL (ref 0.5–1.4)
CREAT SERPL-MCNC: 8.83 MG/DL (ref 0.5–1.4)
EKG IMPRESSION: NORMAL
EOSINOPHIL # BLD AUTO: 0.22 K/UL (ref 0–0.51)
EOSINOPHIL NFR BLD: 2.1 % (ref 0–6.9)
EPI CELLS #/AREA URNS HPF: ABNORMAL /HPF
ERYTHROCYTE [DISTWIDTH] IN BLOOD BY AUTOMATED COUNT: 49.1 FL (ref 35.9–50)
FLUAV RNA SPEC QL NAA+PROBE: NEGATIVE
FLUBV RNA SPEC QL NAA+PROBE: NEGATIVE
GLOBULIN SER CALC-MCNC: 2.3 G/DL (ref 1.9–3.5)
GLUCOSE SERPL-MCNC: 157 MG/DL (ref 65–99)
GLUCOSE SERPL-MCNC: 66 MG/DL (ref 65–99)
GLUCOSE UR STRIP.AUTO-MCNC: 100 MG/DL
HAV IGM SERPL QL IA: NEGATIVE
HBV CORE IGM SER QL: NEGATIVE
HBV SURFACE AB SERPL IA-ACNC: 123.9 MIU/ML (ref 0–10)
HBV SURFACE AG SER QL: NEGATIVE
HCO3 BLDA-SCNC: 32 MMOL/L (ref 17–25)
HCO3 BLDA-SCNC: 35 MMOL/L (ref 17–25)
HCT VFR BLD AUTO: 34 % (ref 42–52)
HCV AB SER QL: NEGATIVE
HGB BLD-MCNC: 11 G/DL (ref 14–18)
HOROWITZ INDEX BLDA+IHG-RTO: 185 MM[HG]
IMM GRANULOCYTES # BLD AUTO: 0.05 K/UL (ref 0–0.11)
IMM GRANULOCYTES NFR BLD AUTO: 0.5 % (ref 0–0.9)
INST. QUALIFIED PATIENT IIQPT: YES
KETONES UR STRIP.AUTO-MCNC: NEGATIVE MG/DL
LACTATE BLD-SCNC: 1.1 MMOL/L (ref 0.5–2)
LACTATE BLD-SCNC: 2.8 MMOL/L (ref 0.5–2)
LEUKOCYTE ESTERASE UR QL STRIP.AUTO: ABNORMAL
LYMPHOCYTES # BLD AUTO: 1.4 K/UL (ref 1–4.8)
LYMPHOCYTES NFR BLD: 13.7 % (ref 22–41)
MCH RBC QN AUTO: 30.5 PG (ref 27–33)
MCHC RBC AUTO-ENTMCNC: 32.4 G/DL (ref 33.7–35.3)
MCV RBC AUTO: 94.2 FL (ref 81.4–97.8)
MICRO URNS: ABNORMAL
MONOCYTES # BLD AUTO: 0.81 K/UL (ref 0–0.85)
MONOCYTES NFR BLD AUTO: 7.9 % (ref 0–13.4)
MUCOUS THREADS #/AREA URNS HPF: ABNORMAL /HPF
NEUTROPHILS # BLD AUTO: 7.7 K/UL (ref 1.82–7.42)
NEUTROPHILS NFR BLD: 75.1 % (ref 44–72)
NITRITE UR QL STRIP.AUTO: NEGATIVE
NRBC # BLD AUTO: 0 K/UL
NRBC BLD-RTO: 0 /100 WBC
O2/TOTAL GAS SETTING VFR VENT: 40 %
O2/TOTAL GAS SETTING VFR VENT: 80 % (ref 30–60)
PCO2 BLDA: 38 MMHG (ref 26–37)
PCO2 BLDA: 52 MMHG (ref 26–37)
PH BLDA: 7.45 [PH] (ref 7.4–7.5)
PH BLDA: 7.54 [PH] (ref 7.4–7.5)
PH TEMP ADJ BLDA: 7.55 [PH] (ref 7.4–7.5)
PH UR STRIP.AUTO: 8 [PH] (ref 5–8)
PHOSPHATE SERPL-MCNC: 6.3 MG/DL (ref 2.5–4.5)
PLATELET # BLD AUTO: 287 K/UL (ref 164–446)
PMV BLD AUTO: 10 FL (ref 9–12.9)
PO2 BLDA: 295.3 MMHG (ref 64–87)
PO2 BLDA: 74 MMHG (ref 64–87)
PO2 TEMP ADJ BLDA: 70 MMHG (ref 64–87)
POTASSIUM SERPL-SCNC: 5.5 MMOL/L (ref 3.6–5.5)
POTASSIUM SERPL-SCNC: 9.9 MMOL/L (ref 3.6–5.5)
PROT SERPL-MCNC: 6 G/DL (ref 6–8.2)
PROT UR QL STRIP: 30 MG/DL
RBC # BLD AUTO: 3.61 M/UL (ref 4.7–6.1)
RBC # URNS HPF: >150 /HPF
RBC UR QL AUTO: ABNORMAL
SAO2 % BLDA: 96 % (ref 93–99)
SAO2 % BLDA: 99.1 % (ref 93–99)
SODIUM SERPL-SCNC: 141 MMOL/L (ref 135–145)
SODIUM SERPL-SCNC: 144 MMOL/L (ref 135–145)
SP GR UR STRIP.AUTO: 1.01
SPECIMEN DRAWN FROM PATIENT: ABNORMAL
TRIGL SERPL-MCNC: 82 MG/DL (ref 0–149)
TROPONIN T SERPL-MCNC: 73 NG/L (ref 6–19)
WBC # BLD AUTO: 10.3 K/UL (ref 4.8–10.8)
WBC #/AREA URNS HPF: ABNORMAL /HPF

## 2019-09-30 PROCEDURE — 51702 INSERT TEMP BLADDER CATH: CPT

## 2019-09-30 PROCEDURE — 36600 WITHDRAWAL OF ARTERIAL BLOOD: CPT

## 2019-09-30 PROCEDURE — 96375 TX/PRO/DX INJ NEW DRUG ADDON: CPT

## 2019-09-30 PROCEDURE — 5A1D70Z PERFORMANCE OF URINARY FILTRATION, INTERMITTENT, LESS THAN 6 HOURS PER DAY: ICD-10-PCS | Performed by: INTERNAL MEDICINE

## 2019-09-30 PROCEDURE — 87076 CULTURE ANAEROBE IDENT EACH: CPT

## 2019-09-30 PROCEDURE — 96376 TX/PRO/DX INJ SAME DRUG ADON: CPT

## 2019-09-30 PROCEDURE — 86706 HEP B SURFACE ANTIBODY: CPT

## 2019-09-30 PROCEDURE — 700111 HCHG RX REV CODE 636 W/ 250 OVERRIDE (IP): Performed by: INTERNAL MEDICINE

## 2019-09-30 PROCEDURE — 93005 ELECTROCARDIOGRAM TRACING: CPT | Performed by: INTERNAL MEDICINE

## 2019-09-30 PROCEDURE — 87150 DNA/RNA AMPLIFIED PROBE: CPT

## 2019-09-30 PROCEDURE — 84478 ASSAY OF TRIGLYCERIDES: CPT

## 2019-09-30 PROCEDURE — 5A1955Z RESPIRATORY VENTILATION, GREATER THAN 96 CONSECUTIVE HOURS: ICD-10-PCS | Performed by: EMERGENCY MEDICINE

## 2019-09-30 PROCEDURE — 700101 HCHG RX REV CODE 250

## 2019-09-30 PROCEDURE — 80069 RENAL FUNCTION PANEL: CPT

## 2019-09-30 PROCEDURE — 99223 1ST HOSP IP/OBS HIGH 75: CPT | Performed by: INTERNAL MEDICINE

## 2019-09-30 PROCEDURE — 31500 INSERT EMERGENCY AIRWAY: CPT

## 2019-09-30 PROCEDURE — 87040 BLOOD CULTURE FOR BACTERIA: CPT | Mod: 91

## 2019-09-30 PROCEDURE — 90935 HEMODIALYSIS ONE EVALUATION: CPT

## 2019-09-30 PROCEDURE — 770022 HCHG ROOM/CARE - ICU (200)

## 2019-09-30 PROCEDURE — 96366 THER/PROPH/DIAG IV INF ADDON: CPT

## 2019-09-30 PROCEDURE — 81001 URINALYSIS AUTO W/SCOPE: CPT

## 2019-09-30 PROCEDURE — 02HV33Z INSERTION OF INFUSION DEVICE INTO SUPERIOR VENA CAVA, PERCUTANEOUS APPROACH: ICD-10-PCS | Performed by: EMERGENCY MEDICINE

## 2019-09-30 PROCEDURE — 85025 COMPLETE CBC W/AUTO DIFF WBC: CPT

## 2019-09-30 PROCEDURE — 303105 HCHG CATHETER EXTRA

## 2019-09-30 PROCEDURE — 80053 COMPREHEN METABOLIC PANEL: CPT

## 2019-09-30 PROCEDURE — 0BH18EZ INSERTION OF ENDOTRACHEAL AIRWAY INTO TRACHEA, VIA NATURAL OR ARTIFICIAL OPENING ENDOSCOPIC: ICD-10-PCS | Performed by: EMERGENCY MEDICINE

## 2019-09-30 PROCEDURE — 96365 THER/PROPH/DIAG IV INF INIT: CPT

## 2019-09-30 PROCEDURE — 80074 ACUTE HEPATITIS PANEL: CPT

## 2019-09-30 PROCEDURE — 36556 INSERT NON-TUNNEL CV CATH: CPT

## 2019-09-30 PROCEDURE — 84484 ASSAY OF TROPONIN QUANT: CPT

## 2019-09-30 PROCEDURE — 93005 ELECTROCARDIOGRAM TRACING: CPT | Performed by: EMERGENCY MEDICINE

## 2019-09-30 PROCEDURE — 82803 BLOOD GASES ANY COMBINATION: CPT | Mod: 91

## 2019-09-30 PROCEDURE — 96368 THER/DIAG CONCURRENT INF: CPT

## 2019-09-30 PROCEDURE — 87086 URINE CULTURE/COLONY COUNT: CPT

## 2019-09-30 PROCEDURE — 700111 HCHG RX REV CODE 636 W/ 250 OVERRIDE (IP): Performed by: EMERGENCY MEDICINE

## 2019-09-30 PROCEDURE — C1751 CATH, INF, PER/CENT/MIDLINE: HCPCS

## 2019-09-30 PROCEDURE — 99291 CRITICAL CARE FIRST HOUR: CPT

## 2019-09-30 PROCEDURE — 94002 VENT MGMT INPAT INIT DAY: CPT

## 2019-09-30 PROCEDURE — 700105 HCHG RX REV CODE 258: Performed by: EMERGENCY MEDICINE

## 2019-09-30 PROCEDURE — 83605 ASSAY OF LACTIC ACID: CPT | Mod: 91

## 2019-09-30 PROCEDURE — 94640 AIRWAY INHALATION TREATMENT: CPT

## 2019-09-30 PROCEDURE — 700111 HCHG RX REV CODE 636 W/ 250 OVERRIDE (IP)

## 2019-09-30 PROCEDURE — 87502 INFLUENZA DNA AMP PROBE: CPT

## 2019-09-30 PROCEDURE — 99291 CRITICAL CARE FIRST HOUR: CPT | Performed by: INTERNAL MEDICINE

## 2019-09-30 PROCEDURE — 304538 HCHG NG TUBE

## 2019-09-30 PROCEDURE — 71045 X-RAY EXAM CHEST 1 VIEW: CPT

## 2019-09-30 RX ORDER — BISACODYL 10 MG
10 SUPPOSITORY, RECTAL RECTAL
Status: DISCONTINUED | OUTPATIENT
Start: 2019-09-30 | End: 2019-10-02

## 2019-09-30 RX ORDER — SEVELAMER CARBONATE 800 MG/1
800 TABLET, FILM COATED ORAL
Status: DISCONTINUED | OUTPATIENT
Start: 2019-09-30 | End: 2019-10-02

## 2019-09-30 RX ORDER — CALCIUM GLUCONATE 94 MG/ML
1 INJECTION, SOLUTION INTRAVENOUS ONCE
Status: COMPLETED | OUTPATIENT
Start: 2019-09-30 | End: 2019-09-30

## 2019-09-30 RX ORDER — CARVEDILOL 25 MG/1
25 TABLET ORAL 2 TIMES DAILY WITH MEALS
Status: DISCONTINUED | OUTPATIENT
Start: 2019-09-30 | End: 2019-10-02

## 2019-09-30 RX ORDER — MIDAZOLAM HYDROCHLORIDE 1 MG/ML
2 INJECTION INTRAMUSCULAR; INTRAVENOUS ONCE
Status: COMPLETED | OUTPATIENT
Start: 2019-09-30 | End: 2019-09-30

## 2019-09-30 RX ORDER — BISACODYL 10 MG
10 SUPPOSITORY, RECTAL RECTAL
Status: DISCONTINUED | OUTPATIENT
Start: 2019-09-30 | End: 2019-09-30

## 2019-09-30 RX ORDER — FAMOTIDINE 20 MG/1
20 TABLET, FILM COATED ORAL DAILY
Status: DISCONTINUED | OUTPATIENT
Start: 2019-09-30 | End: 2019-10-05

## 2019-09-30 RX ORDER — HEPARIN SODIUM 5000 [USP'U]/ML
5000 INJECTION, SOLUTION INTRAVENOUS; SUBCUTANEOUS EVERY 8 HOURS
Status: DISCONTINUED | OUTPATIENT
Start: 2019-09-30 | End: 2019-09-30

## 2019-09-30 RX ORDER — ASPIRIN 81 MG/1
81 TABLET, CHEWABLE ORAL EVERY EVENING
Status: DISCONTINUED | OUTPATIENT
Start: 2019-09-30 | End: 2019-10-02

## 2019-09-30 RX ORDER — AMOXICILLIN 250 MG
2 CAPSULE ORAL 2 TIMES DAILY
Status: DISCONTINUED | OUTPATIENT
Start: 2019-09-30 | End: 2019-10-02

## 2019-09-30 RX ORDER — IPRATROPIUM BROMIDE AND ALBUTEROL SULFATE 2.5; .5 MG/3ML; MG/3ML
3 SOLUTION RESPIRATORY (INHALATION)
Status: DISCONTINUED | OUTPATIENT
Start: 2019-09-30 | End: 2019-10-07 | Stop reason: HOSPADM

## 2019-09-30 RX ORDER — HEPARIN SODIUM 5000 [USP'U]/ML
5000 INJECTION, SOLUTION INTRAVENOUS; SUBCUTANEOUS EVERY 8 HOURS
Status: DISCONTINUED | OUTPATIENT
Start: 2019-09-30 | End: 2019-10-07 | Stop reason: HOSPADM

## 2019-09-30 RX ORDER — CINACALCET 30 MG/1
60 TABLET, FILM COATED ORAL EVERY EVENING
Status: DISCONTINUED | OUTPATIENT
Start: 2019-09-30 | End: 2019-10-02

## 2019-09-30 RX ORDER — MIDAZOLAM HYDROCHLORIDE 1 MG/ML
1-5 INJECTION INTRAMUSCULAR; INTRAVENOUS ONCE
Status: COMPLETED | OUTPATIENT
Start: 2019-09-30 | End: 2019-09-30

## 2019-09-30 RX ORDER — AMOXICILLIN 250 MG
2 CAPSULE ORAL 2 TIMES DAILY
Status: DISCONTINUED | OUTPATIENT
Start: 2019-09-30 | End: 2019-09-30

## 2019-09-30 RX ORDER — POLYETHYLENE GLYCOL 3350 17 G/17G
1 POWDER, FOR SOLUTION ORAL
Status: DISCONTINUED | OUTPATIENT
Start: 2019-09-30 | End: 2019-10-02

## 2019-09-30 RX ORDER — LISINOPRIL 5 MG/1
10 TABLET ORAL EVERY EVENING
Status: DISCONTINUED | OUTPATIENT
Start: 2019-09-30 | End: 2019-10-02

## 2019-09-30 RX ORDER — POLYETHYLENE GLYCOL 3350 17 G/17G
1 POWDER, FOR SOLUTION ORAL
Status: DISCONTINUED | OUTPATIENT
Start: 2019-09-30 | End: 2019-09-30

## 2019-09-30 RX ADMIN — Medication 50 MCG/HR: at 09:25

## 2019-09-30 RX ADMIN — CALCIUM GLUCONATE 1 G: 98 INJECTION, SOLUTION INTRAVENOUS at 07:49

## 2019-09-30 RX ADMIN — SODIUM BICARBONATE: 84 INJECTION, SOLUTION INTRAVENOUS at 06:27

## 2019-09-30 RX ADMIN — SODIUM BICARBONATE 50 MEQ: 84 INJECTION, SOLUTION INTRAVENOUS at 07:01

## 2019-09-30 RX ADMIN — MIDAZOLAM 4 MG: 1 INJECTION INTRAMUSCULAR; INTRAVENOUS at 06:47

## 2019-09-30 RX ADMIN — PROPOFOL 20 MCG/KG/MIN: 10 INJECTION, EMULSION INTRAVENOUS at 20:40

## 2019-09-30 RX ADMIN — HEPARIN SODIUM 5000 UNITS: 5000 INJECTION, SOLUTION INTRAVENOUS; SUBCUTANEOUS at 09:18

## 2019-09-30 RX ADMIN — PROPOFOL 30 MCG/KG/MIN: 10 INJECTION, EMULSION INTRAVENOUS at 10:35

## 2019-09-30 RX ADMIN — HEPARIN SODIUM 5000 UNITS: 5000 INJECTION, SOLUTION INTRAVENOUS; SUBCUTANEOUS at 14:34

## 2019-09-30 RX ADMIN — SODIUM BICARBONATE 50 MEQ: 84 INJECTION, SOLUTION INTRAVENOUS at 07:02

## 2019-09-30 RX ADMIN — SODIUM BICARBONATE 50 MEQ: 84 INJECTION, SOLUTION INTRAVENOUS at 06:55

## 2019-09-30 RX ADMIN — ALBUTEROL SULFATE: 5 SOLUTION RESPIRATORY (INHALATION) at 06:45

## 2019-09-30 RX ADMIN — MIDAZOLAM HYDROCHLORIDE 2 MG: 1 INJECTION, SOLUTION INTRAMUSCULAR; INTRAVENOUS at 07:49

## 2019-09-30 RX ADMIN — PROPOFOL 30 MCG/KG/MIN: 10 INJECTION, EMULSION INTRAVENOUS at 14:34

## 2019-09-30 RX ADMIN — FAMOTIDINE 20 MG: 10 INJECTION INTRAVENOUS at 09:19

## 2019-09-30 RX ADMIN — HEPARIN SODIUM 5000 UNITS: 5000 INJECTION, SOLUTION INTRAVENOUS; SUBCUTANEOUS at 21:37

## 2019-09-30 RX ADMIN — PROPOFOL 5 MCG/KG/MIN: 10 INJECTION, EMULSION INTRAVENOUS at 06:55

## 2019-09-30 ASSESSMENT — COGNITIVE AND FUNCTIONAL STATUS - GENERAL
SUGGESTED CMS G CODE MODIFIER DAILY ACTIVITY: CH
SUGGESTED CMS G CODE MODIFIER MOBILITY: CH
MOBILITY SCORE: 24
DAILY ACTIVITIY SCORE: 24

## 2019-09-30 ASSESSMENT — COPD QUESTIONNAIRES
HAVE YOU SMOKED AT LEAST 100 CIGARETTES IN YOUR ENTIRE LIFE: YES
DURING THE PAST 4 WEEKS HOW MUCH DID YOU FEEL SHORT OF BREATH: MOST  OR ALL OF THE TIME
IN THE PAST 12 MONTHS DO YOU DO LESS THAN YOU USED TO BECAUSE OF YOUR BREATHING PROBLEMS: AGREE
DO YOU EVER COUGH UP ANY MUCUS OR PHLEGM?: YES, A FEW DAYS A WEEK OR MONTH
COPD SCREENING SCORE: 8

## 2019-09-30 ASSESSMENT — LIFESTYLE VARIABLES: EVER_SMOKED: UNABLE TO EVALUATE AT THIS TIME - NEEDS ASSESSMENT PRIOR TO DISCHARGE

## 2019-09-30 ASSESSMENT — PATIENT HEALTH QUESTIONNAIRE - PHQ9
1. LITTLE INTEREST OR PLEASURE IN DOING THINGS: NOT AT ALL
SUM OF ALL RESPONSES TO PHQ9 QUESTIONS 1 AND 2: 0
2. FEELING DOWN, DEPRESSED, IRRITABLE, OR HOPELESS: NOT AT ALL

## 2019-09-30 NOTE — DISCHARGE PLANNING
Outpatient Dialysis Note    Confirmed patient is active at:    [facility]  [address]   [place] [address]       Schedule: Monday, Wednesday, Friday  Time: 6:15 am    Spoke with Alvin at facility who confirmed.    Forwarded records for review.    Dialysis Coordinator, [facility]  Tara Booth [phone]

## 2019-09-30 NOTE — FLOWSHEET NOTE
09/30/19 0812   General Vent Information   Pulse Oximetry 100 %   Secretions   Cough Strong;Productive;Congested   How Sputum Obtained Endotracheal;Suction   Sputum Amount Large   Sputum Color Yellow   Sputum Consistency Thick   Suction Frequency Suctioned Once or Twice Per Encounter   Blood Gas / Site draw   Blood Gas / Site draw  #R Radial   Allens Test Performed Yes   Site Pressure Held X 5 Min Yes   Transport / Destination   Transport Destination  Another Unit

## 2019-09-30 NOTE — CONSULTS
Cardiology Initial Consultation    Date of Service  9/30/2019    Referring Physician  Marcia Lofton M.D.    Reason for Consultation  Abnormal ECG    History of Presenting Illness  Bridger Savage is a 69 y.o. male who presented 9/30/2019 with worsening respiratory failure.    History obtained from the chart.  Previously followed in heart failure clinic for previously reduced EF of 40% and prior nuclear PET stress test which was normal in 2017.    Initial ECG with ST changes, no P waves, irregular ventricular response, suspicious for hyperkalemia.  Potassium came back at 9.9.  Patient had been intubated.    Emergent dialysis performed.  Return to sinus bradycardia on telemetry with narrowing of the QRS.    Hemoglobin 11, platelets 284, sodium 144, creatinine 8.83, LFTs normal, lactic acid 2.8    Has hypertension, takes carvedilol at home.  Previously on lisinopril.  Previously on aspirin.    The rest of his history is unobtainable.    Review of Systems  Review of Systems   Unable to perform ROS: Intubated       Past Medical History   has a past medical history of Asthma, Breath shortness, Chronic obstructive pulmonary disease (HCC), Cold, Congestive heart failure (HCC), Dental disorder, Dialysis patient (HCC), Emphysema of lung (HCC), Renal disorder, Snoring, and Urinary incontinence.    Surgical History   has a past surgical history that includes gastroscopy-endo (N/A, 8/28/2017); gastroscopy (2/1/2018); colonoscopy (2/1/2018); and other.    Family History  family history includes Asthma in his father; Diabetes in his brother and mother; Heart Disease in his mother; Kidney Disease in his mother; No Known Problems in his maternal grandfather, maternal grandmother, paternal grandfather, and paternal grandmother.    Social History   reports that he has been smoking cigarettes. He has been smoking about 0.25 packs per day. He has never used smokeless tobacco. He reports that he does not drink alcohol or use  drugs.    Medications  Prior to Admission Medications   Prescriptions Last Dose Informant Patient Reported? Taking?   Cinacalcet HCl (SENSIPAR) 60 MG Tab  Patient Yes No   Sig: Take 60 mg by mouth every evening.   Sevelamer Carbonate (RENVELA) 800 MG Tab  Patient Yes No   Sig: Take 800 mg by mouth 3 times a day, with meals. 4 caps each meals., 2 cap with snacks.   albuterol (PROAIR HFA) 108 (90 Base) MCG/ACT Aero Soln inhalation aerosol   No No   Sig: Inhale 2 Puffs by mouth every 6 hours as needed for Shortness of Breath.   aspirin 81 MG tablet  Patient Yes No   Sig: Take 81 mg by mouth every evening.   budesonide-formoterol (SYMBICORT) 160-4.5 MCG/ACT Aerosol   No No   Sig: Inhale 2 Puffs by mouth 2 Times a Day.   carvedilol (COREG) 25 MG Tab   Yes No   Sig: Take 25 mg by mouth 2 times a day, with meals.   lisinopril (PRINIVIL) 10 MG Tab  Patient Yes No   Sig: Take 10 mg by mouth every evening.   lisinopril (PRINIVIL) 10 MG Tab   No No   Sig: TAKE 1 TABLET BY MOUTH EVERY DAY   tiotropium (SPIRIVA) 18 MCG Cap   No No   Sig: Inhale 1 Cap by mouth every day.      Facility-Administered Medications: None       Allergies  Allergies   Allergen Reactions   • Chloraprep One Step Itching       Vital signs in last 24 hours  Temp:  [36 °C (96.8 °F)-36.2 °C (97.2 °F)] 36 °C (96.8 °F)  Pulse:  [] 50  Resp:  [17-53] 17  BP: ()/(52-93) 95/55  SpO2:  [83 %-100 %] 99 %    Physical Exam  Physical Exam     General: No acute distress. Well nourished.  Intubated.  HEENT:  No scleral icterus, no pharyngeal erythema, ET tube in place  Neck:  No JVD at 0, no bruits, trachea midline  CVS: RRR. Normal S1, S2. No M/R/G. No LE edema.  2+ radial pulses, 2+ PT pulses  Resp: Coarse breath sounds throughout.  Respiratory effort per the ventilator.  Abdomen: Soft, NT, no murtaza hepatomegaly.  MSK/Ext: No clubbing or cyanosis.  Skin: Warm and dry, no rashes.  Neurological: Deferred, intubated and sedated  Psych: Deferred, intubated and  sedated      Lab Review  Lab Results   Component Value Date/Time    WBC 10.3 09/30/2019 07:00 AM    RBC 3.61 (L) 09/30/2019 07:00 AM    HEMOGLOBIN 11.0 (L) 09/30/2019 07:00 AM    HEMATOCRIT 34.0 (L) 09/30/2019 07:00 AM    MCV 94.2 09/30/2019 07:00 AM    MCH 30.5 09/30/2019 07:00 AM    MCHC 32.4 (L) 09/30/2019 07:00 AM    MPV 10.0 09/30/2019 07:00 AM      Lab Results   Component Value Date/Time    SODIUM 141 09/30/2019 05:45 PM    POTASSIUM 5.5 09/30/2019 05:45 PM    CHLORIDE 94 (L) 09/30/2019 05:45 PM    CO2 33 09/30/2019 05:45 PM    GLUCOSE 66 09/30/2019 05:45 PM    BUN 14 09/30/2019 05:45 PM    CREATININE 5.45 (HH) 09/30/2019 05:45 PM      Lab Results   Component Value Date/Time    ASTSGOT 85 (H) 09/30/2019 07:00 AM    ALTSGPT 60 (H) 09/30/2019 07:00 AM     Lab Results   Component Value Date/Time    TRIGLYCERIDE 82 09/30/2019 07:00 AM    TROPONINT 73 (H) 09/30/2019 07:00 AM       No results for input(s): NTPROBNP in the last 72 hours.    Cardiac Imaging and Procedures Review  EKG:  My personal interpretation of the EKG dated 9/30/2019 is irregular, no atrial activity, diffuse ST-T changes, , QTc 604 occurred in the setting of potassium of 9.9      Echocardiogram 8/27/2017  Reduced left ventricular systolic function. Left ventricular ejection   fraction is visually estimated to be 40%.   Global hypokinesis.  Moderately dilated right ventricle.  Unable to estimate pulmonary artery pressure due to an inadequate   tricuspid regurgitant jet.   Normal pericardium without effusion.  No prior study is available for comparison.     Cardiac Catheterization: None    Imaging  Chest X-Ray: 9/30/2017  Minor bibasilar interstitial opacities, unchanged compared with the prior image.     PET nuclear stress test 9/20/2017  There is no evidence of ischemia or scar.    GATED WALL MOTION FINDINGS:  Measured resting ejection fraction is 34 %.       Assessment/Plan  -Arrhythmia and abnormal ECG in the setting of hyperkalemia,  widened QRS, prolonged QT, no obvious atrial activity  -Mildly abnormal troponin  -End-stage renal disease  -Acute hypoxic respiratory failure  -Life-threatening hyperkalemia  -Chronic LV systolic dysfunction  -Normal perfusion by PET nuclear stress test in 2017    Plan:  -Supportive care  -Treatment of hyperkalemia  -Echocardiogram  -No primary cardiac issues identified at this time    Discussed with Dr. Marcia Lofton.      Thank you for allowing me to participate in the care of this patient.    Cardiology will sign off on this patient, please recall cardiology as needed with questions or concerns.    Please contact me with any questions.    Nkechi Chavez M.D.   Cardiologist, [facility]  [phone]

## 2019-09-30 NOTE — PROGRESS NOTES
Med rec updated and complete  Allergies reviewed, unable to assess  Pt is intubated.  Called Chema @ [phone] to verify if pt picked up any medications recently.  Asked [facility]ma to do a central Search  Pt picked up 90 day supply on 8/2019 for CARVEDILOL 25MG 1 tablet twice a day, and ANORO 62.5MCG on 9/17/2019. No other medications have been filled since 5/2019  Pts pharmacy reports no antibiotics in the last 2 weeks  Called pts son (Aurelio) @ [phone] to verify if he knew what medications pt was taking, per son reports that he is not sure what medications he is taking , but knows that he goes to [facility].

## 2019-09-30 NOTE — ED NOTES
Report received at bedside. Pt has an OG to YESI, armin in place, intubated with CMV, rate 26, PEEP 8,  at 80%. RIJ CVC is infusing without issues. Will continue to monitor.

## 2019-09-30 NOTE — PROGRESS NOTES
Admission skin note, Pt skin intact pt has 2 large skin tags to left posterior thigh, all other skin intact.

## 2019-09-30 NOTE — RESPIRATORY CARE
Adult Ventilation Update    Total Vent Days: 1    Patient Lines/Drains/Airways Status    Active Airway     Name: Placement date: Placement time: Site: Days:    Airway ETT Oral 8.0  09/30/19   0634   Oral  less than 1                 Plateau Pressure (Q Shift): 16 (09/30/19 1405)  Static Compliance (ml / cm H2O): 53 (09/30/19 1405)    Patient failed trials because of    Barriers to SBT  NO order  Length of Weaning Trial           ASV Inspiratory Pressures  % Spontaneous     Sputum/Suction   Cough: Productive (09/30/19 1405)  Sputum Amount: Moderate (09/30/19 1405)  Sputum Color: White (09/30/19 1405)  Sputum Consistency: Thin (09/30/19 1405)         Events/Summary/Plan: Patient was intubated secondary to acute on chronic  [place] fail. RR decreased to 18 (09/30/19 1405)    Results for SUSAN, AURELIA ANDERSON (MRN [id number]) as of 9/30/2019 15:36   Ref. Range 9/30/2019 13:46   Ph Latest Ref Range: 7.400 - 7.500  7.535 (H)   Pco2 Latest Ref Range: 26.0 - 37.0 mmHg 38.0 (H)   Po2 Latest Ref Range: 64 - 87 mmHg 74   Hco3 Latest Ref Range: 17.0 - 25.0 mmol/L 32.0 (H)   BE Latest Ref Range: -4 - 3 mmol/L 9 (H)   Tco2 Latest Ref Range: 20 - 33 mmol/L 33   S02 Latest Ref Range: 93 - 99 % 96     FINDINGS:   There is borderline cardiac enlargement. There are minimal bibasilar interstitial opacities. The eugenia and mediastinum are normal. Endotracheal tube is present, with its tip about 4 cm above the tacos. A right central venous line is present   with its tip projecting in the area of the upper SVC. There is no pneumothorax or pleural effusion.

## 2019-09-30 NOTE — PROGRESS NOTES
Spoke with son Aurelio  POC and pt condition discussed, son stated he is working call for any emergent update leave message if he does not answer.

## 2019-09-30 NOTE — FLOWSHEET NOTE
19 0630   Vent Clock   Vent Initiated Yes   Ventilator Management Group   Intensivist Group Yes   Ventilator Identifier   Ventilator Number ER   Events/Summary/Plan   Non-Invasive Resp Device Site Inspection Completed Intact   General Vent Information   #Vent Initial Day  Yes   Ventilator Red Plug Ventilator Plugged into Red Electrical Outlet   Vent Temp HME Yes   Pulse Oximetry 93 %   Resuscitation Bag Resuscitation Bag and Mask at Bedside and Checked   Vent Alarms   Ventilation Alarms Reviewed / Activated Yes   Upper Pressure Limit (HI PIP) Alarm 42   Low VE (LPM) Alarm 3   High Respiratory Rate Alarm 40   Low Respiratory Rate Alarm 8   Low VT Alarm 200   Apnea Parameters Checked / Activated Yes   Rodrigues Vent   Rodrigues Vent Yes   [place] Conventional Settings   Rate (breaths/min) 26   Vt Target (mL) 450   TI (Seconds) 0.77   PEEP/CPAP 8   FiO2 80   Sensitivity Setting Flow Trigger   Other Settings 5   [place] Measured Parameters   P Peak (PIP) 28    Minute Volume 12.5   Control VTE (exp VT) 450   f Total (Breaths/Min) 26   P MEAN 14   PEEP/CPAP MONITORED 8   Equipment Change   Circuit Last Changed Date 19   Circuit to be Changed Next 10/30/19   Continuous Nebulizer Group   Continuous Nebulizer Q 1 Hour Yes   #Bronchodilator Yes   Respiratory WDL   Respiratory (WDL) X   Chest Exam   Work Of Breathing / Effort Vented   Breath Sounds   RUL Breath Sounds Diminished;Clear   RML Breath Sounds Diminished;Clear   RLL Breath Sounds Diminished   AMALIA Breath Sounds Diminished;Expiratory Wheezes   LLL Breath Sounds Diminished

## 2019-09-30 NOTE — ED TRIAGE NOTES
Called EMS for SOB. Found in resp distress w/ RA spo2 in 60s; 91% on CPAP. resp distress on arrival. MD @ bedside

## 2019-09-30 NOTE — ED PROVIDER NOTES
ED Provider Note    CHIEF COMPLAINT  Respiratory failure    HPI  Bridger Savage is a 69 y.o. male who presents by ambulance in respiratory failure.  He came in by [facility].  When he called the ambulance the only thing he could say was that he was dying.  We placed him on BiPAP gave him a breathing treatment and brought him here for further evaluation.  Upon arrival the patient is unable to speak because of severe respiratory distress on BiPAP.  He is a dialysis patient and is due for his dialysis today.  I cannot elicit whether he has missed any doses of dialysis.  Upon review of his chart the patient has a history of COPD renal failure with dialysis and is on 3-1/2 L of oxygen as needed.    REVIEW OF SYSTEMS  Able to obtain because of the patient's respiratory failure      PAST MEDICAL HISTORY  Past Medical History:   Diagnosis Date   • Asthma    • Breath shortness     uses oxygen 3.5 L as needed   • Chronic obstructive pulmonary disease (HCC)    • Cold     12/27/17 cold   • Congestive heart failure (HCC)    • Dental disorder     upper and lower dentures   • Dialysis patient (HCC)     MWF   • Emphysema of lung (HCC)    • Renal disorder    • Snoring    • Urinary incontinence        FAMILY HISTORY  Family History   Problem Relation Age of Onset   • Heart Disease Mother    • Diabetes Mother    • Kidney Disease Mother         kidney failure   • Asthma Father    • Diabetes Brother    • No Known Problems Maternal Grandmother    • No Known Problems Maternal Grandfather    • No Known Problems Paternal Grandmother    • No Known Problems Paternal Grandfather        SOCIAL HISTORY  Social History     Socioeconomic History   • Marital status:      Spouse name: Not on file   • Number of children: Not on file   • Years of education: Not on file   • Highest education level: Not on file   Occupational History   • Not on file   Social Needs   • Financial resource strain: Not on file   • Food insecurity:     Worry: Not on file      Inability: Not on file   • Transportation needs:     Medical: Not on file     Non-medical: Not on file   Tobacco Use   • Smoking status: Current Some Day Smoker     Packs/day: 0.25     Types: Cigarettes   • Smokeless tobacco: Never Used   • Tobacco comment: 5 cigarettes a day   Substance and Sexual Activity   • Alcohol use: No   • Drug use: No   • Sexual activity: Not on file   Lifestyle   • Physical activity:     Days per week: Not on file     Minutes per session: Not on file   • Stress: Not on file   Relationships   • Social connections:     Talks on phone: Not on file     Gets together: Not on file     Attends Judaism service: Not on file     Active member of club or organization: Not on file     Attends meetings of clubs or organizations: Not on file     Relationship status: Not on file   • Intimate partner violence:     Fear of current or ex partner: Not on file     Emotionally abused: Not on file     Physically abused: Not on file     Forced sexual activity: Not on file   Other Topics Concern   • Not on file   Social History Narrative    ** Merged History Encounter **            SURGICAL HISTORY  Past Surgical History:   Procedure Laterality Date   • GASTROSCOPY  2/1/2018    Procedure: GASTROSCOPY;  Surgeon: Morteza Olivo M.D.;  Location: SURGERY [facility];  Service: Gastroenterology   • COLONOSCOPY  2/1/2018    Procedure: COLONOSCOPY;  Surgeon: Morteza Olivo M.D.;  Location: [facility];  Service: Gastroenterology   • GASTROSCOPY-ENDO N/A 8/28/2017    Procedure: GASTROSCOPY-ENDO;  Surgeon: Pankaj Choudhury M.D.;  Location: SURGERY [facility];  Service: EUS   • OTHER      Patient had an arm fistula put in for dialysis       CURRENT MEDICATIONS  Home Medications    **Home medications have not yet been reviewed for this encounter**         ALLERGIES  Allergies   Allergen Reactions   • Chloraprep One Step Itching       PHYSICAL EXAM  VITAL SIGNS: /76   Pulse 82    "Temp 36.2 °C (97.2 °F) (Temporal)   Resp (!) 52   Ht 1.778 m (5' 10\")   Wt 90.5 kg (199 lb 8.3 oz)   SpO2 100%   BMI 28.63 kg/m²   Constitutional: Well developed, Well nourished, very respiratory distress unable to speak  HEENT: Normocephalic, Atraumatic,  external ears normal, pharynx pink,  Mucous  Membranes moist, No rhinorrhea or mucosal edema  Eyes: PERRL, EOMI, Conjunctiva normal, No discharge.   Neck: Normal range of motion, No tenderness, Supple, No stridor.   Lymphatic: No lymphadenopathy    Cardiovascular: Shiley the patient was bradycardic in the 50s no murmurs rubs or gallops  Thorax & Lungs: Rales in all lung fields with tachypnea and accessory muscle use severe respiratory distress.   Abdomen: Bowel sounds normal, Soft, non tender, non distended,  No pulsatile masses., no rebound guarding or peritoneal signs.   Skin: Warm, Dry, No erythema, No rash,   Back:  No CVA tenderness,  No spinal tenderness, bony crepitance step offs or instability.   Extremities: Equal, intact distal pulses, No cyanosis, clubbing or edema,  No tenderness.   Musculoskeletal: Good range of motion in all major joints. No tenderness to palpation or major deformities noted.   Neurologic: Severe respiratory distress we will not follow commands    EKG  Multiple EKGs obtained please see note below for interpretations    RADIOLOGY/PROCEDURES  DX-CHEST-LIMITED (1 VIEW)   Final Result      Minor bibasilar interstitial opacities, unchanged compared with the prior image.               INTERPRETING LOCATION: [address] [place] [address], [facility]            COURSE & MEDICAL DECISION MAKING  Pertinent Labs & Imaging studies reviewed. (See chart for details)  Differential diagnosis: Hypokalemia, cardiac ischemia, influenza, pneumonia,    Results for orders placed or performed during the hospital encounter of 09/30/19   Lactic acid (lactate)   Result Value Ref Range    Lactic Acid 2.8 (H) 0.5 - 2.0 mmol/L   CBC WITH DIFFERENTIAL   Result Value Ref " Range    WBC 10.3 4.8 - 10.8 K/uL    RBC 3.61 (L) 4.70 - 6.10 M/uL    Hemoglobin 11.0 (L) 14.0 - 18.0 g/dL    Hematocrit 34.0 (L) 42.0 - 52.0 %    MCV 94.2 81.4 - 97.8 fL    MCH 30.5 27.0 - 33.0 pg    MCHC 32.4 (L) 33.7 - 35.3 g/dL    RDW 49.1 35.9 - 50.0 fL    Platelet Count 287 164 - 446 K/uL    MPV 10.0 9.0 - 12.9 fL    Neutrophils-Polys 75.10 (H) 44.00 - 72.00 %    Lymphocytes 13.70 (L) 22.00 - 41.00 %    Monocytes 7.90 0.00 - 13.40 %    Eosinophils 2.10 0.00 - 6.90 %    Basophils 0.70 0.00 - 1.80 %    Immature Granulocytes 0.50 0.00 - 0.90 %    Nucleated RBC 0.00 /100 WBC    Neutrophils (Absolute) 7.70 (H) 1.82 - 7.42 K/uL    Lymphs (Absolute) 1.40 1.00 - 4.80 K/uL    Monos (Absolute) 0.81 0.00 - 0.85 K/uL    Eos (Absolute) 0.22 0.00 - 0.51 K/uL    Baso (Absolute) 0.07 0.00 - 0.12 K/uL    Immature Granulocytes (abs) 0.05 0.00 - 0.11 K/uL    NRBC (Absolute) 0.00 K/uL   COMP METABOLIC PANEL   Result Value Ref Range    Sodium 144 135 - 145 mmol/L    Potassium 9.9 (HH) 3.6 - 5.5 mmol/L    Chloride 91 (L) 96 - 112 mmol/L    Co2 40 (H) 20 - 33 mmol/L    Anion Gap 13.0 (H) 0.0 - 11.9    Glucose 157 (H) 65 - 99 mg/dL    Bun 32 (H) 8 - 22 mg/dL    Creatinine 8.83 (HH) 0.50 - 1.40 mg/dL    Calcium 9.0 8.4 - 10.2 mg/dL    AST(SGOT) 85 (H) 12 - 45 U/L    ALT(SGPT) 60 (H) 2 - 50 U/L    Alkaline Phosphatase 52 30 - 99 U/L    Total Bilirubin 0.8 0.1 - 1.5 mg/dL    Albumin 3.7 3.2 - 4.9 g/dL    Total Protein 6.0 6.0 - 8.2 g/dL    Globulin 2.3 1.9 - 3.5 g/dL    A-G Ratio 1.6 g/dL   URINALYSIS   Result Value Ref Range    Color Yellow     Character Hazy (A)     Specific Gravity 1.010 <1.035    Ph 8.0 5.0 - 8.0    Glucose 100 (A) Negative mg/dL    Ketones Negative Negative mg/dL    Protein 30 (A) Negative mg/dL    Bilirubin Negative Negative    Nitrite Negative Negative    Leukocyte Esterase Small (A) Negative    Occult Blood Large (A) Negative    Micro Urine Req Microscopic    Influenza A/B By PCR (Adult - Flu Only)   Result  Value Ref Range    Influenza virus A RNA Negative Negative    Influenza virus B, PCR Negative Negative   TROPONIN   Result Value Ref Range    Troponin T 73 (H) 6 - 19 ng/L   URINE MICROSCOPIC (W/UA)   Result Value Ref Range    WBC 10-20 (A) /hpf    RBC >150 (A) /hpf    Bacteria Moderate (A) None /hpf    Epithelial Cells Rare Few /hpf    Mucous Threads Few /hpf   ARTERIAL BLOOD GAS w/ O2 (LAB)   Result Value Ref Range    Ph 7.45 7.40 - 7.50    Pco2 52.0 (HH) 26.0 - 37.0 mmHg    Po2 295.3 (H) 64.0 - 87.0 mmHg    O2 Saturation 99.1 (H) 93.0 - 99.0 %    Hco3 35 (H) 17 - 25 mmol/L    Base Excess 10 (H) -4 - 3 mmol/L    Body Temp see below Centigrade    FIO2 80 (H) 30 - 60 %   ESTIMATED GFR   Result Value Ref Range    GFR If  7 (A) >60 mL/min/1.73 m 2    GFR If Non African American 6 (A) >60 mL/min/1.73 m 2   EKG   Result Value Ref Range    Report       [facility] Emergency Dept.    Test Date:  2019  Pt Name:    [facility]                  Department: [facility]  MRN:        [id number]                      Room:       [facility] 8  Gender:     Male                         Technician: [email or web address]  :        1950                   Requested By:CIRO GUZMAN  Order #:    [id number]                    Reading MD: CIRO GUZMAN MD    Measurements  Intervals                                Axis  Rate:       74                           P:  WI:                                      QRS:        -85  QRSD:       164                          T:          74  QT:         544  QTc:        604    Interpretive Statements  ATRIAL FIBRILLATION  NONSPECIFIC IVCD WITH LAD  BASELINE WANDER IN LEAD(S) V2  Compared to ECG 2019 07:05:26  No significant changes    Electronically Signed On 2019 7:17:00 PDT by CIRO GUZMAN MD     EKG   Result Value Ref Range    Report       [facility] Emergency Dept.    Test Date:  2019  Pt Name:    [facility]                  Department:  [facility]  MRN:        [id number]                      Room:       [facility] 8  Gender:     Male                         Technician: [email or web address]  :        1950                   Requested By:CIRO GUZMAN  Order #:    [id number]                    Reading MD: CIRO GUZMAN MD    Measurements  Intervals                                Axis  Rate:       105                          P:          42  CA:         232                          QRS:        -38  QRSD:       170                          T:          76  QT:         488  QTc:        646    Interpretive Statements  SINUS TACHYCARDIA  MULTIFORM VENTRICULAR PREMATURE COMPLEXES  FIRST DEGREE AV BLOCK  LEFT BUNDLE BRANCH BLOCK  BASELINE WANDER IN LEAD(S) V2  Compared to ECG 2019 17:40:20  Ventricular premature complex(es) now present  First degree AV block now present  Left bundle-branch block now present    Alisia ctronically Signed On 2019 7:16:51 PDT by CIRO GUZMAN MD     EKG   Result Value Ref Range    Report       [facility] Emergency Dept.    Test Date:  2019  Pt Name:    AURELIA DAVIS                  Department: [facility]  MRN:        [id number]                      Room:       [facility] 8  Gender:     Male                         Technician: [email or web address]  :        1950                   Requested By:CIRO GUZMAN  Order #:    [id number]                    Reading MD: CIRO GUZMAN MD    Measurements  Intervals                                Axis  Rate:       91                           P:  CA:                                      QRS:        -56  QRSD:       162                          T:          88  QT:         464  QTc:        572    Interpretive Statements  ATRIAL FIBRILLATION  VENTRICULAR PREMATURE COMPLEX  LEFT BUNDLE BRANCH BLOCK  BASELINE WANDER IN LEAD(S) V2  Compared to ECG 2019 06:22:23  Sinus tachycardia no longer present  First degree AV block no longer present    Electronically Signed On 2019 7:16:56 PDT by CIRO  MD MEGAN     EKG   Result Value Ref Range    Report       [facility] Emergency Dept.    Test Date:  2019  Pt Name:    [facility]                  Department: [facility]  MRN:        [id number]                      Room:       [facility] 8  Gender:     Male                         Technician: [email or web address]  :        1950                   Requested By:CIRO GUZMAN  Order #:    [id number]                    Reading MD: CIRO GUZMAN MD    Measurements  Intervals                                Axis  Rate:       106                          P:          0  WI:                                      QRS:        -74  QRSD:       196                          T:          83  QT:         464  QTc:        617    Interpretive Statements  SINUS TACHYCARDIA  LEFT BUNDLE BRANCH BLOCK  Compared to ECG 2019 06:24:40  Atrial fibrillation no longer present  Ventricular premature complex(es) no longer present    Electronically Signed On 2019 7:16:44 PDT by CIRO GUZMAN MD     EKG   Result Value Ref Range    Report       [facility] Emergency Dept.    Test Date:  2019  Pt Name:    [facility]                  Department: [facility]  MRN:        [id number]                      Room:       [facility] 8  Gender:     Male                         Technician: [email or web address]  :        1950                   Requested By:CIRO GUZMAN  Order #:    [id number]                    Reading MD: CIRO GUZMAN MD    Measurements  Intervals                                Axis  Rate:       69                           P:  WI:                                      QRS:        232  QRSD:       184                          T:          67  QT:         560  QTc:        600    Interpretive Statements  ATRIAL FIBRILLATION  NONSPECIFIC INTRAVENTRICULAR CONDUCTION DELAY  BASELINE WANDER IN LEAD(S) V3  Compared to ECG 2019 06:30:02  Intraventricular conduction delay now present  Sinus tachycardia no longer  present  Left bundle-branch block no longer present    Electronically Signed On 9- 7:17:04 PDT by  CIRO GUZMAN MD          The patient's initial EKG by GILBERT showed wide-complex bradycardia with peaked T waves concerning for severe hyperkalemia.  I gave the patient is a bicarb drip and his QRS did start to narrow and his heart rate increased.  Patient was given multiple doses of bicarb by push as well as calcium gluconate.  A bicarb drip was ordered.  Conscious sedation was given and I intubated the patient and placed a central line please see notes below.  Patient's blood pressure and heart rate improved with the bicarb drip.  His final EKG still had some peak T waves but his QRS was more narrow.  He also had some anterior ST changes concerning for cardiac ischemia so cardiology was paged.    Central Line Placement Procedure Note  Indication: vascular access    Consent: Unable to be obtained due to the emergent nature of this procedure.    Procedure: The patient was positioned appropriately and the skin over the right internal jugular vein was prepped with betadine and draped in a sterile fashion. Local anesthesia was not performed due to the emergent nature of this procedure.  A large bore needle was used to identify the vein.  A guide wire was then inserted into the vein through the needle. A triple lumen catheter was then inserted into the vessel over the guide wire using the Seldinger technique.  All ports showed good, free flowing blood return and were flushed with saline solution.  The catheter was then securely fastened to the skin with sutures and covered with a sterile dressing.  A post procedure X-ray was ordered and showed good line position.    The patient tolerated the procedure well.    Complications: None      Intubation Procedure Note    Indication: Respiratory failure    Consent: Unable to be obtained due to the emergent nature of this procedure.    Medications Used: propofol intravenously  and succinycholine intravenously    Procedure: The patient was placed in the appropriate position.  Cricoid pressure was not required.  Intubation was performed by direct laryngoscopy using a gidescope and an 8.0 cuffed endotracheal tube.  The cuff was then inflated and the tube was secured appropriately at a distance of 24 cm to the dental ridge.  Initial confirmation of placement included bilateral breath sounds, an end tidal CO2 detector, absence of sounds over the stomach, tube fogging, adequate chest rise, adequate pulse oximetry reading and improved skin color.  A chest x-ray to verify correct placement of the tube showed appropriate tube position.    The patient tolerated the procedure well.     Complications: None      7:14 AM I spoke with nephrology Dr. Murillo who will arrange for emergent dialysis.  I also spoke with Dr. Hernández our intensivist who will admit the patient to the intensive care unit.    Critical Care  Due to the real possibility of a deterioration of this patient's condition required the highest level of my preparedness for sudden emergent intervention. I provided critical care services which included medication orders, frequent reevaluations of the patient's condition and response to treatment, ordering and reviewing test results and discussing the case with various consultants. The critical care time associated with the care of the patient was 50 minutes. Review chart for interventions. This time is exclusive of any other billable procedures.      FINAL IMPRESSION  1. Acute respiratory failure, unspecified whether with hypoxia or hypercapnia (HCC)    2. Hyperkalemia    3. Abnormal EKG           PLAN/DISPOSITION  Admitted in critical condition          Electronically signed by: Marcia Lofton, 9/30/2019 7:08 AM

## 2019-09-30 NOTE — PROGRESS NOTES
[facility] Progress Note      STAT HD ordered by Dr. Murillo. Treatment started at 0848 and ended at 1218.     Total Net UF 3500mL.    Pt tolerated treatment well with no issues. See paper flow sheet for details. Cannulation needles taken out, held pressure for 10 min and placed gauze dressing with no bleeding. DURAN AVF + for bruit/thrill. Report given to XIAO Weeks RN.

## 2019-10-01 ENCOUNTER — APPOINTMENT (OUTPATIENT)
Dept: CARDIOLOGY | Facility: MEDICAL CENTER | Age: 69
DRG: 207 | End: 2019-10-01
Attending: INTERNAL MEDICINE
Payer: MEDICARE

## 2019-10-01 ENCOUNTER — APPOINTMENT (OUTPATIENT)
Dept: RADIOLOGY | Facility: MEDICAL CENTER | Age: 69
DRG: 207 | End: 2019-10-01
Attending: INTERNAL MEDICINE
Payer: MEDICARE

## 2019-10-01 LAB
25(OH)D3 SERPL-MCNC: 45 NG/ML (ref 30–100)
ALBUMIN SERPL BCP-MCNC: 3.6 G/DL (ref 3.2–4.9)
ALBUMIN/GLOB SERPL: 1.6 G/DL
ALP SERPL-CCNC: 52 U/L (ref 30–99)
ALT SERPL-CCNC: 52 U/L (ref 2–50)
ANION GAP SERPL CALC-SCNC: 12 MMOL/L (ref 0–11.9)
AST SERPL-CCNC: 50 U/L (ref 12–45)
BASE EXCESS BLDA CALC-SCNC: 8 MMOL/L (ref -4–3)
BASOPHILS # BLD AUTO: 0.6 % (ref 0–1.8)
BASOPHILS # BLD: 0.04 K/UL (ref 0–0.12)
BILIRUB SERPL-MCNC: 0.5 MG/DL (ref 0.1–1.5)
BODY TEMPERATURE: 37 CENTIGRADE
BUN SERPL-MCNC: 17 MG/DL (ref 8–22)
CALCIUM SERPL-MCNC: 8.9 MG/DL (ref 8.4–10.2)
CHLORIDE SERPL-SCNC: 94 MMOL/L (ref 96–112)
CK SERPL-CCNC: 151 U/L (ref 0–154)
CO2 SERPL-SCNC: 34 MMOL/L (ref 20–33)
CREAT SERPL-MCNC: 6.5 MG/DL (ref 0.5–1.4)
EKG IMPRESSION: NORMAL
EOSINOPHIL # BLD AUTO: 0.16 K/UL (ref 0–0.51)
EOSINOPHIL NFR BLD: 2.5 % (ref 0–6.9)
ERYTHROCYTE [DISTWIDTH] IN BLOOD BY AUTOMATED COUNT: 50.3 FL (ref 35.9–50)
FERRITIN SERPL-MCNC: 232.1 NG/ML (ref 22–322)
GLOBULIN SER CALC-MCNC: 2.3 G/DL (ref 1.9–3.5)
GLUCOSE SERPL-MCNC: 67 MG/DL (ref 65–99)
HCO3 BLDA-SCNC: 34 MMOL/L (ref 17–25)
HCT VFR BLD AUTO: 32.9 % (ref 42–52)
HGB BLD-MCNC: 10.4 G/DL (ref 14–18)
IMM GRANULOCYTES # BLD AUTO: 0.01 K/UL (ref 0–0.11)
IMM GRANULOCYTES NFR BLD AUTO: 0.2 % (ref 0–0.9)
IRON SATN MFR SERPL: 16 % (ref 15–55)
IRON SERPL-MCNC: 41 UG/DL (ref 50–180)
LV EJECT FRACT  99904: 35
LV EJECT FRACT MOD 2C 99903: 55.62
LV EJECT FRACT MOD 4C 99902: 45.19
LV EJECT FRACT MOD BP 99901: 50.18
LYMPHOCYTES # BLD AUTO: 1.15 K/UL (ref 1–4.8)
LYMPHOCYTES NFR BLD: 17.7 % (ref 22–41)
MCH RBC QN AUTO: 30.1 PG (ref 27–33)
MCHC RBC AUTO-ENTMCNC: 31.6 G/DL (ref 33.7–35.3)
MCV RBC AUTO: 95.4 FL (ref 81.4–97.8)
MONOCYTES # BLD AUTO: 0.62 K/UL (ref 0–0.85)
MONOCYTES NFR BLD AUTO: 9.6 % (ref 0–13.4)
NEUTROPHILS # BLD AUTO: 4.51 K/UL (ref 1.82–7.42)
NEUTROPHILS NFR BLD: 69.4 % (ref 44–72)
NRBC # BLD AUTO: 0 K/UL
NRBC BLD-RTO: 0 /100 WBC
O2/TOTAL GAS SETTING VFR VENT: 40 % (ref 30–60)
PCO2 BLDA: 51.5 MMHG (ref 26–37)
PCO2 TEMP ADJ BLDA: 51.5 MMHG (ref 26–37)
PH BLDA: 7.43 [PH] (ref 7.4–7.5)
PH TEMP ADJ BLDA: 7.43 [PH] (ref 7.4–7.5)
PHOSPHATE SERPL-MCNC: 7 MG/DL (ref 2.5–4.5)
PLATELET # BLD AUTO: 191 K/UL (ref 164–446)
PMV BLD AUTO: 10.3 FL (ref 9–12.9)
PO2 BLDA: 83.5 MMHG (ref 64–87)
PO2 TEMP ADJ BLDA: 83.5 MMHG (ref 64–87)
POTASSIUM SERPL-SCNC: 5.6 MMOL/L (ref 3.6–5.5)
PROT SERPL-MCNC: 5.9 G/DL (ref 6–8.2)
PTH-INTACT SERPL-MCNC: 339.2 PG/ML (ref 14–72)
RBC # BLD AUTO: 3.45 M/UL (ref 4.7–6.1)
SAO2 % BLDA: 95.4 % (ref 93–99)
SODIUM SERPL-SCNC: 140 MMOL/L (ref 135–145)
TIBC SERPL-MCNC: 263 UG/DL (ref 250–450)
URATE SERPL-MCNC: 3.4 MG/DL (ref 2.5–8.3)
WBC # BLD AUTO: 6.5 K/UL (ref 4.8–10.8)

## 2019-10-01 PROCEDURE — 82803 BLOOD GASES ANY COMBINATION: CPT

## 2019-10-01 PROCEDURE — 84100 ASSAY OF PHOSPHORUS: CPT

## 2019-10-01 PROCEDURE — 82728 ASSAY OF FERRITIN: CPT

## 2019-10-01 PROCEDURE — 770022 HCHG ROOM/CARE - ICU (200)

## 2019-10-01 PROCEDURE — 90935 HEMODIALYSIS ONE EVALUATION: CPT

## 2019-10-01 PROCEDURE — 94003 VENT MGMT INPAT SUBQ DAY: CPT

## 2019-10-01 PROCEDURE — 36600 WITHDRAWAL OF ARTERIAL BLOOD: CPT

## 2019-10-01 PROCEDURE — 83550 IRON BINDING TEST: CPT

## 2019-10-01 PROCEDURE — 99291 CRITICAL CARE FIRST HOUR: CPT | Performed by: INTERNAL MEDICINE

## 2019-10-01 PROCEDURE — 71045 X-RAY EXAM CHEST 1 VIEW: CPT

## 2019-10-01 PROCEDURE — 83970 ASSAY OF PARATHORMONE: CPT

## 2019-10-01 PROCEDURE — 83540 ASSAY OF IRON: CPT

## 2019-10-01 PROCEDURE — 80053 COMPREHEN METABOLIC PANEL: CPT

## 2019-10-01 PROCEDURE — 84550 ASSAY OF BLOOD/URIC ACID: CPT

## 2019-10-01 PROCEDURE — 82550 ASSAY OF CK (CPK): CPT

## 2019-10-01 PROCEDURE — 93010 ELECTROCARDIOGRAM REPORT: CPT | Performed by: INTERNAL MEDICINE

## 2019-10-01 PROCEDURE — 5A1D70Z PERFORMANCE OF URINARY FILTRATION, INTERMITTENT, LESS THAN 6 HOURS PER DAY: ICD-10-PCS | Performed by: INTERNAL MEDICINE

## 2019-10-01 PROCEDURE — 93306 TTE W/DOPPLER COMPLETE: CPT

## 2019-10-01 PROCEDURE — 82306 VITAMIN D 25 HYDROXY: CPT

## 2019-10-01 PROCEDURE — 93306 TTE W/DOPPLER COMPLETE: CPT | Mod: 26 | Performed by: INTERNAL MEDICINE

## 2019-10-01 PROCEDURE — 700111 HCHG RX REV CODE 636 W/ 250 OVERRIDE (IP): Performed by: INTERNAL MEDICINE

## 2019-10-01 PROCEDURE — 700101 HCHG RX REV CODE 250: Performed by: INTERNAL MEDICINE

## 2019-10-01 PROCEDURE — 94640 AIRWAY INHALATION TREATMENT: CPT

## 2019-10-01 PROCEDURE — 85025 COMPLETE CBC W/AUTO DIFF WBC: CPT

## 2019-10-01 PROCEDURE — 700117 HCHG RX CONTRAST REV CODE 255: Performed by: INTERNAL MEDICINE

## 2019-10-01 RX ORDER — HEPARIN SODIUM 1000 [USP'U]/ML
1750 INJECTION, SOLUTION INTRAVENOUS; SUBCUTANEOUS
Status: DISCONTINUED | OUTPATIENT
Start: 2019-10-01 | End: 2019-10-03

## 2019-10-01 RX ADMIN — HEPARIN SODIUM 5000 UNITS: 5000 INJECTION, SOLUTION INTRAVENOUS; SUBCUTANEOUS at 22:08

## 2019-10-01 RX ADMIN — Medication 200 MCG/HR: at 09:00

## 2019-10-01 RX ADMIN — PROPOFOL 5 MCG/KG/MIN: 10 INJECTION, EMULSION INTRAVENOUS at 18:16

## 2019-10-01 RX ADMIN — HEPARIN SODIUM 5000 UNITS: 5000 INJECTION, SOLUTION INTRAVENOUS; SUBCUTANEOUS at 05:31

## 2019-10-01 RX ADMIN — FAMOTIDINE 20 MG: 10 INJECTION INTRAVENOUS at 05:31

## 2019-10-01 RX ADMIN — IPRATROPIUM BROMIDE AND ALBUTEROL SULFATE 3 ML: .5; 3 SOLUTION RESPIRATORY (INHALATION) at 14:12

## 2019-10-01 RX ADMIN — PROPOFOL 5 MCG/KG/MIN: 10 INJECTION, EMULSION INTRAVENOUS at 05:31

## 2019-10-01 RX ADMIN — HUMAN ALBUMIN MICROSPHERES AND PERFLUTREN 3 ML: 10; .22 INJECTION, SOLUTION INTRAVENOUS at 10:00

## 2019-10-01 RX ADMIN — HEPARIN SODIUM 5000 UNITS: 5000 INJECTION, SOLUTION INTRAVENOUS; SUBCUTANEOUS at 14:43

## 2019-10-01 NOTE — FLOWSHEET NOTE
19   Events/Summary/Plan   Events/Summary/Plan Vent check   General Vent Information   #Vent Initial Day  Yes   Ventilator Red Plug Ventilator Plugged into Red Electrical Outlet   Vent Temp (Celsius) 35   Pulse Oximetry 100 %   Heart Rate (Monitored) (!) 52   Resuscitation Bag Resuscitation Bag and Mask at Bedside and Checked   CO2 Monitoring   #ETCO2 30   Vent Alarms   Ventilation Alarms Reviewed / Activated Yes   Upper Pressure Limit (HI PIP) Alarm 40   Low VE (LPM) Alarm 4   High Respiratory Rate Alarm 40   Low Respiratory Rate Alarm 8   Low VT Alarm 200   Apnea Parameters Checked / Activated Yes   EtCO2 High Alarm 50   EtCO2 Low Alarm 20   Rodrigues Vent   Rodrigues Vent Yes   Rodrigues Vent Mode APVCMV   Rodrigues Conventional Settings   Rate (breaths/min) 18   Vt Target (mL) 420   TI (Seconds) 1.1   PEEP/CPAP 8   Pramp 50   FiO2 40   Sensitivity Setting Flow Trigger   Other Settings 5   Rodrigues Measured Parameters   P Peak (PIP) 19    Minute Volume 7.51   Control VTE (exp VT) 417   f Total (Breaths/Min) 18   P MEAN 11   Static Compliance (ml / cm H2O) 42   R exp 10   Plateau Pressure (Q Shift) 18   Servo Vent   PEEP (Monitored) (cmH2O) 8   Respiratory WDL   Respiratory (WDL) X   Chest Exam   Work Of Breathing / Effort Vented   Breath Sounds   RUL Breath Sounds Clear   RML Breath Sounds Diminished;Clear   RLL Breath Sounds Diminished   AMALIA Breath Sounds Clear   LLL Breath Sounds Diminished   Secretions   How Sputum Obtained Endotracheal;Suction   Sputum Amount Small   Sputum Color Clear;White   Sputum Consistency Thin   Suction Frequency Suctioned Once or Twice Per Encounter

## 2019-10-01 NOTE — ASSESSMENT & PLAN NOTE
History of COPD  Not able to find any objective studies like PFTs in the records  Since intubation the patient has excessive secretions  Continue systemic steroids and Zithromax  Follow up in pulmonary clinic post discharge

## 2019-10-01 NOTE — FLOWSHEET NOTE
10/01/19 0330   Events/Summary/Plan   Events/Summary/Plan ABG Vent check   General Vent Information   #Ventilator Subsequent Day Yes   Ventilator Red Plug Ventilator Plugged into Red Electrical Outlet   Vent Temp (Celsius) 35   Pulse Oximetry 100 %   Heart Rate (Monitored) (!) 52   Resuscitation Bag Resuscitation Bag and Mask at Bedside and Checked   CO2 Monitoring   #ETCO2 34   Vent Alarms   Ventilation Alarms Reviewed / Activated Yes   Upper Pressure Limit (HI PIP) Alarm 40   Low VE (LPM) Alarm 4   High Respiratory Rate Alarm 40   Low Respiratory Rate Alarm 8   Low VT Alarm 200   Apnea Parameters Checked / Activated Yes   EtCO2 High Alarm 50   EtCO2 Low Alarm 20   Rodrigues Vent   Rodrigues Vent Yes   Rodrigues Vent Mode APVCMV   Rodrigues Conventional Settings   Rate (breaths/min) 18   Vt Target (mL) 420   TI (Seconds) 1.1   PEEP/CPAP 8   Pramp 50   FiO2 40   Sensitivity Setting Flow Trigger   Other Settings 5   Rodrigues Measured Parameters   P Peak (PIP) 16    Minute Volume 7.32   Control VTE (exp VT) 407   f Total (Breaths/Min) 18   P MEAN 11   Static Compliance (ml / cm H2O) 69   Servo Vent   PEEP (Monitored) (cmH2O) 8   Respiratory WDL   Respiratory (WDL) X   Chest Exam   Work Of Breathing / Effort Vented   Breath Sounds   RUL Breath Sounds Rhonchi;Clear After Suction   RML Breath Sounds Diminished   RLL Breath Sounds Diminished   AMALIA Breath Sounds Diminished   LLL Breath Sounds Diminished   Blood Gas / Site draw   Blood Gas / Site draw  #R Radial   Allens Test Performed Yes   Site Pressure Held X 5 Min Yes

## 2019-10-01 NOTE — ASSESSMENT & PLAN NOTE
Emergent hemodialysis on 9/30 2019 and hemodialysis yesterday and today today  for severe hypercapnia   K+ normalized   Will need hemodialysis in the future as scheduled before  Did not miss any dialysis outpatient unclear to me why he had severe hyperkalemia  Nephrology input appreciated

## 2019-10-01 NOTE — H&P
Pulmonary History & Physical Note    Date of Service  9/30/2019    Primary Care Physician  Jasmine Shields M.D.    Consultants  Nephrology  Cardiology    Code Status  Full code    Chief ComplaintRespiratory distress    History of Presenting Illness  69 y.o. male who presented 9/30/2019 with severe respiratory distress with arrhythmia.  The patient was promptly intubated and placed on mechanical ventilation by the emergency room physician.  His EKG showed sinus sign suggestive of hyperkalemia.  The patient is known to be end-stage renal disease patient.  He was treated with calcium gluconate and bicarbonate.  And subsequently he was treated with emergent hemodialysis.  His heart rhythm switched back to bradycardia with narrow QRS after hemodialysis.  Patient potassium came back of 9.9.    Unclear to me if the patient missed hemodialysis sessions or there was other etiology for his severe hyperkalemia.  He was seen by nephrology and cardiology today.  He has been stable after hemodialysis in terms of vital signs.  He is not requiring vasopressors.  Review of Systems  Review of Systems   Unable to perform ROS: Critical illness       Past Medical History   has a past medical history of Asthma, Breath shortness, Chronic obstructive pulmonary disease (HCC), Cold, Congestive heart failure (HCC), Dental disorder, Dialysis patient (HCC), Emphysema of lung (HCC), Renal disorder, Snoring, and Urinary incontinence.    Surgical History   has a past surgical history that includes gastroscopy-endo (N/A, 8/28/2017); gastroscopy (2/1/2018); colonoscopy (2/1/2018); and other.     Family History  family history includes Asthma in his father; Diabetes in his brother and mother; Heart Disease in his mother; Kidney Disease in his mother; No Known Problems in his maternal grandfather, maternal grandmother, paternal grandfather, and paternal grandmother.     Social History   reports that he has been smoking cigarettes. He has been smoking  about 0.25 packs per day. He has never used smokeless tobacco. He reports that he does not drink alcohol or use drugs.    Allergies  Allergies   Allergen Reactions   • Chloraprep One Step Itching       Medications  Prior to Admission Medications   Prescriptions Last Dose Informant Patient Reported? Taking?   ANORO ELLIPTA 62.5-25 MCG/INH AEROSOL POWDER, BREATH ACTIVATED inhaler Unknown at Unknown Patient's Home Pharmacy Yes No   Sig: Inhale 1 Puff by mouth every day.   carvedilol (COREG) 25 MG Tab Unknown at Unknown Patient's Home Pharmacy Yes No   Sig: Take 25 mg by mouth 2 times a day, with meals.      Facility-Administered Medications: None       Physical Exam  Temp:  [36 °C (96.8 °F)-36.2 °C (97.2 °F)] 36 °C (96.8 °F)  Pulse:  [] 54  Resp:  [17-53] 17  BP: ()/(52-93) 97/56  SpO2:  [83 %-100 %] 99 %    Physical Exam   Constitutional: He appears well-developed.   Intubated and sedated, continued to be restless when I saw him in the emergency room   HENT:   Head: Normocephalic and atraumatic.   Right Ear: External ear normal.   Left Ear: External ear normal.   Mouth/Throat: No oropharyngeal exudate.   Eyes: Pupils are equal, round, and reactive to light. Right eye exhibits no discharge. Left eye exhibits no discharge.   Neck: Neck supple. No JVD present. No tracheal deviation present. No thyromegaly present.   Cardiovascular: Normal rate, regular rhythm and normal heart sounds. Exam reveals no friction rub.   No murmur heard.  Pulmonary/Chest: Effort normal. He has rales.   Diminished air movement   Abdominal: Soft. Bowel sounds are normal. He exhibits no distension. There is no tenderness. There is no rebound.   Musculoskeletal: He exhibits no edema, tenderness or deformity.   Neurological: No cranial nerve deficit. Coordination normal.   Intubated and sedated   Skin: Skin is warm and dry. No rash noted. No erythema.   Psychiatric: He has a normal mood and affect.       Laboratory:  Recent Labs      09/30/19  0700   WBC 10.3   RBC 3.61*   HEMOGLOBIN 11.0*   HEMATOCRIT 34.0*   MCV 94.2   MCH 30.5   MCHC 32.4*   RDW 49.1   PLATELETCT 287   MPV 10.0     Recent Labs     09/30/19  0700 09/30/19  1745   SODIUM 144 141   POTASSIUM 9.9* 5.5   CHLORIDE 91* 94*   CO2 40* 33   GLUCOSE 157* 66   BUN 32* 14   CREATININE 8.83* 5.45*   CALCIUM 9.0 9.1     Recent Labs     09/30/19  0700 09/30/19  1745   ALTSGPT 60*  --    ASTSGOT 85*  --    ALKPHOSPHAT 52  --    TBILIRUBIN 0.8  --    GLUCOSE 157* 66         No results for input(s): NTPROBNP in the last 72 hours.  Recent Labs     09/30/19  0700   TRIGLYCERIDE 82     Recent Labs     09/30/19  0700   TROPONINT 73*       Urinalysis:    Recent Labs     09/30/19  0650   SPECGRAVITY 1.010   GLUCOSEUR 100*   KETONES Negative   NITRITE Negative   LEUKESTERAS Small*   WBCURINE 10-20*   RBCURINE >150*   BACTERIA Moderate*   EPITHELCELL Rare        Imaging:  DX-CHEST-LIMITED (1 VIEW)   Final Result      Minor bibasilar interstitial opacities, unchanged compared with the prior image.               INTERPRETING LOCATION: [address] [place] [address], [id number]      EC-ECHOCARDIOGRAM COMPLETE W/O CONT    (Results Pending)         Assessment/Plan:  I anticipate this patient will require at least two midnights for appropriate medical management, necessitating inpatient admission.    CHF (congestive heart failure) ([facility])- (present on admission)  Assessment & Plan  Ejection fraction 40% an echo done in 2017  He is on Coreg  Cardiology input is appreciated    COPD (chronic obstructive pulmonary disease) ([facility])- (present on admission)  Assessment & Plan  History of COPD  Not able to find any objective studies like PFTs in the records  Does not seem to be an exacerbation  No hypercapnia  I will hold treatment for COPD exacerbation at this point since no evidence of exacerbation.  Continue bronchodilators    ESRD (end stage renal disease) ([facility])- (present on admission)  Assessment & Plan  Emergent hemodialysis  today for severe hypercapnia  Will need hemodialysis in the future as scheduled before    Hyperkalemia  Assessment & Plan  Severe, potassium was 9.9 at presentation  Sinus sign on EKG with wide QRS.  Treated promptly by the ER physician with bicarbonate and calcium gluconate  Repeat potassium after hemodialysis  Now QRS is narrow with normal T waves  Seen by Dr. Chavez from cardiology      Acute respiratory failure with hypoxia (HCC)  Assessment & Plan  Secondary to fluid overload  Patient was intubated in the ER  History of COPD and CHF   End-stage renal disease on hemodialysis.  Not sure if the patient missed any hemodialysis session to be in severe fluid overload  Status post hemodialysis with removal of 3.5 L today  Good ventilatory mechanics on the ventilator  ABG did not show hypercapnia  We will check daily ABG and chest x-ray  Daily sedation holidays and SBT  Expect work extubation if there is no other etiology of his respiratory failure.      VTE prophylaxis: Heparin    Patient is critically ill.  He required critical care, with frequent bedside assessments and coordination of care and complex medical decision making.  Critical care time spent with the patient today was 35 minutes I discussed the patient with nephrology in the ER physician

## 2019-10-01 NOTE — PROGRESS NOTES
Tacho from Lab called with critical result of CO2 51.5 at 0400. Critical lab result read back to Tacho.   Dr. Meek notified of critical lab result at 0410.  Critical lab result read back by Dr. Meek.    Dr. Meek given update on pt status. No new orders received at this time. Per Dr. Meek to let Dr. Figueroa know when he calls this morning.

## 2019-10-01 NOTE — FLOWSHEET NOTE
19 0471   Events/Summary/Plan   Events/Summary/Plan Vent check   General Vent Information   #Vent Initial Day  Yes   Ventilator Red Plug Ventilator Plugged into Red Electrical Outlet   Vent Temp (Celsius) 35   Pulse Oximetry 97 %   Heart Rate (Monitored) (!) 58   Resuscitation Bag Resuscitation Bag and Mask at Bedside and Checked   CO2 Monitoring   #ETCO2 31   Vent Alarms   Ventilation Alarms Reviewed / Activated Yes   Upper Pressure Limit (HI PIP) Alarm 40   Low VE (LPM) Alarm 4   High Respiratory Rate Alarm 40   Low Respiratory Rate Alarm 8   Low VT Alarm 200   EtCO2 High Alarm 50   EtCO2 Low Alarm 20   Rodrigues Vent   Rodrigues Vent Yes   Rodrigues Vent Mode APVCMV   Rodrigues Conventional Settings   Rate (breaths/min) 18   Vt Target (mL) 420   TI (Seconds) 1.1   PEEP/CPAP 8   Pramp 50   FiO2 40   Sensitivity Setting Flow Trigger   Other Settings 5   Rodrigues Measured Parameters   P Peak (PIP) 19    Minute Volume 7.6   Control VTE (exp VT) 419   f Total (Breaths/Min) 18   P MEAN 11   Static Compliance (ml / cm H2O) 44   Servo Vent   PEEP (Monitored) (cmH2O) 8   Respiratory WDL   Respiratory (WDL) X   Chest Exam   Work Of Breathing / Effort Vented   Breath Sounds   RUL Breath Sounds Clear   RML Breath Sounds Clear   RLL Breath Sounds Clear   AMALIA Breath Sounds Clear   LLL Breath Sounds Clear   Secretions   How Sputum Obtained Endotracheal;Suction   Sputum Amount Small   Sputum Color Clear;White   Sputum Consistency Thin   Suction Frequency Suctioned Once or Twice Per Encounter

## 2019-10-01 NOTE — PROGRESS NOTES
SBT attempted again without success. Patient continues to wake to voice but does not stay awake long enough to successfully complete SBT. Propofol restarted for comfort. Family updated on plan.

## 2019-10-01 NOTE — ASSESSMENT & PLAN NOTE
Secondary to fluid overload  Patient was intubated in the ER  History of COPD and CHF   End-stage renal disease on hemodialysis.  Not sure if the patient missed any hemodialysis session to be in severe fluid overload  Status post hemodialysis daily since admission.  Good ventilatory mechanics on the ventilator  ABG did not show hypercapnia  Extubated 10/3, doing well since then

## 2019-10-01 NOTE — PROGRESS NOTES
Report to NOC JERMAINE ABDI POC and orders reviewed. Settings verified gtts noted and reviewed. VSS

## 2019-10-01 NOTE — PROGRESS NOTES
Patient doing well throughout day. Tolerated HD. Continues to follow commands appropriately. SBT started at 1400. Having intermittent periods of apnea. All sedation paused. Patient switched back to full support per RT. Will attempt SBT again this afternoon when patient more awake.

## 2019-10-01 NOTE — CONSULTS
DATE OF SERVICE:  09/30/2019    NEPHROLOGY CONSULTATION    REASON FOR CONSULTATION:  End-stage renal disease, hyperkalemia.    HISTORY OF PRESENT ILLNESS:  The patient is a very pleasant 69-year-old   gentleman known to us from outpatient dialysis care.  He receives his   maintenance hemodialysis on a Monday, Wednesday and Friday schedule at [facility] unit.  His last treatment was Friday without incident.    He was due today.  He presented himself to the emergency department at [facility] with complaints of dyspnea.  He does have a history of   rather large interdialytic weight gains and pulmonary edema.  He was placed on   BiPAP for breathing treatment.  He has severe respiratory distress and was   unable to speak.  EKG was profoundly abnormal suggestive of hyperkalemia.  He   was given intravenous calcium with some improvement of the rhythm.  Lab   evaluation demonstrated a rather significant elevation of his potassium to   9.9.  He was admitted to the intensive care unit, urgently intubated, placed   on mechanical ventilation and emergent dialysis was provided.  His rhythm has   improved.  No repeat labs are available yet at the time of this dictation.    PAST MEDICAL HISTORY:  1.  End-stage renal disease, on maintenance hemodialysis secondary to   hypertensive nephrosclerosis.  2.  Hypertension with chronic volume overload.  3.  Dyspnea, on 3.5 L of oxygen at home.  4.  History of COPD.  5.  Known congestive heart failure, degree and type not clear.    SURGICAL HISTORY:  1.  AV fistula creation.  2.  Endoscopy.  3.  Colonoscopy.    ALLERGIES:  CHLORAPREP CAUSING ITCHING.    MEDICATIONS:  Reviewed in detail and documented in chart.    SOCIAL HISTORY:  Current smoker, about a quarter pack a day.  No alcohol use.    Attends dialysis routinely.    FAMILY HISTORY:  Reviewed and noncontributory to current illnesses.  No family   history of kidney disease documented.    REVIEW  OF SYSTEMS:  Unable to obtain due to the patient's medical condition.    He is intubated, on mechanical ventilation in the intensive care unit.    PHYSICAL EXAMINATION:  VITAL SIGNS:  Reviewed.  GENERAL:  This is a very pleasant 69-year-old  gentleman.  He is not   awake, not alert.  He is sedated, on mechanical ventilation in the intensive   care unit.  HEENT:  Pupils equal, round.  Sclerae white.  Conjunctivae are a bit pale.    Mucous membranes are moist.  NECK:  Shows no lymphadenopathy or thyromegaly.  Pharynx is unable to evaluate   due to the endotracheal tube.  HEART:  Regular with some bradycardia, no S3 or rubs.  LUNGS:  Show decreased breath sounds in the bases, some crackles, no active   wheeze.  ABDOMEN:  Soft, nontender, nondistended.  Diminished bowel sounds.  EXTREMITIES:  Show no edema.  Peripheral pulses are equal and symmetric.    LABORATORY DATA:  Dated today, 09/30/2019 at 0700 shows a BUN of 32 with a   creatinine of 8.8 and a potassium of 9.9 with a sodium of 144, glucose 157,   calcium 9.0, albumin 3.7, troponin 73.    White count of 10.3 with 75% neutrophils, hemoglobin 11.0 with platelet count   of 287.  Blood gas shows pH 7.45 with a pCO2 of 52 and a pO2 of 295 with a   base excess of 10 and saturating 99.1% on 80% of FiO2 on mechanical   ventilation.    DIAGNOSTIC IMAGING:  Chest x-ray shows minor bibasilar interstitial opacities   unchanged from previous images.    ASSESSMENT:  1.  End-stage renal disease, on maintenance hemodialysis on a Monday,   Wednesday and Friday schedule.  2.  Hyperkalemia, life threatening, emergent dialysis today.  Recheck   potassium level 4 hours after dialysis to determine the need for any further   treatment.  Likely, he will have dialysis again tomorrow.  3.  Anemia secondary to chronic kidney disease.  4.  Renal osteodystrophy.  5.  Respiratory failure, on mechanical ventilation.  6.  Decreased albumin.  7.  Chronic hypertension.  8.  Chronic  obstructive pulmonary disease.    SUGGESTIONS:  1.  Emergent dialysis today.  2.  Recheck potassium no sooner than 4 hours after dialysis treatment to   determine if further treatments are needed.  3.  Low-potassium diet.  4.  Check iron stores.  5.  DILAN with dialysis to maintain hemoglobin between 10 and 11.  6.  Check PTH and vitamin D level.  7.  Dialysis again tomorrow (Tuesday).  8.  Volume off with dialysis as blood pressure tolerates.  9.  Cardiology to see.  10.  Follow labs with further recommendations to follow.    Thank you very much for this interesting consult and allowing us to   participate in his care.  We will follow closely with you.       ____________________________________     DO UNA KRAFT / DIONNE    DD:  09/30/2019 17:48:16  DT:  09/30/2019 22:32:07    D#:  [id number]  Job#:  [id number]

## 2019-10-01 NOTE — ASSESSMENT & PLAN NOTE
Severe, potassium was 9.9 at presentation  Sinus sign on EKG with wide QRS.  Treated promptly by the ER physician with bicarbonate and calcium gluconate    and today's potassium was 4.8

## 2019-10-01 NOTE — PROGRESS NOTES
[facility] -  PROGRESS NOTE               Author: Gus Murillo M.D. Date & Time: 10/1/2019  2:01 PM     HPI:  The patient is a very pleasant 69-year-old gentleman known to us from outpatient dialysis care.  He receives his maintenance hemodialysis on a Monday, Wednesday and Friday schedule at [facility].  His last treatment was Friday without incident.  He was due today.  He presented himself to the emergency department at [facility] with complaints of dyspnea.  He does have a history of rather large interdialytic weight gains and pulmonary edema.  He was placed on BiPAP for breathing treatment.  He has severe respiratory distress and was unable to speak.  EKG was profoundly abnormal suggestive of hyperkalemia.  He was given intravenous calcium with some improvement of the rhythm.  Lab evaluation demonstrated a rather significant elevation of his potassium to 9.9.  He was admitted to the intensive care unit, urgently intubated, placed on mechanical ventilation and emergent dialysis was provided.  His rhythm has improved.  No repeat labs are available yet at the time of this dictation.    DAILY NEPHROLOGY SUMMARY:  09/30 - Consult done  10/01 - RUDDYO, still in ICU and intubated    REVIEW OF SYSTEMS:    - As per HPI, otherwise review of systems negative per AMA/CMS criteria  - General:  As per HPI, otherwise negative per AMA/CMS criteria  - HEENT:  No ocular pain; no nasal discharge  - CV:  As per HPI, otherwise negative per AMA/CMS criteria  - Lungs:  As per HPI, otherwise negative per AMA/CMS criteria  - GI:  As per HPI, otherwise negative per AMA/CMS criteria  - MSK:  No joint pain; No trauma  - Skin: No rashes; No lesions  - Neuro: No paresthesia; No LOC  - Psych: No depression; No anxiety    PAST FAMILY HISTORY: Reviewed and Unchanged  SOCIAL HISTORY: Reviewed and Unchanged  CURRENT MEDICATIONS: Reviewed  IMAGING STUDIES: Reviewed    PHYSICAL  "EXAM:  VS:  BP (!) 82/55   Pulse 62   Temp 36.4 °C (97.6 °F) (Temporal)   Resp 17   Ht 1.778 m (5' 10\")   Wt 81.3 kg (179 lb 3.7 oz)   SpO2 100%   BMI 25.72 kg/m²   GENERAL:  Ill appearing male, intubated and sedated  HEENT:  NC/AT, no scleral icterus; conjunctiva normal  NECK:  Supple; Non tender  CV:  RRR, no m/r/g  LUNGS:  CTAB, no W/R  ABDOMEN:  SNTND, No rebound/guarding  EXTREMETIES:  No C/C/E  SKIN:  Warm and dry  NEURO:  A&O, no focal deficits  PSYCH:  Cooperative, appropriate mood and affect    Fluids:  In: 1164.9 [I.V.:282.4; Dialysis:500]  Out: 4260     LABS:  Recent Results (from the past 24 hour(s))   Renal Function Panel    Collection Time: 19  5:45 PM   Result Value Ref Range    Sodium 141 135 - 145 mmol/L    Potassium 5.5 3.6 - 5.5 mmol/L    Chloride 94 (L) 96 - 112 mmol/L    Co2 33 20 - 33 mmol/L    Glucose 66 65 - 99 mg/dL    Creatinine 5.45 (HH) 0.50 - 1.40 mg/dL    Bun 14 8 - 22 mg/dL    Calcium 9.1 8.4 - 10.2 mg/dL    Phosphorus 6.3 (H) 2.5 - 4.5 mg/dL    Albumin 3.8 3.2 - 4.9 g/dL   ESTIMATED GFR    Collection Time: 19  5:45 PM   Result Value Ref Range    GFR If  13 (A) >60 mL/min/1.73 m 2    GFR If Non African American 11 (A) >60 mL/min/1.73 m 2   EKG    Collection Time: 10/01/19 12:43 AM   Result Value Ref Range    Report       [facility]    Test Date:  2019-10-01  Pt Name:    AURELIA DAVIS                  Department: ICU  MRN:        [id number]                      Room:       [facility]  Gender:     Male                         Technician: KAMALJIT  :        1950                   Requested By:INGRID DICKSON  Order #:    [id number]                    Reading MD: Giuseppe Ontiveros MD    Measurements  Intervals                                Axis  Rate:       55                           P:          69  NV:         144                          QRS:        41  QRSD:       100                          T:          84  QT:         544  QTc:        " 521    Interpretive Statements  SINUS BRADYCARDIA  T WAVE ABNORMALITIES, ANTERIOR LEADS  PROLONGED QT INTERVAL      Electronically Signed On 10-1-2019 9:02:09 PDT by Giuseppe Ontiveros MD     ARTERIAL BLOOD GAS    Collection Time: 10/01/19  3:30 AM   Result Value Ref Range    Ph 7.43 7.40 - 7.50    Pco2 51.5 (HH) 26.0 - 37.0 mmHg    Po2 83.5 64.0 - 87.0 mmHg    O2 Saturation 95.4 93.0 - 99.0 %    Hco3 34 (H) 17 - 25 mmol/L    Base Excess 8 (H) -4 - 3 mmol/L    Body Temp 37.0 Centigrade    FIO2 40 30 - 60 %    Ph -TC 7.43 7.40 - 7.50    Pco2 -TC 51.5 (HH) 26.0 - 37.0 mmHg    Po2 -TC 83.5 64.0 - 87.0 mmHg   Comp Metabolic Panel    Collection Time: 10/01/19  3:45 AM   Result Value Ref Range    Sodium 140 135 - 145 mmol/L    Potassium 5.6 (H) 3.6 - 5.5 mmol/L    Chloride 94 (L) 96 - 112 mmol/L    Co2 34 (H) 20 - 33 mmol/L    Anion Gap 12.0 (H) 0.0 - 11.9    Glucose 67 65 - 99 mg/dL    Bun 17 8 - 22 mg/dL    Creatinine 6.50 (HH) 0.50 - 1.40 mg/dL    Calcium 8.9 8.4 - 10.2 mg/dL    AST(SGOT) 50 (H) 12 - 45 U/L    ALT(SGPT) 52 (H) 2 - 50 U/L    Alkaline Phosphatase 52 30 - 99 U/L    Total Bilirubin 0.5 0.1 - 1.5 mg/dL    Albumin 3.6 3.2 - 4.9 g/dL    Total Protein 5.9 (L) 6.0 - 8.2 g/dL    Globulin 2.3 1.9 - 3.5 g/dL    A-G Ratio 1.6 g/dL   PHOSPHORUS    Collection Time: 10/01/19  3:45 AM   Result Value Ref Range    Phosphorus 7.0 (H) 2.5 - 4.5 mg/dL   PTH INTACT (PTH ONLY)    Collection Time: 10/01/19  3:45 AM   Result Value Ref Range    Pth, Intact 339.2 (H) 14.0 - 72.0 pg/mL   VITAMIN D,25 HYDROXY    Collection Time: 10/01/19  3:45 AM   Result Value Ref Range    25-Hydroxy   Vitamin D 25 45 30 - 100 ng/mL   URIC ACID    Collection Time: 10/01/19  3:45 AM   Result Value Ref Range    Uric Acid 3.4 2.5 - 8.3 mg/dL   FERRITIN    Collection Time: 10/01/19  3:45 AM   Result Value Ref Range    Ferritin 232.1 22.0 - 322.0 ng/mL   CBC WITH DIFFERENTIAL    Collection Time: 10/01/19  3:45 AM   Result Value Ref Range    WBC 6.5 4.8  - 10.8 K/uL    RBC 3.45 (L) 4.70 - 6.10 M/uL    Hemoglobin 10.4 (L) 14.0 - 18.0 g/dL    Hematocrit 32.9 (L) 42.0 - 52.0 %    MCV 95.4 81.4 - 97.8 fL    MCH 30.1 27.0 - 33.0 pg    MCHC 31.6 (L) 33.7 - 35.3 g/dL    RDW 50.3 (H) 35.9 - 50.0 fL    Platelet Count 191 164 - 446 K/uL    MPV 10.3 9.0 - 12.9 fL    Neutrophils-Polys 69.40 44.00 - 72.00 %    Lymphocytes 17.70 (L) 22.00 - 41.00 %    Monocytes 9.60 0.00 - 13.40 %    Eosinophils 2.50 0.00 - 6.90 %    Basophils 0.60 0.00 - 1.80 %    Immature Granulocytes 0.20 0.00 - 0.90 %    Nucleated RBC 0.00 /100 WBC    Neutrophils (Absolute) 4.51 1.82 - 7.42 K/uL    Lymphs (Absolute) 1.15 1.00 - 4.80 K/uL    Monos (Absolute) 0.62 0.00 - 0.85 K/uL    Eos (Absolute) 0.16 0.00 - 0.51 K/uL    Baso (Absolute) 0.04 0.00 - 0.12 K/uL    Immature Granulocytes (abs) 0.01 0.00 - 0.11 K/uL    NRBC (Absolute) 0.00 K/uL   CREATINE KINASE    Collection Time: 10/01/19  3:45 AM   Result Value Ref Range    CPK Total 151 0 - 154 U/L   ESTIMATED GFR    Collection Time: 10/01/19  3:45 AM   Result Value Ref Range    GFR If African American 10 (A) >60 mL/min/1.73 m 2    GFR If Non African American 9 (A) >60 mL/min/1.73 m 2   EC-ECHOCARDIOGRAM COMPLETE W/ CONT    Collection Time: 10/01/19  9:38 AM   Result Value Ref Range    Eject.Frac. MOD BP 50.18     Eject.Frac. MOD 4C 45.19     Eject.Frac. MOD 2C 55.62     Left Ventrical Ejection Fraction 35        (click the triangle to expand results)    IMPRESSION:  - ESRD    * Etiology likely 2/2 HTN  - Hyperkalemia    * Life threatining, tx with HD    * Improved  - Acute respiratory failure, with hypoxia    * On Vent  - HTN    * Goal BP < 140/90  - Anemia of CKD    * Goal Hgb 10-11  - CKD-MBD    * Managed at HD unit  - COPD  - HLD  - CAD    PLAN:  - MWF iHD starting tomorrow  - UF as tolerated  - No dietary protein restrictions  - Dose all meds per ESRD  - DILAN as needed

## 2019-10-01 NOTE — CARE PLAN
Problem: Communication  Goal: The ability to communicate needs accurately and effectively will improve  Outcome: PROGRESSING AS EXPECTED  POC discussed with family and patient. Family understands plan for SBT today as patient tolerates.      Problem: Respiratory:  Goal: Respiratory status will improve  Outcome: PROGRESSING SLOWER THAN EXPECTED  Patient on ventilator. Settings adjusted per RT and MD. Plan for SBT today.      Problem: Psychosocial Needs:  Goal: Level of anxiety will decrease  Outcome: PROGRESSING SLOWER THAN EXPECTED   Patient demonstrates anxiety when being stimulated. Reorientation provided. Sedation adjusted as needed.

## 2019-10-01 NOTE — PROGRESS NOTES
Report received from Avril DEAN, pt care assumed. All lines and drips verified. L&R soft wrists restraints in place. Wylie cath in place. Intubated with vent settings at 18/420/8/40%. Propofol turned down. All needs currently met & safety precautions in place. Will continue to monitor. Hourly rounds continue.

## 2019-10-01 NOTE — PROGRESS NOTES
0700-Report from JERMAINE Pacheco. POC reviewed. All lines, tubes, and drains over viewed. Family at bedside with patient. Patient wakes to stimuli and follows commands. Will continue to monitor.     0900-Dialysis started. Family updated.

## 2019-10-01 NOTE — PROGRESS NOTES
[facility] Progress Note      HD ordered by Dr. Murillo. Treatment started at 0909 and ended at 1239.     Total Net UF 3000mL.    Pt tolerated treatment well with no issues. See paper flow sheet for details. Cannulation needles taken out, held pressure for 10 min and placed gauze dressing with no bleeding. DURAN AVF + for bruit/thrill. Report given to JESUS Mason RN.

## 2019-10-01 NOTE — ASSESSMENT & PLAN NOTE
Ejection fraction 35% an echo done this admission   He is on Coreg  Cardiology input is appreciated

## 2019-10-02 ENCOUNTER — APPOINTMENT (OUTPATIENT)
Dept: RADIOLOGY | Facility: MEDICAL CENTER | Age: 69
DRG: 207 | End: 2019-10-02
Attending: INTERNAL MEDICINE
Payer: MEDICARE

## 2019-10-02 LAB
ACTION RANGE TRIGGERED IACRT: NO
ANION GAP SERPL CALC-SCNC: 17 MMOL/L (ref 0–11.9)
BACTERIA UR CULT: NORMAL
BASE EXCESS BLDA CALC-SCNC: 6 MMOL/L (ref -4–3)
BODY TEMPERATURE: ABNORMAL DEGREES
BUN SERPL-MCNC: 16 MG/DL (ref 8–22)
CALCIUM SERPL-MCNC: 9.7 MG/DL (ref 8.4–10.2)
CHLORIDE SERPL-SCNC: 87 MMOL/L (ref 96–112)
CO2 BLDA-SCNC: 32 MMOL/L (ref 20–33)
CO2 SERPL-SCNC: 30 MMOL/L (ref 20–33)
CREAT SERPL-MCNC: 5.57 MG/DL (ref 0.5–1.4)
GLUCOSE BLD-MCNC: 101 MG/DL (ref 65–99)
GLUCOSE BLD-MCNC: 61 MG/DL (ref 65–99)
GLUCOSE BLD-MCNC: 90 MG/DL (ref 65–99)
GLUCOSE SERPL-MCNC: 64 MG/DL (ref 65–99)
HCO3 BLDA-SCNC: 30.6 MMOL/L (ref 17–25)
HOROWITZ INDEX BLDA+IHG-RTO: 277 MM[HG]
INST. QUALIFIED PATIENT IIQPT: YES
MAGNESIUM SERPL-MCNC: 2.1 MG/DL (ref 1.5–2.5)
O2/TOTAL GAS SETTING VFR VENT: 35 %
PCO2 BLDA: 42.6 MMHG (ref 26–37)
PH BLDA: 7.46 [PH] (ref 7.4–7.5)
PHOSPHATE SERPL-MCNC: 6.4 MG/DL (ref 2.5–4.5)
PO2 BLDA: 97 MMHG (ref 64–87)
POTASSIUM SERPL-SCNC: 5.4 MMOL/L (ref 3.6–5.5)
SAO2 % BLDA: 98 % (ref 93–99)
SIGNIFICANT IND 70042: NORMAL
SITE SITE: NORMAL
SODIUM SERPL-SCNC: 134 MMOL/L (ref 135–145)
SOURCE SOURCE: NORMAL
SPECIMEN DRAWN FROM PATIENT: ABNORMAL
TRIGL SERPL-MCNC: 187 MG/DL (ref 0–149)

## 2019-10-02 PROCEDURE — A9270 NON-COVERED ITEM OR SERVICE: HCPCS | Performed by: INTERNAL MEDICINE

## 2019-10-02 PROCEDURE — 770022 HCHG ROOM/CARE - ICU (200)

## 2019-10-02 PROCEDURE — 90935 HEMODIALYSIS ONE EVALUATION: CPT

## 2019-10-02 PROCEDURE — 80048 BASIC METABOLIC PNL TOTAL CA: CPT

## 2019-10-02 PROCEDURE — 700102 HCHG RX REV CODE 250 W/ 637 OVERRIDE(OP): Performed by: INTERNAL MEDICINE

## 2019-10-02 PROCEDURE — 5A1D70Z PERFORMANCE OF URINARY FILTRATION, INTERMITTENT, LESS THAN 6 HOURS PER DAY: ICD-10-PCS | Performed by: INTERNAL MEDICINE

## 2019-10-02 PROCEDURE — 94770 HCHG CO2 EXPIRED GAS DETERMINATION: CPT

## 2019-10-02 PROCEDURE — 84100 ASSAY OF PHOSPHORUS: CPT

## 2019-10-02 PROCEDURE — 82962 GLUCOSE BLOOD TEST: CPT

## 2019-10-02 PROCEDURE — 83735 ASSAY OF MAGNESIUM: CPT

## 2019-10-02 PROCEDURE — 36600 WITHDRAWAL OF ARTERIAL BLOOD: CPT

## 2019-10-02 PROCEDURE — 84478 ASSAY OF TRIGLYCERIDES: CPT

## 2019-10-02 PROCEDURE — 700111 HCHG RX REV CODE 636 W/ 250 OVERRIDE (IP): Performed by: INTERNAL MEDICINE

## 2019-10-02 PROCEDURE — 700101 HCHG RX REV CODE 250: Performed by: INTERNAL MEDICINE

## 2019-10-02 PROCEDURE — 82803 BLOOD GASES ANY COMBINATION: CPT

## 2019-10-02 PROCEDURE — 99291 CRITICAL CARE FIRST HOUR: CPT | Performed by: INTERNAL MEDICINE

## 2019-10-02 PROCEDURE — 3E043XZ INTRODUCTION OF VASOPRESSOR INTO CENTRAL VEIN, PERCUTANEOUS APPROACH: ICD-10-PCS | Performed by: INTERNAL MEDICINE

## 2019-10-02 PROCEDURE — 700105 HCHG RX REV CODE 258: Performed by: INTERNAL MEDICINE

## 2019-10-02 PROCEDURE — 94003 VENT MGMT INPAT SUBQ DAY: CPT

## 2019-10-02 PROCEDURE — 71045 X-RAY EXAM CHEST 1 VIEW: CPT

## 2019-10-02 RX ORDER — CINACALCET 30 MG/1
60 TABLET, FILM COATED ORAL EVERY EVENING
Status: DISCONTINUED | OUTPATIENT
Start: 2019-10-02 | End: 2019-10-07 | Stop reason: HOSPADM

## 2019-10-02 RX ORDER — BISACODYL 10 MG
10 SUPPOSITORY, RECTAL RECTAL
Status: DISCONTINUED | OUTPATIENT
Start: 2019-10-02 | End: 2019-10-05

## 2019-10-02 RX ORDER — AZITHROMYCIN 250 MG/1
500 TABLET, FILM COATED ORAL DAILY
Status: DISCONTINUED | OUTPATIENT
Start: 2019-10-02 | End: 2019-10-02

## 2019-10-02 RX ORDER — AMOXICILLIN 250 MG
2 CAPSULE ORAL 2 TIMES DAILY
Status: DISCONTINUED | OUTPATIENT
Start: 2019-10-02 | End: 2019-10-05

## 2019-10-02 RX ORDER — METHYLPREDNISOLONE SODIUM SUCCINATE 40 MG/ML
40 INJECTION, POWDER, LYOPHILIZED, FOR SOLUTION INTRAMUSCULAR; INTRAVENOUS EVERY 6 HOURS
Status: DISCONTINUED | OUTPATIENT
Start: 2019-10-02 | End: 2019-10-06

## 2019-10-02 RX ORDER — DOXYCYCLINE 100 MG/1
100 TABLET ORAL EVERY 12 HOURS
Status: DISCONTINUED | OUTPATIENT
Start: 2019-10-02 | End: 2019-10-05

## 2019-10-02 RX ORDER — CARVEDILOL 25 MG/1
25 TABLET ORAL 2 TIMES DAILY WITH MEALS
Status: DISCONTINUED | OUTPATIENT
Start: 2019-10-02 | End: 2019-10-05

## 2019-10-02 RX ORDER — SEVELAMER CARBONATE 800 MG/1
800 TABLET, FILM COATED ORAL
Status: DISCONTINUED | OUTPATIENT
Start: 2019-10-02 | End: 2019-10-04

## 2019-10-02 RX ORDER — ASPIRIN 81 MG/1
81 TABLET, CHEWABLE ORAL EVERY EVENING
Status: DISCONTINUED | OUTPATIENT
Start: 2019-10-02 | End: 2019-10-05

## 2019-10-02 RX ORDER — POLYETHYLENE GLYCOL 3350 17 G/17G
1 POWDER, FOR SOLUTION ORAL
Status: DISCONTINUED | OUTPATIENT
Start: 2019-10-02 | End: 2019-10-05

## 2019-10-02 RX ORDER — LISINOPRIL 5 MG/1
10 TABLET ORAL EVERY EVENING
Status: DISCONTINUED | OUTPATIENT
Start: 2019-10-02 | End: 2019-10-05

## 2019-10-02 RX ADMIN — PROPOFOL 15 MCG/KG/MIN: 10 INJECTION, EMULSION INTRAVENOUS at 06:47

## 2019-10-02 RX ADMIN — METHYLPREDNISOLONE SODIUM SUCCINATE 40 MG: 40 INJECTION, POWDER, FOR SOLUTION INTRAMUSCULAR; INTRAVENOUS at 20:24

## 2019-10-02 RX ADMIN — FAMOTIDINE 20 MG: 10 INJECTION INTRAVENOUS at 05:02

## 2019-10-02 RX ADMIN — DEXMEDETOMIDINE HYDROCHLORIDE 0.2 MCG/KG/HR: 100 INJECTION, SOLUTION INTRAVENOUS at 12:01

## 2019-10-02 RX ADMIN — DEXTROSE MONOHYDRATE 250 ML: 100 INJECTION, SOLUTION INTRAVENOUS at 10:07

## 2019-10-02 RX ADMIN — DEXMEDETOMIDINE HYDROCHLORIDE 0.6 MCG/KG/HR: 100 INJECTION, SOLUTION INTRAVENOUS at 20:52

## 2019-10-02 RX ADMIN — HEPARIN SODIUM 5000 UNITS: 5000 INJECTION, SOLUTION INTRAVENOUS; SUBCUTANEOUS at 14:16

## 2019-10-02 RX ADMIN — DOXYCYCLINE 100 MG: 100 TABLET, FILM COATED ORAL at 20:48

## 2019-10-02 RX ADMIN — HEPARIN SODIUM 5000 UNITS: 5000 INJECTION, SOLUTION INTRAVENOUS; SUBCUTANEOUS at 21:16

## 2019-10-02 RX ADMIN — SENNOSIDES AND DOCUSATE SODIUM 2 TABLET: 8.6; 5 TABLET ORAL at 17:42

## 2019-10-02 RX ADMIN — NOREPINEPHRINE BITARTRATE 10 MCG/MIN: 1 INJECTION INTRAVENOUS at 12:31

## 2019-10-02 RX ADMIN — HEPARIN SODIUM 5000 UNITS: 5000 INJECTION, SOLUTION INTRAVENOUS; SUBCUTANEOUS at 05:02

## 2019-10-02 RX ADMIN — HEPARIN SODIUM 1300 UNITS: 1000 INJECTION, SOLUTION INTRAVENOUS; SUBCUTANEOUS at 10:18

## 2019-10-02 RX ADMIN — ASPIRIN 81 MG CHEWABLE TABLET 81 MG: 81 TABLET CHEWABLE at 17:42

## 2019-10-02 NOTE — PROGRESS NOTES
Critical Care Progress Note    Date of admission  9/30/2019    Chief Complaint  69 y.o. male admitted 9/30/2019 with severe hyperkalemia     Hospital Course    admitted from ER  Emergent HD yesterday and HD today         Interval Problem Update  Reviewed last 24 hour events:  Could not tolerate SBT today   Afebrile   K + normalized   EKG normalized       Review of Systems  ROS   Unable to obtain   Vital Signs for last 24 hours   Temp:  [36.4 °C (97.5 °F)-37 °C (98.6 °F)] 37 °C (98.6 °F)  Pulse:  [48-79] 78  Resp:  [17-41] 20  BP: ()/(50-71) 93/60  SpO2:  [90 %-100 %] 100 %    Hemodynamic parameters for last 24 hours       Respiratory Information for the last 24 hours  Rodrigues Vent Mode: APVCMV  Rate (breaths/min): 18  Vt Target (mL): 420  PEEP/CPAP: 8  FiO2: 35  P Support: 5  P MEAN: 11  Control VTE (exp VT): 419    Physical Exam   Physical Exam  Constitutional: He appears well-developed.   Intubated and sedated, continued to be restless when I saw him in the emergency room   HENT:   Head: Normocephalic and atraumatic.   Right Ear: External ear normal.   Left Ear: External ear normal.   Mouth/Throat: No oropharyngeal exudate.   Eyes: Pupils are equal, round, and reactive to light. Right eye exhibits no discharge. Left eye exhibits no discharge.   Neck: Neck supple. No JVD present. No tracheal deviation present. No thyromegaly present.   Cardiovascular: Normal rate, regular rhythm and normal heart sounds. Exam reveals no friction rub.   No murmur heard.  Pulmonary/Chest: Effort normal. He has rales.   Diminished air movement   Abdominal: Soft. Bowel sounds are normal. He exhibits no distension. There is no tenderness. There is no rebound.   Musculoskeletal: He exhibits no edema, tenderness or deformity.   Neurological: No cranial nerve deficit. Coordination normal.   Intubated and sedated   Skin: Skin is warm and dry. No rash noted. No erythema.   Psychiatric: He has a normal mood and affect.    Medications  Current Facility-Administered Medications   Medication Dose Route Frequency Provider Last Rate Last Dose   • heparin injection 1,750 Units  1,750 Units Intravenous DIALYSIS PRN Gus Murillo M.D.       • Respiratory Care per Protocol   Nebulization Continuous RT Monique Figueroa M.D.       • ipratropium-albuterol (DUONEB) nebulizer solution  3 mL Nebulization Q2HRS PRN (RT) Monique Figueroa M.D.   3 mL at 10/01/19 1412   • famotidine (PEPCID) tablet 20 mg  20 mg Enteral Tube DAILY Monique Figueroa M.D.        Or   • famotidine (PEPCID) injection 20 mg  20 mg Intravenous DAILY Moinque Figueroa M.D.   20 mg at 10/01/19 0531   • lidocaine (XYLOCAINE) 1 % injection 1-2 mL  1-2 mL Tracheal Tube Q30 MIN PRN Monique Figueroa M.D.       • heparin injection 5,000 Units  5,000 Units Subcutaneous Q8HRS Monique Figueroa M.D.   5,000 Units at 10/01/19 1443   • fentaNYL (SUBLIMAZE) 50 mcg/mL in 50mL   Intravenous Continuous Monique Figueroa M.D. 0 mL/hr at 10/01/19 1414     • aspirin (ASA) chewable tab 81 mg  81 mg Oral Q EVENING Monique Figueroa M.D.   Stopped at 09/30/19 1800   • carvedilol (COREG) tablet 25 mg  25 mg Oral BID WITH MEALS Monique Figueroa M.D.   Stopped at 09/30/19 0900   • cinacalcet (SENSIPAR) tablet 60 mg  60 mg Oral Q EVENING Monique Figueroa M.D.   Stopped at 09/30/19 1800   • lisinopril (PRINIVIL) tablet 10 mg  10 mg Oral Q EVENING Monique Figueroa M.D.   Stopped at 09/30/19 1800   • sevelamer carbonate (RENVELA) tablet 800 mg  800 mg Oral TID WITH MEALS Monique Figueroa M.D.   Stopped at 09/30/19 1130   • senna-docusate (PERICOLACE or SENOKOT S) 8.6-50 MG per tablet 2 Tab  2 Tab Oral BID Monique Figueroa M.D.   Stopped at 09/30/19 0900    And   • polyethylene glycol/lytes (MIRALAX) PACKET 1 Packet  1 Packet Oral QDAY PRN Monique Figueroa M.D.        And   • magnesium hydroxide (MILK OF MAGNESIA) suspension 30 mL  30 mL Oral QDAY PRN Monique Figueroa M.D.        And   • bisacodyl (DULCOLAX) suppository 10 mg  10 mg  Rectal QDAY PRN Monique Figueroa M.D.       • propofol (DIPRIVAN) injection  0-80 mcg/kg/min Intravenous Continuous Monique Figueroa M.D. 8.1 mL/hr at 10/01/19 2100 15 mcg/kg/min at 10/01/19 2100       Fluids    Intake/Output Summary (Last 24 hours) at 10/1/2019 2207  Last data filed at 10/1/2019 2000  Gross per 24 hour   Intake 651.94 ml   Output 3810 ml   Net -3158.06 ml       Laboratory  Recent Labs     09/30/19  0730 09/30/19  1346 10/01/19  0330   LQOQA94L 7.45  --  7.43   [id number] 52.0*  --  51.5*   [id number] 295.3*  --  83.5   VHSJ0XQC 99.1*  --  95.4   ARTHCO3 35*  --  34*   FIO2 80*  --  40   ARTBE 10*  --  8*   ISTATAPH  --  7.535*  --    ISTATAPCO2  --  38.0*  --    ISTATAPO2  --  74  --    ISTATATCO2  --  33  --    NOSQDXH7THF  --  96  --    ISTATARTHCO3  --  32.0*  --    ISTATARTBE  --  9*  --    ISTATTEMP  --  97.2 F  --    ISTATFIO2  --  40  --    ISTATSPEC  --  Arterial  --    ISTATAPHTC  --  7.547*  --    [id number]  --  70  --      Recent Labs     10/01/19  0345   CPKTOTAL 151     Recent Labs     09/30/19  0700 09/30/19  1745 10/01/19  0345   SODIUM 144 141 140   POTASSIUM 9.9* 5.5 5.6*   CHLORIDE 91* 94* 94*   CO2 40* 33 34*   BUN 32* 14 17   CREATININE 8.83* 5.45* 6.50*   PHOSPHORUS  --  6.3* 7.0*   CALCIUM 9.0 9.1 8.9     Recent Labs     09/30/19  0700 09/30/19  1745 10/01/19  0345   ALTSGPT 60*  --  52*   ASTSGOT 85*  --  50*   ALKPHOSPHAT 52  --  52   TBILIRUBIN 0.8  --  0.5   GLUCOSE 157* 66 67     Recent Labs     09/30/19  0700 10/01/19  0345   WBC 10.3 6.5   NEUTSPOLYS 75.10* 69.40   LYMPHOCYTES 13.70* 17.70*   MONOCYTES 7.90 9.60   EOSINOPHILS 2.10 2.50   BASOPHILS 0.70 0.60   ASTSGOT 85* 50*   ALTSGPT 60* 52*   ALKPHOSPHAT 52 52   TBILIRUBIN 0.8 0.5     Recent Labs     09/30/19  0700 10/01/19  0345   RBC 3.61* 3.45*   HEMOGLOBIN 11.0* 10.4*   HEMATOCRIT 34.0* 32.9*   PLATELETCT 287 191   IRON  --  41*   FERRITIN  --  232.1   TOTIRONBC  --  263       Imaging  X-Ray:  I have personally  reviewed the images and compared with prior images.    Assessment/Plan  CHF (congestive heart failure) ([facility])- (present on admission)  Assessment & Plan  Ejection fraction 35% an echo done this admission   He is on Coreg  Cardiology input is appreciated    COPD (chronic obstructive pulmonary disease) ([facility])- (present on admission)  Assessment & Plan  History of COPD  Not able to find any objective studies like PFTs in the records  Does not seem to be an exacerbation  No hypercapnia  I will hold treatment for COPD exacerbation at this point since no evidence of exacerbation.  Continue bronchodilators    ESRD (end stage renal disease) ([facility])- (present on admission)  Assessment & Plan  Emergent hemodialysis yesterday and HD today  for severe hypercapnia  More than 6 Liters removed. K+ normalized   Will need hemodialysis in the future as scheduled before    Hyperkalemia  Assessment & Plan  Severe, potassium was 9.9 at presentation  Sinus sign on EKG with wide QRS.  Treated promptly by the ER physician with bicarbonate and calcium gluconate  Repeat potassium after hemodialysis  Now QRS is narrow with normal T waves  Seen by Dr. Chavez from cardiology      Acute respiratory failure with hypoxia ([facility])  Assessment & Plan  Secondary to fluid overload  Patient was intubated in the ER  History of COPD and CHF   End-stage renal disease on hemodialysis.  Not sure if the patient missed any hemodialysis session to be in severe fluid overload  Status post hemodialysis with removal of more than 6 L   Good ventilatory mechanics on the ventilator  ABG did not show hypercapnia  We will check daily ABG and chest x-ray  Daily sedation holidays and SBT  Expect work extubation if there is no other etiology of his respiratory failure.  Was very weak to be extubated today with apneas          VTE:  Heparin  Ulcer: H2 Antagonist  Lines: Central Line  Ongoing indication addressed    I have performed a physical exam and reviewed and updated ROS  and Plan today (10/1/2019). In review of yesterday's note (9/30/2019), there are no changes except as documented above.     Discussed patient condition and risk of morbidity and/or mortality with Family, RN, RT, Charge nurse / hot rounds and cardiology and nephrology  The patient remains critically ill.  Critical care time = 33 minutes in directly providing and coordinating critical care and extensive data review.  No time overlap and excludes procedures.

## 2019-10-02 NOTE — CARE PLAN
Problem: Communication  Goal: The ability to communicate needs accurately and effectively will improve  Outcome: PROGRESSING AS EXPECTED  Plan of care discussed with patient. Re-oriented to situation-need for ET tube and restraints.        Problem: Safety  Goal: Will remain free from injury  Outcome: PROGRESSING AS EXPECTED  Patient in bilateral wrist restraints for protection of ET tube. Patient has reaching for ET tube several times this AM.      Problem: Venous Thromboembolism (VTW)/Deep Vein Thrombosis (DVT) Prevention:  Goal: Patient will participate in Venous Thrombosis (VTE)/Deep Vein Thrombosis (DVT)Prevention Measures  Outcome: PROGRESSING AS EXPECTED  Patient on sub q heparin. SCDs off per pt request.      Problem: Respiratory:  Goal: Respiratory status will improve  Outcome: PROGRESSING SLOWER THAN EXPECTED   Patient remains on ventilator. Unsuccessful attempts at SBTs. Will try again after dialysis.

## 2019-10-02 NOTE — PROGRESS NOTES
FSBS checked = 61. Discussed with Dr. Figueroa. Order placed for one time dose dextrose 10%. FSBS checked post dextrose infusion = 90. To start tube feed today. Patient remains agitated and restless with stimulation. Has attempted self extubation x2. Sedation adjusted.

## 2019-10-02 NOTE — RESPIRATORY CARE
Adult Ventilation Update    Total Vent Days: 3    Patient Lines/Drains/Airways Status    Active Airway     Name: Placement date: Placement time: Site: Days:    Airway ETT Oral 8.0  09/30/19 0634   Oral  1              In the last 24 hours, the patient tolerated SBT for 15 minutes.     Plateau Pressure (Q Shift): 18 (10/01/19 1039)  Static Compliance (ml / cm H2O): 62 (10/02/19 0352)    Patient failed trials because of    Barriers to SBT Weaning Trial Stopped due to:: Apnea > 30 seconds X 4 (10/01/19 1420)  Length of Weaning Trial Length of Weaning Trial (Hours): (15 minutes) (10/01/19 1420)      Sputum/Suction   Cough: Strong;Productive;Congested (10/02/19 0400)  Sputum Amount: Moderate (10/02/19 0400)  Sputum Color: Clear;White;Yellow (10/02/19 0400)  Sputum Consistency: Thin;Thick (10/02/19 0400)    Mobility  Level of Mobility: Level IV (10/02/19 0400)  Activity Performed: Sitting up in bed (10/02/19 0400)  Assistance: Assistance of Two or More (10/01/19 1800)  Pt Calls for Assistance: No (10/01/19 1800)  Staff Present for Mobilization: RN;CNA (10/01/19 1800)  Reason Not Mobilized: Bed rest (10/01/19 1800)    Events/Summary/Plan: no changes this shift.

## 2019-10-02 NOTE — PROGRESS NOTES
Hemodialysis done today, started @ 0737 and ended @ 1115 with net UF= 1400ml. Also noted rising arterial pressure and requiring NS flushes and dropped QB to 350, Dr Murillo notified. Report given to JERMAINE Mason. See flow sheet for details.

## 2019-10-02 NOTE — PROGRESS NOTES
"[facility] -  PROGRESS NOTE               Author: Gomez Chavez D.O. Date & Time: 10/2/2019  4:29 PM     HPI:  The patient is a very pleasant 69-year-old gentleman known to us from outpatient dialysis care.  He receives his maintenance hemodialysis on a Monday, Wednesday and Friday schedule at [facility].  His last treatment was Friday without incident.  He was due today.  He presented himself to the emergency department at [facility] with complaints of dyspnea.  He does have a history of rather large interdialytic weight gains and pulmonary edema.  He was placed on BiPAP for breathing treatment.  He has severe respiratory distress and was unable to speak.  EKG was profoundly abnormal suggestive of hyperkalemia.  He was given intravenous calcium with some improvement of the rhythm.  Lab evaluation demonstrated a rather significant elevation of his potassium to 9.9.  He was admitted to the intensive care unit, urgently intubated, placed on mechanical ventilation and emergent dialysis was provided.  His rhythm has improved.  No repeat labs are available yet at the time of this dictation.    DAILY NEPHROLOGY SUMMARY:   09/30 - Consult done   10/01 - ANOOPEO, still in ICU and intubated   10/02 - Dialysis this AM.  Awake for a bit but agitated, now sedated.  Failed SBT with apnea.     REVIEW OF SYSTEMS:    Intubated    PAST FAMILY HISTORY: Reviewed and Unchanged  SOCIAL HISTORY: Reviewed and Unchanged  CURRENT MEDICATIONS: Reviewed  IMAGING STUDIES: Reviewed    PHYSICAL EXAM:  VS:  BP (!) 98/56   Pulse 65   Temp 37.1 °C (98.7 °F) (Temporal)   Resp 17   Ht 1.778 m (5' 10\")   Wt 78.2 kg (172 lb 6.4 oz)   SpO2 100%   BMI 24.74 kg/m²   GENERAL:  Ill appearing male, intubated and sedated  HEENT:  NC/AT, no scleral icterus; conjunctiva normal  NECK:  Supple; Non tender  CV:  RRR, no m/r/g  LUNGS:  CTAB, no W/R  ABDOMEN:  SNTND, No " rebound/guarding  EXTREMETIES:  No C/C/E  SKIN:  Warm and dry  NEURO:  A&O, no focal deficits  PSYCH:  Cooperative, appropriate mood and affect    Fluids:  In: 659 [I.V.:159; Dialysis:500]  Out: 3735     LABS:  Recent Results (from the past 24 hour(s))   Basic Metabolic Panel (BMP)    Collection Time: 10/02/19  3:30 AM   Result Value Ref Range    Sodium 134 (L) 135 - 145 mmol/L    Potassium 5.4 3.6 - 5.5 mmol/L    Chloride 87 (L) 96 - 112 mmol/L    Co2 30 20 - 33 mmol/L    Glucose 64 (L) 65 - 99 mg/dL    Bun 16 8 - 22 mg/dL    Creatinine 5.57 (HH) 0.50 - 1.40 mg/dL    Calcium 9.7 8.4 - 10.2 mg/dL    Anion Gap 17.0 (H) 0.0 - 11.9   Magnesium    Collection Time: 10/02/19  3:30 AM   Result Value Ref Range    Magnesium 2.1 1.5 - 2.5 mg/dL   Phosphorus    Collection Time: 10/02/19  3:30 AM   Result Value Ref Range    Phosphorus 6.4 (H) 2.5 - 4.5 mg/dL   Triglyceride    Collection Time: 10/02/19  3:30 AM   Result Value Ref Range    Triglycerides 187 (H) 0 - 149 mg/dL   ESTIMATED GFR    Collection Time: 10/02/19  3:30 AM   Result Value Ref Range    GFR If  12 (A) >60 mL/min/1.73 m 2    GFR If Non African American 10 (A) >60 mL/min/1.73 m 2   ISTAT ARTERIAL BLOOD GAS    Collection Time: 10/02/19  3:53 AM   Result Value Ref Range    Ph 7.464 7.400 - 7.500    Pco2 42.6 (H) 26.0 - 37.0 mmHg    Po2 97 (H) 64 - 87 mmHg    Tco2 32 20 - 33 mmol/L    S02 98 93 - 99 %    Hco3 30.6 (H) 17.0 - 25.0 mmol/L    BE 6 (H) -4 - 3 mmol/L    Body Temp see below degrees    O2 Therapy 35 %    iPF Ratio 277     Specimen Arterial     Action Range Triggered NO     Inst. Qualified Patient YES    ACCU-CHEK GLUCOSE    Collection Time: 10/02/19  9:06 AM   Result Value Ref Range    Glucose - Accu-Ck 61 (L) 65 - 99 mg/dL   ACCU-CHEK GLUCOSE    Collection Time: 10/02/19 10:28 AM   Result Value Ref Range    Glucose - Accu-Ck 90 65 - 99 mg/dL       (click the triangle to expand results)    IMPRESSION:  - ESRD    * Etiology likely 2/2 HTN     * Dialysis today (WED)    * Continue maintenance dialysis qMWF and PRN  - Hyperkalemia    * Life threatining, tx with HD    * Improved  - Acute respiratory failure, with hypoxia    * On Vent  - HTN    * Goal BP < 140/90  - Anemia of CKD    * Goal Hgb 10-11  - CKD-MBD    * Managed at HD unit  - COPD  - HLD  - CAD    PLAN:  - Dialysis today (WED)  - Maintenance dialysis qMWF and PRN  - UF as tolerated  - No dietary protein restrictions  - Dose all meds per ESRD  - No dietary protein restrictions   Goal 1.5g/kg/day  - Wean vent as able  - Follow labs, further recommendations to follow    Thank you,

## 2019-10-02 NOTE — DIETARY
"Nutrition Support Assessment:  Day 2 of admit.  Bridger Savage is a 69 y.o. male with admitting DX of Hyperkalemia     Current problem list:  1. ESRD  2. COPD  3. CHF  4. Acute respiratory failure with hypoxia  5. Hyperkalemia     Assessment:  Estimated Nutritional Needs based on:   Height: 177.8 cm (5' 10\")  Weight: 78.2 kg (172 lb 6.4 oz)  Weight to Use in Calculations: 78.2 kg (172 lb 6.4 oz)  Ideal Body Weight: 75.3 kg (166 lb)  Percent Ideal Body Weight: 103.9  Body mass index is 24.74 kg/m²., BMI classification: Normal    Calculation/Equation: [place] = 1556 kcal, MSJ = 1564 kcal  Total Calories / day: 1556 - 1720  (Calories / k - 22)  Total Grams Protein / day: 94 - 117  (Grams Protein / k.2 - 1.5)     Evaluation:   1. Received tube feed consult.  Tube feed appropriate due to pt intubated on vent.  NPO x 2 days.  2. Cortrak placement pending.  3. Pt sedated with Precedex.  Propofol stopped today.    4. Levophed started today.  5. Pt with ESRD.  Admit K+ 9.9, now improved to 5.4.  Pt receiving HD daily.    6. Weight has decreased from admit weight of 90.5 kg.  I/O is negative 7566 ml from admit.  7. Labs 10/2 include Na 134 (L), K+ 5.4, Glu 64 (L), BUN 16, Creat 5.57 (H), Phos 6.4 (H), Triglycerides 187 (H).  8. As pt sedated on vent with pressor started, recommend elemental formula Impact Peptide 1.5.  Goal rate of 45 ml/hour will provide 1620 kcal, 101 gm protein, and 831 ml free water in 24 hours.      Malnutrition Risk: Criteria not noted at this time.     Recommendations/Plan:  1. When able to start tube feed, use Impact Peptide 1.5 and advanced as tolerated per protocol to goal rate of 45 ml/hour.  2. Fluids per MD  3. Monitor weight and labs.  4. RD following.              "

## 2019-10-02 NOTE — PROGRESS NOTES
Report received from Lindsay RN, pt care assumed. All lines and drips verified. Pt intubated with vent settings at 18/420/8/35%. Wylie to gravity-anuric. Bilateral soft writs restraints in place. OGT to LIWS. All needs currently met & safety precautions in place. Will continue to monitor. Hourly rounds continue.

## 2019-10-02 NOTE — RESPIRATORY CARE
SBT was ordered by MD. Placed pt on SBT 5/8. Pt is went apneic x4. Placed pt back on full support to rest.

## 2019-10-02 NOTE — PROGRESS NOTES
Throughout morning and dialysis patient very agitated and restless. Attempts made by patient to self extubate. Sedation increased resulting in low BP. Sedation switched to precedex. Levophed started for BP management. Sister Tammy and brother Emil updated on plan of care.

## 2019-10-02 NOTE — CARE PLAN
Problem: Risk for injury related to physical restraint use  Goal: Safe and appropriate use of physical restraints. Restraints discontinued at the earliest possibility while ensuring patient safety.  Outcome: PROGRESSING AS EXPECTED  Intervention: Restraint risk assessment; Psychosocial alternatives, Environmental alternatives, Physiological alternatives, Daily reatraint documentation, Restraint Discontinuation  Flowsheets (Taken 10/2/2019 0543)  Restraint Type: Soft Restraint Right Wrist in Use; Soft Restraint Left Wrist in Use  Clinical Justification: Pulling Line(s); Pulling Catheter(s); Pulling Tube(s)  Explained Discontinue Criteria: Yes  Restraint Education: Yes  Patient's Response: Needs Reinforcement     Problem: Respiratory:  Goal: Respiratory status will improve  10/2/2019 0543 by Tara Nicole R.N.  Outcome: PROGRESSING SLOWER THAN EXPECTED  Intervention: Assess and monitor pulmonary status  10/2/2019 0543 by Tara Nicole R.N.  Flowsheets  Taken 10/2/2019 0400 by Tara Nicole R.N.  Resp: 18  SpO2: 99 %  Work Of Breathing / Effort: Vented  Cough: Strong;Productive;Congested  RUL Breath Sounds: Diminished  RML Breath Sounds: Diminished  RLL Breath Sounds: Diminished  AMALIA Breath Sounds: Diminished  LLL Breath Sounds: Diminished

## 2019-10-02 NOTE — PROGRESS NOTES
Vent alarming, RN in with pt. Pt found with left wrist restraint off and reaching over to right wrist restraint trying to take it off. Pt re-oriented, informed him that the ETT cannot be pulled out right now & that breathing trail will be done in the early morning around 0700. Pt shakes head no when asked about pain. Propofol increased - see MAR. Left arm back in restraint. Hourly rounds continue.

## 2019-10-02 NOTE — PROGRESS NOTES
0700-Report from JERMAINE Pacheco. POC reviewed. Patient awake and agitated. Pointing at tube and wanting it taken out. RT at bedside to attempt SBT. Patient able to write notes explaining that he wants tube taken out and feels he can't breathe. Explained to patient dialysis to be done this morning and SBT will be re-attempted. Patient nods head in agreement to plan.     0800-Patient's friend Tammy updated on plan via telephone. Dialysis in place.

## 2019-10-03 ENCOUNTER — APPOINTMENT (OUTPATIENT)
Dept: RADIOLOGY | Facility: MEDICAL CENTER | Age: 69
DRG: 207 | End: 2019-10-03
Attending: INTERNAL MEDICINE
Payer: MEDICARE

## 2019-10-03 LAB
ACTION RANGE TRIGGERED IACRT: NO
ACTION RANGE TRIGGERED IACRT: NO
ANION GAP SERPL CALC-SCNC: 21 MMOL/L (ref 0–11.9)
BASE EXCESS BLDA CALC-SCNC: 1 MMOL/L (ref -4–3)
BASE EXCESS BLDA CALC-SCNC: 4 MMOL/L (ref -4–3)
BASOPHILS # BLD AUTO: 0.2 % (ref 0–1.8)
BASOPHILS # BLD: 0.01 K/UL (ref 0–0.12)
BODY TEMPERATURE: ABNORMAL DEGREES
BODY TEMPERATURE: ABNORMAL DEGREES
BUN SERPL-MCNC: 22 MG/DL (ref 8–22)
CALCIUM SERPL-MCNC: 10.3 MG/DL (ref 8.4–10.2)
CHLORIDE SERPL-SCNC: 86 MMOL/L (ref 96–112)
CO2 BLDA-SCNC: 27 MMOL/L (ref 20–33)
CO2 BLDA-SCNC: 28 MMOL/L (ref 20–33)
CO2 SERPL-SCNC: 25 MMOL/L (ref 20–33)
CREAT SERPL-MCNC: 5.62 MG/DL (ref 0.5–1.4)
CRP SERPL HS-MCNC: 23.13 MG/DL (ref 0–0.75)
EOSINOPHIL # BLD AUTO: 0 K/UL (ref 0–0.51)
EOSINOPHIL NFR BLD: 0 % (ref 0–6.9)
ERYTHROCYTE [DISTWIDTH] IN BLOOD BY AUTOMATED COUNT: 45 FL (ref 35.9–50)
GLUCOSE BLD-MCNC: 118 MG/DL (ref 65–99)
GLUCOSE BLD-MCNC: 161 MG/DL (ref 65–99)
GLUCOSE SERPL-MCNC: 130 MG/DL (ref 65–99)
HCO3 BLDA-SCNC: 25.4 MMOL/L (ref 17–25)
HCO3 BLDA-SCNC: 27.2 MMOL/L (ref 17–25)
HCT VFR BLD AUTO: 37.3 % (ref 42–52)
HGB BLD-MCNC: 12.3 G/DL (ref 14–18)
HOROWITZ INDEX BLDA+IHG-RTO: 227 MM[HG]
HOROWITZ INDEX BLDA+IHG-RTO: 237 MM[HG]
IMM GRANULOCYTES # BLD AUTO: 0.03 K/UL (ref 0–0.11)
IMM GRANULOCYTES NFR BLD AUTO: 0.5 % (ref 0–0.9)
INST. QUALIFIED PATIENT IIQPT: YES
INST. QUALIFIED PATIENT IIQPT: YES
LYMPHOCYTES # BLD AUTO: 0.48 K/UL (ref 1–4.8)
LYMPHOCYTES NFR BLD: 8.3 % (ref 22–41)
MAGNESIUM SERPL-MCNC: 2.3 MG/DL (ref 1.5–2.5)
MCH RBC QN AUTO: 29.9 PG (ref 27–33)
MCHC RBC AUTO-ENTMCNC: 33 G/DL (ref 33.7–35.3)
MCV RBC AUTO: 90.8 FL (ref 81.4–97.8)
MONOCYTES # BLD AUTO: 0.11 K/UL (ref 0–0.85)
MONOCYTES NFR BLD AUTO: 1.9 % (ref 0–13.4)
NEUTROPHILS # BLD AUTO: 5.18 K/UL (ref 1.82–7.42)
NEUTROPHILS NFR BLD: 89.1 % (ref 44–72)
NRBC # BLD AUTO: 0 K/UL
NRBC BLD-RTO: 0 /100 WBC
O2/TOTAL GAS SETTING VFR VENT: 30 %
O2/TOTAL GAS SETTING VFR VENT: 30 %
PCO2 BLDA: 36.3 MMHG (ref 26–37)
PCO2 BLDA: 37 MMHG (ref 26–37)
PH BLDA: 7.45 [PH] (ref 7.4–7.5)
PH BLDA: 7.48 [PH] (ref 7.4–7.5)
PH TEMP ADJ BLDA: 7.49 [PH] (ref 7.4–7.5)
PHOSPHATE SERPL-MCNC: 6.7 MG/DL (ref 2.5–4.5)
PLATELET # BLD AUTO: 230 K/UL (ref 164–446)
PMV BLD AUTO: 10.1 FL (ref 9–12.9)
PO2 BLDA: 68 MMHG (ref 64–87)
PO2 BLDA: 71 MMHG (ref 64–87)
PO2 TEMP ADJ BLDA: 68 MMHG (ref 64–87)
POTASSIUM SERPL-SCNC: 6.4 MMOL/L (ref 3.6–5.5)
PREALB SERPL-MCNC: 18 MG/DL (ref 18–38)
RBC # BLD AUTO: 4.11 M/UL (ref 4.7–6.1)
SAO2 % BLDA: 94 % (ref 93–99)
SAO2 % BLDA: 95 % (ref 93–99)
SODIUM SERPL-SCNC: 132 MMOL/L (ref 135–145)
SPECIMEN DRAWN FROM PATIENT: ABNORMAL
SPECIMEN DRAWN FROM PATIENT: ABNORMAL
WBC # BLD AUTO: 5.8 K/UL (ref 4.8–10.8)

## 2019-10-03 PROCEDURE — 700102 HCHG RX REV CODE 250 W/ 637 OVERRIDE(OP): Performed by: INTERNAL MEDICINE

## 2019-10-03 PROCEDURE — 84134 ASSAY OF PREALBUMIN: CPT

## 2019-10-03 PROCEDURE — 770022 HCHG ROOM/CARE - ICU (200)

## 2019-10-03 PROCEDURE — 700102 HCHG RX REV CODE 250 W/ 637 OVERRIDE(OP)

## 2019-10-03 PROCEDURE — 80048 BASIC METABOLIC PNL TOTAL CA: CPT

## 2019-10-03 PROCEDURE — 700105 HCHG RX REV CODE 258: Performed by: INTERNAL MEDICINE

## 2019-10-03 PROCEDURE — 84100 ASSAY OF PHOSPHORUS: CPT

## 2019-10-03 PROCEDURE — 3E0G76Z INTRODUCTION OF NUTRITIONAL SUBSTANCE INTO UPPER GI, VIA NATURAL OR ARTIFICIAL OPENING: ICD-10-PCS | Performed by: INTERNAL MEDICINE

## 2019-10-03 PROCEDURE — A9270 NON-COVERED ITEM OR SERVICE: HCPCS | Performed by: INTERNAL MEDICINE

## 2019-10-03 PROCEDURE — 83735 ASSAY OF MAGNESIUM: CPT

## 2019-10-03 PROCEDURE — 90935 HEMODIALYSIS ONE EVALUATION: CPT

## 2019-10-03 PROCEDURE — 5A1D70Z PERFORMANCE OF URINARY FILTRATION, INTERMITTENT, LESS THAN 6 HOURS PER DAY: ICD-10-PCS | Performed by: INTERNAL MEDICINE

## 2019-10-03 PROCEDURE — 36600 WITHDRAWAL OF ARTERIAL BLOOD: CPT

## 2019-10-03 PROCEDURE — 99291 CRITICAL CARE FIRST HOUR: CPT | Performed by: INTERNAL MEDICINE

## 2019-10-03 PROCEDURE — 71045 X-RAY EXAM CHEST 1 VIEW: CPT

## 2019-10-03 PROCEDURE — 82962 GLUCOSE BLOOD TEST: CPT

## 2019-10-03 PROCEDURE — 92610 EVALUATE SWALLOWING FUNCTION: CPT

## 2019-10-03 PROCEDURE — 700111 HCHG RX REV CODE 636 W/ 250 OVERRIDE (IP): Performed by: INTERNAL MEDICINE

## 2019-10-03 PROCEDURE — 86140 C-REACTIVE PROTEIN: CPT

## 2019-10-03 PROCEDURE — 36592 COLLECT BLOOD FROM PICC: CPT

## 2019-10-03 PROCEDURE — 82803 BLOOD GASES ANY COMBINATION: CPT | Mod: 91

## 2019-10-03 PROCEDURE — 700101 HCHG RX REV CODE 250: Performed by: INTERNAL MEDICINE

## 2019-10-03 PROCEDURE — 94003 VENT MGMT INPAT SUBQ DAY: CPT

## 2019-10-03 PROCEDURE — 85025 COMPLETE CBC W/AUTO DIFF WBC: CPT

## 2019-10-03 PROCEDURE — 94150 VITAL CAPACITY TEST: CPT

## 2019-10-03 RX ORDER — HEPARIN SODIUM 1000 [USP'U]/ML
2000 INJECTION, SOLUTION INTRAVENOUS; SUBCUTANEOUS
Status: DISCONTINUED | OUTPATIENT
Start: 2019-10-03 | End: 2019-10-04

## 2019-10-03 RX ADMIN — SENNOSIDES AND DOCUSATE SODIUM 2 TABLET: 8.6; 5 TABLET ORAL at 17:51

## 2019-10-03 RX ADMIN — CINACALCET HYDROCHLORIDE 60 MG: 30 TABLET, COATED ORAL at 17:49

## 2019-10-03 RX ADMIN — NOREPINEPHRINE BITARTRATE 2 MCG/MIN: 1 INJECTION INTRAVENOUS at 13:50

## 2019-10-03 RX ADMIN — METHYLPREDNISOLONE SODIUM SUCCINATE 40 MG: 40 INJECTION, POWDER, FOR SOLUTION INTRAMUSCULAR; INTRAVENOUS at 17:53

## 2019-10-03 RX ADMIN — HEPARIN SODIUM 5000 UNITS: 5000 INJECTION, SOLUTION INTRAVENOUS; SUBCUTANEOUS at 13:49

## 2019-10-03 RX ADMIN — FAMOTIDINE 20 MG: 10 INJECTION INTRAVENOUS at 05:12

## 2019-10-03 RX ADMIN — INSULIN HUMAN 10 UNITS: 100 INJECTION, SOLUTION PARENTERAL at 05:18

## 2019-10-03 RX ADMIN — HEPARIN SODIUM 2000 UNITS: 1000 INJECTION, SOLUTION INTRAVENOUS; SUBCUTANEOUS at 09:23

## 2019-10-03 RX ADMIN — DOXYCYCLINE 100 MG: 100 TABLET, FILM COATED ORAL at 20:31

## 2019-10-03 RX ADMIN — HEPARIN SODIUM 5000 UNITS: 5000 INJECTION, SOLUTION INTRAVENOUS; SUBCUTANEOUS at 05:16

## 2019-10-03 RX ADMIN — DOXYCYCLINE 100 MG: 100 TABLET, FILM COATED ORAL at 08:02

## 2019-10-03 RX ADMIN — METHYLPREDNISOLONE SODIUM SUCCINATE 40 MG: 40 INJECTION, POWDER, FOR SOLUTION INTRAMUSCULAR; INTRAVENOUS at 05:08

## 2019-10-03 RX ADMIN — HEPARIN SODIUM 5000 UNITS: 5000 INJECTION, SOLUTION INTRAVENOUS; SUBCUTANEOUS at 22:59

## 2019-10-03 RX ADMIN — ASPIRIN 81 MG CHEWABLE TABLET 81 MG: 81 TABLET CHEWABLE at 17:52

## 2019-10-03 RX ADMIN — METHYLPREDNISOLONE SODIUM SUCCINATE 40 MG: 40 INJECTION, POWDER, FOR SOLUTION INTRAMUSCULAR; INTRAVENOUS at 01:01

## 2019-10-03 RX ADMIN — SENNOSIDES AND DOCUSATE SODIUM 2 TABLET: 8.6; 5 TABLET ORAL at 05:34

## 2019-10-03 RX ADMIN — DEXTROSE MONOHYDRATE 250 ML: 100 INJECTION, SOLUTION INTRAVENOUS at 05:03

## 2019-10-03 RX ADMIN — METHYLPREDNISOLONE SODIUM SUCCINATE 40 MG: 40 INJECTION, POWDER, FOR SOLUTION INTRAMUSCULAR; INTRAVENOUS at 23:01

## 2019-10-03 RX ADMIN — SEVELAMER CARBONATE 800 MG: 800 TABLET, FILM COATED ORAL at 17:49

## 2019-10-03 RX ADMIN — METHYLPREDNISOLONE SODIUM SUCCINATE 40 MG: 40 INJECTION, POWDER, FOR SOLUTION INTRAMUSCULAR; INTRAVENOUS at 12:10

## 2019-10-03 ASSESSMENT — LIFESTYLE VARIABLES: EVER_SMOKED: YES

## 2019-10-03 ASSESSMENT — COPD QUESTIONNAIRES
DURING THE PAST 4 WEEKS HOW MUCH DID YOU FEEL SHORT OF BREATH: SOME OF THE TIME
HAVE YOU SMOKED AT LEAST 100 CIGARETTES IN YOUR ENTIRE LIFE: YES
DO YOU EVER COUGH UP ANY MUCUS OR PHLEGM?: YES, EVERY DAY
COPD SCREENING SCORE: 8

## 2019-10-03 ASSESSMENT — PULMONARY FUNCTION TESTS: FVC: 2300

## 2019-10-03 NOTE — PROGRESS NOTES
Report was received from Lindsay DEAN. Patient is sedated, and appears to be sleeping. Family at bedside. IV drips, TF, and vent settings checked against orders. ETT, central line, and funez observed. Restraints in place. Vitals stable. Sinus rhythm on the monitor.

## 2019-10-03 NOTE — PROGRESS NOTES
Critical Care Progress Note    Date of admission  9/30/2019    Chief Complaint  69 y.o. male admitted 9/30/2019 with severe hyperkalemia     Hospital Course    admitted from ER  Emergent HD when he was admitted and daily since admission      Interval Problem Update  Reviewed last 24 hour events:  Could not tolerate SBT today, continue to have apneas more than 30 seconds  Received dialysis today  When awake is following commands and able to write for communications however continues to have apneas during SBT's  Afebrile   K + normalized   EKG normalized       Review of Systems  ROS   Unable to obtain   Vital Signs for last 24 hours   Temp:  [36.2 °C (97.1 °F)-37.1 °C (98.7 °F)] 37.1 °C (98.7 °F)  Pulse:  [51-90] 65  Resp:  [11-41] 17  BP: ()/(39-78) 128/66  SpO2:  [82 %-100 %] 100 %    Hemodynamic parameters for last 24 hours       Respiratory Information for the last 24 hours  Rodrigues Vent Mode: APVCMV  Rate (breaths/min): 18  Vt Target (mL): 420  PEEP/CPAP: 8  FiO2: 35  P Support: 5  P MEAN: 10  Control VTE (exp VT): 419    Physical Exam   Physical Exam  Constitutional: He appears well-developed.   Intubated and sedated, continued to be restless when I saw him in the emergency room   HENT:   Head: Normocephalic and atraumatic.   Right Ear: External ear normal.   Left Ear: External ear normal.   Mouth/Throat: No oropharyngeal exudate.   Eyes: Pupils are equal, round, and reactive to light. Right eye exhibits no discharge. Left eye exhibits no discharge.   Neck: Neck supple. No JVD present. No tracheal deviation present. No thyromegaly present.   Cardiovascular: Normal rate, regular rhythm and normal heart sounds. Exam reveals no friction rub.   No murmur heard.  Pulmonary/Chest: Effort normal. He has rales.   Abdominal: Soft. Bowel sounds are normal. He exhibits no distension. There is no tenderness. There is no rebound.   Musculoskeletal: He exhibits no edema, tenderness or deformity.   Neurological: No  cranial nerve deficit. Coordination normal.   Intubated and sedated   Skin: Skin is warm and dry. No rash noted. No erythema.   Psychiatric: He has a normal mood and affect.   Medications  Current Facility-Administered Medications   Medication Dose Route Frequency Provider Last Rate Last Dose   • dexmedetomidine (PRECEDEX) 400 mcg in  mL infusion  0.1-1.5 mcg/kg/hr Intravenous Continuous Monique Figueroa M.D. 11.7 mL/hr at 10/02/19 1345 0.6 mcg/kg/hr at 10/02/19 1345   • norepinephrine (LEVOPHED) 8 mg in  mL Infusion  0-30 mcg/min Intravenous Continuous Monique Figueroa M.D. 15 mL/hr at 10/02/19 1640 8 mcg/min at 10/02/19 1640   • Pharmacy Consult: Enteral tube insertion - review meds/change route/product selection  1 Each Other PHARMACY TO DOSE Monique Figueroa M.D.       • aspirin (ASA) chewable tab 81 mg  81 mg Enteral Tube Q EVENING Monique Figueroa M.D.   81 mg at 10/02/19 1742   • carvedilol (COREG) tablet 25 mg  25 mg Enteral Tube BID WITH MEALS Monique Figueroa M.D.   Stopped at 10/02/19 1742   • lisinopril (PRINIVIL) tablet 10 mg  10 mg Enteral Tube Q EVENING Monique Figueroa M.D.   Stopped at 10/02/19 1742   • magnesium hydroxide (MILK OF MAGNESIA) suspension 30 mL  30 mL Enteral Tube QDAY PRN Monique Figueroa M.D.        And   • senna-docusate (PERICOLACE or SENOKOT S) 8.6-50 MG per tablet 2 Tab  2 Tab Enteral Tube BID Monique Figueroa M.D.   2 Tab at 10/02/19 1742    And   • polyethylene glycol/lytes (MIRALAX) PACKET 1 Packet  1 Packet Enteral Tube QDAY PRN Monique Figueroa M.D.        And   • bisacodyl (DULCOLAX) suppository 10 mg  10 mg Rectal QDAY PRN Monique Figueroa M.D.       • cinacalcet (SENSIPAR) tablet 60 mg  60 mg Oral Q EVENING Monique Figueroa M.D.   Stopped at 10/02/19 1742   • sevelamer carbonate (RENVELA) tablet 800 mg  800 mg Oral TID WITH MEALS Monique Figueroa M.D.   Stopped at 10/02/19 1743   • heparin injection 1,750 Units  1,750 Units Intravenous DIALYSIS PRN Gus Murillo M.D.   1,300 Units  at 10/02/19 1018   • Respiratory Care per Protocol   Nebulization Continuous RT Monique Figueroa M.D.       • ipratropium-albuterol (DUONEB) nebulizer solution  3 mL Nebulization Q2HRS PRN (RT) Monique Figueroa M.D.   3 mL at 10/01/19 1412   • famotidine (PEPCID) tablet 20 mg  20 mg Enteral Tube DAILY Monique Figueroa M.D.        Or   • famotidine (PEPCID) injection 20 mg  20 mg Intravenous DAILY Monique Figueroa M.D.   20 mg at 10/02/19 0502   • lidocaine (XYLOCAINE) 1 % injection 1-2 mL  1-2 mL Tracheal Tube Q30 MIN PRN Monique Figueroa M.D.       • heparin injection 5,000 Units  5,000 Units Subcutaneous Q8HRS Monique Figueroa M.D.   5,000 Units at 10/02/19 1416   • fentaNYL (SUBLIMAZE) 50 mcg/mL in 50mL   Intravenous Continuous Monique Figueroa M.D. 1 mL/hr at 10/02/19 1324     • propofol (DIPRIVAN) injection  0-80 mcg/kg/min Intravenous Continuous Monique Figueroa M.D.   Stopped at 10/02/19 1228       Fluids    Intake/Output Summary (Last 24 hours) at 10/2/2019 1853  Last data filed at 10/2/2019 1814  Gross per 24 hour   Intake 972.3 ml   Output 2130 ml   Net -1157.7 ml       Laboratory  Recent Labs     09/30/19  0730 09/30/19  1346 10/01/19  0330 10/02/19  0353   AYNDM03R 7.45  --  7.43  --    XNPBCY420G 52.0*  --  51.5*  --    NEKRB517Z 295.3*  --  83.5  --    GSLF2XVQ 99.1*  --  95.4  --    ARTHCO3 35*  --  34*  --    FIO2 80*  --  40  --    ARTBE 10*  --  8*  --    ISTATAPH  --  7.535*  --  7.464   ISTATAPCO2  --  38.0*  --  42.6*   ISTATAPO2  --  74  --  97*   ISTATATCO2  --  33  --  32   QOEYTCD1SQG  --  96  --  98   ISTATARTHCO3  --  32.0*  --  30.6*   ISTATARTBE  --  9*  --  6*   ISTATTEMP  --  97.2 F  --  see below   ISTATFIO2  --  40  --  35   ISTATSPEC  --  Arterial  --  Arterial   ISTATAPHTC  --  7.547*  --   --    EIVKSZXN5YE  --  70  --   --      Recent Labs     10/01/19  0345   CPKTOTAL 151     Recent Labs     09/30/19  1745 10/01/19  0345 10/02/19  0330   SODIUM 141 140 134*   POTASSIUM 5.5 5.6* 5.4   CHLORIDE  94* 94* 87*   CO2 33 34* 30   BUN 14 17 16   CREATININE 5.45* 6.50* 5.57*   MAGNESIUM  --   --  2.1   PHOSPHORUS 6.3* 7.0* 6.4*   CALCIUM 9.1 8.9 9.7     Recent Labs     09/30/19  0700 09/30/19  1745 10/01/19  0345 10/02/19  0330   ALTSGPT 60*  --  52*  --    ASTSGOT 85*  --  50*  --    ALKPHOSPHAT 52  --  52  --    TBILIRUBIN 0.8  --  0.5  --    GLUCOSE 157* 66 67 64*     Recent Labs     09/30/19  0700 10/01/19  0345   WBC 10.3 6.5   NEUTSPOLYS 75.10* 69.40   LYMPHOCYTES 13.70* 17.70*   MONOCYTES 7.90 9.60   EOSINOPHILS 2.10 2.50   BASOPHILS 0.70 0.60   ASTSGOT 85* 50*   ALTSGPT 60* 52*   ALKPHOSPHAT 52 52   TBILIRUBIN 0.8 0.5     Recent Labs     09/30/19  0700 10/01/19  0345   RBC 3.61* 3.45*   HEMOGLOBIN 11.0* 10.4*   HEMATOCRIT 34.0* 32.9*   PLATELETCT 287 191   IRON  --  41*   FERRITIN  --  232.1   TOTIRONBC  --  263       Imaging  X-Ray:  I have personally reviewed the images and compared with prior images.    Assessment/Plan  CHF (congestive heart failure) ([facility])- (present on admission)  Assessment & Plan  Ejection fraction 35% an echo done this admission   He is on Coreg  Cardiology input is appreciated    COPD (chronic obstructive pulmonary disease) ([facility])- (present on admission)  Assessment & Plan  History of COPD  Not able to find any objective studies like PFTs in the records  Since intubation the patient has excessive secretions  I am going to start him on systemic steroids and Zithromax for COPD exacerbation        ESRD (end stage renal disease) ([facility])- (present on admission)  Assessment & Plan  Emergent hemodialysis on 9/30 2019 and hemodialysis yesterday and today today  for severe hypercapnia   K+ normalized   Will need hemodialysis in the future as scheduled before  Did not miss any dialysis outpatient unclear to me why he had severe hyperkalemia  Nephrology input appreciated    Hyperkalemia  Assessment & Plan  Severe, potassium was 9.9 at presentation  Sinus sign on EKG with wide QRS.  Treated  promptly by the ER physician with bicarbonate and calcium gluconate  Repeat potassium after hemodialysis  Now QRS is narrow with normal T waves  Seen by Dr. Chavez from cardiology      Acute respiratory failure with hypoxia (HCC)  Assessment & Plan  Secondary to fluid overload  Patient was intubated in the ER  History of COPD and CHF   End-stage renal disease on hemodialysis.  Not sure if the patient missed any hemodialysis session to be in severe fluid overload  Status post hemodialysis with removal of more than 18  Good ventilatory mechanics on the ventilator  ABG did not show hypercapnia  We will check daily ABG and chest x-ray  Daily sedation holidays and SBT  Expect work extubation if there is no other etiology of his respiratory failure.  Patient was hypotensive today probably secondary excessive IV removal and sedation  Could not extubate today because the patient continued to have apneas         VTE:  Heparin  Ulcer: H2 Antagonist  Lines: Central Line  Ongoing indication addressed    I have performed a physical exam and reviewed and updated ROS and Plan today (10/2/2019). In review of yesterday's note (10/1/2019), there are no changes except as documented above.     Discussed patient condition and risk of morbidity and/or mortality with Family, RN, RT, Charge nurse / hot rounds and cardiology and nephrology  The patient remains critically ill.  Critical care time = 32 minutes in directly providing and coordinating critical care and extensive data review.  No time overlap and excludes procedures.

## 2019-10-03 NOTE — PROGRESS NOTES
Critical Care Progress Note    Date of admission  9/30/2019    Chief Complaint  69 y.o. male admitted 9/30/2019 with severe hyperkalemia     Hospital Course    admitted from ER  Emergent HD when he was admitted and daily since admission      Interval Problem Update  Reviewed last 24 hour events:  Extubated this morning after having good weaning parameters and tolerated SBT  Getting hemodialysis this morning.  K was 6.4 prior to dialysis  No events overnight other than elevated potassium level.  Given D50 and regular insulin 10 units for high potassium level.  No EKG changes  I started the patient on Zithromax and Solu-Medrol last night for COPD exacerbation because of excessive secretions  2 days prior to extubation secretions from ET tube improved        Review of Systems  ROS   Just extubated, unable to participate in full conversation  Vital Signs for last 24 hours   Temp:  [36.3 °C (97.3 °F)-37.9 °C (100.3 °F)] 36.4 °C (97.6 °F)  Pulse:  [32-84] 57  Resp:  [14-69] 28  BP: ()/(39-84) 98/58  SpO2:  [86 %-100 %] 100 %    Hemodynamic parameters for last 24 hours       Respiratory Information for the last 24 hours  Rodrigues Vent Mode: Spont  Rate (breaths/min): 15  Vt Target (mL): 420  PEEP/CPAP: 8  FiO2: 30  P Support: 5  P MEAN: 11  Control VTE (exp VT): 1    Physical Exam   Physical Exam  Constitutional: He appears well-developed.   Intubated and sedated, continued to be restless when I saw him in the emergency room   HENT:   Head: Normocephalic and atraumatic.   Right Ear: External ear normal.   Left Ear: External ear normal.   Mouth/Throat: No oropharyngeal exudate.   Eyes: Pupils are equal, round, and reactive to light. Right eye exhibits no discharge. Left eye exhibits no discharge.   Neck: Neck supple. No JVD present. No tracheal deviation present. No thyromegaly present.   Cardiovascular: Normal rate, regular rhythm and normal heart sounds. Exam reveals no friction rub.   No murmur  heard.  Pulmonary/Chest: Effort normal. He has rales.   Abdominal: Soft. Bowel sounds are normal. He exhibits no distension. There is no tenderness. There is no rebound.   Musculoskeletal: He exhibits no edema, tenderness or deformity.   Neurological: No cranial nerve deficit. Coordination normal.   Intubated and sedated   Skin: Skin is warm and dry. No rash noted. No erythema.   Psychiatric: He has a normal mood and affect.   Medications  Current Facility-Administered Medications   Medication Dose Route Frequency Provider Last Rate Last Dose   • DEXTROSE 10% BOLUS 250 mL  250 mL Intravenous Q15 MIN PRN Giuseppe Ruiz D.O.   250 mL at 10/03/19 0503   • heparin injection 2,000 Units  2,000 Units Intravenous DIALYSIS PRN Dewayne Mccray M.D.   2,000 Units at 10/03/19 0923   • dexmedetomidine (PRECEDEX) 400 mcg in  mL infusion  0.1-1.5 mcg/kg/hr Intravenous Continuous Monique Figueroa M.D.   Stopped at 10/03/19 0520   • norepinephrine (LEVOPHED) 8 mg in  mL Infusion  0-30 mcg/min Intravenous Continuous Monique Figueroa M.D. 1.9 mL/hr at 10/03/19 0800 1 mcg/min at 10/03/19 0800   • Pharmacy Consult: Enteral tube insertion - review meds/change route/product selection  1 Each Other PHARMACY TO DOSE Monique Figueroa M.D.       • aspirin (ASA) chewable tab 81 mg  81 mg Enteral Tube Q EVENING Monique Figueroa M.D.   81 mg at 10/02/19 1742   • carvedilol (COREG) tablet 25 mg  25 mg Enteral Tube BID WITH MEALS Monique Figueroa M.D.   Stopped at 10/02/19 1742   • lisinopril (PRINIVIL) tablet 10 mg  10 mg Enteral Tube Q EVENING Monique Figueroa M.D.   Stopped at 10/02/19 1742   • magnesium hydroxide (MILK OF MAGNESIA) suspension 30 mL  30 mL Enteral Tube QDAY PRN Monique Figueroa M.D.        And   • senna-docusate (PERICOLACE or SENOKOT S) 8.6-50 MG per tablet 2 Tab  2 Tab Enteral Tube BID Monique Figueroa M.D.   2 Tab at 10/03/19 0534    And   • polyethylene glycol/lytes (MIRALAX) PACKET 1 Packet  1 Packet Enteral Tube QDAY PRN  Monique Figueroa M.D.        And   • bisacodyl (DULCOLAX) suppository 10 mg  10 mg Rectal QDAY PRN Monique Figueroa M.D.       • cinacalcet (SENSIPAR) tablet 60 mg  60 mg Oral Q EVENING Monique Figueroa M.D.   Stopped at 10/02/19 1742   • sevelamer carbonate (RENVELA) tablet 800 mg  800 mg Oral TID WITH MEALS Monique Figueroa M.D.   Stopped at 10/02/19 1743   • methylPREDNISolone (SOLU-MEDROL) 40 MG injection 40 mg  40 mg Intravenous Q6HRS Monique Figueroa M.D.   40 mg at 10/03/19 0508   • doxycycline monohydrate (ADOXA) tablet 100 mg  100 mg Enteral Tube Q12HRS Nacho Faust Jr., D.O.   100 mg at 10/03/19 0802   • Respiratory Care per Protocol   Nebulization Continuous RT Monique Figueroa M.D.       • ipratropium-albuterol (DUONEB) nebulizer solution  3 mL Nebulization Q2HRS PRN (RT) Monique Figueroa M.D.   3 mL at 10/01/19 1412   • famotidine (PEPCID) tablet 20 mg  20 mg Enteral Tube DAILY Monique Figueroa M.D.        Or   • famotidine (PEPCID) injection 20 mg  20 mg Intravenous DAILY Monique Figueroa M.D.   20 mg at 10/03/19 0512   • lidocaine (XYLOCAINE) 1 % injection 1-2 mL  1-2 mL Tracheal Tube Q30 MIN PRN Monique Figueroa M.D.       • heparin injection 5,000 Units  5,000 Units Subcutaneous Q8HRS Monique Figueroa M.D.   5,000 Units at 10/03/19 0516   • fentaNYL (SUBLIMAZE) 50 mcg/mL in 50mL   Intravenous Continuous Monique Figueroa M.D.   Stopped at 10/03/19 0520   • propofol (DIPRIVAN) injection  0-80 mcg/kg/min Intravenous Continuous Monique Figueroa M.D.   Stopped at 10/02/19 1228       Fluids    Intake/Output Summary (Last 24 hours) at 10/3/2019 0938  Last data filed at 10/3/2019 0832  Gross per 24 hour   Intake 1432.2 ml   Output 2010 ml   Net -577.8 ml       Laboratory  Recent Labs     09/30/19  1346 10/01/19  0330 10/02/19  0353 10/03/19  0522 10/03/19  0753   JAUTW47X  --  7.43  --   --   --    CWGCVH153E  --  51.5*  --   --   --    XHZVD296E  --  83.5  --   --   --    CNEC1VBP  --  95.4  --   --   --    ARTHCO3  --  34*  --    --   --    FIO2  --  40  --   --   --    ARTBE  --  8*  --   --   --    ISTATAPH 7.535*  --  7.464 7.445 7.483   ISTATAPCO2 38.0*  --  42.6* 37.0 36.3   ISTATAPO2 74  --  97* 68 71   ISTATATCO2 33  --  32 27 28   SYVOGZD5IBU 96  --  98 94 95   ISTATARTHCO3 32.0*  --  30.6* 25.4* 27.2*   ISTATARTBE 9*  --  6* 1 4*   ISTATTEMP 97.2 F  --  see below see below 97.3 F   ISTATFIO2 40  --  35 30 30   ISTATSPEC Arterial  --  Arterial Arterial Arterial   ISTATAPHTC 7.547*  --   --   --  7.494   FEKSATFA9AX 70  --   --   --  68     Recent Labs     10/01/19  0345   CPKTOTAL 151     Recent Labs     10/01/19  0345 10/02/19  0330 10/03/19  0330   SODIUM 140 134* 132*   POTASSIUM 5.6* 5.4 6.4*   CHLORIDE 94* 87* 86*   CO2 34* 30 25   BUN 17 16 22   CREATININE 6.50* 5.57* 5.62*   MAGNESIUM  --  2.1 2.3   PHOSPHORUS 7.0* 6.4* 6.7*   CALCIUM 8.9 9.7 10.3*     Recent Labs     10/01/19  0345 10/02/19  0330 10/03/19  0330   ALTSGPT 52*  --   --    ASTSGOT 50*  --   --    ALKPHOSPHAT 52  --   --    TBILIRUBIN 0.5  --   --    GLUCOSE 67 64* 130*     Recent Labs     10/01/19  0345 10/03/19  0816   WBC 6.5 5.8   NEUTSPOLYS 69.40 89.10*   LYMPHOCYTES 17.70* 8.30*   MONOCYTES 9.60 1.90   EOSINOPHILS 2.50 0.00   BASOPHILS 0.60 0.20   ASTSGOT 50*  --    ALTSGPT 52*  --    ALKPHOSPHAT 52  --    TBILIRUBIN 0.5  --      Recent Labs     10/01/19  0345 10/03/19  0816   RBC 3.45* 4.11*   HEMOGLOBIN 10.4* 12.3*   HEMATOCRIT 32.9* 37.3*   PLATELETCT 191 230   IRON 41*  --    FERRITIN 232.1  --    TOTIRONBC 263  --        Imaging  X-Ray:  I have personally reviewed the images and compared with prior images.    Assessment/Plan  CHF (congestive heart failure) ([facility])- (present on admission)  Assessment & Plan  Ejection fraction 35% an echo done this admission   He is on Coreg  Cardiology input is appreciated    COPD (chronic obstructive pulmonary disease) ([facility])- (present on admission)  Assessment & Plan  History of COPD  Not able to find any objective  studies like PFTs in the records  Since intubation the patient has excessive secretions  Continue systemic steroids and Zithromax        ESRD (end stage renal disease) (HCC)- (present on admission)  Assessment & Plan  Emergent hemodialysis on 9/30 2019 and hemodialysis yesterday and today today  for severe hypercapnia   K+ normalized   Will need hemodialysis in the future as scheduled before  Did not miss any dialysis outpatient unclear to me why he had severe hyperkalemia  Nephrology input appreciated    Hyperkalemia  Assessment & Plan  Severe, potassium was 9.9 at presentation  Sinus sign on EKG with wide QRS.  Treated promptly by the ER physician with bicarbonate and today's potassium was 6.4  Patient is getting hemodialysis now      Acute respiratory failure with hypoxia (HCC)  Assessment & Plan  Secondary to fluid overload  Patient was intubated in the ER  History of COPD and CHF   End-stage renal disease on hemodialysis.  Not sure if the patient missed any hemodialysis session to be in severe fluid overload  Status post hemodialysis daily since admission.  Good ventilatory mechanics on the ventilator  ABG did not show hypercapnia  Extubated today, tolerating post extubation well so far         VTE:  Heparin  Ulcer: H2 Antagonist  Lines: Central Line  Ongoing indication addressed    I have performed a physical exam and reviewed and updated ROS and Plan today (10/3/2019). In review of yesterday's note (10/2/2019), there are no changes except as documented above.     Discussed patient condition and risk of morbidity and/or mortality with Family, RN, RT, Charge nurse / hot rounds and cardiology and nephrology  The patient remains critically ill.  Critical care time = 35 minutes in directly providing and coordinating critical care and extensive data review.  No time overlap and excludes procedures.

## 2019-10-03 NOTE — PROGRESS NOTES
Cortrak Placement    Tube Team verified patient name and medical record number prior to tube placement.  Cortrak tube (43 inches, 10 Yemeni) placed at 60 cm in right nare.  Per Cortrak picture, tube appears to be in the stomach.  Placement confirmed via xray.

## 2019-10-03 NOTE — PROGRESS NOTES
Hemodialysis treatment ordered today per Dr Mccray x 4 hours. Treatment initiated at 0921, ended at 1341.     Patient tolerated tx; see paper flow sheet for details.     Net UF 3,000 mL.     Needles removed from access site. Dressings applied and sites held x 10 minutes; verified no bleeding. Positive bruit/thrill post tx. Staff RN to monitor AVF for breakthrough bleeding. Should breakthrough bleeding occur, staff RN to apply pressure to access sites until bleeding resolved. Notify Dialysis and Nephrologist for follow-up.    Report given to Primary RN.

## 2019-10-03 NOTE — PROGRESS NOTES
Report to BALWINDER Mcginnis reviewed. Patient resting much more comfortably since initiation of precedex. Daughter in law at bedside and updated on plan.

## 2019-10-03 NOTE — DIETARY
Nutrition Services Update:  Pt extubated this morning.  Gastric cortrak remains in place and tube feed continues at this time with Impact Peptide 1.5 at 45 ml/hour.  Labs today include K+ 6.4 (H) and Phos 6.7 (H).  Pt currently receiving dialysis.  As pt now extubated, will transition to more standard tube feed formula with low K+ and Phos.    Plan/Recommend:  1.  Change tube feed to Novasource Renal.  Goal rate of 40 ml/hour will provide 1920 kcal (25 kcal/kg), 87 gm protein, and 688 ml free water in 24 hours.  2.  Continue fluids per MD in pt with ESRD on daily HD.  3.  RD following.

## 2019-10-03 NOTE — THERAPY
"    Speech Language Therapy Clinical Swallow Evaluation completed.  Functional Status: Pt seen this date for clinical swallow evaluation 2/2 s/p extubation 10/3/19. Pt was awake, confused, followed directions with encouragement, adult daughter at bedside. Pt and daughter report consuming a soft diet with ground meats at baseline 2/2 no lower dentures. Pt on 4LO2 via nasal cannula with Cortrak in place. Oral mechanism examination completed, no gross oral motor deficits appreciated. Pt is edentulous with upper dentures in place. Pt's daughter reports lower dentures are broken. Pt presents with strong but slightly raspy vocal quality. PO trials of ice, NTL, puree, soft, and thins assessed. Hyolaryngeal elevation palpated as complete, timely initiation of swallow appreciated and vocal quality remained clear throughout evaluation. Pt demonstrated prolonged but functional mastication of soft solids. No other s/sx of aspiration appreciated with any other consistencies consumed. Recommend initiation of a D2/thins diet with adherence to the following strategies: HOB at 90* or up to chair for meals, monitoring during meals, slow rate and small bites. SLP following.   Recommendations - Diet: Diet / Liquid Recommendation: (P) Thin Liquid, Dysphagia II                          Strategies: Monitor during meals, No Straws and Head of Bed at 90 Degrees                          Medication Administration: Medication Administration : (P) Whole with Liquid Wash  Plan of Care: Will benefit from Speech Therapy 3 times per week  Post-Acute Therapy: Recommend inpatient transitional care services for continued speech therapy services.        See \"Rehab Therapy-Acute\" Patient Summary Report for complete documentation.   "

## 2019-10-03 NOTE — RESPIRATORY CARE
Extubation           Events/Summary/Plan: Pt is extubated with MD order, RN at bedside with RT. Cuff leak noted, no accessory muscle usage or stridor. No tachypnea and no bradypnea. No respiratory distress.Respirations even and unlabored.  Diminished breath sounds and equal bilaterally.

## 2019-10-03 NOTE — PROGRESS NOTES
Patient has been bradycardic in low 40's-50's for most of night. BP tolerating these rates. This morning, patient's heart rate briefly went as low as 32, and was  irregular. BP was 140/60 at that time. Fentanyl and Precedex drips were turned off, and heart rate came up to low 50's. Now sitting between 48 and 50. Restraints are on. Patient is no longer being actively sedated. Will continue to monitor.

## 2019-10-03 NOTE — FLOWSHEET NOTE
10/03/19 0845   Vent Clock   Vent Discontinued Yes   Events/Summary/Plan   Events/Summary/Plan Pt is extubated with MD order, RN at bedside with RT  (Placed pt on 4L NC)   General Vent Information   Pulse Oximetry 100 %   Vent Alarms   Low VE (LPM) Alarm 4   Rodrigues Vent   Rodrigues Vent Mode Spont   Rodrigues Conventional Settings   TI (Seconds) 3   PEEP/CPAP 8   P Support 5   Pramp 50   ETS(%) 25   Rodrigues Measured Parameters   P Peak (PIP) 17    Minute Volume 12.15   Control VTE (exp VT) 1   f Total (Breaths/Min) 17   P MEAN 11   Static Compliance (ml / cm H2O) 133   Servo Vent   PEEP (Monitored) (cmH2O) 0   Breath Sounds   Pre/Post Intervention Post Intervention Assessment   RUL Breath Sounds Diminished   RML Breath Sounds Diminished   RLL Breath Sounds Diminished   AMALIA Breath Sounds Diminished   LLL Breath Sounds Diminished

## 2019-10-03 NOTE — RESPIRATORY CARE
Adult Ventilation Update    Total Vent Days: 4    Patient Lines/Drains/Airways Status    Active Airway     Name: Placement date: Placement time: Site: Days:    Airway ETT Oral 8.0  09/30/19   0634   Oral  2                    Plateau Pressure (Q Shift): 17 (10/02/19 1856)  Static Compliance (ml / cm H2O): 54 (10/03/19 0256)    Sputum/Suction   Cough: Congested (10/02/19 2000)  Sputum Amount: Small (10/02/19 1600)  Sputum Color: Clear;White (10/02/19 1600)  Sputum Consistency: Thin (10/02/19 1600)    Mobility  Level of Mobility: Level III (10/02/19 2000)  Activity Performed: Sitting up in bed (10/02/19 1800)  Assistance: Assistance of Two or More (10/02/19 1800)  Ambulation Tolerance: General Weakness (10/02/19 1800)  Pt Calls for Assistance: No (10/02/19 2000)  Staff Present for Mobilization: RN;CNA (10/02/19 1800)  Gait: Unable to Ambulate (10/02/19 1800)  Reason Not Mobilized: Bed rest (10/02/19 2000)    Events/Summary/Plan: fi02 to 30%.

## 2019-10-03 NOTE — PROGRESS NOTES
Cortrak Placement    Tube Team verified patient name and medical record number prior to tube placement.  Cortrak tube (43 inches, 10 Barbadian) placed at 60 cm in left nare.  Per Cortrak picture, tube appears to be in the stomach.  Nursing Instructions: Awaiting KUB to confirm placement before use for medications or feeding. Once placement confirmed, flush tube with 30 ml of water, and then remove and save stylet, in patient medication drawer.

## 2019-10-03 NOTE — PROGRESS NOTES
Pt's sister and brother at bedside this morning, updated on care. Dialysis in process. Norepinephrine titrated for dialysis see MAR. Siblings left and pt pulled out Cortrak. Tube feed stopped. Speech therapy to see pt this afternoon around 1400 post dialysis.

## 2019-10-03 NOTE — PROGRESS NOTES
Pt extubated by RT at 0845. Dialysis at bedside now for treatment. Right wrist restrained, pt verbalizes understanding to not pull at lines. Pt sitting up in bed.

## 2019-10-03 NOTE — CARE PLAN
Problem: Knowledge Deficit  Goal: Knowledge of disease process/condition, treatment plan, diagnostic tests, and medications will improve  Outcome: PROGRESSING AS EXPECTED   Pt extubated this AM, A&O now, POC discussed with pt and pt's sister Tammy. Pt getting dialysis now, 4 hour treatment.  Lab results discussed with MD.     Problem: Venous Thromboembolism (VTW)/Deep Vein Thrombosis (DVT) Prevention:  Goal: Patient will participate in Venous Thrombosis (VTE)/Deep Vein Thrombosis (DVT)Prevention Measures  Outcome: PROGRESSING AS EXPECTED   SCD's in place, Heparin SQ ordered every 8 hours. Pt will remain free from DVT.     Problem: Safety  Goal: Will remain free from injury  Outcome: PROGRESSING AS EXPECTED   Treaded slipper socks in placed, bed in low position, pt near nurses station and hourly rounds in place. Pt will remain free from falls.

## 2019-10-03 NOTE — CARE PLAN
Problem: Safety  Goal: Will remain free from falls  Outcome: PROGRESSING AS EXPECTED     Problem: Venous Thromboembolism (VTW)/Deep Vein Thrombosis (DVT) Prevention:  Goal: Patient will participate in Venous Thrombosis (VTE)/Deep Vein Thrombosis (DVT)Prevention Measures  Outcome: PROGRESSING AS EXPECTED     Problem: Fluid Volume:  Goal: Will maintain balanced intake and output  Outcome: PROGRESSING AS EXPECTED     Problem: Pain Management  Goal: Pain level will decrease to patient's comfort goal  Outcome: PROGRESSING AS EXPECTED     Problem: Infection  Goal: Will remain free from infection  Outcome: PROGRESSING SLOWER THAN EXPECTED     Problem: Respiratory:  Goal: Respiratory status will improve  Outcome: PROGRESSING SLOWER THAN EXPECTED     Problem: Mobility  Goal: Risk for activity intolerance will decrease  Outcome: PROGRESSING SLOWER THAN EXPECTED

## 2019-10-04 ENCOUNTER — APPOINTMENT (OUTPATIENT)
Dept: RADIOLOGY | Facility: MEDICAL CENTER | Age: 69
DRG: 207 | End: 2019-10-04
Attending: INTERNAL MEDICINE
Payer: MEDICARE

## 2019-10-04 LAB
ALBUMIN SERPL BCP-MCNC: 4.4 G/DL (ref 3.2–4.9)
ANION GAP SERPL CALC-SCNC: 20 MMOL/L (ref 0–11.9)
ANION GAP SERPL CALC-SCNC: 21 MMOL/L (ref 0–11.9)
BASOPHILS # BLD AUTO: 0.1 % (ref 0–1.8)
BASOPHILS # BLD: 0.02 K/UL (ref 0–0.12)
BUN SERPL-MCNC: 18 MG/DL (ref 8–22)
BUN SERPL-MCNC: 30 MG/DL (ref 8–22)
CALCIUM SERPL-MCNC: 10.4 MG/DL (ref 8.4–10.2)
CALCIUM SERPL-MCNC: 10.7 MG/DL (ref 8.4–10.2)
CHLORIDE SERPL-SCNC: 88 MMOL/L (ref 96–112)
CHLORIDE SERPL-SCNC: 89 MMOL/L (ref 96–112)
CO2 SERPL-SCNC: 26 MMOL/L (ref 20–33)
CO2 SERPL-SCNC: 26 MMOL/L (ref 20–33)
CREAT SERPL-MCNC: 3.55 MG/DL (ref 0.5–1.4)
CREAT SERPL-MCNC: 5.05 MG/DL (ref 0.5–1.4)
EKG IMPRESSION: NORMAL
EOSINOPHIL # BLD AUTO: 0 K/UL (ref 0–0.51)
EOSINOPHIL NFR BLD: 0 % (ref 0–6.9)
ERYTHROCYTE [DISTWIDTH] IN BLOOD BY AUTOMATED COUNT: 45.3 FL (ref 35.9–50)
GLUCOSE SERPL-MCNC: 139 MG/DL (ref 65–99)
GLUCOSE SERPL-MCNC: 165 MG/DL (ref 65–99)
HCT VFR BLD AUTO: 40 % (ref 42–52)
HGB BLD-MCNC: 13.6 G/DL (ref 14–18)
IMM GRANULOCYTES # BLD AUTO: 0.17 K/UL (ref 0–0.11)
IMM GRANULOCYTES NFR BLD AUTO: 1 % (ref 0–0.9)
LYMPHOCYTES # BLD AUTO: 0.87 K/UL (ref 1–4.8)
LYMPHOCYTES NFR BLD: 5.1 % (ref 22–41)
MAGNESIUM SERPL-MCNC: 2.2 MG/DL (ref 1.5–2.5)
MCH RBC QN AUTO: 30.8 PG (ref 27–33)
MCHC RBC AUTO-ENTMCNC: 34 G/DL (ref 33.7–35.3)
MCV RBC AUTO: 90.5 FL (ref 81.4–97.8)
MONOCYTES # BLD AUTO: 0.67 K/UL (ref 0–0.85)
MONOCYTES NFR BLD AUTO: 3.9 % (ref 0–13.4)
NEUTROPHILS # BLD AUTO: 15.45 K/UL (ref 1.82–7.42)
NEUTROPHILS NFR BLD: 89.9 % (ref 44–72)
NRBC # BLD AUTO: 0 K/UL
NRBC BLD-RTO: 0 /100 WBC
PHOSPHATE SERPL-MCNC: 5.1 MG/DL (ref 2.5–4.5)
PLATELET # BLD AUTO: 316 K/UL (ref 164–446)
PMV BLD AUTO: 10.4 FL (ref 9–12.9)
POTASSIUM SERPL-SCNC: 4 MMOL/L (ref 3.6–5.5)
POTASSIUM SERPL-SCNC: 4.8 MMOL/L (ref 3.6–5.5)
RBC # BLD AUTO: 4.42 M/UL (ref 4.7–6.1)
SODIUM SERPL-SCNC: 135 MMOL/L (ref 135–145)
SODIUM SERPL-SCNC: 135 MMOL/L (ref 135–145)
TROPONIN T SERPL-MCNC: 51 NG/L (ref 6–19)
WBC # BLD AUTO: 17.2 K/UL (ref 4.8–10.8)

## 2019-10-04 PROCEDURE — 87040 BLOOD CULTURE FOR BACTERIA: CPT | Mod: 91

## 2019-10-04 PROCEDURE — 99233 SBSQ HOSP IP/OBS HIGH 50: CPT | Performed by: INTERNAL MEDICINE

## 2019-10-04 PROCEDURE — 83735 ASSAY OF MAGNESIUM: CPT

## 2019-10-04 PROCEDURE — 80048 BASIC METABOLIC PNL TOTAL CA: CPT

## 2019-10-04 PROCEDURE — 700111 HCHG RX REV CODE 636 W/ 250 OVERRIDE (IP): Performed by: INTERNAL MEDICINE

## 2019-10-04 PROCEDURE — 85025 COMPLETE CBC W/AUTO DIFF WBC: CPT

## 2019-10-04 PROCEDURE — 93005 ELECTROCARDIOGRAM TRACING: CPT | Performed by: INTERNAL MEDICINE

## 2019-10-04 PROCEDURE — 71045 X-RAY EXAM CHEST 1 VIEW: CPT

## 2019-10-04 PROCEDURE — 700102 HCHG RX REV CODE 250 W/ 637 OVERRIDE(OP): Performed by: INTERNAL MEDICINE

## 2019-10-04 PROCEDURE — 90935 HEMODIALYSIS ONE EVALUATION: CPT

## 2019-10-04 PROCEDURE — 700101 HCHG RX REV CODE 250: Performed by: INTERNAL MEDICINE

## 2019-10-04 PROCEDURE — 84484 ASSAY OF TROPONIN QUANT: CPT

## 2019-10-04 PROCEDURE — 700105 HCHG RX REV CODE 258: Performed by: INTERNAL MEDICINE

## 2019-10-04 PROCEDURE — 93010 ELECTROCARDIOGRAM REPORT: CPT | Performed by: INTERNAL MEDICINE

## 2019-10-04 PROCEDURE — A9270 NON-COVERED ITEM OR SERVICE: HCPCS | Performed by: INTERNAL MEDICINE

## 2019-10-04 PROCEDURE — 770022 HCHG ROOM/CARE - ICU (200)

## 2019-10-04 PROCEDURE — 5A1D70Z PERFORMANCE OF URINARY FILTRATION, INTERMITTENT, LESS THAN 6 HOURS PER DAY: ICD-10-PCS | Performed by: INTERNAL MEDICINE

## 2019-10-04 PROCEDURE — 80069 RENAL FUNCTION PANEL: CPT

## 2019-10-04 RX ORDER — ONDANSETRON 2 MG/ML
4 INJECTION INTRAMUSCULAR; INTRAVENOUS EVERY 4 HOURS PRN
Status: DISCONTINUED | OUTPATIENT
Start: 2019-10-04 | End: 2019-10-07 | Stop reason: HOSPADM

## 2019-10-04 RX ORDER — HEPARIN SODIUM 1000 [USP'U]/ML
1500 INJECTION, SOLUTION INTRAVENOUS; SUBCUTANEOUS
Status: DISCONTINUED | OUTPATIENT
Start: 2019-10-04 | End: 2019-10-07 | Stop reason: HOSPADM

## 2019-10-04 RX ORDER — SEVELAMER CARBONATE 800 MG/1
2400 TABLET, FILM COATED ORAL
Status: DISCONTINUED | OUTPATIENT
Start: 2019-10-04 | End: 2019-10-04

## 2019-10-04 RX ORDER — ONDANSETRON 4 MG/1
4 TABLET, ORALLY DISINTEGRATING ORAL EVERY 4 HOURS PRN
Status: DISCONTINUED | OUTPATIENT
Start: 2019-10-04 | End: 2019-10-07 | Stop reason: HOSPADM

## 2019-10-04 RX ORDER — SEVELAMER CARBONATE 800 MG/1
2400 TABLET, FILM COATED ORAL
Status: DISCONTINUED | OUTPATIENT
Start: 2019-10-04 | End: 2019-10-07 | Stop reason: HOSPADM

## 2019-10-04 RX ADMIN — NOREPINEPHRINE BITARTRATE 6 MCG/MIN: 1 INJECTION INTRAVENOUS at 18:16

## 2019-10-04 RX ADMIN — ONDANSETRON 4 MG: 2 INJECTION INTRAMUSCULAR; INTRAVENOUS at 07:50

## 2019-10-04 RX ADMIN — SEVELAMER CARBONATE 800 MG: 800 TABLET, FILM COATED ORAL at 08:59

## 2019-10-04 RX ADMIN — CEFAZOLIN 1 G: 1 INJECTION, POWDER, FOR SOLUTION INTRAMUSCULAR; INTRAVENOUS at 21:21

## 2019-10-04 RX ADMIN — HEPARIN SODIUM 5000 UNITS: 5000 INJECTION, SOLUTION INTRAVENOUS; SUBCUTANEOUS at 05:48

## 2019-10-04 RX ADMIN — NOREPINEPHRINE BITARTRATE 6 MCG/MIN: 1 INJECTION INTRAVENOUS at 01:35

## 2019-10-04 RX ADMIN — METHYLPREDNISOLONE SODIUM SUCCINATE 40 MG: 40 INJECTION, POWDER, FOR SOLUTION INTRAMUSCULAR; INTRAVENOUS at 11:55

## 2019-10-04 RX ADMIN — CINACALCET HYDROCHLORIDE 60 MG: 30 TABLET, COATED ORAL at 17:47

## 2019-10-04 RX ADMIN — CEFAZOLIN 1 G: 1 INJECTION, POWDER, FOR SOLUTION INTRAMUSCULAR; INTRAVENOUS at 11:54

## 2019-10-04 RX ADMIN — DOXYCYCLINE 100 MG: 100 TABLET, FILM COATED ORAL at 21:22

## 2019-10-04 RX ADMIN — METHYLPREDNISOLONE SODIUM SUCCINATE 40 MG: 40 INJECTION, POWDER, FOR SOLUTION INTRAMUSCULAR; INTRAVENOUS at 05:44

## 2019-10-04 RX ADMIN — HEPARIN SODIUM 5000 UNITS: 5000 INJECTION, SOLUTION INTRAVENOUS; SUBCUTANEOUS at 13:16

## 2019-10-04 RX ADMIN — SEVELAMER CARBONATE 2400 MG: 800 TABLET, FILM COATED ORAL at 17:42

## 2019-10-04 RX ADMIN — DOXYCYCLINE 100 MG: 100 TABLET, FILM COATED ORAL at 08:59

## 2019-10-04 RX ADMIN — FAMOTIDINE 20 MG: 20 TABLET ORAL at 05:40

## 2019-10-04 RX ADMIN — ASPIRIN 81 MG CHEWABLE TABLET 81 MG: 81 TABLET CHEWABLE at 17:42

## 2019-10-04 RX ADMIN — ONDANSETRON 4 MG: 2 INJECTION INTRAMUSCULAR; INTRAVENOUS at 18:08

## 2019-10-04 RX ADMIN — HEPARIN SODIUM 5000 UNITS: 5000 INJECTION, SOLUTION INTRAVENOUS; SUBCUTANEOUS at 21:21

## 2019-10-04 RX ADMIN — HEPARIN SODIUM 1500 UNITS: 1000 INJECTION, SOLUTION INTRAVENOUS; SUBCUTANEOUS at 04:48

## 2019-10-04 RX ADMIN — METHYLPREDNISOLONE SODIUM SUCCINATE 40 MG: 40 INJECTION, POWDER, FOR SOLUTION INTRAMUSCULAR; INTRAVENOUS at 17:42

## 2019-10-04 RX ADMIN — SEVELAMER CARBONATE 2400 MG: 800 TABLET, FILM COATED ORAL at 13:16

## 2019-10-04 NOTE — DIETARY
Nutrition Services: Update   Pt pulled cortrak yesterday afternoon.  Passed swallow eval and started a Renal, Dysphagia 2 diet with thin liquids.  Recorded PO intake is good with pt eating % of a snack and % of Dinner 10/3.  Breakfast today = %.  Observed pt eating well at lunch.  PO intake appears adequate.    Recommendations/Plan:  1. Renal, Dysphagia 2 diet with thin liquids.  Diet advancement per SLP.   2. Encourage continued good intake of meals.  3. Document intake of all meals as % taken in ADL's to provide interdisciplinary communication across all shifts.   4. Monitor weight.  5. Nutrition rep will continue to see patient for ongoing meal and snack preferences.  6. RD to monitor per department protocol.

## 2019-10-04 NOTE — CARE PLAN
Problem: Communication  Goal: The ability to communicate needs accurately and effectively will improve  Outcome: PROGRESSING AS EXPECTED     Problem: Safety  Goal: Will remain free from injury  Outcome: PROGRESSING AS EXPECTED  Goal: Will remain free from falls  Outcome: PROGRESSING AS EXPECTED     Problem: Venous Thromboembolism (VTW)/Deep Vein Thrombosis (DVT) Prevention:  Goal: Patient will participate in Venous Thrombosis (VTE)/Deep Vein Thrombosis (DVT)Prevention Measures  Outcome: PROGRESSING AS EXPECTED     Problem: Pain Management  Goal: Pain level will decrease to patient's comfort goal  Outcome: PROGRESSING AS EXPECTED     Problem: Respiratory:  Goal: Respiratory status will improve  Outcome: PROGRESSING AS EXPECTED     Problem: Urinary Elimination:  Goal: Ability to reestablish a normal urinary elimination pattern will improve  Outcome: PROGRESSING AS EXPECTED     Problem: Infection  Goal: Will remain free from infection  Outcome: PROGRESSING SLOWER THAN EXPECTED     Problem: Knowledge Deficit  Goal: Knowledge of disease process/condition, treatment plan, diagnostic tests, and medications will improve  Outcome: PROGRESSING SLOWER THAN EXPECTED  Goal: Knowledge of the prescribed therapeutic regimen will improve  Outcome: PROGRESSING SLOWER THAN EXPECTED

## 2019-10-04 NOTE — PROGRESS NOTES
Report received from JERMAINE Mcginnis. Pt resting in bed, HD RN at bedside for treatment. Pt in and out of accelerated junctional rhythm on tele rate 50's, back in SR 60s before able to capture with EKG. Pt asymptomatic of this rhythm and in no apparent distress. Dr. Figueroa aware. Pt on levophed, titrated per orders. Pt with nausea and vomiting this AM, he states from frequent HD treatments. Medicated per MAR PRN zofran. Pt with slight confusion and reoriented as needed. Pt states no needs at this time. Will continue with care.

## 2019-10-04 NOTE — PROGRESS NOTES
[facility] -  PROGRESS NOTE               Author: Gus Murillo M.D. Date & Time: 10/4/2019  12:18 PM     HPI:  The patient is a very pleasant 69-year-old gentleman known to us from outpatient dialysis care.  He receives his maintenance hemodialysis on a Monday, Wednesday and Friday schedule at [facility].  His last treatment was Friday without incident.  He was due today.  He presented himself to the emergency department at [facility] with complaints of dyspnea.  He does have a history of rather large interdialytic weight gains and pulmonary edema.  He was placed on BiPAP for breathing treatment.  He has severe respiratory distress and was unable to speak.  EKG was profoundly abnormal suggestive of hyperkalemia.  He was given intravenous calcium with some improvement of the rhythm.  Lab evaluation demonstrated a rather significant elevation of his potassium to 9.9.  He was admitted to the intensive care unit, urgently intubated, placed on mechanical ventilation and emergent dialysis was provided.  His rhythm has improved.  No repeat labs are available yet at the time of this dictation.    DAILY NEPHROLOGY SUMMARY:   09/30 - Consult done   10/01 - NAEO, still in ICU and intubated   10/02 - Dialysis this AM.  Awake for a bit but agitated, now sedated.  Failed SBT with apnea.    10/03 - Now extubated.  +confused.  Doesn't remember me.  K up again.  Family at bedside.   10/04 - NAEO, does not recall events of this week, family/friend at bedside    REVIEW OF SYSTEMS:    - As per HPI, otherwise review of systems negative per AMA/CMS criteria  - General:  As per HPI, otherwise negative per AMA/CMS criteria  - HEENT:  No ocular pain; no nasal discharge  - CV:  As per HPI, otherwise negative per AMA/CMS criteria  - Lungs:  As per HPI, otherwise negative per AMA/CMS criteria  - GI:  As per HPI, otherwise negative per AMA/CMS criteria  - MSK:  No joint pain;  "No trauma  - Skin: No rashes; No lesions  - Neuro: No paresthesia; No LOC  - Psych: No depression; No anxiety    PAST FAMILY HISTORY: Reviewed and Unchanged  SOCIAL HISTORY: Reviewed and Unchanged  CURRENT MEDICATIONS: Reviewed  IMAGING STUDIES: Reviewed    PHYSICAL EXAM:  VS:  /55   Pulse 98   Temp 37.3 °C (99.1 °F) (Temporal)   Resp (!) 38   Ht 1.778 m (5' 10\")   Wt 78.2 kg (172 lb 6.4 oz)   SpO2 90%   BMI 24.74 kg/m²   GENERAL:  Ill appearing male, intubated and sedated  HEENT:  NC/AT, no scleral icterus; conjunctiva normal  NECK:  Supple; Non tender  CV:  RRR, no m/r/g  LUNGS:  CTAB, no W/R  ABDOMEN:  SNTND, No rebound/guarding  EXTREMETIES:  No C/C/E  SKIN:  Warm and dry  NEURO:  A&O, no focal deficits  PSYCH:  Cooperative, appropriate mood and affect    Fluids:  In: 1449.1 [P.O.:220; I.V.:44.1; Other:120; Dialysis:700; Enteral:60]  Out: 3700     LABS:  Recent Results (from the past 24 hour(s))   Renal Function Panel    Collection Time: 10/04/19  3:45 AM   Result Value Ref Range    Sodium 135 135 - 145 mmol/L    Potassium 4.8 3.6 - 5.5 mmol/L    Chloride 89 (L) 96 - 112 mmol/L    Co2 26 20 - 33 mmol/L    Glucose 139 (H) 65 - 99 mg/dL    Creatinine 5.05 (H) 0.50 - 1.40 mg/dL    Bun 30 (H) 8 - 22 mg/dL    Calcium 10.4 (H) 8.4 - 10.2 mg/dL    Phosphorus 5.1 (H) 2.5 - 4.5 mg/dL    Albumin 4.4 3.2 - 4.9 g/dL   CBC WITH DIFFERENTIAL    Collection Time: 10/04/19  3:45 AM   Result Value Ref Range    WBC 17.2 (H) 4.8 - 10.8 K/uL    RBC 4.42 (L) 4.70 - 6.10 M/uL    Hemoglobin 13.6 (L) 14.0 - 18.0 g/dL    Hematocrit 40.0 (L) 42.0 - 52.0 %    MCV 90.5 81.4 - 97.8 fL    MCH 30.8 27.0 - 33.0 pg    MCHC 34.0 33.7 - 35.3 g/dL    RDW 45.3 35.9 - 50.0 fL    Platelet Count 316 164 - 446 K/uL    MPV 10.4 9.0 - 12.9 fL    Neutrophils-Polys 89.90 (H) 44.00 - 72.00 %    Lymphocytes 5.10 (L) 22.00 - 41.00 %    Monocytes 3.90 0.00 - 13.40 %    Eosinophils 0.00 0.00 - 6.90 %    Basophils 0.10 0.00 - 1.80 %    Immature " Granulocytes 1.00 (H) 0.00 - 0.90 %    Nucleated RBC 0.00 /100 WBC    Neutrophils (Absolute) 15.45 (H) 1.82 - 7.42 K/uL    Lymphs (Absolute) 0.87 (L) 1.00 - 4.80 K/uL    Monos (Absolute) 0.67 0.00 - 0.85 K/uL    Eos (Absolute) 0.00 0.00 - 0.51 K/uL    Baso (Absolute) 0.02 0.00 - 0.12 K/uL    Immature Granulocytes (abs) 0.17 (H) 0.00 - 0.11 K/uL    NRBC (Absolute) 0.00 K/uL   Basic Metabolic Panel    Collection Time: 10/04/19  3:45 AM   Result Value Ref Range    Anion Gap 20.0 (H) 0.0 - 11.9   ESTIMATED GFR    Collection Time: 10/04/19  3:45 AM   Result Value Ref Range    GFR If  14 (A) >60 mL/min/1.73 m 2    GFR If Non African American 11 (A) >60 mL/min/1.73 m 2   Basic Metabolic Panel    Collection Time: 10/04/19 11:00 AM   Result Value Ref Range    Sodium 135 135 - 145 mmol/L    Potassium 4.0 3.6 - 5.5 mmol/L    Chloride 88 (L) 96 - 112 mmol/L    Co2 26 20 - 33 mmol/L    Glucose 165 (H) 65 - 99 mg/dL    Bun 18 8 - 22 mg/dL    Creatinine 3.55 (H) 0.50 - 1.40 mg/dL    Calcium 10.7 (H) 8.4 - 10.2 mg/dL    Anion Gap 21.0 (H) 0.0 - 11.9   TROPONIN    Collection Time: 10/04/19 11:00 AM   Result Value Ref Range    Troponin T 51 (H) 6 - 19 ng/L   ESTIMATED GFR    Collection Time: 10/04/19 11:00 AM   Result Value Ref Range    GFR If  21 (A) >60 mL/min/1.73 m 2    GFR If Non African American 17 (A) >60 mL/min/1.73 m 2   MAGNESIUM    Collection Time: 10/04/19 11:00 AM   Result Value Ref Range    Magnesium 2.2 1.5 - 2.5 mg/dL   EKG    Collection Time: 10/04/19 11:26 AM   Result Value Ref Range    Report       [facility]    Test Date:  2019-10-04  Pt Name:    AURELIA DAVIS                  Department: ICU  MRN:        [id number]                      Room:       3321  Gender:     Male                         Technician: KATARZYNA  :        1950                   Requested By:LUIZ MEJIAS  Order #:    [id number]                    Reading MD:    Measurements  Intervals                                 Axis  Rate:       95                           P:          44  GA:         140                          QRS:        39  QRSD:       98                           T:          221  QT:         396  QTc:        498    Interpretive Statements  SINUS RHYTHM  PROBABLE LVH WITH SECONDARY REPOL ABNRM  BORDERLINE PROLONGED QT INTERVAL  Compared to ECG 10/01/2019 00:43:10  Sinus bradycardia no longer present  T-wave abnormality no longer present         (click the triangle to expand results)    IMPRESSION:  - ESRD    * Etiology likely 2/2 HTN    * Dialysis today (THURS)    * Continue maintenance dialysis qMWF and PRN  - Hyperkalemia    * Life threatining, tx with HD    * Etiology not clear:  Diet indiscretions, acidosis, Cell lysis?  - Acute respiratory failure, with hypoxia    * Liberated from vent, 10/3  - AMS     * Looks to have delerium  ?meds  - HTN    * Goal BP < 140/90    * Currently hypotensive and on levophed still    * No etiology to hypotension found yet  - Anemia of CKD    * Goal Hgb 10-11  - CKD-MBD    * Managed at HD unit  - COPD  - HLD  - CAD    PLAN:  - MWF iHD  - UF as tolerated  - No dietary protein restrictions  - Dose all meds per ESRD  - BCx repeated today

## 2019-10-04 NOTE — DISCHARGE PLANNING
Care Transition Team Assessment    Information Source  Orientation : Oriented x 4  Information Given By: Other (Comments)  Informant's Name: son Aurelio   Who is responsible for making decisions for patient? : Patient    Readmission Evaluation  Is this a readmission?: No    Elopement Risk  Legal Hold: No  Ambulatory or Self Mobile in Wheelchair: No-Not an Elopement Risk  Elopement Risk: Not at Risk for Elopement    Interdisciplinary Discharge Planning  Does Admitting Nurse Feel This Could be a Complex Discharge?: No  Lives with - Patient's Self Care Capacity: Unable To Determine At This Time  Patient or legal guardian wants to designate a caregiver (see row info): Yes  Caregiver name: Brother Emil   Caregiver relationship to patient: son   Caregiver contact info: cell [phone]   home [phone]  ([facility] [place]) Authorization for Release of Health Information has been completed: Yes  Support Systems: Family Member(s)  Housing / Facility: 1 Story Apartment / Condo  Do You Take your Prescribed Medications Regularly: Yes  Able to Return to Previous ADL's: Future Time w/Therapy  Prior Services: None  Assistance Needed: Unknown at this Time  Durable Medical Equipment: Home Oxygen    Discharge Preparedness  What is your plan after discharge?: Uncertain - pending medical team collaboration  What are your discharge supports?: Child, Sibling, Spouse  Prior Functional Level: Needs Assist with Activities of Daily Living    Functional Assesment  Prior Functional Level: Needs Assist with Activities of Daily Living    Finances  Financial Barriers to Discharge: No  Prescription Coverage: Yes    Vision / Hearing Impairment  Vision Impairment : No  Hearing Impairment : No         Advance Directive  Advance Directive?: None    Domestic Abuse  Have you ever been the victim of abuse or violence?: No  Physical Abuse or Sexual Abuse: No  Verbal Abuse or Emotional Abuse: No  Possible Abuse Reported to::     Psychological  Assessment  History of Substance Abuse: None  History of Psychiatric Problems: No  Non-compliant with Treatment: No    Discharge Risks or Barriers  Discharge risks or barriers?: Complex medical needs  Patient risk factors: Complex medical needs, Vulnerable adult    Anticipated Discharge Information  Anticipated discharge disposition: Discharge needs currently unknown

## 2019-10-04 NOTE — CARE PLAN
Problem: Nutritional:  Goal: Nutrition support tolerated and meeting greater than 85% of estimated needs  Outcome: MET   TF was at goal, but cortrak pulled 10/3.  Pt started Renal, Dysphagia 2 diet with thin liquids.  Good PO intake recorded at %.

## 2019-10-04 NOTE — PROGRESS NOTES
Report was received from Eleonora DEAN. Patient is resting in bed with no needs at this time. Supplemental O2 turned up to 4 L via nasal cannula. Vital signs stable. Plan of care reviewed. Bed alarm on and call light within reach.

## 2019-10-04 NOTE — PROGRESS NOTES
[facility] -  PROGRESS NOTE               Author: Gomez Chavez D.O. Date & Time: 10/3/2019  5:16 PM     HPI:  The patient is a very pleasant 69-year-old gentleman known to us from outpatient dialysis care.  He receives his maintenance hemodialysis on a Monday, Wednesday and Friday schedule at [facility].  His last treatment was Friday without incident.  He was due today.  He presented himself to the emergency department at [facility] with complaints of dyspnea.  He does have a history of rather large interdialytic weight gains and pulmonary edema.  He was placed on BiPAP for breathing treatment.  He has severe respiratory distress and was unable to speak.  EKG was profoundly abnormal suggestive of hyperkalemia.  He was given intravenous calcium with some improvement of the rhythm.  Lab evaluation demonstrated a rather significant elevation of his potassium to 9.9.  He was admitted to the intensive care unit, urgently intubated, placed on mechanical ventilation and emergent dialysis was provided.  His rhythm has improved.  No repeat labs are available yet at the time of this dictation.    DAILY NEPHROLOGY SUMMARY:   09/30 - Consult done   10/01 - NAEO, still in ICU and intubated   10/02 - Dialysis this AM.  Awake for a bit but agitated, now sedated.  Failed SBT with apnea.    10/03 - Now extubated.  +confused.  Doesn't remember me.  K up again.  Family at bedside.    REVIEW OF SYSTEMS:    - As per HPI, otherwise review of systems negative per AMA/CMS criteria  - General:  As per HPI, otherwise negative per AMA/CMS criteria  - HEENT:  No ocular pain; no nasal discharge  - CV:  As per HPI, otherwise negative per AMA/CMS criteria  - Lungs:  As per HPI, otherwise negative per AMA/CMS criteria  - GI:  As per HPI, otherwise negative per AMA/CMS criteria  - MSK:  No joint pain; No trauma  - Skin: No rashes; No lesions  - Neuro: No paresthesia; No LOC  -  "Psych: No depression; No anxiety    PAST FAMILY HISTORY: Reviewed and Unchanged  SOCIAL HISTORY: Reviewed and Unchanged  CURRENT MEDICATIONS: Reviewed  IMAGING STUDIES: Reviewed    PHYSICAL EXAM:  VS:  BP (!) 84/63   Pulse (!) 101   Temp 36.9 °C (98.5 °F) (Temporal)   Resp (!) 28   Ht 1.778 m (5' 10\")   Wt 78.1 kg (172 lb 2.9 oz)   SpO2 91%   BMI 24.71 kg/m²   GENERAL:  Ill appearing male, intubated and sedated  HEENT:  NC/AT, no scleral icterus; conjunctiva normal  NECK:  Supple; Non tender  CV:  RRR, no m/r/g  LUNGS:  CTAB, no W/R  ABDOMEN:  SNTND, No rebound/guarding  EXTREMETIES:  No C/C/E  SKIN:  Warm and dry  NEURO:  A&O, no focal deficits  PSYCH:  Cooperative, appropriate mood and affect    Fluids:  In: 1208.9 [I.V.:468.9; Dialysis:600; Enteral:90]  Out: 2015     LABS:  Recent Results (from the past 24 hour(s))   ACCU-CHEK GLUCOSE    Collection Time: 10/02/19  8:22 PM   Result Value Ref Range    Glucose - Accu-Ck 101 (H) 65 - 99 mg/dL   Basic Metabolic Panel (BMP)    Collection Time: 10/03/19  3:30 AM   Result Value Ref Range    Sodium 132 (L) 135 - 145 mmol/L    Potassium 6.4 (H) 3.6 - 5.5 mmol/L    Chloride 86 (L) 96 - 112 mmol/L    Co2 25 20 - 33 mmol/L    Glucose 130 (H) 65 - 99 mg/dL    Bun 22 8 - 22 mg/dL    Creatinine 5.62 (HH) 0.50 - 1.40 mg/dL    Calcium 10.3 (H) 8.4 - 10.2 mg/dL    Anion Gap 21.0 (H) 0.0 - 11.9   Magnesium    Collection Time: 10/03/19  3:30 AM   Result Value Ref Range    Magnesium 2.3 1.5 - 2.5 mg/dL   Phosphorus    Collection Time: 10/03/19  3:30 AM   Result Value Ref Range    Phosphorus 6.7 (H) 2.5 - 4.5 mg/dL   CRP QUANTITIVE (NON-CARDIAC)    Collection Time: 10/03/19  3:30 AM   Result Value Ref Range    Stat C-Reactive Protein 23.13 (H) 0.00 - 0.75 mg/dL   PREALBUMIN    Collection Time: 10/03/19  3:30 AM   Result Value Ref Range    Pre-Albumin 18.0 18.0 - 38.0 mg/dL   ESTIMATED GFR    Collection Time: 10/03/19  3:30 AM   Result Value Ref Range    GFR If  " 12 (A) >60 mL/min/1.73 m 2    GFR If Non African American 10 (A) >60 mL/min/1.73 m 2   ACCU-CHEK GLUCOSE    Collection Time: 10/03/19  5:07 AM   Result Value Ref Range    Glucose - Accu-Ck 161 (H) 65 - 99 mg/dL   ISTAT ARTERIAL BLOOD GAS    Collection Time: 10/03/19  5:22 AM   Result Value Ref Range    Ph 7.445 7.400 - 7.500    Pco2 37.0 26.0 - 37.0 mmHg    Po2 68 64 - 87 mmHg    Tco2 27 20 - 33 mmol/L    S02 94 93 - 99 %    Hco3 25.4 (H) 17.0 - 25.0 mmol/L    BE 1 -4 - 3 mmol/L    Body Temp see below degrees    O2 Therapy 30 %    iPF Ratio 227     Specimen Arterial     Action Range Triggered NO     Inst. Qualified Patient YES    ISTAT ARTERIAL BLOOD GAS    Collection Time: 10/03/19  7:53 AM   Result Value Ref Range    Ph 7.483 7.400 - 7.500    Pco2 36.3 26.0 - 37.0 mmHg    Po2 71 64 - 87 mmHg    Tco2 28 20 - 33 mmol/L    S02 95 93 - 99 %    Hco3 27.2 (H) 17.0 - 25.0 mmol/L    BE 4 (H) -4 - 3 mmol/L    Body Temp 97.3 F degrees    O2 Therapy 30 %    iPF Ratio 237     Ph Temp Jeffrey 7.494 7.400 - 7.500    Po2 Temp Cor 68 64 - 87 mmHg    Specimen Arterial     Action Range Triggered NO     Inst. Qualified Patient YES    CBC WITH DIFFERENTIAL    Collection Time: 10/03/19  8:16 AM   Result Value Ref Range    WBC 5.8 4.8 - 10.8 K/uL    RBC 4.11 (L) 4.70 - 6.10 M/uL    Hemoglobin 12.3 (L) 14.0 - 18.0 g/dL    Hematocrit 37.3 (L) 42.0 - 52.0 %    MCV 90.8 81.4 - 97.8 fL    MCH 29.9 27.0 - 33.0 pg    MCHC 33.0 (L) 33.7 - 35.3 g/dL    RDW 45.0 35.9 - 50.0 fL    Platelet Count 230 164 - 446 K/uL    MPV 10.1 9.0 - 12.9 fL    Neutrophils-Polys 89.10 (H) 44.00 - 72.00 %    Lymphocytes 8.30 (L) 22.00 - 41.00 %    Monocytes 1.90 0.00 - 13.40 %    Eosinophils 0.00 0.00 - 6.90 %    Basophils 0.20 0.00 - 1.80 %    Immature Granulocytes 0.50 0.00 - 0.90 %    Nucleated RBC 0.00 /100 WBC    Neutrophils (Absolute) 5.18 1.82 - 7.42 K/uL    Lymphs (Absolute) 0.48 (L) 1.00 - 4.80 K/uL    Monos (Absolute) 0.11 0.00 - 0.85 K/uL    Eos (Absolute)  0.00 0.00 - 0.51 K/uL    Baso (Absolute) 0.01 0.00 - 0.12 K/uL    Immature Granulocytes (abs) 0.03 0.00 - 0.11 K/uL    NRBC (Absolute) 0.00 K/uL   ACCU-CHEK GLUCOSE    Collection Time: 10/03/19 12:13 PM   Result Value Ref Range    Glucose - Accu-Ck 118 (H) 65 - 99 mg/dL       (click the triangle to expand results)    IMPRESSION:  - ESRD    * Etiology likely 2/2 HTN    * Dialysis today ([facility])    * Continue maintenance dialysis qMWF and PRN  - Hyperkalemia    * Life threatining, tx with HD    * Etiology not clear:  Diet indiscretions, acidosis, Cell lysis?    * Dialysis again today ([facility])  - Acute respiratory failure, with hypoxia    * Liberated from vent, 10/3  - AMS     * Looks to have delerium  ?meds  - HTN    * Goal BP < 140/90  - Anemia of CKD    * Goal Hgb 10-11  - CKD-MBD    * Managed at HD unit  - COPD  - HLD  - CAD    PLAN:  - Dialysis today ([facility])  - Maintenance dialysis qMWF and PRN, next in AM  - UF as tolerated  - No dietary protein restrictions  - Dose all meds per ESRD  - No dietary protein restrictions   Goal 1.5g/kg/day  - Wean vent as able  - Follow labs, further recommendations to follow    Thank you,

## 2019-10-04 NOTE — PROGRESS NOTES
[facility] Progress Note      HD ordered by Dr. Chavez. Treatment started at 0453 and ended at 0753.     Total Net UF 1000mL.    Pt tolerated treatment, had some hypotensive episodes during last hour of HD, UF goal lowered. See paper flow sheet for details. Cannulation needles taken out, held pressure for 10 min and placed gauze dressing with no bleeding. DURAN AVF + for bruit/thrill. Report given to JERMAINE Espinoza.

## 2019-10-04 NOTE — PROGRESS NOTES
Dario from Lab called with critical result of positive blood cultures for gram positive cocci, possibly staphylococcus at 1737. Critical lab result read back to Dario.   Dr. Figueroa notified of critical lab result at 1800.  Critical lab result read back by Dr. Figueroa.

## 2019-10-04 NOTE — PROGRESS NOTES
Patient had a very small emesis occurrence and said he did not feeling well. Zofran order received from Dr. Ruiz. Medication was refused when offered. Patient was hypotensive with BP as low as 72/46(55), and bradycardic with HR as low as 52. Norepinephrine infusion was restarted. BP trending up, now 92/64(86). HR has also come up, currently 71. Patient states he is starting to feel better.

## 2019-10-04 NOTE — CARE PLAN
Problem: Safety  Goal: Will remain free from injury  Outcome: PROGRESSING AS EXPECTED     Problem: Safety  Goal: Will remain free from falls  Intervention: Implement fall precautions  Flowsheets  Taken 10/4/2019 1527 by Olga Robles R.N.  Bed Alarm: Yes - Alarm On  Chair/Bed Strip Alarm: Yes - Alarm On  Taken 10/2/2019 1800 by Shirlene Mason RALFREDO  Environmental Precautions: Treaded Slipper Socks on Patient;Personal Belongings, Wastebasket, Call Bell etc. in Easy Reach;Transferred to Stronger Side;Report Given to Other Health Care Providers Regarding Fall Risk;Bed in Low Position;Communication Sign for Patients & Families;Mobility Assessed & Appropriate Sign Placed     Problem: Pain Management  Goal: Pain level will decrease to patient's comfort goal  Intervention: Educate and implement non-pharmacologic comfort measures. Examples: relaxation, distration, play therapy, activity therapy, massage, etc.  Flowsheets (Taken 10/4/2019 0800)  Intervention: Declines

## 2019-10-05 ENCOUNTER — APPOINTMENT (OUTPATIENT)
Dept: RADIOLOGY | Facility: MEDICAL CENTER | Age: 69
DRG: 207 | End: 2019-10-05
Attending: INTERNAL MEDICINE
Payer: MEDICARE

## 2019-10-05 PROBLEM — R78.81 BACTEREMIA: Status: ACTIVE | Noted: 2019-10-05

## 2019-10-05 LAB
ANION GAP SERPL CALC-SCNC: 22 MMOL/L (ref 0–11.9)
BACTERIA BLD CULT: NORMAL
BUN SERPL-MCNC: 40 MG/DL (ref 8–22)
CALCIUM SERPL-MCNC: 10 MG/DL (ref 8.4–10.2)
CHLORIDE SERPL-SCNC: 91 MMOL/L (ref 96–112)
CO2 SERPL-SCNC: 26 MMOL/L (ref 20–33)
CREAT SERPL-MCNC: 5.84 MG/DL (ref 0.5–1.4)
GLUCOSE SERPL-MCNC: 152 MG/DL (ref 65–99)
POTASSIUM SERPL-SCNC: 4.6 MMOL/L (ref 3.6–5.5)
SIGNIFICANT IND 70042: NORMAL
SITE SITE: NORMAL
SODIUM SERPL-SCNC: 139 MMOL/L (ref 135–145)
SOURCE SOURCE: NORMAL
TRIGL SERPL-MCNC: 144 MG/DL (ref 0–149)

## 2019-10-05 PROCEDURE — A9270 NON-COVERED ITEM OR SERVICE: HCPCS | Performed by: INTERNAL MEDICINE

## 2019-10-05 PROCEDURE — 700105 HCHG RX REV CODE 258: Performed by: INTERNAL MEDICINE

## 2019-10-05 PROCEDURE — 97161 PT EVAL LOW COMPLEX 20 MIN: CPT

## 2019-10-05 PROCEDURE — 700102 HCHG RX REV CODE 250 W/ 637 OVERRIDE(OP): Performed by: INTERNAL MEDICINE

## 2019-10-05 PROCEDURE — 84478 ASSAY OF TRIGLYCERIDES: CPT

## 2019-10-05 PROCEDURE — 99232 SBSQ HOSP IP/OBS MODERATE 35: CPT | Performed by: INTERNAL MEDICINE

## 2019-10-05 PROCEDURE — 700111 HCHG RX REV CODE 636 W/ 250 OVERRIDE (IP): Performed by: INTERNAL MEDICINE

## 2019-10-05 PROCEDURE — 97165 OT EVAL LOW COMPLEX 30 MIN: CPT

## 2019-10-05 PROCEDURE — 770020 HCHG ROOM/CARE - TELE (206)

## 2019-10-05 PROCEDURE — 80048 BASIC METABOLIC PNL TOTAL CA: CPT

## 2019-10-05 PROCEDURE — 71045 X-RAY EXAM CHEST 1 VIEW: CPT

## 2019-10-05 RX ORDER — ASPIRIN 81 MG/1
81 TABLET, CHEWABLE ORAL EVERY EVENING
Status: DISCONTINUED | OUTPATIENT
Start: 2019-10-05 | End: 2019-10-07 | Stop reason: HOSPADM

## 2019-10-05 RX ORDER — BISACODYL 10 MG
10 SUPPOSITORY, RECTAL RECTAL
Status: DISCONTINUED | OUTPATIENT
Start: 2019-10-05 | End: 2019-10-07 | Stop reason: HOSPADM

## 2019-10-05 RX ORDER — FAMOTIDINE 20 MG/1
20 TABLET, FILM COATED ORAL DAILY
Status: DISCONTINUED | OUTPATIENT
Start: 2019-10-06 | End: 2019-10-07 | Stop reason: HOSPADM

## 2019-10-05 RX ORDER — DOXYCYCLINE 100 MG/1
100 TABLET ORAL EVERY 12 HOURS
Status: DISCONTINUED | OUTPATIENT
Start: 2019-10-05 | End: 2019-10-06

## 2019-10-05 RX ORDER — POLYETHYLENE GLYCOL 3350 17 G/17G
1 POWDER, FOR SOLUTION ORAL
Status: DISCONTINUED | OUTPATIENT
Start: 2019-10-05 | End: 2019-10-07 | Stop reason: HOSPADM

## 2019-10-05 RX ORDER — AMOXICILLIN 250 MG
2 CAPSULE ORAL 2 TIMES DAILY
Status: DISCONTINUED | OUTPATIENT
Start: 2019-10-05 | End: 2019-10-07 | Stop reason: HOSPADM

## 2019-10-05 RX ORDER — LISINOPRIL 5 MG/1
10 TABLET ORAL EVERY EVENING
Status: DISCONTINUED | OUTPATIENT
Start: 2019-10-05 | End: 2019-10-07 | Stop reason: HOSPADM

## 2019-10-05 RX ORDER — CARVEDILOL 25 MG/1
25 TABLET ORAL 2 TIMES DAILY WITH MEALS
Status: DISCONTINUED | OUTPATIENT
Start: 2019-10-05 | End: 2019-10-07 | Stop reason: HOSPADM

## 2019-10-05 RX ADMIN — DOXYCYCLINE 100 MG: 100 TABLET, FILM COATED ORAL at 07:50

## 2019-10-05 RX ADMIN — METHYLPREDNISOLONE SODIUM SUCCINATE 40 MG: 40 INJECTION, POWDER, FOR SOLUTION INTRAMUSCULAR; INTRAVENOUS at 17:17

## 2019-10-05 RX ADMIN — LISINOPRIL 10 MG: 5 TABLET ORAL at 17:19

## 2019-10-05 RX ADMIN — CEFAZOLIN 1 G: 1 INJECTION, POWDER, FOR SOLUTION INTRAMUSCULAR; INTRAVENOUS at 06:39

## 2019-10-05 RX ADMIN — HEPARIN SODIUM 5000 UNITS: 5000 INJECTION, SOLUTION INTRAVENOUS; SUBCUTANEOUS at 13:49

## 2019-10-05 RX ADMIN — METHYLPREDNISOLONE SODIUM SUCCINATE 40 MG: 40 INJECTION, POWDER, FOR SOLUTION INTRAMUSCULAR; INTRAVENOUS at 00:08

## 2019-10-05 RX ADMIN — SEVELAMER CARBONATE 2400 MG: 800 TABLET, FILM COATED ORAL at 07:50

## 2019-10-05 RX ADMIN — METHYLPREDNISOLONE SODIUM SUCCINATE 40 MG: 40 INJECTION, POWDER, FOR SOLUTION INTRAMUSCULAR; INTRAVENOUS at 23:32

## 2019-10-05 RX ADMIN — CEFAZOLIN 1 G: 1 INJECTION, POWDER, FOR SOLUTION INTRAMUSCULAR; INTRAVENOUS at 17:17

## 2019-10-05 RX ADMIN — CARVEDILOL 25 MG: 25 TABLET, FILM COATED ORAL at 17:18

## 2019-10-05 RX ADMIN — FAMOTIDINE 20 MG: 10 INJECTION INTRAVENOUS at 06:40

## 2019-10-05 RX ADMIN — METHYLPREDNISOLONE SODIUM SUCCINATE 40 MG: 40 INJECTION, POWDER, FOR SOLUTION INTRAMUSCULAR; INTRAVENOUS at 06:40

## 2019-10-05 RX ADMIN — CINACALCET HYDROCHLORIDE 60 MG: 30 TABLET, COATED ORAL at 17:18

## 2019-10-05 RX ADMIN — SEVELAMER CARBONATE 2400 MG: 800 TABLET, FILM COATED ORAL at 17:18

## 2019-10-05 RX ADMIN — SEVELAMER CARBONATE 2400 MG: 800 TABLET, FILM COATED ORAL at 11:40

## 2019-10-05 RX ADMIN — DOXYCYCLINE 100 MG: 100 TABLET, FILM COATED ORAL at 22:07

## 2019-10-05 RX ADMIN — HEPARIN SODIUM 5000 UNITS: 5000 INJECTION, SOLUTION INTRAVENOUS; SUBCUTANEOUS at 06:40

## 2019-10-05 RX ADMIN — ASPIRIN 81 MG CHEWABLE TABLET 81 MG: 81 TABLET CHEWABLE at 17:18

## 2019-10-05 RX ADMIN — HEPARIN SODIUM 5000 UNITS: 5000 INJECTION, SOLUTION INTRAVENOUS; SUBCUTANEOUS at 22:07

## 2019-10-05 RX ADMIN — METHYLPREDNISOLONE SODIUM SUCCINATE 40 MG: 40 INJECTION, POWDER, FOR SOLUTION INTRAMUSCULAR; INTRAVENOUS at 11:40

## 2019-10-05 ASSESSMENT — COGNITIVE AND FUNCTIONAL STATUS - GENERAL
MOBILITY SCORE: 20
STANDING UP FROM CHAIR USING ARMS: A LITTLE
WALKING IN HOSPITAL ROOM: A LITTLE
CLIMB 3 TO 5 STEPS WITH RAILING: A LOT
SUGGESTED CMS G CODE MODIFIER MOBILITY: CJ

## 2019-10-05 ASSESSMENT — ACTIVITIES OF DAILY LIVING (ADL): TOILETING: INDEPENDENT

## 2019-10-05 ASSESSMENT — ENCOUNTER SYMPTOMS
PSYCHIATRIC NEGATIVE: 1
MUSCULOSKELETAL NEGATIVE: 1
RESPIRATORY NEGATIVE: 1
EYES NEGATIVE: 1
CARDIOVASCULAR NEGATIVE: 1
NEUROLOGICAL NEGATIVE: 1
GASTROINTESTINAL NEGATIVE: 1

## 2019-10-05 ASSESSMENT — LIFESTYLE VARIABLES
AVERAGE NUMBER OF DAYS PER WEEK YOU HAVE A DRINK CONTAINING ALCOHOL: 0
ON A TYPICAL DAY WHEN YOU DRINK ALCOHOL HOW MANY DRINKS DO YOU HAVE: 0
TOTAL SCORE: 0
CONSUMPTION TOTAL: NEGATIVE
HAVE YOU EVER FELT YOU SHOULD CUT DOWN ON YOUR DRINKING: NO
HOW MANY TIMES IN THE PAST YEAR HAVE YOU HAD 5 OR MORE DRINKS IN A DAY: 0
EVER HAD A DRINK FIRST THING IN THE MORNING TO STEADY YOUR NERVES TO GET RID OF A HANGOVER: NO
TOTAL SCORE: 0
EVER FELT BAD OR GUILTY ABOUT YOUR DRINKING: NO
HAVE PEOPLE ANNOYED YOU BY CRITICIZING YOUR DRINKING: NO
TOTAL SCORE: 0
ALCOHOL_USE: NO

## 2019-10-05 ASSESSMENT — GAIT ASSESSMENTS
ASSISTIVE DEVICE: FRONT WHEEL WALKER
DISTANCE (FEET): 50
GAIT LEVEL OF ASSIST: MINIMAL ASSIST
DEVIATION: DECREASED BASE OF SUPPORT

## 2019-10-05 NOTE — THERAPY
"Occupational Therapy Evaluation completed.   Functional Status:  Admitted with respiratory failure. Hx of dialysis, O2 at home. Presents confused, Ox2. O2@ 3L NC. Poor safety awareness. Performs ADL transfers with Marlyn, FWW, v/c's. Unsteady. UB dressing with Marlyn. Donns socks with Marlyn. Seated grooming with Marlyn. UIC post session. Family in room.    Plan of Care: Will benefit from Occupational Therapy 3 times per week  Discharge Recommendations:  Equipment: Will Continue to Assess for Equipment Needs. Post-acute therapy Discharge to a transitional care facility for continued skilled therapy services. Lives alone.  See \"Rehab Therapy-Acute\" Patient Summary Report for complete documentation.    "

## 2019-10-05 NOTE — PROGRESS NOTES
Report received. Assumed care of patient. All lines and drips verified. Patient is sitting up in bed watching television with no complaints at this time. All needs are currently met. All safety precautions in place. Will continue to monitor.

## 2019-10-05 NOTE — ASSESSMENT & PLAN NOTE
1 bottle of blood cultures showed gram-positive cocci, staph species.  No clear signs or symptoms of infection other than hypotension.  Started patient on Ancef until we get full blood culture report.  Off pressors   Repeat BCx was negative. I am going  To d.c ancef

## 2019-10-05 NOTE — PROGRESS NOTES
Pt arrived to room 313-2 via w/c with CNA. Alert. Ambulated from w/c to bed with SBA. Oriented to room, call bell within reach. Fall precautions initiated. Updated on POC. No complaints offered. Will continue to monitor.

## 2019-10-05 NOTE — THERAPY
"Physical Therapy Evaluation completed.   Bed Mobility:  Supine to Sit: Supervised  Transfers: Sit to Stand: Minimal Assist  Gait: Level Of Assist: Minimal Assist with Front-Wheel Walker       Plan of Care: Will benefit from Physical Therapy 3 times per week  Discharge Recommendations: Equipment: Will Continue to Assess for Equipment Needs. Post-acute therapy Discharge to a transitional care facility for continued skilled therapy services. and Discharge to home with outpatient or home health for additional skilled therapy services.    Pt is a 68 y/o man admit with dyspnea, who required ICU intubation course, now out of ICU with some AMS which has improved per family. Pt is on Dialysis MWF. Pt willing to participate. Demo's decreased strength and balance afeecting safe mobility with transfers and gait. Pt able to amb 50 feet with FWW and min assist. Pt does have slight AMS affecting safe mobility and at times is impulsive. Pt does live alone and will need 24/7 supervison at home with HH PT or a short stay in rehab. Will follow during acute care. LK   See \"Rehab Therapy-Acute\" Patient Summary Report for complete documentation.     "

## 2019-10-05 NOTE — PROGRESS NOTES
2 RN skin check complete  Device in place nasal cannula .  Skin assessed under devices intact .  Confirmed pressure ulcers found on NA .  New potential pressure ulcers found on NA . Wound consult placed NA .  The following interventions in place silicone nasal cannula tubing, pressure redistributing mattress, pt positions self independently in bed.     Skin remains intact.

## 2019-10-05 NOTE — PROGRESS NOTES
Critical Care Progress Note    Date of admission  9/30/2019    Chief Complaint  69 y.o. male admitted 9/30/2019 with severe hyperkalemia     Hospital Course    admitted from ER  Emergent HD when he was admitted and daily since admission      Interval Problem Update  Reviewed last 24 hour events:  Yesterday, tolerating extubation well  Having short runs of consecutive PVCs throughout the morning today.  Electrolytes checked and were unremarkable  Potassium was 4 this morning  I started the patient on Zithromax and Solu-Medrol COPD exacerbation because of excessive secretions from the ET tube prior to extubation.  Positive blood culture, 1 bottle staph CBC but not MRSA.  Probably contamination patient is requiring Levophed but no other signs or symptoms of infection.  I started the patient on Ancef until we have full culture report.          Review of Systems  Review of Systems   Constitutional: Positive for malaise/fatigue.   HENT: Negative.    Eyes: Negative.    Respiratory: Negative.    Cardiovascular: Negative.    Gastrointestinal: Negative.    Genitourinary: Negative.    Musculoskeletal: Negative.    Skin: Negative.    Neurological: Negative.    Endo/Heme/Allergies: Negative.    Psychiatric/Behavioral: Negative.         Vital Signs for last 24 hours   Temp:  [36.6 °C (97.8 °F)-37.2 °C (98.9 °F)] 36.6 °C (97.8 °F)  Pulse:  [] 51  Resp:  [8-78] 45  BP: ()/(21-81) 89/64  SpO2:  [58 %-100 %] 95 %    Hemodynamic parameters for last 24 hours       Respiratory Information for the last 24 hours       Physical Exam   Physical Exam  Constitutional: He appears well-developed.   Intubated and sedated, continued to be restless when I saw him in the emergency room   HENT:   Head: Normocephalic and atraumatic.   Right Ear: External ear normal.   Left Ear: External ear normal.   Mouth/Throat: No oropharyngeal exudate.   Eyes: Pupils are equal, round, and reactive to light. Right eye exhibits no discharge. Left eye  exhibits no discharge.   Neck: Neck supple. No JVD present. No tracheal deviation present. No thyromegaly present.   Cardiovascular: Normal rate, regular rhythm and normal heart sounds. Exam reveals no friction rub.   No murmur heard.  Pulmonary/Chest: Effort normal.  No wheezing.  Abdominal: Soft. Bowel sounds are normal. He exhibits no distension. There is no tenderness. There is no rebound.   Musculoskeletal: He exhibits no edema, tenderness or deformity.   Neurological: No cranial nerve deficit. Coordination normal.     Skin: Skin is warm and dry. No rash noted. No erythema.   Psychiatric: He has a normal mood and affect.   Medications  Current Facility-Administered Medications   Medication Dose Route Frequency Provider Last Rate Last Dose   • ondansetron (ZOFRAN ODT) dispertab 4 mg  4 mg Oral Q4HRS PRN GLENDY Candelaria.O.       • ondansetron (ZOFRAN) syringe/vial injection 4 mg  4 mg Intravenous Q4HRS PRN GLENDY Candelaria.O.   4 mg at 10/04/19 1808   • heparin injection 1,500 Units  1,500 Units Intravenous DIALYSIS PRN Gomez Chavez D.O.   1,500 Units at 10/04/19 0448   • ceFAZolin (ANCEF) 1 g in  mL IVPB  1 g Intravenous Q12HRS Monique Figueroa M.D.   Stopped at 10/05/19 0709   • sevelamer carbonate (RENVELA) tablet 2,400 mg  2,400 mg Oral TID WITH MEALS Monique Figueroa M.D.   2,400 mg at 10/05/19 1140   • DEXTROSE 10% BOLUS 250 mL  250 mL Intravenous Q15 MIN PRN GLENDY Candelaria.O.   250 mL at 10/03/19 0503   • norepinephrine (LEVOPHED) 8 mg in  mL Infusion  0-30 mcg/min Intravenous Continuous Monique Figueroa M.D.   Stopped at 10/05/19 0853   • aspirin (ASA) chewable tab 81 mg  81 mg Enteral Tube Q EVENING Monique Figueroa M.D.   81 mg at 10/04/19 1742   • carvedilol (COREG) tablet 25 mg  25 mg Enteral Tube BID WITH MEALS Monique Figueroa M.D.   Stopped at 10/02/19 1742   • lisinopril (PRINIVIL) tablet 10 mg  10 mg Enteral Tube Q EVENING Monique Figueroa M.D.   Stopped at 10/02/19 5364   •  magnesium hydroxide (MILK OF MAGNESIA) suspension 30 mL  30 mL Enteral Tube QDAY PRN Monique Figueroa M.D.        And   • senna-docusate (PERICOLACE or SENOKOT S) 8.6-50 MG per tablet 2 Tab  2 Tab Enteral Tube BID Monique Figueroa M.D.   2 Tab at 10/03/19 1751    And   • polyethylene glycol/lytes (MIRALAX) PACKET 1 Packet  1 Packet Enteral Tube QDAY PRN Monique Figueroa M.D.        And   • bisacodyl (DULCOLAX) suppository 10 mg  10 mg Rectal QDAY PRN Monique Figueroa M.D.       • cinacalcet (SENSIPAR) tablet 60 mg  60 mg Oral Q EVENING Monique Figueroa M.D.   60 mg at 10/04/19 1747   • methylPREDNISolone (SOLU-MEDROL) 40 MG injection 40 mg  40 mg Intravenous Q6HRS Monique Figueroa M.D.   40 mg at 10/05/19 1140   • doxycycline monohydrate (ADOXA) tablet 100 mg  100 mg Enteral Tube Q12HRS Monique Figueroa M.D.   100 mg at 10/05/19 0750   • Respiratory Care per Protocol   Nebulization Continuous RT Monique Figueroa M.D.       • ipratropium-albuterol (DUONEB) nebulizer solution  3 mL Nebulization Q2HRS PRN (RT) Monique Figueroa M.D.   3 mL at 10/01/19 1412   • famotidine (PEPCID) tablet 20 mg  20 mg Enteral Tube DAILY Monique Figueroa M.D.   20 mg at 10/04/19 0540    Or   • famotidine (PEPCID) injection 20 mg  20 mg Intravenous DAILY Monique Figueroa M.D.   20 mg at 10/05/19 0640   • heparin injection 5,000 Units  5,000 Units Subcutaneous Q8HRS Monique Figueroa M.D.   5,000 Units at 10/05/19 0640       Fluids    Intake/Output Summary (Last 24 hours) at 10/5/2019 1212  Last data filed at 10/5/2019 1000  Gross per 24 hour   Intake 1648.28 ml   Output 0 ml   Net 1648.28 ml       Laboratory  Recent Labs     10/03/19  0522 10/03/19  0753   ISTATAPH 7.445 7.483   ISTATAPCO2 37.0 36.3   ISTATAPO2 68 71   ISTATATCO2 27 28   [id number] 94 95   ISTATARTHCO3 25.4* 27.2*   ISTATARTBE 1 4*   ISTATTEMP see below 97.3 F   ISTATFIO2 30 30   ISTATSPEC Arterial Arterial   ISTATAPHTC  --  7.494   [id number]  --  68         Recent Labs     10/03/19  0333  10/04/19  0345 10/04/19  1100 10/05/19  0500   SODIUM 132* 135 135 139   POTASSIUM 6.4* 4.8 4.0 4.6   CHLORIDE 86* 89* 88* 91*   CO2 25 26 26 26   BUN 22 30* 18 40*   CREATININE 5.62* 5.05* 3.55* 5.84*   MAGNESIUM 2.3  --  2.2  --    PHOSPHORUS 6.7* 5.1*  --   --    CALCIUM 10.3* 10.4* 10.7* 10.0     Recent Labs     10/03/19  0330 10/04/19  0345 10/04/19  1100 10/05/19  0500   PREALBUMIN 18.0  --   --   --    GLUCOSE 130* 139* 165* 152*     Recent Labs     10/03/19  0816 10/04/19  0345   WBC 5.8 17.2*   NEUTSPOLYS 89.10* 89.90*   LYMPHOCYTES 8.30* 5.10*   MONOCYTES 1.90 3.90   EOSINOPHILS 0.00 0.00   BASOPHILS 0.20 0.10     Recent Labs     10/03/19  0816 10/04/19  0345   RBC 4.11* 4.42*   HEMOGLOBIN 12.3* 13.6*   HEMATOCRIT 37.3* 40.0*   PLATELETCT 230 316       Imaging  X-Ray:  I have personally reviewed the images and compared with prior images.    Assessment/Plan  CHF (congestive heart failure) ([facility])- (present on admission)  Assessment & Plan  Ejection fraction 35% an echo done this admission   He is on Coreg  Cardiology input is appreciated    COPD (chronic obstructive pulmonary disease) ([facility])- (present on admission)  Assessment & Plan  History of COPD  Not able to find any objective studies like PFTs in the records  Since intubation the patient has excessive secretions  Continue systemic steroids and Zithromax        ESRD (end stage renal disease) ([facility])- (present on admission)  Assessment & Plan  Emergent hemodialysis on 9/30 2019 and hemodialysis yesterday and today today  for severe hypercapnia   K+ normalized   Will need hemodialysis in the future as scheduled before  Did not miss any dialysis outpatient unclear to me why he had severe hyperkalemia  Nephrology input appreciated    Bacteremia  Assessment & Plan  1 bottle of blood cultures showed gram-positive cocci, staph species.  No clear signs or symptoms of infection other than hypotension.  Started patient on Ancef until we get full blood culture  report.    Hyperkalemia  Assessment & Plan  Severe, potassium was 9.9 at presentation  Sinus sign on EKG with wide QRS.  Treated promptly by the ER physician with bicarbonate and today's potassium was 6.4  Patient is getting hemodialysis now      Acute respiratory failure with hypoxia (HCC)  Assessment & Plan  Secondary to fluid overload  Patient was intubated in the ER  History of COPD and CHF   End-stage renal disease on hemodialysis.  Not sure if the patient missed any hemodialysis session to be in severe fluid overload  Status post hemodialysis daily since admission.  Good ventilatory mechanics on the ventilator  ABG did not show hypercapnia  Extubated yesterday, doing well since then         VTE:  Heparin  Ulcer: H2 Antagonist  Lines: Central Line  Ongoing indication addressed    I have performed a physical exam and reviewed and updated ROS and Plan today (10/5/2019). In review of yesterday's note (10/4/2019), there are no changes except as documented above.     Discussed patient condition and risk of morbidity and/or mortality with Family, RN, RT, Charge nurse / hot rounds and cardiology and nephrology  .

## 2019-10-05 NOTE — PROGRESS NOTES
[facility] Daily Progress Note    Date of Service  10/5/2019    Chief Complaint  69 y.o. male admitted 9/30/2019 with     Interval Problem Update  HPI:  The patient is a very pleasant 69-year-old gentleman known to us from outpatient dialysis care.  He receives his maintenance hemodialysis on a Monday, Wednesday and Friday schedule at [facility].  His last treatment was Friday without incident.  He was due today.  He presented himself to the emergency department at [facility] with complaints of dyspnea.  He does have a history of rather large interdialytic weight gains and pulmonary edema.  He was placed on BiPAP for breathing treatment.  He has severe respiratory distress and was unable to speak.  EKG was profoundly abnormal suggestive of hyperkalemia.  He was given intravenous calcium with some improvement of the rhythm.  Lab evaluation demonstrated a rather significant elevation of his potassium to 9.9.  He was admitted to the intensive care unit, urgently intubated, placed on mechanical ventilation and emergent dialysis was provided.  His rhythm has improved.  No repeat labs are available yet at the time of this dictation.    DAILY NEPHROLOGY SUMMARY:              09/30 - Consult done              10/01 - NAEO, still in ICU and intubated              10/02 - Dialysis this AM.  Awake for a bit but agitated, now sedated.  Failed SBT with apnea.               10/03 - Now extubated.  +confused.  Doesn't remember me.  K up again.  Family at bedside.              10/04 - NAEO, does not recall events of this week, family/friend at bedside   10/05 - Pt just moved out of ICU.  Feels OK.  Family in room. No new complaints.  Had HD yesterday with 1 L UF.    Review of Systems  ROS   - As per HPI, otherwise review of systems negative per AMA/CMS criteria  - General:  As per HPI, otherwise negative per AMA/CMS criteria  - HEENT:  No ocular pain; no nasal  discharge  - CV:  No chest pain or palpitations  - Lungs:  No SOB or cough.   - GI:  No N/V/D, no constipation, no abdominal pain  - MSK:  No joint pain; No trauma  - Skin: No rashes; No lesions  - Neuro: No paresthesia; No LOC  - Psych: No depression; No anxiety    Physical Exam  Temp:  [36.4 °C (97.6 °F)-37.2 °C (98.9 °F)] 36.4 °C (97.6 °F)  Pulse:  [] 67  Resp:  [8-78] 18  BP: ()/(21-81) 101/67  SpO2:  [58 %-100 %] 94 %    Physical Exam  GENERAL:  WDWN elderly male, NAD  HEENT:  NC/AT, no scleral icterus; conjunctiva normal  NECK:  Supple; Non tender  CV:  RRR, no m/r/g  LUNGS:  CTAB, no W/R  ABDOMEN:  SNTND, No rebound/guarding  EXTREMETIES:  No C/C/E, LLE AVF with +T/B  SKIN:  Warm and dry  NEURO:  A&O, no focal deficits  PSYCH:  Cooperative, appropriate mood and affect    Fluids    Intake/Output Summary (Last 24 hours) at 10/5/2019 1327  Last data filed at 10/5/2019 1200  Gross per 24 hour   Intake 1688.28 ml   Output 0 ml   Net 1688.28 ml       Laboratory  Recent Labs     10/03/19  0816 10/04/19  0345   WBC 5.8 17.2*   RBC 4.11* 4.42*   HEMOGLOBIN 12.3* 13.6*   HEMATOCRIT 37.3* 40.0*   MCV 90.8 90.5   MCH 29.9 30.8   MCHC 33.0* 34.0   RDW 45.0 45.3   PLATELETCT 230 316   MPV 10.1 10.4     Recent Labs     10/04/19  0345 10/04/19  1100 10/05/19  0500   SODIUM 135 135 139   POTASSIUM 4.8 4.0 4.6   CHLORIDE 89* 88* 91*   CO2 26 26 26   GLUCOSE 139* 165* 152*   BUN 30* 18 40*   CREATININE 5.05* 3.55* 5.84*   CALCIUM 10.4* 10.7* 10.0         No results for input(s): NTPROBNP in the last 72 hours.  Recent Labs     10/05/19  0500   TRIGLYCERIDE 144       Imaging       Assessment/Plan     IMPRESSION:  - ESRD    * Etiology likely 2/2 HTN    * Continue maintenance dialysis qMWF and PRN  - Hyperkalemia    * Life threatining, tx with HD - resolved    * Etiology not clear:  Diet indiscretions, acidosis, Cell lysis?  - Acute respiratory failure, with hypoxia    * Liberated from vent, 10/3  - AMS     * Looks to  have delerium  ?meds -- somewhat improved  - HTN    * Goal BP < 140/90    * Off Levophed    * Hypotension - No etiology to hypotension found yet  - Anemia of CKD    * Goal Hgb 10-11  - CKD-MBD    * Managed at HD unit    * On Sensipar and Sevelamer  - COPD  - HLD  - CAD     PLAN:  - MWF iHD  - UF as tolerated  - No dietary protein restrictions  - Dose all meds per ESRD  - Repeat BCx pending result

## 2019-10-05 NOTE — PROGRESS NOTES
Telemetry Shift Summary    Rhythm SR  HR Range 70s-80s  Ectopy NA  Measurements 0.14/0.10/0.36        Normal Values  Rhythm SR  HR Range    Measurements 0.12-0.20 / 0.06-0.10  / 0.30-0.52

## 2019-10-06 ENCOUNTER — APPOINTMENT (OUTPATIENT)
Dept: RADIOLOGY | Facility: MEDICAL CENTER | Age: 69
DRG: 207 | End: 2019-10-06
Attending: INTERNAL MEDICINE
Payer: MEDICARE

## 2019-10-06 LAB
ALBUMIN SERPL BCP-MCNC: 3.6 G/DL (ref 3.2–4.9)
ANION GAP SERPL CALC-SCNC: 21 MMOL/L (ref 0–11.9)
BASOPHILS # BLD AUTO: 0 % (ref 0–1.8)
BASOPHILS # BLD: 0 K/UL (ref 0–0.12)
BUN SERPL-MCNC: 62 MG/DL (ref 8–22)
CALCIUM SERPL-MCNC: 9.3 MG/DL (ref 8.4–10.2)
CHLORIDE SERPL-SCNC: 90 MMOL/L (ref 96–112)
CO2 SERPL-SCNC: 26 MMOL/L (ref 20–33)
CREAT SERPL-MCNC: 8.08 MG/DL (ref 0.5–1.4)
EOSINOPHIL # BLD AUTO: 0 K/UL (ref 0–0.51)
EOSINOPHIL NFR BLD: 0 % (ref 0–6.9)
ERYTHROCYTE [DISTWIDTH] IN BLOOD BY AUTOMATED COUNT: 46.5 FL (ref 35.9–50)
GLUCOSE SERPL-MCNC: 131 MG/DL (ref 65–99)
HCT VFR BLD AUTO: 37.5 % (ref 42–52)
HGB BLD-MCNC: 12.4 G/DL (ref 14–18)
IMM GRANULOCYTES # BLD AUTO: 0.11 K/UL (ref 0–0.11)
IMM GRANULOCYTES NFR BLD AUTO: 1.1 % (ref 0–0.9)
LYMPHOCYTES # BLD AUTO: 0.66 K/UL (ref 1–4.8)
LYMPHOCYTES NFR BLD: 6.3 % (ref 22–41)
MAGNESIUM SERPL-MCNC: 2.5 MG/DL (ref 1.5–2.5)
MCH RBC QN AUTO: 30.3 PG (ref 27–33)
MCHC RBC AUTO-ENTMCNC: 33.1 G/DL (ref 33.7–35.3)
MCV RBC AUTO: 91.7 FL (ref 81.4–97.8)
MONOCYTES # BLD AUTO: 0.63 K/UL (ref 0–0.85)
MONOCYTES NFR BLD AUTO: 6 % (ref 0–13.4)
NEUTROPHILS # BLD AUTO: 9.05 K/UL (ref 1.82–7.42)
NEUTROPHILS NFR BLD: 86.6 % (ref 44–72)
NRBC # BLD AUTO: 0 K/UL
NRBC BLD-RTO: 0 /100 WBC
PHOSPHATE SERPL-MCNC: 5.6 MG/DL (ref 2.5–4.5)
PLATELET # BLD AUTO: 255 K/UL (ref 164–446)
PMV BLD AUTO: 10.5 FL (ref 9–12.9)
POTASSIUM SERPL-SCNC: 4.9 MMOL/L (ref 3.6–5.5)
RBC # BLD AUTO: 4.09 M/UL (ref 4.7–6.1)
SODIUM SERPL-SCNC: 137 MMOL/L (ref 135–145)
WBC # BLD AUTO: 10.5 K/UL (ref 4.8–10.8)

## 2019-10-06 PROCEDURE — 99233 SBSQ HOSP IP/OBS HIGH 50: CPT | Performed by: HOSPITALIST

## 2019-10-06 PROCEDURE — A9270 NON-COVERED ITEM OR SERVICE: HCPCS | Performed by: HOSPITALIST

## 2019-10-06 PROCEDURE — 700102 HCHG RX REV CODE 250 W/ 637 OVERRIDE(OP): Performed by: INTERNAL MEDICINE

## 2019-10-06 PROCEDURE — 94640 AIRWAY INHALATION TREATMENT: CPT

## 2019-10-06 PROCEDURE — 80069 RENAL FUNCTION PANEL: CPT

## 2019-10-06 PROCEDURE — 700101 HCHG RX REV CODE 250: Performed by: INTERNAL MEDICINE

## 2019-10-06 PROCEDURE — 700111 HCHG RX REV CODE 636 W/ 250 OVERRIDE (IP): Performed by: INTERNAL MEDICINE

## 2019-10-06 PROCEDURE — 83735 ASSAY OF MAGNESIUM: CPT

## 2019-10-06 PROCEDURE — 85025 COMPLETE CBC W/AUTO DIFF WBC: CPT

## 2019-10-06 PROCEDURE — 700102 HCHG RX REV CODE 250 W/ 637 OVERRIDE(OP): Performed by: HOSPITALIST

## 2019-10-06 PROCEDURE — 71045 X-RAY EXAM CHEST 1 VIEW: CPT

## 2019-10-06 PROCEDURE — 770020 HCHG ROOM/CARE - TELE (206)

## 2019-10-06 PROCEDURE — A9270 NON-COVERED ITEM OR SERVICE: HCPCS | Performed by: INTERNAL MEDICINE

## 2019-10-06 RX ORDER — FUROSEMIDE 40 MG/1
80 TABLET ORAL
Status: DISCONTINUED | OUTPATIENT
Start: 2019-10-06 | End: 2019-10-06

## 2019-10-06 RX ORDER — FUROSEMIDE 40 MG/1
80 TABLET ORAL
Status: DISCONTINUED | OUTPATIENT
Start: 2019-10-07 | End: 2019-10-07 | Stop reason: HOSPADM

## 2019-10-06 RX ADMIN — IPRATROPIUM BROMIDE AND ALBUTEROL SULFATE 3 ML: .5; 3 SOLUTION RESPIRATORY (INHALATION) at 16:40

## 2019-10-06 RX ADMIN — DOXYCYCLINE 100 MG: 100 TABLET, FILM COATED ORAL at 08:48

## 2019-10-06 RX ADMIN — METHYLPREDNISOLONE SODIUM SUCCINATE 40 MG: 40 INJECTION, POWDER, FOR SOLUTION INTRAMUSCULAR; INTRAVENOUS at 11:52

## 2019-10-06 RX ADMIN — FAMOTIDINE 20 MG: 20 TABLET ORAL at 05:08

## 2019-10-06 RX ADMIN — HEPARIN SODIUM 5000 UNITS: 5000 INJECTION, SOLUTION INTRAVENOUS; SUBCUTANEOUS at 05:08

## 2019-10-06 RX ADMIN — LISINOPRIL 10 MG: 5 TABLET ORAL at 17:49

## 2019-10-06 RX ADMIN — CARVEDILOL 25 MG: 25 TABLET, FILM COATED ORAL at 11:52

## 2019-10-06 RX ADMIN — SEVELAMER CARBONATE 2400 MG: 800 TABLET, FILM COATED ORAL at 17:50

## 2019-10-06 RX ADMIN — METHYLPREDNISOLONE SODIUM SUCCINATE 40 MG: 40 INJECTION, POWDER, FOR SOLUTION INTRAMUSCULAR; INTRAVENOUS at 05:08

## 2019-10-06 RX ADMIN — HEPARIN SODIUM 5000 UNITS: 5000 INJECTION, SOLUTION INTRAVENOUS; SUBCUTANEOUS at 15:19

## 2019-10-06 RX ADMIN — SEVELAMER CARBONATE 2400 MG: 800 TABLET, FILM COATED ORAL at 08:48

## 2019-10-06 RX ADMIN — CARVEDILOL 25 MG: 25 TABLET, FILM COATED ORAL at 17:50

## 2019-10-06 RX ADMIN — ASPIRIN 81 MG CHEWABLE TABLET 81 MG: 81 TABLET CHEWABLE at 17:50

## 2019-10-06 RX ADMIN — FUROSEMIDE 80 MG: 40 TABLET ORAL at 15:19

## 2019-10-06 RX ADMIN — HEPARIN SODIUM 5000 UNITS: 5000 INJECTION, SOLUTION INTRAVENOUS; SUBCUTANEOUS at 21:05

## 2019-10-06 RX ADMIN — CINACALCET HYDROCHLORIDE 60 MG: 30 TABLET, COATED ORAL at 17:50

## 2019-10-06 RX ADMIN — SEVELAMER CARBONATE 2400 MG: 800 TABLET, FILM COATED ORAL at 11:52

## 2019-10-06 ASSESSMENT — ENCOUNTER SYMPTOMS
CONSTITUTIONAL NEGATIVE: 1
COUGH: 0
WEAKNESS: 0
DEPRESSION: 0
BRUISES/BLEEDS EASILY: 0
MUSCULOSKELETAL NEGATIVE: 1
BACK PAIN: 0
RESPIRATORY NEGATIVE: 1
WEIGHT LOSS: 0
FEVER: 0
VOMITING: 0
CARDIOVASCULAR NEGATIVE: 1
NERVOUS/ANXIOUS: 1
NAUSEA: 0
SHORTNESS OF BREATH: 0
DIZZINESS: 0
MYALGIAS: 0
ABDOMINAL PAIN: 0
FLANK PAIN: 0
GASTROINTESTINAL NEGATIVE: 1
NEUROLOGICAL NEGATIVE: 1
CHILLS: 0
LOSS OF CONSCIOUSNESS: 0

## 2019-10-06 NOTE — PROGRESS NOTES
Bedside report given to JERMAINE Acuña. POC discussed, all questions answered. Safety precautions in place. Care released.

## 2019-10-06 NOTE — PROGRESS NOTES
Critical Care Progress Note    Date of admission  9/30/2019    Chief Complaint  69 y.o. male admitted 9/30/2019 with severe hyperkalemia     Hospital Course    admitted from ER  Emergent HD when he was admitted and daily since admission      Interval Problem Update  Reviewed last 24 hour events:    No PVCs today   Off pressors   Electrolytes checked and were unremarkable  Potassium was 4.6 this morning  Positive blood culture, 1 bottle staph CBC but not MRSA.  Probably contamination   Repeat BCs are negative   No HD today   In good spirit, no confusion. No pain          Review of Systems  Review of Systems   Constitutional: Positive for malaise/fatigue.   HENT: Negative.    Eyes: Negative.    Respiratory: Negative.    Cardiovascular: Negative.    Gastrointestinal: Negative.    Genitourinary: Negative.    Musculoskeletal: Negative.    Skin: Negative.    Neurological: Negative.    Endo/Heme/Allergies: Negative.    Psychiatric/Behavioral: Negative.         Vital Signs for last 24 hours   Temp:  [36.3 °C (97.4 °F)-36.8 °C (98.3 °F)] 36.3 °C (97.4 °F)  Pulse:  [] 78  Resp:  [8-78] 18  BP: ()/(21-81) 93/56  SpO2:  [58 %-100 %] 94 %    Hemodynamic parameters for last 24 hours       Respiratory Information for the last 24 hours       Physical Exam   Physical Exam  Constitutional: He appears well-developed.   Intubated and sedated, continued to be restless when I saw him in the emergency room   HENT:   Head: Normocephalic and atraumatic.   Right Ear: External ear normal.   Left Ear: External ear normal.   Mouth/Throat: No oropharyngeal exudate.   Eyes: Pupils are equal, round, and reactive to light. Right eye exhibits no discharge. Left eye exhibits no discharge.   Neck: Neck supple. No JVD present. No tracheal deviation present. No thyromegaly present.   Cardiovascular: Normal rate, regular rhythm and normal heart sounds. Exam reveals no friction rub.   No murmur heard.  Pulmonary/Chest: Effort normal.  No  wheezing.  Abdominal: Soft. Bowel sounds are normal. He exhibits no distension. There is no tenderness. There is no rebound.   Musculoskeletal: He exhibits no edema, tenderness or deformity.   Neurological: No cranial nerve deficit. Coordination normal.     Skin: Skin is warm and dry. No rash noted. No erythema.   Psychiatric: He has a normal mood and affect.   Medications  Current Facility-Administered Medications   Medication Dose Route Frequency Provider Last Rate Last Dose   • aspirin (ASA) chewable tab 81 mg  81 mg Oral Q EVENING Monique Figueroa M.D.   81 mg at 10/05/19 1718   • carvedilol (COREG) tablet 25 mg  25 mg Oral BID WITH MEALS Monique Figueroa M.D.   25 mg at 10/05/19 1718   • doxycycline monohydrate (ADOXA) tablet 100 mg  100 mg Oral Q12HRS Monique Figueroa M.D.   100 mg at 10/05/19 2207   • [START ON 10/6/2019] famotidine (PEPCID) tablet 20 mg  20 mg Oral DAILY Monique Figueroa M.D.        Or   • [START ON 10/6/2019] famotidine (PEPCID) injection 20 mg  20 mg Intravenous DAILY Monique Figueroa M.D.       • lisinopril (PRINIVIL) tablet 10 mg  10 mg Oral Q EVENING Monique Figueroa M.D.   10 mg at 10/05/19 1719   • magnesium hydroxide (MILK OF MAGNESIA) suspension 30 mL  30 mL Oral QDAY PRN Monique Figueroa M.D.        And   • senna-docusate (PERICOLACE or SENOKOT S) 8.6-50 MG per tablet 2 Tab  2 Tab Oral BID Monique Figueroa M.D.        And   • polyethylene glycol/lytes (MIRALAX) PACKET 1 Packet  1 Packet Oral QDAY PRN Monique Figueroa M.D.        And   • bisacodyl (DULCOLAX) suppository 10 mg  10 mg Rectal QDAY PRN Monique Figueroa M.D.       • ondansetron (ZOFRAN ODT) dispertab 4 mg  4 mg Oral Q4HRS PRN Giuseppe Ruiz D.O.       • ondansetron (ZOFRAN) syringe/vial injection 4 mg  4 mg Intravenous Q4HRS PRN Giuseppe Ruiz D.O.   4 mg at 10/04/19 1808   • heparin injection 1,500 Units  1,500 Units Intravenous DIALYSIS PRN Gomez Chavez D.O.   1,500 Units at 10/04/19 2787   • ceFAZolin (ANCEF) 1 g in  mL  IVPB  1 g Intravenous Q12HRS Monique Figueroa M.D.   Stopped at 10/05/19 1747   • sevelamer carbonate (RENVELA) tablet 2,400 mg  2,400 mg Oral TID WITH MEALS Monique Figueroa M.D.   2,400 mg at 10/05/19 1718   • DEXTROSE 10% BOLUS 250 mL  250 mL Intravenous Q15 MIN PRN Giuseppe Ruiz D.O.   250 mL at 10/03/19 0503   • cinacalcet (SENSIPAR) tablet 60 mg  60 mg Oral Q EVENING Monique Figueroa M.D.   60 mg at 10/05/19 1718   • methylPREDNISolone (SOLU-MEDROL) 40 MG injection 40 mg  40 mg Intravenous Q6HRS Monique Figueroa M.D.   40 mg at 10/05/19 1717   • Respiratory Care per Protocol   Nebulization Continuous RT Monique Figueroa M.D.       • ipratropium-albuterol (DUONEB) nebulizer solution  3 mL Nebulization Q2HRS PRN (RT) Monique Figueroa M.D.   3 mL at 10/01/19 1412   • heparin injection 5,000 Units  5,000 Units Subcutaneous Q8HRS Monique Figueroa M.D.   5,000 Units at 10/05/19 2207       Fluids    Intake/Output Summary (Last 24 hours) at 10/5/2019 2228  Last data filed at 10/5/2019 1800  Gross per 24 hour   Intake 1236.3 ml   Output --   Net 1236.3 ml       Laboratory  Recent Labs     10/03/19  0522 10/03/19  0753   ISTATAPH 7.445 7.483   ISTATAPCO2 37.0 36.3   ISTATAPO2 68 71   ISTATATCO2 27 28   CVZTNCQ5MTZ 94 95   ISTATARTHCO3 25.4* 27.2*   ISTATARTBE 1 4*   ISTATTEMP see below 97.3 F   ISTATFIO2 30 30   ISTATSPEC Arterial Arterial   ISTATAPHTC  --  7.494   YCNNJQOS5FU  --  68         Recent Labs     10/03/19  0330 10/04/19  0345 10/04/19  1100 10/05/19  0500   SODIUM 132* 135 135 139   POTASSIUM 6.4* 4.8 4.0 4.6   CHLORIDE 86* 89* 88* 91*   CO2 25 26 26 26   BUN 22 30* 18 40*   CREATININE 5.62* 5.05* 3.55* 5.84*   MAGNESIUM 2.3  --  2.2  --    PHOSPHORUS 6.7* 5.1*  --   --    CALCIUM 10.3* 10.4* 10.7* 10.0     Recent Labs     10/03/19  0330 10/04/19  0345 10/04/19  1100 10/05/19  0500   PREALBUMIN 18.0  --   --   --    GLUCOSE 130* 139* 165* 152*     Recent Labs     10/03/19  0816 10/04/19  0345   WBC 5.8 17.2*    NEUTSPOLYS 89.10* 89.90*   LYMPHOCYTES 8.30* 5.10*   MONOCYTES 1.90 3.90   EOSINOPHILS 0.00 0.00   BASOPHILS 0.20 0.10     Recent Labs     10/03/19  0816 10/04/19  0345   RBC 4.11* 4.42*   HEMOGLOBIN 12.3* 13.6*   HEMATOCRIT 37.3* 40.0*   PLATELETCT 230 316       Imaging  X-Ray:  I have personally reviewed the images and compared with prior images.    Assessment/Plan  CHF (congestive heart failure) ([facility])- (present on admission)  Assessment & Plan  Ejection fraction 35% an echo done this admission   He is on Coreg  Cardiology input is appreciated    COPD (chronic obstructive pulmonary disease) ([facility])- (present on admission)  Assessment & Plan  History of COPD  Not able to find any objective studies like PFTs in the records  Since intubation the patient has excessive secretions  Continue systemic steroids and Zithromax  Follow up in pulmonary clinic post discharge         ESRD (end stage renal disease) ([facility])- (present on admission)  Assessment & Plan  Emergent hemodialysis on 9/30 2019 and hemodialysis yesterday and today today  for severe hypercapnia   K+ normalized   Will need hemodialysis in the future as scheduled before  Did not miss any dialysis outpatient unclear to me why he had severe hyperkalemia  Nephrology input appreciated    Bacteremia  Assessment & Plan  1 bottle of blood cultures showed gram-positive cocci, staph species.  No clear signs or symptoms of infection other than hypotension.  Started patient on Ancef until we get full blood culture report.  Off pressors   Repeat BCx was negative. I am going  To d.c ancef     Hyperkalemia  Assessment & Plan  Severe, potassium was 9.9 at presentation  Sinus sign on EKG with wide QRS.  Treated promptly by the ER physician with bicarbonate and calcium gluconate    and today's potassium was 4.8        Acute respiratory failure with hypoxia ([facility])  Assessment & Plan  Secondary to fluid overload  Patient was intubated in the ER  History of COPD and CHF   End-stage  renal disease on hemodialysis.  Not sure if the patient missed any hemodialysis session to be in severe fluid overload  Status post hemodialysis daily since admission.  Good ventilatory mechanics on the ventilator  ABG did not show hypercapnia  Extubated 10/3, doing well since then         VTE:  Heparin  Ulcer: H2 Antagonist  Lines: Central Line  Ongoing indication addressed    I have performed a physical exam and reviewed and updated ROS and Plan today (10/5/2019). In review of yesterday's note (10/4/2019), there are no changes except as documented above.     Discussed patient condition and risk of morbidity and/or mortality with Family, RN, RT, Charge nurse / hot rounds and cardiology and nephrology  .

## 2019-10-06 NOTE — PROGRESS NOTES
Received report from [place] EDAN. Pt assessed, awake, alert, and oriented x4 with NAD. Respirations even and unlabored. Bed locked and in lowest position. Placed call light and belongings within reach. [facility].

## 2019-10-06 NOTE — PROGRESS NOTES
Telemetry Shift Summary     Rhythm SR  HR Range 60s-80s  Ectopy Rare PAC, Rare PVC  Measurements 0.16/0.10/0.38           Normal Values  Rhythm SR  HR Range    Measurements 0.12-0.20 / 0.06-0.10  / 0.30-0.52

## 2019-10-06 NOTE — PROGRESS NOTES
[facility] Daily Progress Note    Date of Service  10/6/2019    Chief Complaint  69 y.o. male admitted 9/30/2019 with SOB    Interval Problem Update  HPI:  The patient is a very pleasant 69-year-old gentleman known to us from outpatient dialysis care.  He receives his maintenance hemodialysis on a Monday, Wednesday and Friday schedule at [facility] unit.  His last treatment was Friday without incident.  He was due today.  He presented himself to the emergency department at [facility] with complaints of dyspnea.  He does have a history of rather large interdialytic weight gains and pulmonary edema.  He was placed on BiPAP for breathing treatment.  He has severe respiratory distress and was unable to speak.  EKG was profoundly abnormal suggestive of hyperkalemia.  He was given intravenous calcium with some improvement of the rhythm.  Lab evaluation demonstrated a rather significant elevation of his potassium to 9.9.  He was admitted to the intensive care unit, urgently intubated, placed on mechanical ventilation and emergent dialysis was provided.  His rhythm has improved.  No repeat labs are available yet at the time of this dictation.     DAILY NEPHROLOGY SUMMARY:              09/30 - Consult done              10/01 - RUDDYO, still in ICU and intubated              10/02 - Dialysis this AM.  Awake for a bit but agitated, now sedated.  Failed SBT with apnea.               10/03 - Now extubated.  +confused.  Doesn't remember me.  K up again.  Family at bedside.              10/04 - NAEO, does not recall events of this week, family/friend at bedside              10/05 - Pt just moved out of ICU.  Feels OK.  Family in room. No new complaints.  Had HD yesterday with 1 L UF.   10/06 - Pt sitting up eating lunch.  Feels fine.  Does not remember why he is in the hospital. Discussed high K+ and Hypervolemia with resp failure upon admission.  I will call [facility] tomorrow to let them know to decrease EDW. Pt says he comes into dialysis o/p under his EDW a lot.     Review of Systems  ROS   - As per HPI, otherwise review of systems negative per AMA/CMS criteria  - General:  As per HPI, otherwise negative per AMA/CMS criteria  - HEENT:  No ocular pain; no nasal discharge  - CV:  No chest pain or palpitations  - Lungs:  No SOB or cough.   - GI:  No N/V/D, no constipation, no abdominal pain  - MSK:  No joint pain; No trauma  - Skin: No rashes; No lesions  - Neuro: No paresthesia; No LOC  - Psych: No depression; No anxiety    Physical Exam  Temp:  [36.3 °C (97.4 °F)-36.8 °C (98.2 °F)] 36.7 °C (98.1 °F)  Pulse:  [51-95] 95  Resp:  [18-45] 18  BP: ()/(54-71) 98/59  SpO2:  [92 %-97 %] 97 %    Physical Exam  GENERAL:  WDWN elderly male, NAD  HEENT:  NC/AT, no scleral icterus; conjunctiva normal  NECK:  Supple; Non tender  CV:  RRR, no m/r/g  LUNGS:  CTAB, no W/R  ABDOMEN:  SNTND, No rebound/guarding  EXTREMETIES:  No C/C/E, LLE AVF with +T/B  SKIN:  Warm and dry  NEURO:  A&O, no focal deficits  PSYCH:  Cooperative, appropriate mood and affect    Fluids    Intake/Output Summary (Last 24 hours) at 10/6/2019 1140  Last data filed at 10/6/2019 0821  Gross per 24 hour   Intake 580 ml   Output --   Net 580 ml       Laboratory  Recent Labs     10/04/19  0345 10/06/19  0215   WBC 17.2* 10.5   RBC 4.42* 4.09*   HEMOGLOBIN 13.6* 12.4*   HEMATOCRIT 40.0* 37.5*   MCV 90.5 91.7   MCH 30.8 30.3   MCHC 34.0 33.1*   RDW 45.3 46.5   PLATELETCT 316 255   MPV 10.4 10.5     Recent Labs     10/04/19  1100 10/05/19  0500 10/06/19  0215   SODIUM 135 139 137   POTASSIUM 4.0 4.6 4.9   CHLORIDE 88* 91* 90*   CO2 26 26 26   GLUCOSE 165* 152* 131*   BUN 18 40* 62*   CREATININE 3.55* 5.84* 8.08*   CALCIUM 10.7* 10.0 9.3         No results for input(s): NTPROBNP in the last 72 hours.  Recent Labs     10/05/19  0500   TRIGLYCERIDE 144       Imaging       Assessment/Plan     IMPRESSION:  - ESRD    *  Etiology likely 2/2 HTN    * Dialyzes at [facility]    * Continue maintenance dialysis qMWF and PRN  - Hyperkalemia    * Life threatining, tx with HD - resolved    * Etiology not clear:  Diet indiscretions, acidosis, Cell lysis?  - Acute respiratory failure, with hypoxia    * Liberated from vent, 10/3  - AMS     * Looks to have delerium  ?meds -- somewhat improved  - HTN    * Goal BP < 140/90    * Off Levophed    * Hypotension - No etiology to hypotension found yet  - Anemia of CKD    * Goal Hgb 10-11  - CKD-MBD    * Managed at HD unit    * On Sensipar and Sevelamer  - COPD  - HLD  - CAD  - CHF    * Hypervolemia resolved with UF / HD    * 1 L per day fluid restriction  - Hypermagnesemia    * Avoid magnesium based meds     PLAN:  - MWF iHD - due tomorrow  - UF as tolerated  - 1 L per day fluid restriction  - D/C MOM  - Renal diet  - No dietary protein restrictions  - Dose all meds per ESRD guidelines

## 2019-10-06 NOTE — DISCHARGE PLANNING
Anticipated Discharge Disposition: home with HHC    Action: LSW spoke with pt and family at bedside regarding HHC. LSW explained services provided and HHC is covered through Medicare. Pt agreed and signed choice for [facility].     LSW will fax choice to [facility] pending order    Barriers to Discharge: HHC acceptance    Plan: LSW to f/u

## 2019-10-07 ENCOUNTER — HOME HEALTH ADMISSION (OUTPATIENT)
Dept: HOME HEALTH SERVICES | Facility: HOME HEALTHCARE | Age: 69
End: 2019-10-07
Payer: MEDICARE

## 2019-10-07 ENCOUNTER — APPOINTMENT (OUTPATIENT)
Dept: RADIOLOGY | Facility: MEDICAL CENTER | Age: 69
DRG: 207 | End: 2019-10-07
Attending: INTERNAL MEDICINE
Payer: MEDICARE

## 2019-10-07 ENCOUNTER — PATIENT OUTREACH (OUTPATIENT)
Dept: HEALTH INFORMATION MANAGEMENT | Facility: OTHER | Age: 69
End: 2019-10-07

## 2019-10-07 VITALS
OXYGEN SATURATION: 99 % | SYSTOLIC BLOOD PRESSURE: 120 MMHG | BODY MASS INDEX: 25.34 KG/M2 | HEART RATE: 65 BPM | DIASTOLIC BLOOD PRESSURE: 63 MMHG | WEIGHT: 177.03 LBS | HEIGHT: 70 IN | RESPIRATION RATE: 18 BRPM | TEMPERATURE: 98.2 F

## 2019-10-07 LAB
ALBUMIN SERPL BCP-MCNC: 3.5 G/DL (ref 3.2–4.9)
ALBUMIN/GLOB SERPL: 1.3 G/DL
ALP SERPL-CCNC: 45 U/L (ref 30–99)
ALT SERPL-CCNC: <5 U/L (ref 2–50)
ANION GAP SERPL CALC-SCNC: 24 MMOL/L (ref 0–11.9)
AST SERPL-CCNC: 8 U/L (ref 12–45)
BILIRUB SERPL-MCNC: 0.2 MG/DL (ref 0.1–1.5)
BUN SERPL-MCNC: 89 MG/DL (ref 8–22)
CALCIUM SERPL-MCNC: 9 MG/DL (ref 8.4–10.2)
CHLORIDE SERPL-SCNC: 92 MMOL/L (ref 96–112)
CO2 SERPL-SCNC: 23 MMOL/L (ref 20–33)
CREAT SERPL-MCNC: 10.67 MG/DL (ref 0.5–1.4)
GLOBULIN SER CALC-MCNC: 2.6 G/DL (ref 1.9–3.5)
GLUCOSE SERPL-MCNC: 88 MG/DL (ref 65–99)
MAGNESIUM SERPL-MCNC: 2.6 MG/DL (ref 1.5–2.5)
PHOSPHATE SERPL-MCNC: 5.8 MG/DL (ref 2.5–4.5)
POTASSIUM SERPL-SCNC: 4.7 MMOL/L (ref 3.6–5.5)
PROT SERPL-MCNC: 6.1 G/DL (ref 6–8.2)
SODIUM SERPL-SCNC: 139 MMOL/L (ref 135–145)

## 2019-10-07 PROCEDURE — 90935 HEMODIALYSIS ONE EVALUATION: CPT

## 2019-10-07 PROCEDURE — 84100 ASSAY OF PHOSPHORUS: CPT

## 2019-10-07 PROCEDURE — 83735 ASSAY OF MAGNESIUM: CPT

## 2019-10-07 PROCEDURE — 700102 HCHG RX REV CODE 250 W/ 637 OVERRIDE(OP): Performed by: HOSPITALIST

## 2019-10-07 PROCEDURE — 71045 X-RAY EXAM CHEST 1 VIEW: CPT

## 2019-10-07 PROCEDURE — 5A1D70Z PERFORMANCE OF URINARY FILTRATION, INTERMITTENT, LESS THAN 6 HOURS PER DAY: ICD-10-PCS | Performed by: INTERNAL MEDICINE

## 2019-10-07 PROCEDURE — 80053 COMPREHEN METABOLIC PANEL: CPT

## 2019-10-07 PROCEDURE — A9270 NON-COVERED ITEM OR SERVICE: HCPCS | Performed by: HOSPITALIST

## 2019-10-07 PROCEDURE — 97116 GAIT TRAINING THERAPY: CPT

## 2019-10-07 PROCEDURE — 99239 HOSP IP/OBS DSCHRG MGMT >30: CPT | Performed by: HOSPITALIST

## 2019-10-07 PROCEDURE — 700102 HCHG RX REV CODE 250 W/ 637 OVERRIDE(OP): Performed by: INTERNAL MEDICINE

## 2019-10-07 PROCEDURE — A9270 NON-COVERED ITEM OR SERVICE: HCPCS | Performed by: INTERNAL MEDICINE

## 2019-10-07 RX ADMIN — SEVELAMER CARBONATE 2400 MG: 800 TABLET, FILM COATED ORAL at 07:52

## 2019-10-07 RX ADMIN — FAMOTIDINE 20 MG: 20 TABLET ORAL at 05:09

## 2019-10-07 RX ADMIN — SEVELAMER CARBONATE 2400 MG: 800 TABLET, FILM COATED ORAL at 11:39

## 2019-10-07 RX ADMIN — FUROSEMIDE 80 MG: 40 TABLET ORAL at 05:09

## 2019-10-07 ASSESSMENT — COGNITIVE AND FUNCTIONAL STATUS - GENERAL
SUGGESTED CMS G CODE MODIFIER MOBILITY: CJ
DAILY ACTIVITIY SCORE: 24
MOBILITY SCORE: 20
SUGGESTED CMS G CODE MODIFIER DAILY ACTIVITY: CH
CLIMB 3 TO 5 STEPS WITH RAILING: A LOT
STANDING UP FROM CHAIR USING ARMS: A LITTLE
WALKING IN HOSPITAL ROOM: A LITTLE

## 2019-10-07 ASSESSMENT — GAIT ASSESSMENTS
DEVIATION: SHUFFLED GAIT
GAIT LEVEL OF ASSIST: MINIMAL ASSIST
ASSISTIVE DEVICE: FRONT WHEEL WALKER
DISTANCE (FEET): 200

## 2019-10-07 NOTE — PROGRESS NOTES
[facility] Daily Progress Note    Date of Service  10/7/2019    Chief Complaint  69 y.o. male admitted 9/30/2019 with SOB    Interval Problem Update  HPI:  The patient is a very pleasant 69-year-old gentleman known to us from outpatient dialysis care.  He receives his maintenance hemodialysis on a Monday, Wednesday and Friday schedule at [facility] unit.  His last treatment was Friday without incident.  He was due today.  He presented himself to the emergency department at [facility] with complaints of dyspnea.  He does have a history of rather large interdialytic weight gains and pulmonary edema.  He was placed on BiPAP for breathing treatment.  He has severe respiratory distress and was unable to speak.  EKG was profoundly abnormal suggestive of hyperkalemia.  He was given intravenous calcium with some improvement of the rhythm.  Lab evaluation demonstrated a rather significant elevation of his potassium to 9.9.  He was admitted to the intensive care unit, urgently intubated, placed on mechanical ventilation and emergent dialysis was provided.  His rhythm has improved.  No repeat labs are available yet at the time of this dictation.     DAILY NEPHROLOGY SUMMARY:              09/30 - Consult done              10/01 - RUDDYO, still in ICU and intubated              10/02 - Dialysis this AM.  Awake for a bit but agitated, now sedated.  Failed SBT with apnea.               10/03 - Now extubated.  +confused.  Doesn't remember me.  K up again.  Family at bedside.              10/04 - NAEO, does not recall events of this week, family/friend at bedside              10/05 - Pt just moved out of ICU.  Feels OK.  Family in room. No new complaints.  Had HD yesterday with 1 L UF.   10/06 - Pt sitting up eating lunch.  Feels fine.  Does not remember why he is in the hospital. Discussed high K+ and Hypervolemia with resp failure upon admission.  I will call [facility] tomorrow to let them know to decrease EDW. Pt says he comes into dialysis o/p under his EDW a lot.    10/07 - Pt just completed HD with net UF 2.5L per RN report. Pt denies complaints/concerns and asking when he can go home. BP stable.   Review of Systems  ROS   - As per HPI, otherwise review of systems negative per AMA/CMS criteria  - General:  As per HPI, otherwise negative per AMA/CMS criteria  - HEENT:  No ocular pain; no nasal discharge  - CV:  No chest pain or palpitations  - Lungs:  No SOB or cough.   - GI:  No N/V/D, no constipation, no abdominal pain  - MSK:  No joint pain; No trauma  - Skin: No rashes; No lesions  - Neuro: No paresthesia; No LOC  - Psych: No depression; No anxiety    Physical Exam  Temp:  [36.2 °C (97.1 °F)-36.8 °C (98.2 °F)] 36.7 °C (98.1 °F)  Pulse:  [65-74] 65  Resp:  [18] 18  BP: (107-125)/(62-75) 125/68  SpO2:  [93 %-96 %] 95 %    Physical Exam  GENERAL:  WDWN elderly male, NAD  HEENT:  NC/AT, no scleral icterus; conjunctiva normal  NECK:  Supple; Non tender  CV:  RRR, no m/r/g  LUNGS:  CTAB, no W/R  ABDOMEN:  SNTND, No rebound/guarding  EXTREMETIES:  No C/C/E, LLE AVF with +T/B  SKIN:  Warm and dry  NEURO:  A&O, no focal deficits  PSYCH:  Cooperative, appropriate mood and affect    Fluids    Intake/Output Summary (Last 24 hours) at 10/7/2019 1024  Last data filed at 10/7/2019 0948  Gross per 24 hour   Intake 1750 ml   Output 3000 ml   Net -1250 ml       Laboratory  Recent Labs     10/06/19  0215   WBC 10.5   RBC 4.09*   HEMOGLOBIN 12.4*   HEMATOCRIT 37.5*   MCV 91.7   MCH 30.3   MCHC 33.1*   RDW 46.5   PLATELETCT 255   MPV 10.5     Recent Labs     10/05/19  0500 10/06/19  0215 10/07/19  0408   SODIUM 139 137 139   POTASSIUM 4.6 4.9 4.7   CHLORIDE 91* 90* 92*   CO2 26 26 23   GLUCOSE 152* 131* 88   BUN 40* 62* 89*   CREATININE 5.84* 8.08* 10.67*   CALCIUM 10.0 9.3 9.0         No results for input(s): NTPROBNP in the last 72 hours.  Recent Labs     10/05/19  0500   TRIGLYCERIDE  144       Imaging       Assessment/Plan     IMPRESSION:  - ESRD    * Etiology likely 2/2 HTN    * Dialyzes at [place] [facility] [place]    * Continue maintenance dialysis qMWF and PRN  - Hyperkalemia    * Life threatining, tx with HD - resolved    * Etiology not clear:  Diet indiscretions, acidosis, Cell lysis?  - Acute respiratory failure, with hypoxia    * Liberated from vent, 10/3  - AMS     * Looks to have delerium  ?meds -- improved  - HTN    * Goal BP < 140/90    * Off Levophed    * Hypotension - No etiology to hypotension found   - Anemia of CKD    * Goal Hgb 10-11  - CKD-MBD    * Managed at HD unit    * On Sensipar and Sevelamer  - COPD  - HLD  - CAD  - CHF    * Hypervolemia resolved with UF / HD    * 1 L per day fluid restriction  - Hypermagnesemia    * Avoid magnesium based meds, DC'd MOM     PLAN:  - MWF iHD - due today (MON)  - UF as tolerated  - 1 L per day fluid restriction  - Renal diet  - No dietary protein restrictions  - Dose all meds per ESRD guidelines

## 2019-10-07 NOTE — ASSESSMENT & PLAN NOTE
Secondary to pulmonary edema from combination of ESRD on dialysis and heart failure with ejection fraction of 35%.  Improved with dialysis treatments.  Titrate oxygen as tolerated  RT protocol.

## 2019-10-07 NOTE — PROGRESS NOTES
Received BSR from JERMAINE Malone. Pt assessed, awake, alert, and oriented x4 with NAD. Respirations even and unlabored. Bed locked and in lowest position. Flushed PIV. Placed call light and belongings within reach. WCTM.

## 2019-10-07 NOTE — DISCHARGE PLANNING
ATTN: Case Management  RE: Referral for Home Health    As of 10/07/19, we have accepted the Home Health referral for the patient listed above.    A [facility] clinician will be out to see the patient within 48 hours. If you have any questions or concerns regarding the patient’s transition to Home Health, please do not hesitate to contact us at [phone].      We look forward to collaborating with you,  [facility] Team

## 2019-10-07 NOTE — PROGRESS NOTES
Telemetry Shift Summary     Rhythm SR  HR Range 60-80  Ectopy RPVCs, RCoup  Measurements 0.14/0.08/0.34           Normal Values  Rhythm SR  HR Range    Measurements 0.12-0.20 / 0.06-0.10  / 0.30-0.52

## 2019-10-07 NOTE — FACE TO FACE
Face to Face Supporting Documentation - Home Health    The encounter with this patient was in whole or in part the primary reason for home health admission.    Date of encounter:   Patient:                    MRN:                       YOB: 2017  Bridger Savage  [id number]  1950     Home health to see patient for:  Skilled Nursing care for assessment, interventions & education, Medical social work consult, Physical Therapy evaluation and treatment and Occupational therapy evaluation and treatment    Skilled need for:  Exacerbation of Chronic Disease State heart failure, end stage renal disease    Skilled nursing interventions to include:  Line/Drain/Airway education and care    Homebound status evidenced by:  Needs the assistance of another person in order to leave the home. Leaving home requires a considerable and taxing effort. There is a normal inability to leave the home.    Community Physician to provide follow up care: Jasmine Shields M.D.     Optional Interventions? No      I certify the face to face encounter for this home health care referral meets the CMS requirements and the encounter/clinical assessment with the patient was, in whole, or in part, for the medical condition(s) listed above, which is the primary reason for home health care. Based on my clinical findings: the service(s) are medically necessary, support the need for home health care, and the homebound criteria are met.  I certify that this patient has had a face to face encounter by myself.  Ana Soni M.D. - NPI: [id number]

## 2019-10-07 NOTE — ASSESSMENT & PLAN NOTE
New diagnosis with ejection fraction of 35%  Patient is very annoyed with fluid restriction and insists he makes urine, he would like to have a trial of high dose lasix and monitor urine output to see if some of his volume can be managed by lasix and liberalize his fluid intake.  Increase water restriction to 1200cc and monitor output.  Continue lisinopril, lasix, carvedilol.

## 2019-10-07 NOTE — PROGRESS NOTES
BP checked x3. Systolic mid 70s ( most recent 76/45)   Dr. Soni notified. No new orders. Will continue to monitor.

## 2019-10-07 NOTE — THERAPY
Attempted to see the patient for dysphagia therapy on this date.  However, patient currently receiving dialysis.  RN reported no difficulty with PO intake.  SLP following and will reattempt as the patient is appropriate.

## 2019-10-07 NOTE — PROGRESS NOTES
[facility] Progress Note      HD ordered by Dr. Townsend. Treatment started at 0648 and ended at 0948.     Total Net UF 2500mL.    Pt tolerated treatment well with no issues. See paper flow sheet for details. Cannulation needles taken out, held pressure for 10 min and placed gauze dressing with no bleeding. DURAN AVF + for bruit/thrill. Report given to DANIELE Ahuja RN.

## 2019-10-07 NOTE — PROGRESS NOTES
Report received from JERMAINE Calle. Patient resting comfortably in bed. Declines any needs at this time. Dialysis nurse at bedside. Call light in reach. Safety measures in place.

## 2019-10-07 NOTE — PROGRESS NOTES
Bedside report given to Nora DEAN. POC discussed. Pt resting comfortably in bed receiving dialysis. Safety precautions in place.

## 2019-10-07 NOTE — ASSESSMENT & PLAN NOTE
Dialysis per nephrology.  None today, hyperkalemia resolved.  Fluid restriction discussed extensively with patient, trial of lasix and monitor output, he insists he does have urine output, start lasix 80 mg bid and observe.

## 2019-10-07 NOTE — DISCHARGE PLANNING
Received Choice form at 0955  Agency/Facility Name: [facility]  Referral sent per Choice form at 3360

## 2019-10-07 NOTE — ASSESSMENT & PLAN NOTE
PATIENT DOES NOT HAVE BACTEREMIA  Blood cultures reviewed one bottle has staph species in it that are not MSSA or MRSA by PCR. Therefore this is most likely a contaminant from the skin. Stop antibiotics and observe.

## 2019-10-07 NOTE — PROGRESS NOTES
Bedside report received from Chris DEAN. Assumed care. POC discussed. Pt resting comfortably in bed. Safety precautions in place.

## 2019-10-07 NOTE — CARE PLAN
Problem: Safety  Goal: Will remain free from falls  Outcome: PROGRESSING AS EXPECTED  Note:   Pt falls risk but is impulsive. WCTM. Bed alarm on. And functioning.      Problem: Respiratory:  Goal: Respiratory status will improve  Outcome: PROGRESSING AS EXPECTED  Note:   Pt in NAD at this time     Problem: Fluid Volume:  Goal: Will maintain balanced intake and output  Outcome: PROGRESSING SLOWER THAN EXPECTED  Note:   Pt non compliant with fluid restriction despite education.

## 2019-10-07 NOTE — THERAPY
"Physical Therapy Treatment completed.   Bed Mobility:  Supine to Sit: Supervised  Transfers: Sit to Stand: Stand by Assist  Gait: Level Of Assist: Minimal Assist with No Equipment Needed       Plan of Care: Will benefit from Physical Therapy 3 times per week  Discharge Recommendations: Equipment: No Equipment Needed. Post-acute therapy Discharge to home with outpatient or home health for additional skilled therapy services.     pt ambulates increased distance with FWW 200ft level surface with multiple changes of direction CGA/Bennett. At this time walker is not necesary and creates more of a fall risk for patient. Pt demonstrates poor judgement with use of walker letting go of it and steering it in an uncontrolled manner. Pt is able to ambulate in room with no device min A provided no LOB. Pt's judgement appears impaired and nursing was notified that pt requested to stay sitting up at edge of bed. Nursing to place bed alarm on pt.  pt is eager to go home today.      See \"Rehab Therapy-Acute\" Patient Summary Report for complete documentation.       "

## 2019-10-07 NOTE — PROGRESS NOTES
Received patient report from JERMAINE Acuña. Patient resting comfortably in bed, no complaints at this time. Safety precautions in place. Will continue to monitor.

## 2019-10-07 NOTE — DISCHARGE SUMMARY
Discharge Summary    CHIEF COMPLAINT ON ADMISSION  No chief complaint on file.      Reason for Admission  Shortness of Breath     Admission Date  9/30/2019    CODE STATUS  Full Code    HPI & HOSPITAL COURSE    History of Presenting Illness per Dr. Figueroa's H&P  69 y.o. male who presented 9/30/2019 with severe respiratory distress with arrhythmia.  The patient was promptly intubated and placed on mechanical ventilation by the emergency room physician.  His EKG showed sinus sign suggestive of hyperkalemia.  The patient is known to be end-stage renal disease patient.  He was treated with calcium gluconate and bicarbonate.  And subsequently he was treated with emergent hemodialysis.  His heart rhythm switched back to bradycardia with narrow QRS after hemodialysis.  Patient potassium came back of 9.9.   Unclear to me if the patient missed hemodialysis sessions or there was other etiology for his severe hyperkalemia.  He was seen by nephrology and cardiology today.  He has been stable after hemodialysis in terms of vital signs.  He is not requiring vasopressors.     HOSPITAL COURSE: He was placed on emergent dialysis with removal of fluid and was able to be weaned from the ventilator. Echocardiogram showed a drop in ejection fraction to 35%. He was eventually stabilized and moved to the floor. We attempted to educate him on a fluid restricted diet. He is unwilling to go to a skilled nursing facility or accept home health care. Patient does have outpatient dialysis set up.    Therefore, he is discharged in guarded and stable condition to home with close outpatient follow-up.    The patient met 2-midnight criteria for an inpatient stay at the time of discharge.    Discharge Date  10/7/19    FOLLOW UP ITEMS POST DISCHARGE  Primary care, nephrology, cardiology.    DISCHARGE DIAGNOSES  Principal Problem:    CHF (congestive heart failure) ([facility]) POA: Yes  Active Problems:    ESRD (end stage renal disease) ([facility]) POA: Yes    COPD  (chronic obstructive pulmonary disease) ([facility]) POA: Yes    Acute respiratory failure with hypoxia ([facility]) POA: Unknown    Hyperkalemia POA: Unknown    Bacteremia POA: Unknown  Resolved Problems:    * No resolved hospital problems. *      FOLLOW UP  Future Appointments   Date Time Provider Department Center   10/17/2019  7:40 AM AMY Julien [facility] None   7/14/2020  2:20 PM Jasmine Shields M.D. [facility] None     No follow-up provider specified.    MEDICATIONS ON DISCHARGE     Medication List      CONTINUE taking these medications      Instructions   ANORO ELLIPTA 62.5-25 MCG/INH Aepb inhaler  Generic drug:  umeclidinium-vilanterol   Inhale 1 Puff by mouth every day.  Dose:  1 Puff     carvedilol 25 MG Tabs  Commonly known as:  COREG   Take 25 mg by mouth 2 times a day, with meals.  Dose:  25 mg            Allergies  Allergies   Allergen Reactions   • Chloraprep One Step Itching       DIET  Orders Placed This Encounter   Procedures   • Diet Order Renal     Standing Status:   Standing     Number of Occurrences:   1     Order Specific Question:   Diet:     Answer:   Renal [8]     Order Specific Question:   Texture/Fiber modifications:     Answer:   Dysphagia 2(Pureed/Chopped)specify fluid consistency(question 6) [2]     Order Specific Question:   Consistency/Fluid modifications:     Answer:   Thin Liquids [3]     Comments:   no straws     Order Specific Question:   Consistency/Fluid modifications:     Answer:   1800 ml Fluid Restriction [10]       ACTIVITY  As tolerated.  Weight bearing as tolerated    CONSULTATIONS  ICU  Cardiology  Nephrology    PROCEDURES  none    LABORATORY  Lab Results   Component Value Date    SODIUM 139 10/07/2019    POTASSIUM 4.7 10/07/2019    CHLORIDE 92 (L) 10/07/2019    CO2 23 10/07/2019    GLUCOSE 88 10/07/2019    BUN 89 (HH) 10/07/2019    CREATININE 10.67 (HH) 10/07/2019        Lab Results   Component Value Date    WBC 10.5 10/06/2019    HEMOGLOBIN 12.4 (L) 10/06/2019    HEMATOCRIT 37.5  (L) 10/06/2019    PLATELETCT 255 10/06/2019        Total time of the discharge process exceeds 35 minutes.

## 2019-10-07 NOTE — PROGRESS NOTES
"Hospital Medicine Daily Progress Note    Date of Service  10/6/2019    Chief Complaint  69 y.o. male admitted 2019 with respiratory distress.    Hospital Course    History of Presenting Illness per Dr. Figueroa's H&P  69 y.o. male who presented 2019 with severe respiratory distress with arrhythmia.  The patient was promptly intubated and placed on mechanical ventilation by the emergency room physician.  His EKG showed sinus sign suggestive of hyperkalemia.  The patient is known to be end-stage renal disease patient.  He was treated with calcium gluconate and bicarbonate.  And subsequently he was treated with emergent hemodialysis.  His heart rhythm switched back to bradycardia with narrow QRS after hemodialysis.  Patient potassium came back of 9.9.   Unclear to me if the patient missed hemodialysis sessions or there was other etiology for his severe hyperkalemia.  He was seen by nephrology and cardiology today.  He has been stable after hemodialysis in terms of vital signs.  He is not requiring vasopressors.       Hospital course: patient was admitted to the icu, he went into respiratory failure requiring intubation. Nephrology continued daily dialysis. Echocardiogram was done which showed systolic dysfunction with EF of 35%. Patient ultimately extubated successfully and transferred to the floor.    Interval Problem Update  10/6: he is very annoyed with the fluid restriction placed on him. Family at bedside and argued with him. Patient states his wife and son have  and he would be open to end of life care if it means he can eat and drink what he likes. After further consideration he states that \"no one believes me but I make urine\" and would like to take lasix to see if this can help with fluid balance.     Consultants/Specialty  ICU  Nephrology  Cardiology    Code Status  full    Disposition  Home with home health, he refuses rehab    Review of Systems  Review of Systems   Constitutional: Negative.  " Negative for chills, fever, malaise/fatigue and weight loss.   HENT: Negative.    Respiratory: Negative.  Negative for cough and shortness of breath.    Cardiovascular: Negative.  Negative for chest pain and leg swelling.   Gastrointestinal: Negative.  Negative for abdominal pain, nausea and vomiting.   Genitourinary: Negative.  Negative for dysuria and flank pain.   Musculoskeletal: Negative.  Negative for back pain and myalgias.   Neurological: Negative.  Negative for dizziness, loss of consciousness and weakness.   Endo/Heme/Allergies: Negative.  Does not bruise/bleed easily.   Psychiatric/Behavioral: Negative for depression. The patient is nervous/anxious.    All other systems reviewed and are negative.       Physical Exam  Temp:  [36.3 °C (97.4 °F)-36.8 °C (98.2 °F)] 36.8 °C (98.2 °F)  Pulse:  [59-95] 72  Resp:  [18] 18  BP: ()/(54-75) 115/75  SpO2:  [92 %-97 %] 94 %    Physical Exam   Constitutional: He is oriented to person, place, and time. He appears well-developed and well-nourished. No distress.   Plan of care discussed with bedside RN.   HENT:   Nose: Nose normal.   Mouth/Throat: Oropharynx is clear and moist. No oropharyngeal exudate.   Eyes: Conjunctivae are normal. Right eye exhibits no discharge. Left eye exhibits no discharge. No scleral icterus.   Neck: No JVD present. No tracheal deviation present.   Cardiovascular: Normal rate, regular rhythm and normal heart sounds.   Pulmonary/Chest: Effort normal and breath sounds normal. No stridor. No respiratory distress. He has no wheezes. He has no rales. He exhibits no tenderness.   Abdominal: Soft. Bowel sounds are normal. He exhibits no distension. There is no tenderness.   Musculoskeletal: He exhibits no edema or tenderness.   Fistula left forearm   Neurological: He is alert and oriented to person, place, and time. No cranial nerve deficit. He exhibits normal muscle tone.   Skin: Skin is warm and dry. He is not diaphoretic. No pallor.    Psychiatric: His behavior is normal. Judgment and thought content normal. His mood appears anxious. His affect is angry.   Nursing note and vitals reviewed.      Fluids    Intake/Output Summary (Last 24 hours) at 10/6/2019 1822  Last data filed at 10/6/2019 1751  Gross per 24 hour   Intake 1370 ml   Output 0 ml   Net 1370 ml       Laboratory  Recent Labs     10/04/19  0345 10/06/19  0215   WBC 17.2* 10.5   RBC 4.42* 4.09*   HEMOGLOBIN 13.6* 12.4*   HEMATOCRIT 40.0* 37.5*   MCV 90.5 91.7   MCH 30.8 30.3   MCHC 34.0 33.1*   RDW 45.3 46.5   PLATELETCT 316 255   MPV 10.4 10.5     Recent Labs     10/04/19  1100 10/05/19  0500 10/06/19  0215   SODIUM 135 139 137   POTASSIUM 4.0 4.6 4.9   CHLORIDE 88* 91* 90*   CO2 26 26 26   GLUCOSE 165* 152* 131*   BUN 18 40* 62*   CREATININE 3.55* 5.84* 8.08*   CALCIUM 10.7* 10.0 9.3             Recent Labs     10/05/19  0500   TRIGLYCERIDE 144       Imaging  DX-CHEST-PORTABLE (1 VIEW)   Final Result      1.  No large consolidation.   2.  Stable study      DX-CHEST-PORTABLE (1 VIEW)   Final Result         1.  Pulmonary vascular congestion, stable   2.  Atherosclerosis            DX-CHEST-PORTABLE (1 VIEW)   Final Result         1.  Pulmonary vascular congestion   2.  Cardiomegaly   3.  Atherosclerosis         DX-ABDOMEN FOR TUBE PLACEMENT   Final Result         1.  Nonspecific bowel gas pattern.   2.  Dobbhoff tube tip terminates overlying the expected location of the gastric body.      DX-CHEST-PORTABLE (1 VIEW)   Final Result         1.  No acute cardiopulmonary disease.   2.  Atherosclerosis      DX-ABDOMEN FOR TUBE PLACEMENT   Final Result      1.  Feeding tube tip overlies the gastric fundus.      DX-CHEST-PORTABLE (1 VIEW)   Final Result         1.  Minimal pulmonary edema and/or infiltrates, decreased since prior.      EC-ECHOCARDIOGRAM COMPLETE W/ CONT   Final Result      DX-CHEST-PORTABLE (1 VIEW)   Final Result         1.  Pulmonary edema and/or infiltrates are identified,  which are stable since the prior exam.   2.  Cardiomegaly      DX-CHEST-LIMITED (1 VIEW)   Final Result      Minor bibasilar interstitial opacities, unchanged compared with the prior image.               INTERPRETING LOCATION: [facility] [facility], [place], [address]           Assessment/Plan  * CHF (congestive heart failure) ([facility])- (present on admission)  Assessment & Plan  New diagnosis with ejection fraction of 35%  Patient is very annoyed with fluid restriction and insists he makes urine, he would like to have a trial of high dose lasix and monitor urine output to see if some of his volume can be managed by lasix and liberalize his fluid intake.  Increase water restriction to 1200cc and monitor output.  Continue lisinopril, lasix, carvedilol.    COPD (chronic obstructive pulmonary disease) ([facility])- (present on admission)  Assessment & Plan  RT protocol. No flare at current, resolved.  Resume steroid inhaler.    ESRD (end stage renal disease) ([facility])- (present on admission)  Assessment & Plan  Dialysis per nephrology.  None today, hyperkalemia resolved.  Fluid restriction discussed extensively with patient, trial of lasix and monitor output, he insists he does have urine output, start lasix 80 mg bid and observe.    Bacteremia  Assessment & Plan  PATIENT DOES NOT HAVE BACTEREMIA  Blood cultures reviewed one bottle has staph species in it that are not MSSA or MRSA by PCR. Therefore this is most likely a contaminant from the skin. Stop antibiotics and observe.    Hyperkalemia  Assessment & Plan  Resolved with dialysis treatments, renal diet.    Acute respiratory failure with hypoxia ([facility])  Assessment & Plan  Secondary to pulmonary edema from combination of ESRD on dialysis and heart failure with ejection fraction of 35%.  Improved with dialysis treatments.  Titrate oxygen as tolerated  RT protocol.         VTE prophylaxis: heparin sq

## 2019-10-08 LAB
BACTERIA BLD CULT: ABNORMAL
BACTERIA BLD CULT: ABNORMAL
SIGNIFICANT IND 70042: ABNORMAL
SITE SITE: ABNORMAL
SOURCE SOURCE: ABNORMAL

## 2019-10-08 NOTE — PROGRESS NOTES
Telemetry Shift Summary    Rhythm SR  HR Range 60s-80s  Ectopy Rare PVCs, Rare PACs  Measurements .14/.08/.44        Normal Values  Rhythm SR  HR Range    Measurements 0.12-0.20 / 0.06-0.10  / 0.30-0.52

## 2019-10-08 NOTE — DISCHARGE INSTRUCTIONS
Discharge Instructions per Ana Soni M.D.    Follow up with dialysis, recommend follow up with [facility], heart failure clinic.    DIET: Low salt, fluid restrict 2L per day.    ACTIVITY: as tolerated    DIAGNOSIS: heart failure, hyperkalemia, respiratory failure.    Return to ER if you have swelling, shortness of breath, fevers, tremors.    MEDICATIONS: Resume home meds, no lasix      Hyperkalemia  Introduction  Hyperkalemia is when you have too much potassium in your blood. Potassium is normally removed (excreted) from your body by your kidneys. If there is too much potassium in your blood, it can affect how your heart works.  Follow these instructions at home:  · Take medicines only as told by your doctor.  · Do not take any supplements, natural products, herbs, or vitamins unless your doctor says it is okay.  · Limit your alcohol intake as told by your doctor.  · Stop illegal drug use. If you need help quitting, ask your doctor.  · Keep all follow-up visits as told by your doctor. This is important.  · If you have kidney disease, you may need to follow a low potassium diet. A  (dietitian) can help you.  Contact a doctor if:  · Your heartbeat is not regular or very slow.  · You feel dizzy (light-headed).  · You feel weak.  · You feel sick to your stomach (nauseous).  · You have tingling in your hands or feet.  · You cannot feel your hands or feet.  Get help right away if:  · You are short of breath.  · You have chest pain.  · You pass out (faint).  · You cannot move your muscles.  This information is not intended to replace advice given to you by your health care provider. Make sure you discuss any questions you have with your health care provider.  Document Released: 12/18/2006 Document Revised: 05/25/2017 Document Reviewed: 03/25/2015  © 2017 [employer]      Heart Failure  Heart failure is a condition in which the heart has trouble pumping blood because it has become weak or stiff.  This means that the heart does not pump blood efficiently for the body to work well. For some people with heart failure, fluid may back up into the lungs and there may be swelling (edema) in the lower legs. Heart failure is usually a long-term (chronic) condition. It is important for you to take good care of yourself and follow the treatment plan from your health care provider.  What are the causes?  This condition is caused by some health problems, including:  High blood pressure (hypertension). Hypertension causes the heart muscle to work harder than normal. High blood pressure eventually causes the heart to become stiff and weak.  Coronary artery disease (CAD). CAD is the buildup of cholesterol and fat (plaques) in the arteries of the heart.  Heart attack (myocardial infarction). Injured tissue, which is caused by the heart attack, does not contract as well and the heart's ability to pump blood is weakened.  Abnormal heart valves. When the heart valves do not open and close properly, the heart muscle must pump harder to keep the blood flowing.  Heart muscle disease (cardiomyopathy or myocarditis). Heart muscle disease is damage to the heart muscle from a variety of causes, such as drug or alcohol abuse, infections, or unknown causes. These can increase the risk of heart failure.  Lung disease. When the lungs do not work properly, the heart must work harder.  What increases the risk?  Risk of heart failure increases as a person ages. This condition is also more likely to develop in people who:  Are overweight.  Are male.  Smoke or chew tobacco.  Abuse alcohol or illegal drugs.  Have taken medicines that can damage the heart, such as chemotherapy drugs.  Have diabetes.  High blood sugar (glucose) is associated with high fat (lipid) levels in the blood.  Diabetes can also damage tiny blood vessels that carry nutrients to the heart muscle.  Have abnormal heart rhythms.  Have thyroid problems.  Have low blood counts  (anemia).  What are the signs or symptoms?  Symptoms of this condition include:  Shortness of breath with activity, such as when climbing stairs.  Persistent cough.  Swelling of the feet, ankles, legs, or abdomen.  Unexplained weight gain.  Difficulty breathing when lying flat (orthopnea).  Waking from sleep because of the need to sit up and get more air.  Rapid heartbeat.  Fatigue and loss of energy.  Feeling light-headed, dizzy, or close to fainting.  Loss of appetite.  Nausea.  Increased urination during the night (nocturia).  Confusion.  How is this diagnosed?  This condition is diagnosed based on:  Medical history, symptoms, and a physical exam.  Diagnostic tests, which may include:  Echocardiogram.  Electrocardiogram (ECG).  Chest X-ray.  Blood tests.  Exercise stress test.  Radionuclide scans.  Cardiac catheterization and angiogram.  How is this treated?  Treatment for this condition is aimed at managing the symptoms of heart failure. Medicines, behavioral changes, or other treatments may be necessary to treat heart failure.  Medicines   These may include:  Angiotensin-converting enzyme (ACE) inhibitors. This type of medicine blocks the effects of a blood protein called angiotensin-converting enzyme. ACE inhibitors relax (dilate) the blood vessels and help to lower blood pressure.  Angiotensin receptor blockers (ARBs). This type of medicine blocks the actions of a blood protein called angiotensin. ARBs dilate the blood vessels and help to lower blood pressure.  Water pills (diuretics). Diuretics cause the kidneys to remove salt and water from the blood. The extra fluid is removed through urination, leaving a lower volume of blood that the heart has to pump.  Beta blockers. These improve heart muscle strength and they prevent the heart from beating too quickly.  Digoxin. This increases the force of the heartbeat.  Healthy behavior changes   These may include:  Reaching and maintaining a healthy  weight.  Stopping smoking or chewing tobacco.  Eating heart-healthy foods.  Limiting or avoiding alcohol.  Stopping use of street drugs (illegal drugs).  Physical activity.  Other treatments   These may include:  Surgery to open blocked coronary arteries or repair damaged heart valves.  Placement of a biventricular pacemaker to improve heart muscle function (cardiac resynchronization therapy). This device paces both the right ventricle and left ventricle.  Placement of a device to treat serious abnormal heart rhythms (implantable cardioverter defibrillator, or ICD).  Placement of a device to improve the pumping ability of the heart (left ventricular assist device, or LVAD).  Heart transplant. This can cure heart failure, and it is considered for certain patients who do not improve with other therapies.  Follow these instructions at home:  Medicines  Take over-the-counter and prescription medicines only as told by your health care provider. Medicines are important in reducing the workload of your heart, slowing the progression of heart failure, and improving your symptoms.  Do not stop taking your medicine unless your health care provider told you to do that.  Do not skip any dose of medicine.  Refill your prescriptions before you run out of medicine. You need your medicines every day.  Eating and drinking  Eat heart-healthy foods. Talk with a dietitian to make an eating plan that is right for you.  Choose foods that contain no trans fat and are low in saturated fat and cholesterol. Healthy choices include fresh or frozen fruits and vegetables, fish, lean meats, legumes, fat-free or low-fat dairy products, and whole-grain or high-fiber foods.  Limit salt (sodium) if directed by your health care provider. Sodium restriction may reduce symptoms of heart failure. Ask a dietitian to recommend heart-healthy seasonings.  Use healthy cooking methods instead of frying. Healthy methods include roasting, grilling, broiling,  baking, poaching, steaming, and stir-frying.  Limit your fluid intake if directed by your health care provider. Fluid restriction may reduce symptoms of heart failure.  Lifestyle  Stop smoking or using chewing tobacco. Nicotine and tobacco can damage your heart and your blood vessels. Do not use nicotine gum or patches before talking to your health care provider.  Limit alcohol intake to no more than 1 drink per day for non-pregnant women and 2 drinks per day for men. One drink equals 12 oz of beer, 5 oz of wine, or 1½ oz of hard liquor.  Drinking more than that is harmful to your heart. Tell your health care provider if you drink alcohol several times a week.  Talk with your health care provider about whether any level of alcohol use is safe for you.  If your heart has already been damaged by alcohol or you have severe heart failure, drinking alcohol should be stopped completely.  Stop use of illegal drugs.  Lose weight if directed by your health care provider. Weight loss may reduce symptoms of heart failure.  Do moderate physical activity if directed by your health care provider. People who are elderly and people with severe heart failure should consult with a health care provider for physical activity recommendations.  Monitor important information  Weigh yourself every day. Keeping track of your weight daily helps you to notice excess fluid sooner.  Weigh yourself every morning after you urinate and before you eat breakfast.  Wear the same amount of clothing each time you weigh yourself.  Record your daily weight. Provide your health care provider with your weight record.  Monitor and record your blood pressure as told by your health care provider.  Check your pulse as told by your health care provider.  Dealing with extreme temperatures  If the weather is extremely hot:  Avoid vigorous physical activity.  Use air conditioning or fans or seek a cooler location.  Avoid caffeine and alcohol.  Wear loose-fitting,  lightweight, and light-colored clothing.  If the weather is extremely cold:  Avoid vigorous physical activity.  Layer your clothes.  Wear mittens or gloves, a hat, and a scarf when you go outside.  Avoid alcohol.  General instructions  Manage other health conditions such as hypertension, diabetes, thyroid disease, or abnormal heart rhythms as told by your health care provider.  Learn to manage stress. If you need help to do this, ask your health care provider.  Plan rest periods when fatigued.  Get ongoing education and support as needed.  Participate in or seek rehabilitation as needed to maintain or improve independence and quality of life.  Stay up to date with immunizations. Keeping current on pneumococcal and influenza immunizations is especially important to prevent respiratory infections.  Keep all follow-up visits as told by your health care provider. This is important.  Contact a health care provider if:  You have a rapid weight gain.  You have increasing shortness of breath that is unusual for you.  You are unable to participate in your usual physical activities.  You tire easily.  You cough more than normal, especially with physical activity.  You have any swelling or more swelling in areas such as your hands, feet, ankles, or abdomen.  You are unable to sleep because it is hard to breathe.  You feel like your heart is beating quickly (palpitations).  You become dizzy or light-headed when you stand up.  Get help right away if:  You have difficulty breathing.  You notice or your family notices a change in your awareness, such as having trouble staying awake or having difficulty with concentration.  You have pain or discomfort in your chest.  You have an episode of fainting (syncope).  This information is not intended to replace advice given to you by your health care provider. Make sure you discuss any questions you have with your health care provider.  Document Released: 12/18/2006 Document Revised:  08/22/2017 Document Reviewed: 07/12/2017  [employer] Interactive Patient Education © 2017 [employer]      Acute Respiratory Failure, Adult  Acute respiratory failure occurs when there is not enough oxygen passing from your lungs to your body. When this happens, your lungs have trouble removing carbon dioxide from the blood. This causes your blood oxygen level to drop too low as carbon dioxide builds up.  Acute respiratory failure is a medical emergency. It can develop quickly, but it is temporary if treated promptly. Your lung capacity, or how much air your lungs can hold, may improve with time, exercise, and treatment.  What are the causes?  There are many possible causes of acute respiratory failure, including:  · Lung injury.  · Chest injury or damage to the ribs or tissues near the lungs.  · Lung conditions that affect the flow of air and blood into and out of the lungs, such as pneumonia, acute respiratory distress syndrome, and cystic fibrosis.  · Medical conditions, such as strokes or spinal cord injuries, that affect the muscles and nerves that control breathing.  · Blood infection (sepsis).  · Inflammation of the pancreas (pancreatitis).  · A blood clot in the lungs (pulmonary embolism).  · A large-volume blood transfusion.  · Burns.  · Near-drowning.  · Seizure.  · Smoke inhalation.  · Reaction to medicines.  · Alcohol or drug overdose.  What increases the risk?  This condition is more likely to develop in people who have:  · A blocked airway.  · Asthma.  · A condition or disease that damages or weakens the muscles, nerves, bones, or tissues that are involved in breathing.  · A serious infection.  · A health problem that blocks the unconscious reflex that is involved in breathing, such as hypothyroidism or sleep apnea.  · A lung injury or trauma.  What are the signs or symptoms?  Trouble breathing is the main symptom of acute respiratory failure. Symptoms may also include:  · Rapid  breathing.  · Restlessness or anxiety.  · Skin, lips, or fingernails that appear blue (cyanosis).  · Rapid heart rate.  · Abnormal heart rhythms (arrhythmias).  · Confusion or changes in behavior.  · Tiredness or loss of energy.  · Feeling sleepy or having a loss of consciousness.  How is this diagnosed?  Your health care provider can diagnose acute respiratory failure with a medical history and physical exam. During the exam, your health care provider will listen to your heart and check for crackling or wheezing sounds in your lungs. Your may also have tests to confirm the diagnosis and determine what is causing respiratory failure. These tests may include:  · Measuring the amount of oxygen in your blood (pulse oximetry). The measurement comes from a small device that is placed on your finger, earlobe, or toe.  · Other blood tests to measure blood gases and to look for signs of infection.  · Sampling your cerebral spinal fluid or tracheal fluid to check for infections.  · Chest X-ray to look for fluid in spaces that should be filled with air.  · Electrocardiogram (ECG) to look at the heart's electrical activity.  How is this treated?  Treatment for this condition usually takes places in a hospital intensive care unit (ICU). Treatment depends on what is causing the condition. It may include one or more treatments until your symptoms improve. Treatment may include:  · Supplemental oxygen. Extra oxygen is given through a tube in the nose, a face mask, or a hodge.  · A device such as a continuous positive airway pressure (CPAP) or bi-level positive airway pressure (BiPAP or BPAP) machine. This treatment uses mild air pressure to keep the airways open. A mask or other device will be placed over your nose or mouth. A tube that is connected to a motor will deliver oxygen through the mask.  · Ventilator. This treatment helps move air into and out of the lungs. This may be done with a bag and mask or a machine. For this  treatment, a tube is placed in your windpipe (trachea) so air and oxygen can flow to the lungs.  · Extracorporeal membrane oxygenation (ECMO). This treatment temporarily takes over the function of the heart and lungs, supplying oxygen and removing carbon dioxide. ECMO gives the lungs a chance to recover. It may be used if a ventilator is not effective.  · Tracheostomy. This is a procedure that creates a hole in the neck to insert a breathing tube.  · Receiving fluids and medicines.  · Rocking the bed to help breathing.  Follow these instructions at home:  · Take over-the-counter and prescription medicines only as told by your health care provider.  · Return to normal activities as told by your health care provider. Ask your health care provider what activities are safe for you.  · Keep all follow-up visits as told by your health care provider. This is important.  How is this prevented?  Treating infections and medical conditions that may lead to acute respiratory failure can help prevent the condition from developing.  Contact a health care provider if:  · You have a fever.  · Your symptoms do not improve or they get worse.  Get help right away if:  · You are having trouble breathing.  · You lose consciousness.  · Your have cyanosis or turn blue.  · You develop a rapid heart rate.  · You are confused.  These symptoms may represent a serious problem that is an emergency. Do not wait to see if the symptoms will go away. Get medical help right away. Call your local emergency services (911 in the [place] Do not drive yourself to the hospital.   This information is not intended to replace advice given to you by your health care provider. Make sure you discuss any questions you have with your health care provider.  Document Released: 12/23/2014 Document Revised: 07/15/2017 Document Reviewed: 07/05/2017  [employer] Interactive Patient Education © 2017 [employer]    Discharge Instructions    Discharged to home by car with  relative. Discharged via wheelchair, hospital escort: Yes.  Special equipment needed: Not Applicable    Be sure to schedule a follow-up appointment with your primary care doctor or any specialists as instructed.     Discharge Plan:   Diet Plan: Discussed  Activity Level: Discussed  Confirmed Follow up Appointment: Appointment Scheduled  Confirmed Symptoms Management: Discussed  Medication Reconciliation Updated: Yes  Influenza Vaccine Indication: Patient Refuses    I understand that a diet low in cholesterol, fat, and sodium is recommended for good health. Unless I have been given specific instructions below for another diet, I accept this instruction as my diet prescription.   Other diet: low salt, fluid restriction (2L)    Special Instructions:     HF Patient Discharge Instructions  · Monitor your weight daily, and maintain a weight chart, to track your weight changes.   · Activity as tolerated, unless your Doctor has ordered otherwise. .  · Follow a low fat, low cholesterol, low salt diet unless instructed otherwise by your Doctor. Read the labels on the back of food products and track your intake of fat, cholesterol and salt.   · Fluid Restriction Yes. If a Fluid Restriction has been ordered by your Doctor, measure fluids with a measuring cup to ensure that you are not exceeding the restriction.   · No smoking.  · Oxygen Yes. If your Doctor has ordered that you wear Oxygen at home, it is important to wear it as ordered.  · Did you receive an explanation from staff on the importance of taking each of your medications and why it is necessary to stay on the medications the physician/care provider has ordered? Yes  · Do you have any questions concerning how to manage your heart failure and what to do should you have any increased signs and symptoms after you go home? Yes  · Do you feel like your heart failure care team involved you in the care treatment plan and allowed you to make decisions regarding your care  while in the hospital and addressed any discharge needs you might have? Yes    See the educational handout provided at discharge for more information on monitoring your daily weight, activity and diet. This also explains more about Heart Failure, symptoms of a flare-up and some of the tests that you have undergone.     Warning Signs of a Flare-Up include:  · Swelling in the ankles or lower legs.  · Shortness of breath, while at rest, or while doing normal activities.   · Shortness of breath at night when in bed, or coughing in bed.   · Requiring more pillows to sleep at night, or needing to sit up at night to sleep.  · Feeling weak, dizzy or fatigued.     When to call your Doctor:  · Call [facility] about questions regarding the discharge instructions you were given [phone].  (Discharge Unit Medical telemetry)  · Call your Primary Care Physician or Cardiologist if:   1. You experience any pain radiating to your jaw or neck.  2. You have any difficulty breathing.  3. You experience weight gain of 2 lbs in a day or 5 lbs in a week.   4. You feel any palpitations or irregular heartbeats.  5. You become dizzy or lose consciousness.   If you have had an angiogram or had a pacemaker or AICD placed, and experience:  1. Bleeding, drainage or swelling at the surgical / puncture site.  2. Fever greater than 100.0 F  3. Shock from internal defibrillator.  4. Cool and / or numb extremities.      · Is patient discharged on Warfarin / Coumadin?   No     Depression / Suicide Risk    As you are discharged from this [facility], it is important to learn how to keep safe from harming yourself.    Recognize the warning signs:  · Abrupt changes in personality, positive or negative- including increase in energy   · Giving away possessions  · Change in eating patterns- significant weight changes-  positive or negative  · Change in sleeping patterns- unable to sleep or sleeping all the  time   · Unwillingness or inability to communicate  · Depression  · Unusual sadness, discouragement and loneliness  · Talk of wanting to die  · Neglect of personal appearance   · Rebelliousness- reckless behavior  · Withdrawal from people/activities they love  · Confusion- inability to concentrate     If you or a loved one observes any of these behaviors or has concerns about self-harm, here's what you can do:  · Talk about it- your feelings and reasons for harming yourself  · Remove any means that you might use to hurt yourself (examples: pills, rope, extension cords, firearm)  · Get professional help from the community (Mental Health, Substance Abuse, psychological counseling)  · Do not be alone:Call your Safe Contact- someone whom you trust who will be there for you.  · Call your local CRISIS HOTLINE 046-9446 or 378-563-3151  · Call your local Children's Mobile Crisis Response Team [place] (022) 551-3583 or www.[employer]  · Call the toll free National Suicide Prevention Hotlines   · National Suicide Prevention Lifeline 960-675-WNQP (6526)  · National Hope Line Network 800-SUICIDE (939-8307)

## 2019-10-08 NOTE — PROGRESS NOTES
Discharge order written. IV removed, patient tolerated well. Tele removed and returned to monitor tech. Brother at bedside. Belongings gathered. Pt states that all personal belongings are in possession. AVS printed, reviewed and copy signed and placed on the chart. Patient resistant to HF teaching. Patient educated on importance of HF packet and following up with HF appts. Patient has no further questions. No new prescriptions. Discharged in satisfactory condition home with brother. Pt off unit via wheelchair, escorted by transport staff.

## 2019-10-29 ENCOUNTER — OFFICE VISIT (OUTPATIENT)
Dept: CARDIOLOGY | Facility: MEDICAL CENTER | Age: 69
End: 2019-10-29
Payer: MEDICARE

## 2019-10-29 VITALS
BODY MASS INDEX: 24.79 KG/M2 | HEIGHT: 72 IN | HEART RATE: 72 BPM | OXYGEN SATURATION: 96 % | WEIGHT: 183 LBS | DIASTOLIC BLOOD PRESSURE: 56 MMHG | SYSTOLIC BLOOD PRESSURE: 102 MMHG

## 2019-10-29 DIAGNOSIS — I10 ESSENTIAL HYPERTENSION: ICD-10-CM

## 2019-10-29 DIAGNOSIS — N18.6 ESRD (END STAGE RENAL DISEASE) (HCC): ICD-10-CM

## 2019-10-29 DIAGNOSIS — I50.20 ACC/AHA STAGE C SYSTOLIC HEART FAILURE (HCC): ICD-10-CM

## 2019-10-29 DIAGNOSIS — I42.8 NON-ISCHEMIC CARDIOMYOPATHY (HCC): ICD-10-CM

## 2019-10-29 DIAGNOSIS — I50.20 NYHA CLASS 1 HEART FAILURE WITH REDUCED EJECTION FRACTION (HCC): ICD-10-CM

## 2019-10-29 PROCEDURE — 99214 OFFICE O/P EST MOD 30 MIN: CPT | Performed by: INTERNAL MEDICINE

## 2019-10-29 RX ORDER — CINACALCET 60 MG/1
60 TABLET, FILM COATED ORAL DAILY
COMMUNITY
End: 2020-06-15

## 2019-10-29 RX ORDER — LISINOPRIL 10 MG/1
10 TABLET ORAL DAILY
Status: ON HOLD | COMMUNITY
End: 2020-01-28

## 2019-10-29 RX ORDER — SEVELAMER CARBONATE 800 MG/1
800 TABLET, FILM COATED ORAL
Status: ON HOLD | COMMUNITY
End: 2020-01-28

## 2019-10-29 ASSESSMENT — MINNESOTA LIVING WITH HEART FAILURE QUESTIONNAIRE (MLHF)
MAKING YOU STAY IN A HOSPITAL: 1
FEELING LIKE A BURDEN TO FAMILY AND FRIENDS: 3
COSTING YOU MONEY FOR MEDICAL CARE: 1
MAKING YOU WORRY: 0
LOSS OF SELF CONTROL IN YOUR LIFE: 3
DIFFICULTY SOCIALIZING WITH FAMILY OR FRIENDS: 2
GIVING YOU SIDE EFFECTS FROM TREATMENTS: 5
TIRED, FATIGUED OR LOW ON ENERGY: 2
DIFFICULTY SLEEPING WELL AT NIGHT: 2
SWELLING IN ANKLES OR LEGS: 0
EATING LESS FOODS YOU LIKE: 5
MAKING YOU SHORT OF BREATH: 4
DIFFICULTY WITH SEXUAL ACTIVITIES: 5
MAKING YOU FEEL DEPRESSED: 4
DIFFICULTY WORKING TO EARN A LIVING: 5
HAVING TO SIT OR LIE DOWN DURING THE DAY: 1
DIFFICULTY TO CONCENTRATE OR REMEMBERING THINGS: 2
TOTAL_SCORE: 52
DIFFICULTY GOING AWAY FROM HOME: 3
WALKING ABOUT OR CLIMBING STAIRS DIFFICULT: 0
DIFFICULTY WITH RECREATIONAL PASTIMES, SPORTS, HOBBIES: 3
WORKING AROUND THE HOUSE OR YARD DIFFICULT: 1

## 2019-10-29 ASSESSMENT — 6 MINUTE WALK TEST (6MWT): TOTAL DISTANCE WALKED (METERS): 273

## 2019-10-29 NOTE — PROGRESS NOTES
Chief Complaint   Patient presents with   • Congestive Heart Failure     NEW HEART FAILURE       Subjective:   Bridger Savage is a 69 y.o. male who presents today for evaluation in heart failure clinic because of a reduced ejection fraction.  He was hospitalized about a month ago with respiratory failure and found to be severely hyperkalemic.  He had some dysrhythmias at that time which resolved with normalization of his potassium.  He currently is feeling improved.    He denies any dyspnea on exertion on a flat walk.  He has had no PND, orthopnea or edema.  He occasionally notes some orthostatic lightheadedness but no palpitations.  He denies any chest discomfort.    Past Medical History:   Diagnosis Date   • Asthma    • Breath shortness     uses oxygen 3.5 L as needed   • Chronic obstructive pulmonary disease (HCC)    • Cold     12/27/17 cold   • Congestive heart failure (HCC)    • Dental disorder     upper and lower dentures   • Dialysis patient (HCC)     MWF   • Emphysema of lung (HCC)    • Renal disorder    • Snoring    • Urinary incontinence      Past Surgical History:   Procedure Laterality Date   • GASTROSCOPY  2/1/2018    Procedure: GASTROSCOPY;  Surgeon: Morteza Olivo M.D.;  Location: [facility];  Service: Gastroenterology   • COLONOSCOPY  2/1/2018    Procedure: COLONOSCOPY;  Surgeon: Morteza Olivo M.D.;  Location: [facility];  Service: Gastroenterology   • GASTROSCOPY-ENDO N/A 8/28/2017    Procedure: GASTROSCOPY-ENDO;  Surgeon: Pankaj Choudhury M.D.;  Location: [facility];  Service: EUS   • OTHER      Patient had an arm fistula put in for dialysis     Family History   Problem Relation Age of Onset   • Heart Disease Mother    • Diabetes Mother    • Kidney Disease Mother         kidney failure   • Asthma Father    • Diabetes Brother    • No Known Problems Maternal Grandmother    • No Known Problems Maternal Grandfather    • No Known Problems Paternal  Grandmother    • No Known Problems Paternal Grandfather      Social History     Socioeconomic History   • Marital status:      Spouse name: Not on file   • Number of children: Not on file   • Years of education: Not on file   • Highest education level: Not on file   Occupational History   • Not on file   Social Needs   • Financial resource strain: Not on file   • Food insecurity:     Worry: Not on file     Inability: Not on file   • Transportation needs:     Medical: Not on file     Non-medical: Not on file   Tobacco Use   • Smoking status: Current Some Day Smoker     Packs/day: 0.25     Types: Cigarettes   • Smokeless tobacco: Never Used   • Tobacco comment: 5 cigarettes a day   Substance and Sexual Activity   • Alcohol use: No   • Drug use: No   • Sexual activity: Not on file   Lifestyle   • Physical activity:     Days per week: Not on file     Minutes per session: Not on file   • Stress: Not on file   Relationships   • Social connections:     Talks on phone: Not on file     Gets together: Not on file     Attends Restorationism service: Not on file     Active member of club or organization: Not on file     Attends meetings of clubs or organizations: Not on file     Relationship status: Not on file   • Intimate partner violence:     Fear of current or ex partner: Not on file     Emotionally abused: Not on file     Physically abused: Not on file     Forced sexual activity: Not on file   Other Topics Concern   • Not on file   Social History Narrative    ** Merged History Encounter **          Allergies   Allergen Reactions   • Chloraprep One Step Itching     Outpatient Encounter Medications as of 10/29/2019   Medication Sig Dispense Refill   • sevelamer carbonate (RENVELA) 800 MG Tab tablet Take 800 mg by mouth 5 Times a Day.     • Cinacalcet HCl (SENSIPAR) 60 MG Tab Take  by mouth every day.     • lisinopril (PRINIVIL) 10 MG Tab Take 10 mg by mouth every day.     • aspirin EC (ECOTRIN) 81 MG Tablet Delayed  Response Take 81 mg by mouth every day.     • ANORO ELLIPTA 62.5-25 MCG/INH AEROSOL POWDER, BREATH ACTIVATED inhaler Inhale 1 Puff by mouth every day.  11   • carvedilol (COREG) 25 MG Tab Take 25 mg by mouth every day.       No facility-administered encounter medications on file as of 10/29/2019.      ROS     Objective:   /56 (BP Location: Left arm, Patient Position: Sitting, BP Cuff Size: Adult)   Pulse 72   Ht 1.829 m (6')   Wt 83 kg (183 lb)   SpO2 96%   BMI 24.82 kg/m²     Physical Exam   Constitutional: He appears well-developed and well-nourished.   Neck: No JVD present.   Cardiovascular: Normal rate and regular rhythm.   No murmur heard.  Pulmonary/Chest: Effort normal and breath sounds normal. He has no rales.   Abdominal: Soft. There is no tenderness.   Musculoskeletal: He exhibits no edema.     Lab Results   Component Value Date/Time    TRIGLYCERIDE 144 10/05/2019 05:00 AM       Lab Results   Component Value Date/Time    SODIUM 139 10/07/2019 04:08 AM    POTASSIUM 4.7 10/07/2019 04:08 AM    CHLORIDE 92 (L) 10/07/2019 04:08 AM    CO2 23 10/07/2019 04:08 AM    GLUCOSE 88 10/07/2019 04:08 AM    BUN 89 (HH) 10/07/2019 04:08 AM    CREATININE 10.67 (HH) 10/07/2019 04:08 AM     Lab Results   Component Value Date/Time    ALKPHOSPHAT 45 10/07/2019 04:08 AM    ASTSGOT 8 (L) 10/07/2019 04:08 AM    ALTSGPT <5 10/07/2019 04:08 AM    TBILIRUBIN 0.2 10/07/2019 04:08 AM      Lab Results   Component Value Date/Time    BNPBTYPENAT 146 (H) 05/20/2019 05:51 PM            Echocardiography Laboratory    CONCLUSIONS  Compared to the images of the prior study done 08/29/2017, current   study suboptimal for complete comparison.  Technically difficult study incomplete information is obtained.   Moderately reduced left ventricular systolic function.  Left ventricular ejection fraction is visually estimated to be 35%.  Moderate concentric left ventricular hypertrophy.    AURELIA DAVIS  Exam Date:         10/01/2019     PET  myocardial perfusion scan from 2017:  ELECTROCARDIOGRAPHIC FINDINGS: Normal sinus rhythm.  Normal ECG response to dipyridamole infusion.  No ischemic changes noted     SCINTOGRAPHIC FINDINGS:  Reduced left ventricular perfusion with    stress and rest images.  There is no evidence of ischemia or scar.    GATED WALL MOTION FINDINGS:  Measured resting ejection fraction is 34 %.      Assessment:     1. ACC/AHA stage C systolic heart failure (HCC)     2. NYHA class 1 heart failure with reduced ejection fraction (HCC)     3. Non-ischemic cardiomyopathy (HCC)     4. Essential hypertension     5. ESRD (end stage renal disease) (Formerly [facility])         Medical Decision Making:  Today's Assessment / Status / Plan:     Mr. Savage is clinically stable.  He appears to be euvolemic on examination today.  He states that he has never had hypertension and may have a nonischemic cardiomyopathy.  He did have what was felt to be moderate LVH on his echocardiogram though the examination was technically difficult.  He is on an ACE inhibitor and beta-blocker.  Given his renal failure and difficulty with hyperkalemia, we will not place him on an aldosterone antagonist.  He will follow-up in about a month.

## 2020-01-23 ENCOUNTER — HOSPITAL ENCOUNTER (EMERGENCY)
Facility: MEDICAL CENTER | Age: 70
End: 2020-01-23
Attending: EMERGENCY MEDICINE
Payer: MEDICARE

## 2020-01-23 VITALS
TEMPERATURE: 96.5 F | SYSTOLIC BLOOD PRESSURE: 119 MMHG | HEIGHT: 72 IN | DIASTOLIC BLOOD PRESSURE: 79 MMHG | OXYGEN SATURATION: 95 % | HEART RATE: 81 BPM | BODY MASS INDEX: 25.17 KG/M2 | RESPIRATION RATE: 16 BRPM | WEIGHT: 185.85 LBS

## 2020-01-23 DIAGNOSIS — K08.89 PAIN, DENTAL: ICD-10-CM

## 2020-01-23 PROCEDURE — 700102 HCHG RX REV CODE 250 W/ 637 OVERRIDE(OP): Performed by: EMERGENCY MEDICINE

## 2020-01-23 PROCEDURE — 99283 EMERGENCY DEPT VISIT LOW MDM: CPT

## 2020-01-23 PROCEDURE — A9270 NON-COVERED ITEM OR SERVICE: HCPCS | Performed by: EMERGENCY MEDICINE

## 2020-01-23 RX ORDER — IBUPROFEN 600 MG/1
600 TABLET ORAL ONCE
Status: COMPLETED | OUTPATIENT
Start: 2020-01-23 | End: 2020-01-23

## 2020-01-23 RX ADMIN — IBUPROFEN 600 MG: 600 TABLET ORAL at 13:05

## 2020-01-23 NOTE — ED NOTES
Pt given discharge instructions. RN answered questions. VSS. Pt ambulated steadily out to [facility].

## 2020-01-23 NOTE — ED TRIAGE NOTES
Chief Complaint   Patient presents with   • Oral Swelling     pt reports dental surgery monday to left lower jaw.     • Numbness     yesterday around noon pt developed numbness to left cheek     No other neuro deficits noted. No weakness, TRUJILLO, no droop, no slurred speech, no aphasia.   /45   Pulse 88   Temp 36.7 °C (98 °F) (Temporal)   Resp 16   Ht 1.829 m (6')   Wt 84.3 kg (185 lb 13.6 oz)   SpO2 95%   Pt informed of wait times. Educated on triage process.  Asked to return to triage RN for any new or worsening of symptoms. Thanked for patience.

## 2020-01-23 NOTE — ED PROVIDER NOTES
ED Provider Note    CHIEF COMPLAINT  Chief Complaint   Patient presents with   • Oral Swelling     pt reports dental surgery monday to left lower jaw.     • Numbness     yesterday around noon pt developed numbness to left cheek       HPI  Bridger Savage is a 69 y.o. male who presents with left facial pain and numbness.  The patient states that he had an oral surgical procedure on his gums on Monday.  He states on Tuesday was fine but today's developed some discomfort in the mandibular region and he states he also has some periodic numbness to the left mandibular region.  The patient has not had any associated fevers.  He has noticed some swelling in the submandibular region.  He does not have any difficulty with breathing or swallowing.  He states he feels very tired.  He has not had any vomiting.    REVIEW OF SYSTEMS  No change in his breathing patterns, no recent fevers, no abdominal pain    PHYSICAL EXAM  VITAL SIGNS: /45   Pulse 88   Temp 36.7 °C (98 °F) (Temporal)   Resp 16   Ht 1.829 m (6')   Wt 84.3 kg (185 lb 13.6 oz)   SpO2 95%   BMI 25.21 kg/m²   In general the patient does not appear toxic    Eyes pupils are 2 and reactive bilaterally extraocular is intact    Nares and mouth are moist, the patient does have some pain with percussion to the left mandibular region around the gingiva.  He is had his teeth extracted.  The patient does not have any fluctuance.    Facial examination there is some tenderness to the left submandibular region with no fullness    Neck has anterior cervical adenopathy    Neurologic examination cranial 2 through 12 are intact, motor is 5 out of 5 and symmetric throughout, no sensory deficits    COURSE & MEDICAL DECISION MAKING  Pertinent Labs & Imaging studies reviewed. (See chart for details)  This a 69-year-old gentleman who presents with pain and paresthesias to the left jaw region.  I suspect this is all from his recent surgical procedure.  The patient is  neurologically intact at this time and this seems to more of a peripheral nerve injury.  The patient has not been taking any anti-inflammatories he is only been taking narcotics.  I told the patient narcotics will make him more sleepy.  The patient needs to start taking Motrin on top of the pain medication that has been prescribed.  He does have a follow-up appointment with his oral surgeon today at 3 PM.  FINAL IMPRESSION  1.  Postoperative left mandibular pain       Disposition  The patient will be discharged in stable condition      Electronically signed by: Dusty Chavez M.D., 1/23/2020 12:53 PM

## 2020-01-23 NOTE — DISCHARGE INSTRUCTIONS
Take Motrin every 8 hours as needed for pain control    Follow-up with your oral surgeon as scheduled    Return if you are acutely worse

## 2020-01-24 ENCOUNTER — OFFICE VISIT (OUTPATIENT)
Dept: CARDIOLOGY | Facility: MEDICAL CENTER | Age: 70
End: 2020-01-24
Payer: MEDICARE

## 2020-01-24 VITALS
SYSTOLIC BLOOD PRESSURE: 110 MMHG | HEIGHT: 71 IN | HEART RATE: 102 BPM | OXYGEN SATURATION: 94 % | RESPIRATION RATE: 16 BRPM | WEIGHT: 181 LBS | DIASTOLIC BLOOD PRESSURE: 66 MMHG | BODY MASS INDEX: 25.34 KG/M2

## 2020-01-24 DIAGNOSIS — Z79.899 HIGH RISK MEDICATION USE: ICD-10-CM

## 2020-01-24 DIAGNOSIS — I50.20 NYHA CLASS 1 HEART FAILURE WITH REDUCED EJECTION FRACTION (HCC): ICD-10-CM

## 2020-01-24 DIAGNOSIS — I50.20 ACC/AHA STAGE C SYSTOLIC HEART FAILURE (HCC): ICD-10-CM

## 2020-01-24 DIAGNOSIS — N18.6 ESRD (END STAGE RENAL DISEASE) (HCC): ICD-10-CM

## 2020-01-24 DIAGNOSIS — I42.8 NON-ISCHEMIC CARDIOMYOPATHY (HCC): ICD-10-CM

## 2020-01-24 DIAGNOSIS — I10 HTN (HYPERTENSION), MALIGNANT: ICD-10-CM

## 2020-01-24 PROCEDURE — 99214 OFFICE O/P EST MOD 30 MIN: CPT | Performed by: INTERNAL MEDICINE

## 2020-01-24 RX ORDER — MIDODRINE HYDROCHLORIDE 10 MG/1
10 TABLET ORAL DAILY
COMMUNITY
Start: 2020-01-09

## 2020-01-24 RX ORDER — HYDROCODONE BITARTRATE AND ACETAMINOPHEN 7.5; 325 MG/1; MG/1
TABLET ORAL
Status: ON HOLD | COMMUNITY
Start: 2020-01-20 | End: 2020-01-28

## 2020-01-24 RX ORDER — MIDODRINE HYDROCHLORIDE 2.5 MG/1
TABLET ORAL
Status: ON HOLD | COMMUNITY
Start: 2019-11-25 | End: 2020-01-28

## 2020-01-24 ASSESSMENT — ENCOUNTER SYMPTOMS
DEPRESSION: 0
PND: 0
LOSS OF CONSCIOUSNESS: 0
EYE DISCHARGE: 0
EYE PAIN: 0
MYALGIAS: 0
SENSORY CHANGE: 0
COUGH: 0
ORTHOPNEA: 0
DIZZINESS: 0
DOUBLE VISION: 0
CHILLS: 0
SPEECH CHANGE: 0
BRUISES/BLEEDS EASILY: 0
HALLUCINATIONS: 0
HEADACHES: 0
PALPITATIONS: 0
FALLS: 0
FEVER: 0
VOMITING: 0
CLAUDICATION: 0
WEIGHT LOSS: 0
SHORTNESS OF BREATH: 0
ABDOMINAL PAIN: 0
BLOOD IN STOOL: 0
BLURRED VISION: 0
NAUSEA: 0

## 2020-01-24 NOTE — PROGRESS NOTES
Chief Complaint   Patient presents with   • Congestive Heart Failure       Subjective:   Bridger Savage is a 69 y.o. male who presents today for cardiac care and evaluation in the heart failure clinic for chronic heart failure care. He does have a history of end-stage renal disease for which he is currently on hemodialysis. This has been going on for 5 years. He doesn't know the reason for that. His left ventricular systolic function was found to be 40% as well. Nuclear PET scan stress test was negative 09/2017.     Patient is feeling better these days. Does get winded upon walking up inclines or for distance. No symptoms at rest or with daily living activities.     He is not using carvedilol and lisinopril due to hypotension. He was started Midodrine during dialysis.     At prior visit, patient was able to complete 116 m during his 6 minute walk test. his O2 saturation at baseline was 90% and at the end of the test, the O2 saturation was 97%. he reported 3 level of dyspnea on Trevon scale.     Patient was able to complete 110 m during his 6 minute walk test. his O2 saturation at baseline was 94% and at the end of the test, the O2 saturation was 96%. he reported 3 level of dyspnea on Trevon scale.     No family history of sudden cardiac death.    Past Medical History:   Diagnosis Date   • Asthma    • Breath shortness     uses oxygen 3.5 L as needed   • Chronic obstructive pulmonary disease (HCC)    • Cold     12/27/17 cold   • Congestive heart failure (HCC)    • Dental disorder     upper and lower dentures   • Dialysis patient (HCC)     MWF   • Emphysema of lung (HCC)    • Renal disorder    • Snoring    • Urinary incontinence      Past Surgical History:   Procedure Laterality Date   • GASTROSCOPY  2/1/2018    Procedure: GASTROSCOPY;  Surgeon: Morteza Olivo M.D.;  [facility]: SUR[facility]ERY [facility];  Service: Gastroenterology   • COLONOSCOPY  2/1/2018    Procedure: COLONOSCOPY;  Surgeon: Morteza Olivo M.D.;   Location: SURGERY [facility];  Service: Gastroenterology   • GASTROSCOPY-ENDO N/A 8/28/2017    Procedure: GASTROSCOPY-ENDO;  Surgeon: Pankaj Choudhury M.D.;  Location: SURGERY [facility];  Service: EUS   • OTHER      Patient had an arm fistula put in for dialysis     Family History   Problem Relation Age of Onset   • Heart Disease Mother    • Diabetes Mother    • Kidney Disease Mother         kidney failure   • Asthma Father    • Diabetes Brother    • No Known Problems Maternal Grandmother    • No Known Problems Maternal Grandfather    • No Known Problems Paternal Grandmother    • No Known Problems Paternal Grandfather      Social History     Socioeconomic History   • Marital status:      Spouse name: Not on file   • Number of children: Not on file   • Years of education: Not on file   • Highest education level: Not on file   Occupational History   • Not on file   Social Needs   • Financial resource strain: Not on file   • Food insecurity:     Worry: Not on file     Inability: Not on file   • Transportation needs:     Medical: Not on file     Non-medical: Not on file   Tobacco Use   • Smoking status: Current Some Day Smoker     Packs/day: 0.50     Types: Cigarettes   • Smokeless tobacco: Never Used   Substance and Sexual Activity   • Alcohol use: No   • Drug use: No   • Sexual activity: Not on file   Lifestyle   • Physical activity:     Days per week: Not on file     Minutes per session: Not on file   • Stress: Not on file   Relationships   • Social connections:     Talks on phone: Not on file     Gets together: Not on file     Attends [employer] service: Not on file     Active member of club or organization: Not on file     Attends meetings of clubs or organizations: Not on file     Relationship status: Not on file   • Intimate partner violence:     Fear of current or ex partner: Not on file     Emotionally abused: Not on file     Physically abused: Not on file     Forced sexual activity: Not on  file   Other Topics Concern   • Not on file   Social History Narrative    ** Merged History Encounter **          Allergies   Allergen Reactions   • Chloraprep One Step Itching     Outpatient Encounter Medications as of 1/24/2020   Medication Sig Dispense Refill   • HYDROcodone-acetaminophen (NORCO) 7.5-325 MG per tablet TK 1 T PO  Q 4 H  P FOR 3 DAYS     • midodrine (PROAMATINE) 10 MG tablet Take  by mouth 3 times a day. Take 1 tablet by mouth three times a day     • PROAIR  (90 Base) MCG/ACT Aero Soln inhalation aerosol INHALE 2 PUFFS PO Q 4 TO 6 H PRN     • sevelamer carbonate (RENVELA) 800 MG Tab tablet Take 800 mg by mouth 5 Times a Day.     • Cinacalcet HCl (SENSIPAR) 60 MG Tab Take  by mouth every day.     • aspirin EC (ECOTRIN) 81 MG Tablet Delayed Response Take 81 mg by mouth every day.     • ANORO ELLIPTA 62.5-25 MCG/INH AEROSOL POWDER, BREATH ACTIVATED inhaler Inhale 1 Puff by mouth every day.  11   • midodrine (PROAMATINE) 2.5 MG Tab TK 1 T PO TID PRF BLOOD PRESSURE SUPPORT     • lisinopril (PRINIVIL) 10 MG Tab Take 10 mg by mouth every day.     • carvedilol (COREG) 25 MG Tab Take 25 mg by mouth every day.       No facility-administered encounter medications on file as of 1/24/2020.      Review of Systems   Constitutional: Negative for chills, fever, malaise/fatigue and weight loss.   HENT: Negative for ear discharge, ear pain, hearing loss and nosebleeds.    Eyes: Negative for blurred vision, double vision, pain and discharge.   Respiratory: Negative for cough and shortness of breath.    Cardiovascular: Negative for chest pain, palpitations, orthopnea, claudication, leg swelling and PND.   Gastrointestinal: Negative for abdominal pain, blood in stool, melena, nausea and vomiting.   Genitourinary: Negative for dysuria and hematuria.   Musculoskeletal: Negative for falls, joint pain and myalgias.   Skin: Negative for itching and rash.   Neurological: Negative for dizziness, sensory change, speech  "change, loss of consciousness and headaches.   Endo/Heme/Allergies: Negative for environmental allergies. Does not bruise/bleed easily.   Psychiatric/Behavioral: Negative for depression, hallucinations and suicidal ideas.        Objective:   /66 (BP Location: Right arm, Patient Position: Sitting, BP Cuff Size: Adult)   Pulse (!) 102   Resp 16   Ht 1.803 m (5' 11\")   Wt 82.1 kg (181 lb)   SpO2 94%   BMI 25.24 kg/m²     Physical Exam   Constitutional: He is oriented to person, place, and time. No distress.   HENT:   Head: Normocephalic and atraumatic.   Right Ear: External ear normal.   Left Ear: External ear normal.   Eyes: Right eye exhibits no discharge. Left eye exhibits no discharge.   Neck: No JVD present. No thyromegaly present.   Cardiovascular: Normal rate, regular rhythm, normal heart sounds and intact distal pulses. Exam reveals no gallop and no friction rub.   No murmur heard.  Pulmonary/Chest: Breath sounds normal. No respiratory distress.   Abdominal: Bowel sounds are normal. He exhibits no distension. There is no tenderness.   Musculoskeletal:         General: No tenderness or edema.   Neurological: He is alert and oriented to person, place, and time. No cranial nerve deficit.   Skin: Skin is warm and dry. He is not diaphoretic.   Psychiatric: He has a normal mood and affect. His behavior is normal.   Nursing note and vitals reviewed.      Assessment:     1. ACC/AHA stage C systolic heart failure (HCC)     2. NYHA class 1 heart failure with reduced ejection fraction (HCC)     3. Non-ischemic cardiomyopathy (HCC)     4. HTN (hypertension), malignant     5. ESRD (end stage renal disease) (HCC)     6. High risk medication use         Medical Decision Making:  Today's Assessment / Status / Plan:   Today, based on physical examination findings, patient is euvolemic. No JVD, lungs are clear to auscultation, no pitting edema in bilateral lower extremities, no ascites.     Dry weight is 181 lbs. " Lost 20 lbs.    NO change in therapy today. No BB and ACE-I due to low blood pressures and patient prefers not to add any new medications.    No Spironolactone due to ESRD.     No indication for ICD, LVEF of 40%.     Will continue to closely monitor for side effects of patient's high risk medication(s) including liver, renal function and electrolytes.     I will see patient back in our Heart Failure Clinic with lab tests and studies results in 6 months.     I thank you Dr. Cho for referring patient to our Heart Failure Clinic today.

## 2020-01-27 ENCOUNTER — HOSPITAL ENCOUNTER (INPATIENT)
Facility: MEDICAL CENTER | Age: 70
LOS: 4 days | DRG: 640 | End: 2020-01-31
Attending: EMERGENCY MEDICINE | Admitting: INTERNAL MEDICINE
Payer: MEDICARE

## 2020-01-27 ENCOUNTER — APPOINTMENT (OUTPATIENT)
Dept: RADIOLOGY | Facility: MEDICAL CENTER | Age: 70
DRG: 640 | End: 2020-01-27
Attending: EMERGENCY MEDICINE
Payer: MEDICARE

## 2020-01-27 DIAGNOSIS — E87.5 HYPERKALEMIA: ICD-10-CM

## 2020-01-27 DIAGNOSIS — R06.02 SOB (SHORTNESS OF BREATH): ICD-10-CM

## 2020-01-27 DIAGNOSIS — N18.6 ESRD (END STAGE RENAL DISEASE) (HCC): ICD-10-CM

## 2020-01-27 PROBLEM — D72.829 LEUKOCYTOSIS: Status: ACTIVE | Noted: 2020-01-27

## 2020-01-27 PROBLEM — E87.29 HIGH ANION GAP METABOLIC ACIDOSIS: Status: ACTIVE | Noted: 2020-01-27

## 2020-01-27 PROBLEM — Z72.0 TOBACCO ABUSE: Status: ACTIVE | Noted: 2020-01-27

## 2020-01-27 LAB
ALBUMIN SERPL BCP-MCNC: 2.8 G/DL (ref 3.2–4.9)
ALBUMIN/GLOB SERPL: 0.9 G/DL
ALP SERPL-CCNC: 34 U/L (ref 30–99)
ALT SERPL-CCNC: 6 U/L (ref 2–50)
ANION GAP SERPL CALC-SCNC: 30 MMOL/L (ref 0–11.9)
AST SERPL-CCNC: 8 U/L (ref 12–45)
BASOPHILS # BLD AUTO: 0.5 % (ref 0–1.8)
BASOPHILS # BLD: 0.06 K/UL (ref 0–0.12)
BILIRUB SERPL-MCNC: 0.2 MG/DL (ref 0.1–1.5)
BUN SERPL-MCNC: 169 MG/DL (ref 8–22)
CALCIUM SERPL-MCNC: 10 MG/DL (ref 8.4–10.2)
CHLORIDE SERPL-SCNC: 91 MMOL/L (ref 96–112)
CO2 SERPL-SCNC: 18 MMOL/L (ref 20–33)
CREAT SERPL-MCNC: 12.85 MG/DL (ref 0.5–1.4)
EKG IMPRESSION: NORMAL
EOSINOPHIL # BLD AUTO: 0.26 K/UL (ref 0–0.51)
EOSINOPHIL NFR BLD: 2 % (ref 0–6.9)
ERYTHROCYTE [DISTWIDTH] IN BLOOD BY AUTOMATED COUNT: 49.8 FL (ref 35.9–50)
GLOBULIN SER CALC-MCNC: 3.1 G/DL (ref 1.9–3.5)
GLUCOSE BLD-MCNC: 77 MG/DL (ref 65–99)
GLUCOSE SERPL-MCNC: 101 MG/DL (ref 65–99)
HCT VFR BLD AUTO: 22.4 % (ref 42–52)
HGB BLD-MCNC: 7.1 G/DL (ref 14–18)
IMM GRANULOCYTES # BLD AUTO: 0.09 K/UL (ref 0–0.11)
IMM GRANULOCYTES NFR BLD AUTO: 0.7 % (ref 0–0.9)
LYMPHOCYTES # BLD AUTO: 2.67 K/UL (ref 1–4.8)
LYMPHOCYTES NFR BLD: 20.9 % (ref 22–41)
MCH RBC QN AUTO: 29.5 PG (ref 27–33)
MCHC RBC AUTO-ENTMCNC: 31.7 G/DL (ref 33.7–35.3)
MCV RBC AUTO: 92.9 FL (ref 81.4–97.8)
MONOCYTES # BLD AUTO: 0.74 K/UL (ref 0–0.85)
MONOCYTES NFR BLD AUTO: 5.8 % (ref 0–13.4)
NEUTROPHILS # BLD AUTO: 8.95 K/UL (ref 1.82–7.42)
NEUTROPHILS NFR BLD: 70.1 % (ref 44–72)
NRBC # BLD AUTO: 0 K/UL
NRBC BLD-RTO: 0 /100 WBC
PLATELET # BLD AUTO: 393 K/UL (ref 164–446)
PMV BLD AUTO: 9.9 FL (ref 9–12.9)
POTASSIUM SERPL-SCNC: 7.9 MMOL/L (ref 3.6–5.5)
PROT SERPL-MCNC: 5.9 G/DL (ref 6–8.2)
RBC # BLD AUTO: 2.41 M/UL (ref 4.7–6.1)
SODIUM SERPL-SCNC: 139 MMOL/L (ref 135–145)
WBC # BLD AUTO: 12.8 K/UL (ref 4.8–10.8)

## 2020-01-27 PROCEDURE — 82962 GLUCOSE BLOOD TEST: CPT

## 2020-01-27 PROCEDURE — 99407 BEHAV CHNG SMOKING > 10 MIN: CPT | Performed by: INTERNAL MEDICINE

## 2020-01-27 PROCEDURE — 700101 HCHG RX REV CODE 250: Performed by: EMERGENCY MEDICINE

## 2020-01-27 PROCEDURE — 770020 HCHG ROOM/CARE - TELE (206)

## 2020-01-27 PROCEDURE — 80053 COMPREHEN METABOLIC PANEL: CPT

## 2020-01-27 PROCEDURE — 96375 TX/PRO/DX INJ NEW DRUG ADDON: CPT

## 2020-01-27 PROCEDURE — 96374 THER/PROPH/DIAG INJ IV PUSH: CPT

## 2020-01-27 PROCEDURE — 700111 HCHG RX REV CODE 636 W/ 250 OVERRIDE (IP)

## 2020-01-27 PROCEDURE — 700102 HCHG RX REV CODE 250 W/ 637 OVERRIDE(OP)

## 2020-01-27 PROCEDURE — 71046 X-RAY EXAM CHEST 2 VIEWS: CPT

## 2020-01-27 PROCEDURE — 93005 ELECTROCARDIOGRAM TRACING: CPT | Performed by: EMERGENCY MEDICINE

## 2020-01-27 PROCEDURE — 99285 EMERGENCY DEPT VISIT HI MDM: CPT

## 2020-01-27 PROCEDURE — 99223 1ST HOSP IP/OBS HIGH 75: CPT | Mod: AI,25 | Performed by: INTERNAL MEDICINE

## 2020-01-27 PROCEDURE — 85025 COMPLETE CBC W/AUTO DIFF WBC: CPT

## 2020-01-27 RX ORDER — SEVELAMER CARBONATE 800 MG/1
800 TABLET, FILM COATED ORAL
Status: DISCONTINUED | OUTPATIENT
Start: 2020-01-28 | End: 2020-01-28

## 2020-01-27 RX ORDER — ONDANSETRON 2 MG/ML
4 INJECTION INTRAMUSCULAR; INTRAVENOUS EVERY 4 HOURS PRN
Status: DISCONTINUED | OUTPATIENT
Start: 2020-01-27 | End: 2020-01-31 | Stop reason: HOSPADM

## 2020-01-27 RX ORDER — ONDANSETRON 4 MG/1
4 TABLET, ORALLY DISINTEGRATING ORAL EVERY 4 HOURS PRN
Status: DISCONTINUED | OUTPATIENT
Start: 2020-01-27 | End: 2020-01-31 | Stop reason: HOSPADM

## 2020-01-27 RX ORDER — CARVEDILOL 6.25 MG/1
25 TABLET ORAL DAILY
Status: DISCONTINUED | OUTPATIENT
Start: 2020-01-28 | End: 2020-01-28

## 2020-01-27 RX ORDER — CALCIUM GLUCONATE 94 MG/ML
INJECTION, SOLUTION INTRAVENOUS
Status: COMPLETED
Start: 2020-01-27 | End: 2020-01-27

## 2020-01-27 RX ORDER — POLYETHYLENE GLYCOL 3350 17 G/17G
1 POWDER, FOR SOLUTION ORAL
Status: DISCONTINUED | OUTPATIENT
Start: 2020-01-27 | End: 2020-01-31 | Stop reason: HOSPADM

## 2020-01-27 RX ORDER — ENALAPRILAT 1.25 MG/ML
1.25 INJECTION INTRAVENOUS EVERY 6 HOURS PRN
Status: DISCONTINUED | OUTPATIENT
Start: 2020-01-27 | End: 2020-01-27

## 2020-01-27 RX ORDER — IPRATROPIUM BROMIDE AND ALBUTEROL SULFATE 2.5; .5 MG/3ML; MG/3ML
3 SOLUTION RESPIRATORY (INHALATION) ONCE
Status: COMPLETED | OUTPATIENT
Start: 2020-01-27 | End: 2020-01-27

## 2020-01-27 RX ORDER — BISACODYL 10 MG
10 SUPPOSITORY, RECTAL RECTAL
Status: DISCONTINUED | OUTPATIENT
Start: 2020-01-27 | End: 2020-01-31 | Stop reason: HOSPADM

## 2020-01-27 RX ORDER — CLONIDINE HYDROCHLORIDE 0.1 MG/1
0.1 TABLET ORAL EVERY 4 HOURS PRN
Status: DISCONTINUED | OUTPATIENT
Start: 2020-01-27 | End: 2020-01-31 | Stop reason: HOSPADM

## 2020-01-27 RX ORDER — AMOXICILLIN 250 MG
2 CAPSULE ORAL 2 TIMES DAILY
Status: DISCONTINUED | OUTPATIENT
Start: 2020-01-28 | End: 2020-01-31 | Stop reason: HOSPADM

## 2020-01-27 RX ORDER — ACETAMINOPHEN 325 MG/1
650 TABLET ORAL EVERY 6 HOURS PRN
Status: DISCONTINUED | OUTPATIENT
Start: 2020-01-27 | End: 2020-01-31 | Stop reason: HOSPADM

## 2020-01-27 RX ORDER — DEXTROSE MONOHYDRATE 25 G/50ML
50 INJECTION, SOLUTION INTRAVENOUS ONCE
Status: COMPLETED | OUTPATIENT
Start: 2020-01-27 | End: 2020-01-27

## 2020-01-27 RX ORDER — HYDROCODONE BITARTRATE AND ACETAMINOPHEN 7.5; 325 MG/1; MG/1
1 TABLET ORAL EVERY 6 HOURS PRN
Status: DISCONTINUED | OUTPATIENT
Start: 2020-01-27 | End: 2020-01-31 | Stop reason: HOSPADM

## 2020-01-27 RX ADMIN — ALBUTEROL SULFATE 2.5 MG: 2.5 SOLUTION RESPIRATORY (INHALATION) at 23:05

## 2020-01-27 RX ADMIN — IPRATROPIUM BROMIDE AND ALBUTEROL SULFATE 3 ML: .5; 3 SOLUTION RESPIRATORY (INHALATION) at 23:06

## 2020-01-27 RX ADMIN — DEXTROSE MONOHYDRATE 50 ML: 25 INJECTION, SOLUTION INTRAVENOUS at 23:10

## 2020-01-27 RX ADMIN — CALCIUM GLUCONATE 1000 MG: 98 INJECTION, SOLUTION INTRAVENOUS at 23:16

## 2020-01-27 ASSESSMENT — ENCOUNTER SYMPTOMS
DIARRHEA: 0
STRIDOR: 0
DEPRESSION: 0
COUGH: 1
ABDOMINAL PAIN: 0
HEADACHES: 0
DIZZINESS: 0
CONSTIPATION: 0
SHORTNESS OF BREATH: 1
FEVER: 0
WEAKNESS: 0
FALLS: 0
CHILLS: 0
TINGLING: 0
VOMITING: 1
MYALGIAS: 0
LOSS OF CONSCIOUSNESS: 0
SPUTUM PRODUCTION: 0
PALPITATIONS: 0
NAUSEA: 1

## 2020-01-28 PROBLEM — K92.2 GIB (GASTROINTESTINAL BLEEDING): Status: ACTIVE | Noted: 2017-08-27

## 2020-01-28 LAB
ABO GROUP BLD: NORMAL
ALBUMIN SERPL BCP-MCNC: 3.2 G/DL (ref 3.2–4.9)
ALBUMIN/GLOB SERPL: 1.2 G/DL
ALP SERPL-CCNC: 35 U/L (ref 30–99)
ALT SERPL-CCNC: 7 U/L (ref 2–50)
ANION GAP SERPL CALC-SCNC: 16 MMOL/L (ref 0–11.9)
ANION GAP SERPL CALC-SCNC: 27 MMOL/L (ref 0–11.9)
AST SERPL-CCNC: 14 U/L (ref 12–45)
BARCODED ABORH UBTYP: 6200
BARCODED PRD CODE UBPRD: NORMAL
BARCODED UNIT NUM UBUNT: NORMAL
BILIRUB SERPL-MCNC: 0.2 MG/DL (ref 0.1–1.5)
BLD GP AB SCN SERPL QL: NORMAL
BUN SERPL-MCNC: 181 MG/DL (ref 8–22)
BUN SERPL-MCNC: 59 MG/DL (ref 8–22)
CALCIUM SERPL-MCNC: 10.2 MG/DL (ref 8.4–10.2)
CALCIUM SERPL-MCNC: 9.1 MG/DL (ref 8.4–10.2)
CHLORIDE SERPL-SCNC: 89 MMOL/L (ref 96–112)
CHLORIDE SERPL-SCNC: 89 MMOL/L (ref 96–112)
CO2 SERPL-SCNC: 22 MMOL/L (ref 20–33)
CO2 SERPL-SCNC: 30 MMOL/L (ref 20–33)
COMPONENT R 8504R: NORMAL
CREAT SERPL-MCNC: 14.62 MG/DL (ref 0.5–1.4)
CREAT SERPL-MCNC: 5.86 MG/DL (ref 0.5–1.4)
ERYTHROCYTE [DISTWIDTH] IN BLOOD BY AUTOMATED COUNT: 46.2 FL (ref 35.9–50)
ERYTHROCYTE [DISTWIDTH] IN BLOOD BY AUTOMATED COUNT: 48.7 FL (ref 35.9–50)
ERYTHROCYTE [DISTWIDTH] IN BLOOD BY AUTOMATED COUNT: 49 FL (ref 35.9–50)
ERYTHROCYTE [DISTWIDTH] IN BLOOD BY AUTOMATED COUNT: 50.4 FL (ref 35.9–50)
GLOBULIN SER CALC-MCNC: 2.6 G/DL (ref 1.9–3.5)
GLUCOSE BLD-MCNC: 103 MG/DL (ref 65–99)
GLUCOSE BLD-MCNC: 103 MG/DL (ref 65–99)
GLUCOSE BLD-MCNC: 117 MG/DL (ref 65–99)
GLUCOSE BLD-MCNC: 127 MG/DL (ref 65–99)
GLUCOSE BLD-MCNC: 131 MG/DL (ref 65–99)
GLUCOSE BLD-MCNC: 27 MG/DL (ref 65–99)
GLUCOSE BLD-MCNC: 62 MG/DL (ref 65–99)
GLUCOSE BLD-MCNC: 65 MG/DL (ref 65–99)
GLUCOSE BLD-MCNC: 80 MG/DL (ref 65–99)
GLUCOSE BLD-MCNC: 83 MG/DL (ref 65–99)
GLUCOSE BLD-MCNC: 84 MG/DL (ref 65–99)
GLUCOSE BLD-MCNC: 86 MG/DL (ref 65–99)
GLUCOSE SERPL-MCNC: 106 MG/DL (ref 65–99)
GLUCOSE SERPL-MCNC: 124 MG/DL (ref 65–99)
HCT VFR BLD AUTO: 18.5 % (ref 42–52)
HCT VFR BLD AUTO: 18.7 % (ref 42–52)
HCT VFR BLD AUTO: 22.4 % (ref 42–52)
HCT VFR BLD AUTO: 22.7 % (ref 42–52)
HGB BLD-MCNC: 5.9 G/DL (ref 14–18)
HGB BLD-MCNC: 6.1 G/DL (ref 14–18)
HGB BLD-MCNC: 7.4 G/DL (ref 14–18)
HGB BLD-MCNC: 7.6 G/DL (ref 14–18)
MCH RBC QN AUTO: 29.9 PG (ref 27–33)
MCH RBC QN AUTO: 30.1 PG (ref 27–33)
MCH RBC QN AUTO: 30.2 PG (ref 27–33)
MCH RBC QN AUTO: 30.2 PG (ref 27–33)
MCHC RBC AUTO-ENTMCNC: 32.2 G/DL (ref 33.7–35.3)
MCHC RBC AUTO-ENTMCNC: 32.6 G/DL (ref 33.7–35.3)
MCHC RBC AUTO-ENTMCNC: 33 G/DL (ref 33.7–35.3)
MCHC RBC AUTO-ENTMCNC: 33.5 G/DL (ref 33.7–35.3)
MCV RBC AUTO: 89.4 FL (ref 81.4–97.8)
MCV RBC AUTO: 91.4 FL (ref 81.4–97.8)
MCV RBC AUTO: 92.6 FL (ref 81.4–97.8)
MCV RBC AUTO: 93.4 FL (ref 81.4–97.8)
PLATELET # BLD AUTO: 261 K/UL (ref 164–446)
PLATELET # BLD AUTO: 263 K/UL (ref 164–446)
PLATELET # BLD AUTO: 293 K/UL (ref 164–446)
PLATELET # BLD AUTO: 312 K/UL (ref 164–446)
PMV BLD AUTO: 9.2 FL (ref 9–12.9)
PMV BLD AUTO: 9.4 FL (ref 9–12.9)
PMV BLD AUTO: 9.5 FL (ref 9–12.9)
PMV BLD AUTO: 9.6 FL (ref 9–12.9)
POTASSIUM SERPL-SCNC: 4.7 MMOL/L (ref 3.6–5.5)
POTASSIUM SERPL-SCNC: 7.6 MMOL/L (ref 3.6–5.5)
PRODUCT TYPE UPROD: NORMAL
PROT SERPL-MCNC: 5.8 G/DL (ref 6–8.2)
RBC # BLD AUTO: 1.96 M/UL (ref 4.7–6.1)
RBC # BLD AUTO: 2.02 M/UL (ref 4.7–6.1)
RBC # BLD AUTO: 2.45 M/UL (ref 4.7–6.1)
RBC # BLD AUTO: 2.54 M/UL (ref 4.7–6.1)
RH BLD: NORMAL
SODIUM SERPL-SCNC: 135 MMOL/L (ref 135–145)
SODIUM SERPL-SCNC: 138 MMOL/L (ref 135–145)
UNIT STATUS USTAT: NORMAL
WBC # BLD AUTO: 10.7 K/UL (ref 4.8–10.8)
WBC # BLD AUTO: 11 K/UL (ref 4.8–10.8)
WBC # BLD AUTO: 11.2 K/UL (ref 4.8–10.8)
WBC # BLD AUTO: 9.9 K/UL (ref 4.8–10.8)

## 2020-01-28 PROCEDURE — 86923 COMPATIBILITY TEST ELECTRIC: CPT

## 2020-01-28 PROCEDURE — 700102 HCHG RX REV CODE 250 W/ 637 OVERRIDE(OP)

## 2020-01-28 PROCEDURE — 700111 HCHG RX REV CODE 636 W/ 250 OVERRIDE (IP): Performed by: INTERNAL MEDICINE

## 2020-01-28 PROCEDURE — 700102 HCHG RX REV CODE 250 W/ 637 OVERRIDE(OP): Performed by: INTERNAL MEDICINE

## 2020-01-28 PROCEDURE — A9270 NON-COVERED ITEM OR SERVICE: HCPCS

## 2020-01-28 PROCEDURE — 80048 BASIC METABOLIC PNL TOTAL CA: CPT

## 2020-01-28 PROCEDURE — 99406 BEHAV CHNG SMOKING 3-10 MIN: CPT

## 2020-01-28 PROCEDURE — 82962 GLUCOSE BLOOD TEST: CPT | Mod: 91

## 2020-01-28 PROCEDURE — 99291 CRITICAL CARE FIRST HOUR: CPT | Performed by: HOSPITALIST

## 2020-01-28 PROCEDURE — 5A1D70Z PERFORMANCE OF URINARY FILTRATION, INTERMITTENT, LESS THAN 6 HOURS PER DAY: ICD-10-PCS | Performed by: INTERNAL MEDICINE

## 2020-01-28 PROCEDURE — 85027 COMPLETE CBC AUTOMATED: CPT

## 2020-01-28 PROCEDURE — 86850 RBC ANTIBODY SCREEN: CPT

## 2020-01-28 PROCEDURE — 700111 HCHG RX REV CODE 636 W/ 250 OVERRIDE (IP): Performed by: HOSPITALIST

## 2020-01-28 PROCEDURE — P9016 RBC LEUKOCYTES REDUCED: HCPCS

## 2020-01-28 PROCEDURE — 80053 COMPREHEN METABOLIC PANEL: CPT

## 2020-01-28 PROCEDURE — A9270 NON-COVERED ITEM OR SERVICE: HCPCS | Performed by: INTERNAL MEDICINE

## 2020-01-28 PROCEDURE — 700105 HCHG RX REV CODE 258: Performed by: INTERNAL MEDICINE

## 2020-01-28 PROCEDURE — 36430 TRANSFUSION BLD/BLD COMPNT: CPT

## 2020-01-28 PROCEDURE — 770020 HCHG ROOM/CARE - TELE (206)

## 2020-01-28 PROCEDURE — C9113 INJ PANTOPRAZOLE SODIUM, VIA: HCPCS | Performed by: HOSPITALIST

## 2020-01-28 PROCEDURE — 94760 N-INVAS EAR/PLS OXIMETRY 1: CPT

## 2020-01-28 PROCEDURE — 700105 HCHG RX REV CODE 258: Performed by: HOSPITALIST

## 2020-01-28 PROCEDURE — 90935 HEMODIALYSIS ONE EVALUATION: CPT

## 2020-01-28 PROCEDURE — 86901 BLOOD TYPING SEROLOGIC RH(D): CPT

## 2020-01-28 PROCEDURE — 86900 BLOOD TYPING SEROLOGIC ABO: CPT

## 2020-01-28 RX ORDER — AMOXICILLIN 875 MG/1
875 TABLET, COATED ORAL 2 TIMES DAILY
COMMUNITY
Start: 2020-01-23 | End: 2020-05-28

## 2020-01-28 RX ORDER — PANTOPRAZOLE SODIUM 40 MG/10ML
40 INJECTION, POWDER, LYOPHILIZED, FOR SOLUTION INTRAVENOUS DAILY
Status: DISCONTINUED | OUTPATIENT
Start: 2020-01-28 | End: 2020-01-29

## 2020-01-28 RX ORDER — IPRATROPIUM BROMIDE AND ALBUTEROL SULFATE 2.5; .5 MG/3ML; MG/3ML
3 SOLUTION RESPIRATORY (INHALATION)
Status: ACTIVE | OUTPATIENT
Start: 2020-01-28 | End: 2020-01-28

## 2020-01-28 RX ORDER — ACETAMINOPHEN 500 MG
500-1000 TABLET ORAL EVERY 6 HOURS PRN
COMMUNITY
End: 2020-06-15

## 2020-01-28 RX ORDER — ALBUTEROL SULFATE 90 UG/1
2 AEROSOL, METERED RESPIRATORY (INHALATION) EVERY 6 HOURS PRN
Status: ON HOLD | COMMUNITY
End: 2022-10-15 | Stop reason: SDUPTHER

## 2020-01-28 RX ORDER — HYDROCODONE BITARTRATE AND ACETAMINOPHEN 7.5; 325 MG/1; MG/1
1 TABLET ORAL EVERY 6 HOURS PRN
COMMUNITY
End: 2020-10-20

## 2020-01-28 RX ORDER — DIPHENHYDRAMINE HCL 25 MG
25 TABLET ORAL EVERY 6 HOURS PRN
COMMUNITY
End: 2020-06-15

## 2020-01-28 RX ORDER — CALCIUM CHLORIDE 100 MG/ML
1 INJECTION INTRAVENOUS; INTRAVENTRICULAR ONCE
Status: DISCONTINUED | OUTPATIENT
Start: 2020-01-28 | End: 2020-01-28

## 2020-01-28 RX ADMIN — ASPIRIN 81 MG: 81 TABLET, COATED ORAL at 05:57

## 2020-01-28 RX ADMIN — CARVEDILOL 25 MG: 6.25 TABLET, FILM COATED ORAL at 05:58

## 2020-01-28 RX ADMIN — DEXTROSE MONOHYDRATE 250 ML: 100 INJECTION, SOLUTION INTRAVENOUS at 14:20

## 2020-01-28 RX ADMIN — Medication 4 G: at 00:43

## 2020-01-28 RX ADMIN — SEVELAMER CARBONATE 800 MG: 800 TABLET, FILM COATED ORAL at 01:10

## 2020-01-28 RX ADMIN — Medication 6 G: at 14:14

## 2020-01-28 RX ADMIN — SODIUM BICARBONATE 100 MEQ: 84 INJECTION, SOLUTION INTRAVENOUS at 08:13

## 2020-01-28 RX ADMIN — Medication 12 G: at 00:42

## 2020-01-28 RX ADMIN — CALCIUM CHLORIDE 1 G: 100 INJECTION, SOLUTION INTRAVENOUS at 08:18

## 2020-01-28 RX ADMIN — PANTOPRAZOLE SODIUM 40 MG: 40 INJECTION, POWDER, LYOPHILIZED, FOR SOLUTION INTRAVENOUS at 09:05

## 2020-01-28 RX ADMIN — SEVELAMER CARBONATE 800 MG: 800 TABLET, FILM COATED ORAL at 06:24

## 2020-01-28 ASSESSMENT — COPD QUESTIONNAIRES
HAVE YOU SMOKED AT LEAST 100 CIGARETTES IN YOUR ENTIRE LIFE: YES
COPD SCREENING SCORE: 6
DURING THE PAST 4 WEEKS HOW MUCH DID YOU FEEL SHORT OF BREATH: SOME OF THE TIME
DO YOU EVER COUGH UP ANY MUCUS OR PHLEGM?: NO/ONLY WITH OCCASIONAL COLDS OR INFECTIONS

## 2020-01-28 ASSESSMENT — ENCOUNTER SYMPTOMS
SHORTNESS OF BREATH: 1
INSOMNIA: 0
BLURRED VISION: 0
VOMITING: 0
PALPITATIONS: 0
NAUSEA: 0
NECK PAIN: 0
DEPRESSION: 0
SORE THROAT: 0
TINGLING: 0
CHILLS: 0
ABDOMINAL PAIN: 0
DIZZINESS: 0
HEADACHES: 0
FEVER: 0
COUGH: 0
EYE PAIN: 0
BACK PAIN: 0

## 2020-01-28 ASSESSMENT — COGNITIVE AND FUNCTIONAL STATUS - GENERAL
SUGGESTED CMS G CODE MODIFIER DAILY ACTIVITY: CH
DAILY ACTIVITIY SCORE: 24
SUGGESTED CMS G CODE MODIFIER MOBILITY: CH
MOBILITY SCORE: 24

## 2020-01-28 ASSESSMENT — PATIENT HEALTH QUESTIONNAIRE - PHQ9
2. FEELING DOWN, DEPRESSED, IRRITABLE, OR HOPELESS: NOT AT ALL
SUM OF ALL RESPONSES TO PHQ9 QUESTIONS 1 AND 2: 0
1. LITTLE INTEREST OR PLEASURE IN DOING THINGS: NOT AT ALL

## 2020-01-28 ASSESSMENT — LIFESTYLE VARIABLES: EVER_SMOKED: YES

## 2020-01-28 NOTE — FLOWSHEET NOTE
01/28/20 0215   Type of Assessment   Assessment Yes   Patient History   Pulmonary Diagnosis COPD Hx   Surgical Procedures none   Home O2 Yes   Oxygen liter flow 2   Oxygen at night only No   Nocturnal CPAP No   Home Treatments/Frequency Yes   MDI 1/Frequency Albuterol PRN   COPD Risk Screening   Do you have a history of COPD? Yes   Do you have a Pulmonologist? No   COPD Population Screener   During the past 4 weeks, how much did you feel short of breath? 1   Do you ever cough up any mucus or phlegm? 0   In the past 12 months, you do less than you used to because of your breathing problems 1   Have you smoked at least 100 cigarettes in your entire life? 2   How old are you? 2   COPD Screening Score 6   Smoking History   Have you ever smoked Yes   Have you smoked in the last 12 months Yes   Have you quit smoking No   Smoking Cessation Offered Patient Refused   Level Of Consciousness   Level of Consciousness Alert   Respiratory WDL   Respiratory (WDL) X   Chest Exam   Respiration 18   Breath Sounds   RUL Breath Sounds Clear   RML Breath Sounds Clear   RLL Breath Sounds Diminished   AMALIA Breath Sounds Clear   LLL Breath Sounds Diminished   Oximetry   #Pulse Oximetry (Single Determination) Yes   Oxygen   Pulse Oximetry 96 %   O2 Daily Delivery Respiratory  Room Air with O2 Available   Protocol Pathways   Protocol Pathways Yes   Bronchodilator Protocol   Med Order With RCP No   Is this an exacerbation of COPD/Asthma? Patient on Home Regimen   Bronchodilator Indications Strong Subjective / Objective Improvement   Breath Sounds Criteria 1    Benefit Criteria 0    Pulse Criteria 0    Respiratory Rate Criteria 1    S.O.B. Criteria 1    Criteria Total 3   Point Values 0-3 - PRN   Bronchodilator Goals/Outcome Patient at Stable Baseline

## 2020-01-28 NOTE — PROGRESS NOTES
Pt awake. A&Ox4. Assisted him up to the bathroom. Had a black  watery BM. Assisted him back to bed. Pt states he has a medication for diarrhea in his belongings bag and wants to take one pill, he gets very upset and verbally offensive when informed that he cannot have home meds in the room. Meds sent to pharmacy. Pt refused to finish the assessment and do the admission profile.

## 2020-01-28 NOTE — PROGRESS NOTES
Telemetry summary:  Rhythm: SR     HR Range: 63-82 Down to 28 (Dr Soni aware)       ME: 0.16   QRS 0.10  QT 0.42     Ectopies: Rare PVCs

## 2020-01-28 NOTE — PROGRESS NOTES
Med rec updated and complete  Allergies reviewed  Pt reports that he is not sure when he took his medications last, thinks he took his prescription last night (1/27/2020), but is not sure what time he took them, he did not look at the clock.  Pt is not sure when he took his over the counter medications.  Pts medications were in the after hours lock up, look at all prescription medications, and gave medications back to pharmacist.

## 2020-01-28 NOTE — PROGRESS NOTES
Telemetry summary:  Rhythm: SR     HR Range: 70s to 90s Down to 40      Measurements from strip printed at 00:24:21   NM: 0.16   QRS 0.08   QT 0.32     Ectopies: Rare blocked PAC.

## 2020-01-28 NOTE — PROGRESS NOTES
[facility] Nephrology Progress Note    Patient seen and examined on hemodialysis  VSS--see dialysis treatment sheet for full details.  Labs reviewed

## 2020-01-28 NOTE — PROGRESS NOTES
Ale from Lab called with critical result of hgb at 0915 Critical lab result read back to ale.   Dr. Soni notified of critical lab result at 0930.  Critical lab result read back by Dr. Soni.    Ale from lab wanted verification for blood to still be given. This was clarified with Dr Soni. Blood to be given with dialysis. Dialysis RN just arrived to room and updated on order. Ale will call when blood is ready.

## 2020-01-28 NOTE — PROGRESS NOTES
Ale from Lab called with critical result of potassium, BUN, CR, Hbg at 0725. Critical lab result read back to Ale.   Dr. Soni notified of critical lab result at 0745.  Critical lab result read back by Dr. Soni. New orders received. Blood to be transfused with dialysis.

## 2020-01-28 NOTE — ED NOTES
Attempted to start IV on patient,  Patient  will not cooperate.  Patient will not lay in bed, will not give his arm for the IV.  Patient asked to lay down so IV can be started, flopping around the bed.

## 2020-01-28 NOTE — PROGRESS NOTES
Patient tolerated 3.5 hr and net UF 1415 ml. He received 300 ml PRBC . No adverse reaction. O2sat greater than 90 during dialysis. O2/Nc 2.5 Li/min. UFR was adjusted to Bp to keep > 90. DURAN AVF + B/T. See flow sheet for details. Report given to Primary RN. Patient is alert.

## 2020-01-28 NOTE — ASSESSMENT & PLAN NOTE
-Patient is generally on Coreg and lisinopril but then is on midodrine as well?  Off midodrine  -Holding lisinopril due to hyperkalemia  -continue PRN clonidine  Coreg was only QD and BP is soft. Hold for now.

## 2020-01-28 NOTE — CARE PLAN
Problem: Safety  Goal: Will remain free from injury  1/28/2020 0311 by Gail Ortiz R.N.  Outcome: PROGRESSING AS EXPECTED  1/28/2020 0311 by Gail Ortiz R.N.  Outcome: PROGRESSING AS EXPECTED  Check on Pt hourly. Pt encouraged to call for assistance as needed. Bed in low position, upper side rails up, anti slippery socks on, call light within reach, bed alarm on.         Problem: Bowel/Gastric:  Goal: Normal bowel function is maintained or improved  Outcome: PROGRESSING AS EXPECTED  Assess and chart frequency and amount of diarrhea, stool characteristics and accompanying symptoms. Instruct Pt on proper hand washing technique. Monitor for s/s of dehydration and fluid and electrolyte imbalance. Monitor daily weight and I&Os. Administer antidiarrheics as needed.       Problem: Knowledge Deficit  Goal: Knowledge of disease process/condition, treatment plan, diagnostic tests, and medications will improve  1/28/2020 0311 by Gail Ortiz R.N.  Outcome: PROGRESSING AS EXPECTED  1/28/2020 0311 by Gail Ortiz R.N.  Outcome: PROGRESSING AS EXPECTED  Discuss POC with Pt. Assess Pt's knowledge of disease process, treatment plan, diagnostic test, labs, and medications; and explain and give information as needed.

## 2020-01-28 NOTE — ASSESSMENT & PLAN NOTE
-With a missed hemodialysis yesterday.   Now with a critical potassium    -Now with profound hyperkalemia with new ekg changes.   Emergent dialysis requested today.

## 2020-01-28 NOTE — PROGRESS NOTES
[facility] Medicine Daily Progress Note    Date of Service  1/28/2020    Chief Complaint  69 y.o. male admitted 1/27/2020 with dyspnea.     Hospital Course    He has a known history of ESRD on dialysis. He had missed dialysis prior to admission for diarrhea. He was found to have severe hyperkalemia. He was admitted and treated with medical therapy. His admission was later complicated by evidence for GI bleeding. Both nephrology and GI were consulted. He was emergently dialyzed.        Interval Problem Update  I was contacted this am by the RN after his repeat labs resulted. His potassium is no better and his hgb dropped to 5.9. His blood sugar dropped over night to the 20s as well. He apparently is having dark stools with red streaking per the RN. I transfused him (to be given with dialysis), gave emergent calcium, and bicarbonate. I started a PPI and ordered stool occuIt blood and serial hemoglobins. I immediately contacted nephrology for emergent dialysis.     Consultants/Specialty  nephrology- I discussed with them today.   I called and discussed him with GI      Code Status  full    Disposition  Guarded    This patient is critically ill with a very high risk for mobidity and death. The last time this happened he was intubated in the ICU. Low threshold to transfer. However dialysis will be happening immediately which should take care of the immediate issue of potassium. If his bleeding does not improve I will contact GI immediately but he is too unstable for intervention at this time. Critical care time from 733-812am today. No overlap.     Review of Systems  Review of Systems   Constitutional: Positive for malaise/fatigue. Negative for chills and fever.   HENT: Negative for sore throat.    Eyes: Negative for blurred vision and pain.   Respiratory: Positive for shortness of breath. Negative for cough.    Cardiovascular: Negative for chest pain and palpitations.   Gastrointestinal: Positive for melena. Negative for  abdominal pain, nausea and vomiting.   Genitourinary: Negative for dysuria and urgency.   Musculoskeletal: Negative for back pain and neck pain.   Skin: Negative for itching and rash.   Neurological: Negative for dizziness, tingling and headaches.   Psychiatric/Behavioral: Negative for depression. The patient does not have insomnia.    All other systems reviewed and are negative.       Physical Exam  Temp:  [35.8 °C (96.5 °F)-36.7 °C (98.1 °F)] 36.4 °C (97.6 °F)  Pulse:  [67-98] 85  Resp:  [17-20] 18  BP: ()/(43-75) 125/64  SpO2:  [81 %-99 %] 99 %    Physical Exam  Vitals signs and nursing note reviewed.   Constitutional:       General: He is not in acute distress.     Appearance: He is well-developed. He is not diaphoretic.      Comments: Patient seen and examined at the bedside. Plan discussed at bedside with the RN.    HENT:      Right Ear: External ear normal.      Left Ear: External ear normal.      Nose: Nose normal.   Eyes:      General: No scleral icterus.        Right eye: No discharge.         Left eye: No discharge.   Neck:      Vascular: No JVD.      Trachea: No tracheal deviation.   Cardiovascular:      Rate and Rhythm: Normal rate.      Heart sounds: Normal heart sounds. No murmur.   Pulmonary:      Effort: Pulmonary effort is normal. No respiratory distress.      Breath sounds: Normal breath sounds. No wheezing or rales.   Abdominal:      General: Bowel sounds are normal. There is no distension.      Palpations: Abdomen is soft.      Tenderness: There is no tenderness. There is no guarding.   Musculoskeletal:         General: No tenderness.      Comments: Left arm fistula   Skin:     General: Skin is warm and dry.      Findings: No erythema.   Neurological:      Mental Status: He is alert and oriented to person, place, and time.   Psychiatric:         Behavior: Behavior normal.         Fluids  No intake or output data in the 24 hours ending 01/28/20 0754    Laboratory  Recent Labs      01/27/20 2105 01/28/20  0621   WBC 12.8* 9.9   RBC 2.41* 1.96*   HEMOGLOBIN 7.1* 5.9*   HEMATOCRIT 22.4* 18.5*   MCV 92.9 93.4   MCH 29.5 30.1   MCHC 31.7* 32.2*   RDW 49.8 50.4*   PLATELETCT 393 261   MPV 9.9 9.5     Recent Labs     01/27/20 2105 01/28/20  0621   SODIUM 139 138   POTASSIUM 7.9* 7.6*   CHLORIDE 91* 89*   CO2 18* 22   GLUCOSE 101* 124*   * 181*   CREATININE 12.85* 14.62*   CALCIUM 10.0 10.2                   Imaging  DX-CHEST-2 VIEWS   Final Result      No evidence of acute cardiopulmonary process.           Assessment/Plan  Hyperkalemia- (present on admission)  Assessment & Plan  -Profound elevation yet no peaked T waves  Not improved.; given emergent bicarb and calcium.   I contacted nephrology for emergent dialysis  Trend on tele      ESRD (end stage renal disease) on dialysis (HCC)- (present on admission)  Assessment & Plan  -With a missed hemodialysis yesterday.   Now with a critical potassium    -Now with profound hyperkalemia with new ekg changes.   Emergent dialysis requested today.       High anion gap metabolic acidosis- (present on admission)  Assessment & Plan  suspect renal related.   Bicarb for now and proceed with dialysis.     Leukocytosis- (present on admission)  Assessment & Plan  -No additional sign of infection  -Repeat CBC in the morning    Essential hypertension- (present on admission)  Assessment & Plan  -Patient is generally on Coreg and lisinopril but then is on midodrine as well?  Stop midodrine  -Hold lisinopril due to hyperkalemia  -continue PRN clonidine  Coreg was only QD and BP is soft. Hold for now.     Non-ischemic cardiomyopathy (HCC)- (present on admission)  Assessment & Plan  Needing to hold medical therapy given other medical issues and BP issues.     GIB (gastrointestinal bleeding)- (present on admission)  Assessment & Plan  With 2 weeks of reported melena and witnessed here. Add ppi, trend serial hgb. Transfuse. Contacted GI for a consultation.      Tobacco abuse- (present on admission)  Assessment & Plan  -Tobacco cessation counseling and education provided     COPD (chronic obstructive pulmonary disease) (HCC)- (present on admission)  Assessment & Plan  -No acute exacerbation  Rt protocol prn       VTE prophylaxis: scd

## 2020-01-28 NOTE — DISCHARGE PLANNING
Outpatient Dialysis Note    Confirmed patient is active at:    [facility]  [address]  [place] [address]       Schedule: Monday, Wednesday, Friday  Time: 6:15 am    Spoke with Suresh at facility who confirmed.    Forwarded records for review.      Tara Booth- Dialysis Coordinator  [facility] # [phone]

## 2020-01-28 NOTE — PROGRESS NOTES
04:07 FSBS 84 Juice given PO as protocol, pt refused to take glucose tables.    04:37 FSBS 103 Protein shake given.

## 2020-01-28 NOTE — PROGRESS NOTES
At 00:10 am Admitted Pt from ER via gurney. Pt awake, A&O 4.  Transferred to bed and assessment performed. Oriented Pt to room and discussed POC. Initiated safety measures.     At 00:30 went to check on Pt, he states he is very thirsty but he does not look good, he is a little lethargic, he is shaking and has cool skin. Accu check done and found BS of 27, called for assistance, 4 tables of Glucose given as per Hypoglycemia protocol. Dr Ruiz notified.    00:45 FSBS 54 More juice given pt states having a little nausea but refused medication for it.    01:00 FSBS 80 Pt states he is feeling better, additional juice given    01:15 FSBS 127 Will continue monitoring.

## 2020-01-28 NOTE — PROGRESS NOTES
Patient's HR has dropped as low  As 28 for 5 seconds. Patient asymptomatic. Dr Soni notified and ok'd to continue monitoring/call if patient becomes symptomatic.

## 2020-01-28 NOTE — ACP (ADVANCE CARE PLANNING)
Advance Care Planning Note    Discussion date:  1/28/2020  Discussion participants:  patient    The patient wishes to discuss Advanced Care Planning today and the following is a brief summary of our discussion.    Patient has capacity to make their own medical decisions.  Health Care Agent/Surrogate Decision Maker not documented in chart.    Documents of Advance Directives:  POLST    Communication of relevant diagnoses: yes    Communication of prognosis: fair    Communication of treatment goals/options: change of code status    Treatment decisions:  yes    Code status:  do not attempt resuscitation (DNAR)    The patient would like:   Life-sustaining antibiotics: antibiotics by IV as necessary   Artificially administered fluids: long-term IV fluids   Artificially administered nutrition: long-term feeding tube   Other limitations of medical interventions: none     Time statement:  I discussed advance care planning with the patient for at least 16 minutes, including diagnosis, prognosis, plan of care, risks and benefits of any therapies that could be offered, as well as alternatives including palliation and hospice, as appropriate, exclusive of evaluation and management or other separately billable procedures.    Capo Soni M.D.

## 2020-01-28 NOTE — ED PROVIDER NOTES
ED Provider Note    Chief Complaint:   Shortness of breath    HPI:  Bridger Savage is a 69 y.o. male who presents with chief complaint of shortness of breath.  He has a past medical history significant for COPD, as well as congestive heart failure.  He is also end-stage renal disease, dialysis dependent.  He is typically dialyzed Monday, Wednesday, Friday.  He was not dialyzed today, Monday, because he was not feeling well enough to go.  He did go to dialysis on Friday.  He reports that he called and rescheduled his dialysis for tomorrow, and does have an appointment for dialysis tomorrow morning.  Earlier this evening, 46 hours prior to arrival, he developed some progressively worsening shortness of breath.  This is exacerbated by exertion, alleviated by rest.  He reports that he has been using his home nebulizer, has also been using all of his COPD medications.  He has not recently been on any steroid treatment for COPD.  He denies any associated chest pain, denies back pain.  He has not noticed any lower extremity swelling or edema.  He has not had any nausea or vomiting, no fevers.  On arrival to the emergency department he is able to rest, and states that his symptoms are feeling significantly improved now that he is sleeping.    He does have oxygen at home, he usually requires 2 to 4 L via nasal cannula.    Review of Systems:  See HPI for pertinent positives and negatives. All other systems negative.    Past Medical History:   has a past medical history of Asthma, Breath shortness, Chronic obstructive pulmonary disease (HCC), Cold, Congestive heart failure (HCC), Dental disorder, Dialysis patient (HCC), Emphysema of lung (HCC), Renal disorder, Snoring, and Urinary incontinence.    Social History:  Social History     Tobacco Use   • Smoking status: Current Some Day Smoker     Packs/day: 0.50     Types: Cigarettes   • Smokeless tobacco: Never Used   Substance and Sexual Activity   • Alcohol use: No   • Drug  use: No   • Sexual activity: Not on file       Surgical History:   has a past surgical history that includes gastroscopy-endo (N/A, 8/28/2017); gastroscopy (2/1/2018); colonoscopy (2/1/2018); and other.    Current Medications:  Home Medications    **Home medications have not yet been reviewed for this encounter**         Allergies:  Allergies   Allergen Reactions   • Chloraprep One Step Itching       Physical Exam:  Vital Signs: /66   Pulse 95   Temp 35.8 °C (96.5 °F) (Temporal)   Resp 20   Ht 1.829 m (6')   Wt 81.5 kg (179 lb 10.8 oz)   SpO2 (!) 86%   BMI 24.37 kg/m²   Constitutional: Alert, no acute distress  HENT: Moist mucus membranes, normal posterior pharynx, no intraoral lesions  Eyes: Pupils equal and reactive, normal conjunctiva  Neck: Supple, normal range of motion, no stridor  Cardiovascular: Extremities are warm and well perfused, no murmur appreciated, normal cardiac auscultation  Pulmonary: Mild, scattered expiratory wheezing on pulmonary auscultation, no increased work of breathing, room her oxygen saturation is 98% on 2 L nasal cannula, reports that home oxygen requirement is anywhere up to 4 L.  Abdomen: Soft, non-distended, non-tender to palpation, no peritoneal signs  Skin: Warm, dry, no rashes or lesions  Musculoskeletal: Normal range of motion in all extremities, no swelling or deformity noted, no lower extremity edema  Neurologic: Alert, oriented, normal speech, normal motor function  Psychiatric: Normal and appropriate mood and affect    Medical records reviewed for continuity of care.  Patient was seen in this emergency department 1/23/2024 left facial pain and numbness after an oral surgical procedure on his gums.  No difficulty breathing or swallowing noted at that visit.  Thought to be residual pain due to recent dental procedure.  Recommended to take nonsteroidal anti-inflammatories in addition to his opiate pain medications.  Discharged home in stable condition.  Cardiology  clinic note reviewed from 1/24/2020.  Patient is note of history of chronic heart failure.  Left ventricular systolic function is 40% on most recent echo.  Noted to have a history of end-stage renal disease on dialysis.  Also noted a history of nonischemic cardiomyopathy, and hypertension.    EKG: Rate 90, normal sinus rhythm, no ST elevation or depression, no T wave inversions, no peak T waves, normal intervals    Labs:  Labs Reviewed   CBC WITH DIFFERENTIAL - Abnormal; Notable for the following components:       Result Value    WBC 12.8 (*)     RBC 2.41 (*)     Hemoglobin 7.1 (*)     Hematocrit 22.4 (*)     MCHC 31.7 (*)     Lymphocytes 20.90 (*)     Neutrophils (Absolute) 8.95 (*)     All other components within normal limits   COMP METABOLIC PANEL - Abnormal; Notable for the following components:    Potassium 7.9 (*)     Chloride 91 (*)     Co2 18 (*)     Anion Gap 30.0 (*)     Glucose 101 (*)     Bun 169 (*)     Creatinine 12.85 (*)     AST(SGOT) 8 (*)     Albumin 2.8 (*)     Total Protein 5.9 (*)     All other components within normal limits   ESTIMATED GFR - Abnormal; Notable for the following components:    GFR If  5 (*)     GFR If Non  4 (*)     All other components within normal limits   CBC WITHOUT DIFFERENTIAL   CBC WITHOUT DIFFERENTIAL   BASIC METABOLIC PANEL   LACTIC ACID   ACCU-CHEK GLUCOSE       Radiology:  DX-CHEST-2 VIEWS   Final Result      No evidence of acute cardiopulmonary process.         Differential diagnosis:  COPD exacerbation, fluid overload, electrolyte abnormality    MDM:  Patient presents with chief complaint of shortness of breath, though he states his symptoms have resolved on arrival to the emergency department.  He is currently on 2 L nasal cannula, with oxygen saturations in the high 90s.  He did have some mild wheezing on physical exam, this was treated with albuterol and duo nebs with resolution of wheezing.  He had no further shortness of  breath.  Chest x-ray was negative for acute process, no pulmonary edema noted.    On laboratory evaluation his potassium was 7.9, this is likely due to missed dialysis today.  He did not have any significant EKG changes.  Additionally noted is a hemoglobin is 7.1 which is low for him.  This was repeated for verification, repeat BMP is pending at this time    He was treated with insulin, albuterol, as well as calcium chloride given significantly elevated potassium.  He remained stable in the emergency department with no evidence of cardiac arrhythmia.  Plan at this time is for admission to hospitalist service for continued telemetry monitoring until he can be dialyzed.    Disposition:  Admit to hospitalist in guarded condition    Final Impression:  1. SOB (shortness of breath)    2. Hyperkalemia        Electronically signed by: Shawna Copeland MD, 1/28/2020 12:09 AM

## 2020-01-28 NOTE — CONSULTS
[facility] Nephrology Consult Note    Patient seen and examined, please see my dictated consult note for full details.   69yoM with PMH significant for ESRD on HD MWF via L arm AVF, HTN, anemia secondary to CKD, COPD, CKD Bone Mineral Disorder, Cardiomyopathy with EF 35% by echo 10/19 admitted with complaints of SOB after missing outpt HD and found to have significant hyperkalemia with K 7.9 and now with concern for GI bleed as pt is having melena.  Assessment:  1. ESRD--HD MWF as outpt via L arm AVF, last outpt HD 1/24/20  2. Hyperkalemia-missed HD  3. Anemia--secondary to CKD but also concern for GI bleed due to melena  4. GI Bleed--black tarry stools and Hgb dropped since admission  5. Leukocytosis  6. CKD Bone Mineral Disorder/Renal Osteodystrophy  7. HTN--pt on midodrine TID as well as lisinopril and carvedilol  8. Secondary Hyperparathyroidism--on sensipar, unclear if compliant  9. COPD  10. Cardiomyopathy--pt with h/o CHF, EF 35% by echo 10/19, currently does not appear volume overloaded  11. Severe Protein-Calorie Malnutrition    Plan:  --Urgent HD this am on low K bath  --HD again tomorrow  --Receiving PRBC transfusion with HD   --Serial CBC and transfuse PRN to keep Hgb above 7  --Epogen with HD  --GI to be consulted  --Hold sensipar for now  --Hold BP meds  --Dose adjust all meds for decreased GFR  --Culture if spikes a fever  --No dietary protein restrictions    Report Confirmation #[id number]

## 2020-01-28 NOTE — H&P
Hospital Medicine History & Physical Note    Date of Service  1/27/2020    Primary Care Physician  Jasmine Shields M.D.    Consultants  None however he will need nephrology    Code Status  Full    Chief Complaint  Shortness of breath after missing hemodialysis    History of Presenting Illness  69 y.o. male who presented 1/27/2020 with dyspnea.  Patient is a hemodialysis patient, gets dialysis Monday, Wednesday and Friday.  States he had his usual dialysis on Friday but today he developed diarrhea and was unable to go to hemodialysis.  He states he became short of breath so he presented to the emergency department.  Upon arrival here, patient was noted to have significant hyperkalemia.  He denies any chest pain or palpitations.  He states he has had a nonproductive cough but the cough sometimes causes vomiting.  He states he did have surgery on his gums 2 weeks ago and has been hard to eat since then.  Of note, patient is a poor historian, it is difficult to get information from him.  I did discuss the case including labs and imaging with the ER physician.    Review of Systems  Review of Systems   Constitutional: Negative for chills, fever and malaise/fatigue.   HENT: Negative for congestion.    Respiratory: Positive for cough and shortness of breath. Negative for sputum production and stridor.    Cardiovascular: Negative for chest pain, palpitations and leg swelling.   Gastrointestinal: Positive for nausea and vomiting. Negative for abdominal pain, constipation and diarrhea.   Genitourinary: Negative for dysuria and urgency.   Musculoskeletal: Negative for falls and myalgias.   Neurological: Negative for dizziness, tingling, loss of consciousness, weakness and headaches.   Psychiatric/Behavioral: Negative for depression and suicidal ideas.   All other systems reviewed and are negative.      Past Medical History   has a past medical history of Asthma, Breath shortness, Chronic obstructive pulmonary disease (HCC),  Cold, Congestive heart failure (HCC), Dental disorder, Dialysis patient (HCC), Emphysema of lung (HCC), Renal disorder, Snoring, and Urinary incontinence.    Surgical History   has a past surgical history that includes gastroscopy-endo (N/A, 8/28/2017); gastroscopy (2/1/2018); colonoscopy (2/1/2018); and other.     Family History  family history includes Asthma in his father; Diabetes in his brother and mother; Heart Disease in his mother; Kidney Disease in his mother; No Known Problems in his maternal grandfather, maternal grandmother, paternal grandfather, and paternal grandmother.     Social History   reports that he has been smoking cigarettes. He has been smoking about 0.50 packs per day. He has never used smokeless tobacco. He reports that he does not drink alcohol or use drugs.    Allergies  Allergies   Allergen Reactions   • Chloraprep One Step Itching       Medications  Prior to Admission Medications   Prescriptions Last Dose Informant Patient Reported? Taking?   ANORO ELLIPTA 62.5-25 MCG/INH AEROSOL POWDER, BREATH ACTIVATED inhaler  Patient's Home Pharmacy Yes No   Sig: Inhale 1 Puff by mouth every day.   Cinacalcet HCl (SENSIPAR) 60 MG Tab   Yes No   Sig: Take  by mouth every day.   HYDROcodone-acetaminophen (NORCO) 7.5-325 MG per tablet   Yes No   Sig: TK 1 T PO  Q 4 H  P FOR 3 DAYS   PROAIR  (90 Base) MCG/ACT Aero Soln inhalation aerosol   Yes No   Sig: INHALE 2 PUFFS PO Q 4 TO 6 H PRN   aspirin EC (ECOTRIN) 81 MG Tablet Delayed Response   Yes No   Sig: Take 81 mg by mouth every day.   carvedilol (COREG) 25 MG Tab  Patient's Home Pharmacy Yes No   Sig: Take 25 mg by mouth every day.   lisinopril (PRINIVIL) 10 MG Tab   Yes No   Sig: Take 10 mg by mouth every day.   midodrine (PROAMATINE) 10 MG tablet   Yes No   Sig: Take  by mouth 3 times a day. Take 1 tablet by mouth three times a day   midodrine (PROAMATINE) 2.5 MG Tab   Yes No   Sig: TK 1 T PO TID PRF BLOOD PRESSURE SUPPORT   sevelamer  carbonate (RENVELA) 800 MG Tab tablet   Yes No   Sig: Take 800 mg by mouth 5 Times a Day.      Facility-Administered Medications: None       Physical Exam  Temp:  [35.8 °C (96.5 °F)] 35.8 °C (96.5 °F)  Pulse:  [81-98] 81  Resp:  [18-20] 20  BP: ()/(43-75) 137/75  SpO2:  [94 %-98 %] 95 %    Physical Exam  Vitals signs and nursing note reviewed.   Constitutional:       General: He is not in acute distress.     Appearance: He is well-developed. He is ill-appearing. He is not toxic-appearing or diaphoretic.   HENT:      Head: Normocephalic and atraumatic.      Right Ear: External ear normal.      Left Ear: External ear normal.      Nose: Nose normal. No congestion or rhinorrhea.      Mouth/Throat:      Mouth: Mucous membranes are moist.      Pharynx: No oropharyngeal exudate.   Eyes:      General: No scleral icterus.        Right eye: No discharge.         Left eye: No discharge.      Extraocular Movements: Extraocular movements intact.   Neck:      Musculoskeletal: Normal range of motion and neck supple. No edema or erythema.      Trachea: No tracheal deviation.   Cardiovascular:      Rate and Rhythm: Normal rate and regular rhythm.      Heart sounds: No murmur. No friction rub. No gallop.    Pulmonary:      Effort: Pulmonary effort is normal. No respiratory distress.      Breath sounds: Normal breath sounds. No stridor. No wheezing or rales.   Chest:      Chest wall: No tenderness.   Abdominal:      General: Bowel sounds are normal. There is no distension.      Palpations: Abdomen is soft.      Tenderness: There is no tenderness.   Musculoskeletal: Normal range of motion.         General: No tenderness.      Right lower leg: No edema.      Left lower leg: No edema.   Lymphadenopathy:      Cervical: No cervical adenopathy.   Skin:     General: Skin is warm and dry.      Coloration: Skin is not jaundiced.      Findings: No erythema or rash.   Neurological:      General: No focal deficit present.      Mental  Status: He is alert and oriented to person, place, and time.      Cranial Nerves: No cranial nerve deficit.   Psychiatric:         Speech: Speech is delayed.         Behavior: Behavior is slowed.         Thought Content: Thought content normal.         Judgment: Judgment normal.         Laboratory:  Recent Labs     01/27/20 2105   WBC 12.8*   RBC 2.41*   HEMOGLOBIN 7.1*   HEMATOCRIT 22.4*   MCV 92.9   MCH 29.5   MCHC 31.7*   RDW 49.8   PLATELETCT 393   MPV 9.9     Recent Labs     01/27/20 2105   SODIUM 139   POTASSIUM 7.9*   CHLORIDE 91*   CO2 18*   GLUCOSE 101*   *   CREATININE 12.85*   CALCIUM 10.0     Recent Labs     01/27/20 2105   ALTSGPT 6   ASTSGOT 8*   ALKPHOSPHAT 34   TBILIRUBIN 0.2   GLUCOSE 101*         No results for input(s): NTPROBNP in the last 72 hours.      No results for input(s): TROPONINT in the last 72 hours.    Urinalysis:    No results found     Imaging:  DX-CHEST-2 VIEWS   Final Result      No evidence of acute cardiopulmonary process.            Assessment/Plan:  I anticipate this patient will require at least two midnights for appropriate medical management, necessitating inpatient admission.    Hyperkalemia- (present on admission)  Assessment & Plan  -Profound elevation yet no peaked T waves  -He is at high risk for worsening, monitor on telemetry  -Patient has been given IV calcium, IV insulin  -Repeat BMP in a couple of hours  -Ultimately, he will need hemodialysis    ESRD (end stage renal disease) on dialysis (HCC)- (present on admission)  Assessment & Plan  -With a missed hemodialysis today  -Now with profound hyperkalemia  -Hyperkalemia is not causing peaked T waves at this point in time, monitor on telemetry, treating hyperkalemia if it improves and no changes on telemetry noted, then no urgent need for hemodialysis and nephrology can be consulted in the morning  -If he does show signs of worsening, urgent nephrology consultation will be obtained    High anion gap metabolic  acidosis- (present on admission)  Assessment & Plan  -Likely due to worsening kidney function however there is a gap so I will obtain a lactic acid  -Repeat BMP in the morning    Leukocytosis- (present on admission)  Assessment & Plan  -No additional sign of infection  -Repeat CBC in the morning    Essential hypertension- (present on admission)  Assessment & Plan  -Patient is generally on Coreg and lisinopril, Coreg is only dosed once daily  -He is also on midodrine  -Hold midodrine  -Hold lisinopril due to hyperkalemia  -Start PRN clonidine  -Adjust as needed    Normocytic anemia- (present on admission)  Assessment & Plan  -No sign of gross bleeding  -This is a significant drop from his baseline, CBC is being repeated    Tobacco abuse- (present on admission)  Assessment & Plan  -Tobacco cessation counseling and education provided for more than 11 minutes. Nicotine replacement options provided including patch, and further medical treatments including Wellbutrin and chantix.  As well as over the counter options of lozenges and gum  -He denied the need for a patch or gum    COPD (chronic obstructive pulmonary disease) (HCC)- (present on admission)  Assessment & Plan  -No acute exacerbation  -I did personally review his chest x-ray, noted no acute cardiopulmonary process      VTE prophylaxis: SCDs

## 2020-01-28 NOTE — CARE PLAN
Problem: Safety  Goal: Will remain free from injury  Outcome: PROGRESSING AS EXPECTED  Note:   Free from falls/injury. Safety measures in place.

## 2020-01-28 NOTE — RESPIRATORY CARE
COPD EDUCATION by COPD CLINICAL EDUCATOR  1/28/2020 at 10:30 AM by Dora Humphries, RRT     Patient interviewed by COPD education team. Patient refused COPD/Smoking Cessation program at this time.

## 2020-01-29 LAB
ALBUMIN SERPL BCP-MCNC: 3 G/DL (ref 3.2–4.9)
ALBUMIN/GLOB SERPL: 1.3 G/DL
ALP SERPL-CCNC: 35 U/L (ref 30–99)
ALT SERPL-CCNC: 8 U/L (ref 2–50)
ANION GAP SERPL CALC-SCNC: 13 MMOL/L (ref 0–11.9)
AST SERPL-CCNC: 18 U/L (ref 12–45)
BASOPHILS # BLD AUTO: 0.6 % (ref 0–1.8)
BASOPHILS # BLD: 0.05 K/UL (ref 0–0.12)
BILIRUB SERPL-MCNC: 0.2 MG/DL (ref 0.1–1.5)
BUN SERPL-MCNC: 71 MG/DL (ref 8–22)
CALCIUM SERPL-MCNC: 8.7 MG/DL (ref 8.4–10.2)
CHLORIDE SERPL-SCNC: 80 MMOL/L (ref 96–112)
CO2 SERPL-SCNC: 29 MMOL/L (ref 20–33)
CREAT SERPL-MCNC: 8.03 MG/DL (ref 0.5–1.4)
EOSINOPHIL # BLD AUTO: 0.38 K/UL (ref 0–0.51)
EOSINOPHIL NFR BLD: 4.3 % (ref 0–6.9)
ERYTHROCYTE [DISTWIDTH] IN BLOOD BY AUTOMATED COUNT: 46.7 FL (ref 35.9–50)
ERYTHROCYTE [DISTWIDTH] IN BLOOD BY AUTOMATED COUNT: 48.3 FL (ref 35.9–50)
GLOBULIN SER CALC-MCNC: 2.4 G/DL (ref 1.9–3.5)
GLUCOSE BLD-MCNC: 118 MG/DL (ref 65–99)
GLUCOSE BLD-MCNC: 56 MG/DL (ref 65–99)
GLUCOSE BLD-MCNC: 74 MG/DL (ref 65–99)
GLUCOSE BLD-MCNC: 78 MG/DL (ref 65–99)
GLUCOSE BLD-MCNC: 86 MG/DL (ref 65–99)
GLUCOSE BLD-MCNC: 91 MG/DL (ref 65–99)
GLUCOSE BLD-MCNC: 94 MG/DL (ref 65–99)
GLUCOSE SERPL-MCNC: 86 MG/DL (ref 65–99)
HCT VFR BLD AUTO: 20.8 % (ref 42–52)
HCT VFR BLD AUTO: 23.1 % (ref 42–52)
HEMOCCULT STL QL: POSITIVE
HGB BLD-MCNC: 7 G/DL (ref 14–18)
HGB BLD-MCNC: 7.6 G/DL (ref 14–18)
HGB RETIC QN AUTO: 37.2 PG/CELL (ref 29–35)
IMM GRANULOCYTES # BLD AUTO: 0.06 K/UL (ref 0–0.11)
IMM GRANULOCYTES NFR BLD AUTO: 0.7 % (ref 0–0.9)
IMM RETICS NFR: 32 % (ref 9.3–17.4)
IRON SATN MFR SERPL: 21 % (ref 15–55)
IRON SERPL-MCNC: 72 UG/DL (ref 50–180)
LYMPHOCYTES # BLD AUTO: 1.5 K/UL (ref 1–4.8)
LYMPHOCYTES NFR BLD: 17 % (ref 22–41)
MCH RBC QN AUTO: 30.3 PG (ref 27–33)
MCH RBC QN AUTO: 30.3 PG (ref 27–33)
MCHC RBC AUTO-ENTMCNC: 32.9 G/DL (ref 33.7–35.3)
MCHC RBC AUTO-ENTMCNC: 33.7 G/DL (ref 33.7–35.3)
MCV RBC AUTO: 90 FL (ref 81.4–97.8)
MCV RBC AUTO: 92 FL (ref 81.4–97.8)
MONOCYTES # BLD AUTO: 0.61 K/UL (ref 0–0.85)
MONOCYTES NFR BLD AUTO: 6.9 % (ref 0–13.4)
NEUTROPHILS # BLD AUTO: 6.22 K/UL (ref 1.82–7.42)
NEUTROPHILS NFR BLD: 70.5 % (ref 44–72)
NRBC # BLD AUTO: 0 K/UL
NRBC BLD-RTO: 0 /100 WBC
PLATELET # BLD AUTO: 243 K/UL (ref 164–446)
PLATELET # BLD AUTO: 248 K/UL (ref 164–446)
PMV BLD AUTO: 9.1 FL (ref 9–12.9)
PMV BLD AUTO: 9.8 FL (ref 9–12.9)
POTASSIUM SERPL-SCNC: 6.3 MMOL/L (ref 3.6–5.5)
PROT SERPL-MCNC: 5.4 G/DL (ref 6–8.2)
RBC # BLD AUTO: 2.31 M/UL (ref 4.7–6.1)
RBC # BLD AUTO: 2.51 M/UL (ref 4.7–6.1)
RETICS # AUTO: 0.09 M/UL (ref 0.04–0.06)
RETICS/RBC NFR: 3.8 % (ref 0.8–2.1)
SODIUM SERPL-SCNC: 122 MMOL/L (ref 135–145)
TIBC SERPL-MCNC: 336 UG/DL (ref 250–450)
WBC # BLD AUTO: 8.8 K/UL (ref 4.8–10.8)
WBC # BLD AUTO: 9.4 K/UL (ref 4.8–10.8)

## 2020-01-29 PROCEDURE — 700105 HCHG RX REV CODE 258: Performed by: HOSPITALIST

## 2020-01-29 PROCEDURE — 85025 COMPLETE CBC W/AUTO DIFF WBC: CPT

## 2020-01-29 PROCEDURE — 85027 COMPLETE CBC AUTOMATED: CPT

## 2020-01-29 PROCEDURE — 700111 HCHG RX REV CODE 636 W/ 250 OVERRIDE (IP): Performed by: INTERNAL MEDICINE

## 2020-01-29 PROCEDURE — 83550 IRON BINDING TEST: CPT

## 2020-01-29 PROCEDURE — C9113 INJ PANTOPRAZOLE SODIUM, VIA: HCPCS | Performed by: HOSPITALIST

## 2020-01-29 PROCEDURE — 85046 RETICYTE/HGB CONCENTRATE: CPT

## 2020-01-29 PROCEDURE — 80053 COMPREHEN METABOLIC PANEL: CPT

## 2020-01-29 PROCEDURE — 99233 SBSQ HOSP IP/OBS HIGH 50: CPT | Performed by: HOSPITALIST

## 2020-01-29 PROCEDURE — 770020 HCHG ROOM/CARE - TELE (206)

## 2020-01-29 PROCEDURE — 90935 HEMODIALYSIS ONE EVALUATION: CPT

## 2020-01-29 PROCEDURE — 82962 GLUCOSE BLOOD TEST: CPT | Mod: 91

## 2020-01-29 PROCEDURE — 82272 OCCULT BLD FECES 1-3 TESTS: CPT

## 2020-01-29 PROCEDURE — 700102 HCHG RX REV CODE 250 W/ 637 OVERRIDE(OP): Performed by: INTERNAL MEDICINE

## 2020-01-29 PROCEDURE — 83540 ASSAY OF IRON: CPT

## 2020-01-29 PROCEDURE — 700111 HCHG RX REV CODE 636 W/ 250 OVERRIDE (IP): Performed by: HOSPITALIST

## 2020-01-29 PROCEDURE — A9270 NON-COVERED ITEM OR SERVICE: HCPCS | Performed by: INTERNAL MEDICINE

## 2020-01-29 RX ADMIN — SODIUM CHLORIDE 8 MG/HR: 9 INJECTION, SOLUTION INTRAVENOUS at 10:08

## 2020-01-29 RX ADMIN — SODIUM CHLORIDE 8 MG/HR: 9 INJECTION, SOLUTION INTRAVENOUS at 23:21

## 2020-01-29 RX ADMIN — ERYTHROPOIETIN 10000 UNITS: 10000 INJECTION, SOLUTION INTRAVENOUS; SUBCUTANEOUS at 08:43

## 2020-01-29 RX ADMIN — Medication 16 G: at 17:40

## 2020-01-29 RX ADMIN — PANTOPRAZOLE SODIUM 40 MG: 40 INJECTION, POWDER, LYOPHILIZED, FOR SOLUTION INTRAVENOUS at 05:50

## 2020-01-29 ASSESSMENT — ENCOUNTER SYMPTOMS
CONSTIPATION: 0
SHORTNESS OF BREATH: 1
TINGLING: 0
ORTHOPNEA: 1
WHEEZING: 0
ABDOMINAL PAIN: 1
NECK PAIN: 0
BLOOD IN STOOL: 0
CLAUDICATION: 0
DIARRHEA: 1
PND: 0
PALPITATIONS: 0
HEARTBURN: 0
DEPRESSION: 0
PSYCHIATRIC NEGATIVE: 1
NAUSEA: 0
FEVER: 0
SHORTNESS OF BREATH: 0
DIZZINESS: 0
SORE THROAT: 0
CHILLS: 0
EYES NEGATIVE: 1
NEUROLOGICAL NEGATIVE: 1
BACK PAIN: 0
INSOMNIA: 0
BLURRED VISION: 0
COUGH: 0
HEMOPTYSIS: 0
VOMITING: 0
DIARRHEA: 0
ABDOMINAL PAIN: 0
EYE PAIN: 0

## 2020-01-29 NOTE — PROGRESS NOTES
Received report from JERMAINE Drew. Admitted following high potassium. Received dialysis and blood transfusion today. Plan is to keep NPO at midnight and scope tomorrow. Safety measures in place, care assumed.

## 2020-01-29 NOTE — PROGRESS NOTES
[facility] Medicine Daily Progress Note    Date of Service  1/29/2020    Chief Complaint  69 y.o. male admitted 1/27/2020 with dyspnea.     Hospital Course    He has a known history of ESRD on dialysis. He had missed dialysis prior to admission for diarrhea. He was found to have severe hyperkalemia. He was admitted and treated with medical therapy. His admission was later complicated by evidence for GI bleeding. Both nephrology and GI were consulted. He was emergently dialyzed.        Interval Problem Update  1/28- I was contacted this am by the RN after his repeat labs resulted. His potassium is no better and his hgb dropped to 5.9. His blood sugar dropped over night to the 20s as well. He apparently is having dark stools with red streaking per the RN. I transfused him (to be given with dialysis), gave emergent calcium, and bicarbonate. I started a PPI and ordered stool occuIt blood and serial hemoglobins. I immediately contacted nephrology for emergent dialysis.   1/29- egd planned for tomorrow. His hgb is slowly dropping. I added a protonix gtt per GI recommendations.     Consultants/Specialty  nephrology-  GI      Code Status  full    Disposition  Guarded    This patient is critically ill with a very high risk for mobidity and death. The last time this happened he was intubated in the ICU. Low threshold to transfer. However dialysis will be happening immediately which should take care of the immediate issue of potassium. If his bleeding does not improve I will contact GI immediately but he is too unstable for intervention at this time. Critical care time from 733-812am today. No overlap.     Review of Systems  Review of Systems   Constitutional: Positive for malaise/fatigue. Negative for chills and fever.   HENT: Negative for sore throat.    Eyes: Negative for blurred vision and pain.   Respiratory: Positive for shortness of breath. Negative for cough.    Cardiovascular: Negative for chest pain and palpitations.    Gastrointestinal: Positive for melena. Negative for abdominal pain and nausea.   Genitourinary: Negative for dysuria and urgency.   Musculoskeletal: Negative for back pain and neck pain.   Skin: Negative for itching and rash.   Neurological: Negative for dizziness and tingling.   Psychiatric/Behavioral: Negative for depression. The patient does not have insomnia.    All other systems reviewed and are negative.       Physical Exam  Temp:  [36.2 °C (97.2 °F)-37 °C (98.6 °F)] 36.2 °C (97.2 °F)  Pulse:  [64-84] 70  Resp:  [20-24] 20  BP: ()/(48-75) 102/61  SpO2:  [93 %-100 %] 94 %    Physical Exam  Vitals signs and nursing note reviewed.   Constitutional:       General: He is not in acute distress.     Appearance: He is well-developed. He is not diaphoretic.      Comments: Patient seen and examined at the bedside. Plan discussed at bedside with the RN.    HENT:      Right Ear: External ear normal.      Left Ear: External ear normal.      Nose: Nose normal.   Eyes:      General: No scleral icterus.        Right eye: No discharge.         Left eye: No discharge.   Neck:      Vascular: No JVD.      Trachea: No tracheal deviation.   Cardiovascular:      Rate and Rhythm: Normal rate.      Heart sounds: Normal heart sounds. No murmur.   Pulmonary:      Effort: Pulmonary effort is normal. No respiratory distress.      Breath sounds: Normal breath sounds. No wheezing or rales.   Abdominal:      General: Bowel sounds are normal. There is no distension.      Palpations: Abdomen is soft.      Tenderness: There is no tenderness. There is no guarding.   Musculoskeletal:         General: No tenderness.      Comments: Left arm fistula   Skin:     General: Skin is warm and dry.      Findings: No erythema.   Neurological:      Mental Status: He is alert and oriented to person, place, and time.   Psychiatric:         Behavior: Behavior normal.         Fluids    Intake/Output Summary (Last 24 hours) at 1/29/2020 0802  Last data  filed at 1/28/2020 2020  Gross per 24 hour   Intake 936 ml   Output 2115 ml   Net -1179 ml       Laboratory  Recent Labs     01/28/20  1418 01/28/20  1952/20  0650   WBC 10.7 11.0* 8.8   RBC 2.54* 2.45* 2.31*   HEMOGLOBIN 7.6* 7.4* 7.0*   HEMATOCRIT 22.7* 22.4* 20.8*   MCV 89.4 91.4 90.0   MCH 29.9 30.2 30.3   MCHC 33.5* 33.0* 33.7   RDW 46.2 48.7 46.7   PLATELETCT 312 263 248   MPV 9.6 9.2 9.8     Recent Labs     01/28/20  0621 01/28/20  1620 01/29/20  0650   SODIUM 138 135 122*   POTASSIUM 7.6* 4.7 6.3*   CHLORIDE 89* 89* 80*   CO2 22 30 29   GLUCOSE 124* 106* 86   * 59* 71*   CREATININE 14.62* 5.86* 8.03*   CALCIUM 10.2 9.1 8.7                   Imaging  DX-CHEST-2 VIEWS   Final Result      No evidence of acute cardiopulmonary process.           Assessment/Plan  * GIB (gastrointestinal bleeding)- (present on admission)  Assessment & Plan  Not improved.   Transfuse  Protonix drip  Pending egd  Serial hgb    Hyperkalemia- (present on admission)  Assessment & Plan  Initially Improved with dialysis. Now worsening again. Dialysis per nephrology. Keep on tele.       ESRD (end stage renal disease) on dialysis (HCC)- (present on admission)  Assessment & Plan  -With a missed hemodialysis yesterday.   Now with a critical potassium    -Now with profound hyperkalemia with new ekg changes.   Emergent dialysis requested today.       High anion gap metabolic acidosis- (present on admission)  Assessment & Plan  Improved with dialysis.     Leukocytosis  Assessment & Plan  -No additional sign of infection  -Repeat CBC in the morning    Essential hypertension- (present on admission)  Assessment & Plan  -Patient is generally on Coreg and lisinopril but then is on midodrine as well?  Off midodrine  -Holding lisinopril due to hyperkalemia  -continue PRN clonidine  Coreg was only QD and BP is soft. Hold for now.     Non-ischemic cardiomyopathy (HCC)- (present on admission)  Assessment & Plan  Continue to hold medical  therapy given other medical issues and BP issues.     Tobacco abuse- (present on admission)  Assessment & Plan  -Tobacco cessation counseling and education provided     COPD (chronic obstructive pulmonary disease) (HCC)- (present on admission)  Assessment & Plan  -No acute exacerbation  Rt protocol prn       VTE prophylaxis: scd

## 2020-01-29 NOTE — PROGRESS NOTES
Telemetry Shift Summary    Rhythm SR  HR Range 66-77  Ectopy rPVC, PAC  Measurements 0.16/0.10/0.36    Per [place], [place]    Normal Values  Rhythm SR  HR Range    Measurements 0.12-0.20 / 0.06-0.10  / 0.30-0.52

## 2020-01-29 NOTE — CARE PLAN
Pt is being educated on scope procedure by RN and Mds. Pt is not following safety protocol and RN discusses risks with pt. Pt agreered to call before getting out of bed. Bed alarm activated.

## 2020-01-29 NOTE — CARE PLAN
Problem: Communication  Goal: The ability to communicate needs accurately and effectively will improve  Outcome: PROGRESSING AS EXPECTED  Note:   Pt will feel comfortable communicating questions and concerns with treatment team. Will continue to foster this relationship.        Problem: Safety  Goal: Will remain free from injury  Outcome: PROGRESSING AS EXPECTED  Note:   Bed alarm on, call light and personal belongings within reach.

## 2020-01-29 NOTE — PROGRESS NOTES
[facility] Progress Note      HD ordered by Dr. Murillo. Treatment started at 0700 and ended at 1030.     Total Net UF 2000mL.    Pt tolerated treatment well with no issues, VSS after treatment. See paper flow sheet for details. Cannulation needles taken out, held pressure for 10 min and placed gauze dressing with no bleeding. DURAN AVF + for bruit/thrill. Report given to primary RN.

## 2020-01-29 NOTE — PROGRESS NOTES
Patient was lethargic this afternoon after finishing up dialysis. Blood sugar had been checked and had been as low as 65. Dextrose IV given and glucagon tabs attempted to be given (pt only ate partial). Blood sugar did improve. Dr Soni notified. Dr Soni was again notified that patient's mentation had changed and was only oriented to self and place. Pt was lethargic but restless and figidity. Orders received for more labs, Dr Soni stating this could be related to the dialysis. He ok'd to continue monitoring if labs were wnl.

## 2020-01-29 NOTE — PROGRESS NOTES
Gastroenterology Progress Note     Author: AMY Wallace   Date & Time Created: 1/29/2020 12:08 PM    Chief Complaint:  Anemia, melena    Interval History:  Had a single small tarry stool overnight.  Hgb stable.  No nausea, vomiting, abdominal pain.  Getting dialysis now.     Review of Systems:  Review of Systems   Respiratory: Negative for shortness of breath.    Cardiovascular: Negative for chest pain.   Gastrointestinal: Negative for abdominal pain, blood in stool, constipation, diarrhea, heartburn, melena, nausea and vomiting.       Physical Exam:  Physical Exam  Constitutional:       General: He is not in acute distress.  HENT:      Head: Normocephalic.   Cardiovascular:      Rate and Rhythm: Normal rate and regular rhythm.   Pulmonary:      Effort: Pulmonary effort is normal.      Breath sounds: Normal breath sounds.   Abdominal:      General: Abdomen is flat. Bowel sounds are normal. There is no distension.      Palpations: Abdomen is soft.      Tenderness: There is no tenderness.   Skin:     General: Skin is warm and dry.   Neurological:      General: No focal deficit present.      Mental Status: He is alert.   Psychiatric:         Mood and Affect: Mood normal.         Behavior: Behavior normal.         Thought Content: Thought content normal.         Judgment: Judgment normal.         Labs:          Recent Labs     01/28/20  0621 01/28/20  1620 01/29/20  0650   SODIUM 138 135 122*   POTASSIUM 7.6* 4.7 6.3*   CHLORIDE 89* 89* 80*   CO2 22 30 29   * 59* 71*   CREATININE 14.62* 5.86* 8.03*   CALCIUM 10.2 9.1 8.7     Recent Labs     01/27/20  2105 01/28/20  0621 01/28/20  1620 01/29/20  0650   ALTSGPT 6  --  7 8   ASTSGOT 8*  --  14 18   ALKPHOSPHAT 34  --  35 35   TBILIRUBIN 0.2  --  0.2 0.2   GLUCOSE 101* 124* 106* 86     Recent Labs     01/28/20  1418 01/28/20  1952/20  0650   RBC 2.54* 2.45* 2.31*   HEMOGLOBIN 7.6* 7.4* 7.0*   HEMATOCRIT 22.7* 22.4* 20.8*   PLATELETCT  312 263 248     Recent Labs     20  2105  20  1418 20  1620 20  1952 20  0650   WBC 12.8*   < > 10.7  --  11.0* 8.8   NEUTSPOLYS 70.10  --   --   --   --  70.50   LYMPHOCYTES 20.90*  --   --   --   --  17.00*   MONOCYTES 5.80  --   --   --   --  6.90   EOSINOPHILS 2.00  --   --   --   --  4.30   BASOPHILS 0.50  --   --   --   --  0.60   ASTSGOT 8*  --   --  14  --  18   ALTSGPT 6  --   --  7  --  8   ALKPHOSPHAT 34  --   --  35  --  35   TBILIRUBIN 0.2  --   --  0.2  --  0.2    < > = values in this interval not displayed.     Hemodynamics:  Temp (24hrs), Av.5 °C (97.7 °F), Min:36.2 °C (97.2 °F), Max:37 °C (98.6 °F)  Temperature: 36.6 °C (97.9 °F)  Pulse  Av.5  Min: 64  Max: 98   Blood Pressure : 112/61     Respiratory:    Respiration: 20, Pulse Oximetry: 97 %        RUL Breath Sounds: Clear, RML Breath Sounds: Clear, RLL Breath Sounds: Diminished, AMALIA Breath Sounds: Clear, LLL Breath Sounds: Diminished  Fluids:    Intake/Output Summary (Last 24 hours) at 2020 1208  Last data filed at 2020 1100  Gross per 24 hour   Intake 1376 ml   Output 4615 ml   Net -3239 ml     Weight: 80.4 kg (177 lb 4 oz)  GI/Nutrition:  Orders Placed This Encounter   Procedures   • Diet Order Clear Liquids - No Red Foods     Standing Status:   Standing     Number of Occurrences:   1     Order Specific Question:   Diet:     Answer:   Clear Liquids - No Red Foods [12]   • Diet NPO     Standing Status:   Standing     Number of Occurrences:   8     Order Specific Question:   Restrict to:     Answer:   Sips with Medications [3]     Medical Decision Making, by Problem:  Active Hospital Problems    Diagnosis   • Hyperkalemia [E87.5]   • ESRD (end stage renal disease) on dialysis (HCC) [N18.6, Z99.2]   • Tobacco abuse [Z72.0]   • COPD (chronic obstructive pulmonary disease) (HCC) [J44.9]   • Leukocytosis [D72.829]   • High anion gap metabolic acidosis [E87.2]   • Essential hypertension [I10]   •  Non-ischemic cardiomyopathy (HCC) [I42.8]   • GIB (gastrointestinal bleeding) [K92.2]       Plan:  Continue to monitoring his H&H, keep greater than 7/21 with blood transfusions PRN.      Continue IV Protonix drip.    Clear liquids, n.p.o. after midnight.     Plan for EGD tomorrow.      Quality-Core Measures

## 2020-01-29 NOTE — PROGRESS NOTES
[facility] Nephrology Daily Progress Note    Date of Service  1/29/2020    Author: Edith SALCEDO, [facility]-NP  Collaborating Physician: Dr. Slade     Chief Complaint   This is a 69-year-old  male with past medical history significant for end-stage renal disease, on chronic   hemodialysis Monday, Wednesday, and Fridays, hypertension, anemia secondary to chronic kidney disease, COPD, CKD bone mineral disorder/renal osteodystrophy, secondary hyperparathyroidism, cardiomyopathy with an EF of 35% by echocardiogram in 10/2019 who was admitted with complaints of shortness of breath after missing his outpatient hemodialysis treatment and was found to have significant hyperkalemia with potassium of 7.9 and now with concern for GI bleed as the patient is having melena and drop in hemoglobin.  The patient reports that he normally dialyzes Monday, Wednesday, and Fridays via a left arm AV fistula.  His last outpatient dialysis treatment was on Friday, 01/24/2020.  The patient was supposed to go to his regular dialysis yesterday on 01/27/2020; however, because of diarrhea, he was unable to go to his outpatient dialysis.  The patient reports that he has had this diarrhea for about 2 weeks and he has noted some black tarry stools at home.  He reports that his appetite has been very poor.  He has not really been eating much.  He presented to the emergency room with complaints of worsening shortness of breath and labs revealed significant hyperkalemia with potassium of 7.9 and elevated BUN level of 169.  Urgent hemodialysis has been ordered and the patient is receiving it at this time.  Also, on admission, he was noted to have anemia with hemoglobin of 7.1 and it did drop this morning to 5.9.  The patient reports that he has had these black tarry stools for about 2 weeks.  He is a somewhat poor historian.  Apparently, he did have some dental work a   few weeks ago and has not been feeling well since packed red blood cell  transfusion has been ordered to be given with dialysis today.    Daily Nephrology Summary:   1/28/20: consult done, HD performed  1/29/20: seen on HD, mild hypotension with HD, AXO, feels weak, tired.  + occult stool, GI to take for Endoscopy tomorrow.      Review of Systems  Review of Systems   Constitutional: Positive for malaise/fatigue. Negative for chills and fever.   HENT: Negative for congestion, hearing loss, sore throat and tinnitus.    Eyes: Negative.    Respiratory: Negative for cough, hemoptysis, shortness of breath and wheezing.    Cardiovascular: Positive for orthopnea. Negative for chest pain, palpitations, claudication, leg swelling and PND.   Gastrointestinal: Positive for abdominal pain, diarrhea and melena. Negative for heartburn, nausea and vomiting.   Genitourinary: Negative.    Musculoskeletal: Positive for joint pain.   Skin: Negative.    Neurological: Negative.    Endo/Heme/Allergies: Negative.    Psychiatric/Behavioral: Negative.         Physical Exam  Temp:  [36.2 °C (97.2 °F)-37 °C (98.6 °F)] 36.2 °C (97.2 °F)  Pulse:  [64-84] 70  Resp:  [20-22] 20  BP: ()/(53-67) 102/61  SpO2:  [93 %-97 %] 94 %    Physical Exam  Constitutional:       Appearance: Normal appearance. He is normal weight.   HENT:      Head: Normocephalic and atraumatic.      Nose: Nose normal.      Mouth/Throat:      Mouth: Mucous membranes are dry.   Eyes:      Extraocular Movements: Extraocular movements intact.      Conjunctiva/sclera: Conjunctivae normal.      Pupils: Pupils are equal, round, and reactive to light.   Neck:      Musculoskeletal: Normal range of motion and neck supple.   Cardiovascular:      Rate and Rhythm: Normal rate and regular rhythm.      Pulses: Normal pulses.      Heart sounds: Normal heart sounds. No murmur. No friction rub. No gallop.    Pulmonary:      Effort: Pulmonary effort is normal.      Breath sounds: Normal breath sounds.   Abdominal:      General: Bowel sounds are normal.       Palpations: Abdomen is soft.      Tenderness: There is tenderness.   Musculoskeletal: Normal range of motion.   Skin:     General: Skin is warm and dry.   Neurological:      General: No focal deficit present.      Mental Status: He is alert and oriented to person, place, and time.   Psychiatric:         Mood and Affect: Mood normal.         Behavior: Behavior normal.         Thought Content: Thought content normal.         Judgment: Judgment normal.         Fluids    Intake/Output Summary (Last 24 hours) at 1/29/2020 1050  Last data filed at 1/28/2020 2020  Gross per 24 hour   Intake 936 ml   Output 2115 ml   Net -1179 ml       Laboratory  Recent Labs     01/28/20  1418 01/28/20  1952/20  0650   WBC 10.7 11.0* 8.8   RBC 2.54* 2.45* 2.31*   HEMOGLOBIN 7.6* 7.4* 7.0*   HEMATOCRIT 22.7* 22.4* 20.8*   MCV 89.4 91.4 90.0   MCH 29.9 30.2 30.3   MCHC 33.5* 33.0* 33.7   RDW 46.2 48.7 46.7   PLATELETCT 312 263 248   MPV 9.6 9.2 9.8     Recent Labs     01/28/20  0621 01/28/20  1620 01/29/20  0650   SODIUM 138 135 122*   POTASSIUM 7.6* 4.7 6.3*   CHLORIDE 89* 89* 80*   CO2 22 30 29   GLUCOSE 124* 106* 86   * 59* 71*   CREATININE 14.62* 5.86* 8.03*   CALCIUM 10.2 9.1 8.7         No results for input(s): NTPROBNP in the last 72 hours.        Imaging    Assessment/Plan  ASSESSMENT:  1.  End-stage renal disease   - Maint HD q MWF and prn   - via a left arm arteriovenous fistula.    2.  Hyperkalemia   - manage with HD   3.  Anemia secondary to chronic kidney disease, but also concern for   gastrointestinal bleed now due to melena and drop in hemoglobin   - DILAN with HD   - check iron stores   4.  Gastrointestinal bleed.  The patient has been having black tarry   stools/melena and hemoglobin has dropped since admission.   - GI to take for endoscopy tomorrow   5.  Leukocytosis, resolved    - The patient was afebrile overnight.  6.  Chronic kidney disease, bone mineral disorder/renal osteodystrophy.  The   patient is on a  binder 5 times a day apparently per his MAR  7.  Hypertension, now hypotensive    - BP meds on hold     -  As an outpatient, the patient is on midodrine t.i.d. as     well as lisinopril and carvedilol.   8.  Secondary hyperparathyroidism.  The patient is on Sensipar as an   outpatient, unclear if he is compliant.  9.  Chronic obstructive pulmonary disease.  10.  Cardiomyopathy.  The patient with history of congestive heart failure.    His ejection fraction is 35% by echocardiogram in 10/2019.  He currently does   not appear fluid overloaded.  11.  Severe protein-calorie malnutrition   - no dietary protein restrictions  12. Hyponatremia   - manage with HD      PLAN:  1.  HD today   2.  HD q MWF and prn  3.  Transfuse prn Hgn < 7.0  4.  Check iron stores   5.  DILAN with HD   6.  Follow GI recs   7.  Hold Sensipar for now  8.  Recommend holding blood pressure medications for now and monitoring  9.  Dose adjust all medications for his decreased GFR.  10.  Culture if patient spikes a fever.  11.  No dietary protein restrictions at this time.

## 2020-01-29 NOTE — CONSULTS
Date of Service:1/28/20    Consult Requested By: Capo Soni M.D.    Reason for Consultation: anemia, melena    History of Present Illness:     We were called to evaluate this gentleman 69 years of age because of a history of melena and anemia.  He is unable to give any history after returning from dialysis.  The nurse tells me since his return from dialysis his mentation has been poor.  He had one dark black stool earlier this morning approximately 12 hours ago and has not had any further bleeding.  He received 1 unit of blood in dialysis and there was an improvement in his H&H.  Please see the note below.    This is a 69-year-old  male with past   medical history significant for end-stage renal disease, on chronic   hemodialysis Monday, Wednesday, and Fridays, hypertension, anemia secondary to   chronic kidney disease, COPD, CKD bone mineral disorder/renal osteodystrophy,   secondary hyperparathyroidism, cardiomyopathy with an EF of 35% by   echocardiogram in 10/2019 who was admitted with complaints of shortness of   breath after missing his outpatient hemodialysis treatment and was found to   have significant hyperkalemia with potassium of 7.9 and now with concern for   GI bleed as the patient is having melena and drop in hemoglobin.  The patient   reports that he normally dialyzes Monday, Wednesday, and Fridays via a left   arm AV fistula.  His last outpatient dialysis treatment was on Friday,   01/24/2020.  The patient was supposed to go to his regular dialysis yesterday   on 01/27/2020; however, because of diarrhea, he was unable to go to his   outpatient dialysis.  The patient reports that he has had this diarrhea for   about 2 weeks and he has noted some black tarry stools at home.  He reports   that his appetite has been very poor.  He has not really been eating much.  He   presented to the emergency room with complaints of worsening shortness of   breath and labs revealed significant  hyperkalemia with potassium of 7.9 and   elevated BUN level of 169.  Urgent hemodialysis has been ordered and the   patient is receiving it at this time.  Also, on admission, he was noted to   have anemia with hemoglobin of 7.1 and it did drop this morning to 5.9.  The   patient reports that he has had these black tarry stools for about 2 weeks.    He is a somewhat poor historian.  Apparently, he did have some dental work a   few weeks ago and has not been feeling well since packed red blood cell   transfusion has been ordered to be given with dialysis today.    Review Of Systems:  Review of systems could not be ascertained given his mentation.    PMH:   Past Medical History:   Diagnosis Date   • Asthma    • Breath shortness     uses oxygen 3.5 L as needed   • Chronic obstructive pulmonary disease (HCC)    • Cold     12/27/17 cold   • Congestive heart failure (HCC)    • Dental disorder     upper and lower dentures   • Dialysis patient (HCC)     MWF   • Emphysema of lung (HCC)    • Renal disorder    • Snoring    • Urinary incontinence          PSH:  Past Surgical History:   Procedure Laterality Date   • GASTROSCOPY  2/1/2018    Procedure: GASTROSCOPY;  Surgeon: Morteza Olivo M.D.;  Location: [facility];  Service: Gastroenterology   • COLONOSCOPY  2/1/2018    Procedure: COLONOSCOPY;  Surgeon: Morteza Olivo M.D.;  Location: [facility];  Service: Gastroenterology   • GASTROSCOPY-ENDO N/A 8/28/2017    Procedure: GASTROSCOPY-ENDO;  Surgeon: Pankaj Choudhury M.D.;  Location: [facility];  Service: EUS   • OTHER      Patient had an arm fistula put in for dialysis       FAMILY HX:  Family History   Problem Relation Age of Onset   • Heart Disease Mother    • Diabetes Mother    • Kidney Disease Mother         kidney failure   • Asthma Father    • Diabetes Brother    • No Known Problems Maternal Grandmother    • No Known Problems Maternal Grandfather    • No Known Problems  Paternal Grandmother    • No Known Problems Paternal Grandfather        SOCIAL HX:  Social History     Socioeconomic History   • Marital status:      Spouse name: Not on file   • Number of children: Not on file   • Years of education: Not on file   • Highest education level: Not on file   Occupational History   • Not on file   Social Needs   • Financial resource strain: Not on file   • Food insecurity:     Worry: Not on file     Inability: Not on file   • Transportation needs:     Medical: Not on file     Non-medical: Not on file   Tobacco Use   • Smoking status: Current Some Day Smoker     Packs/day: 0.50     Types: Cigarettes   • Smokeless tobacco: Never Used   Substance and Sexual Activity   • Alcohol use: No   • Drug use: No   • Sexual activity: Not on file   Lifestyle   • Physical activity:     Days per week: Not on file     Minutes per session: Not on file   • Stress: Not on file   Relationships   • Social connections:     Talks on phone: Not on file     Gets together: Not on file     Attends [employer] service: Not on file     Active member of club or organization: Not on file     Attends meetings of clubs or organizations: Not on file     Relationship status: Not on file   • Intimate partner violence:     Fear of current or ex partner: Not on file     Emotionally abused: Not on file     Physically abused: Not on file     Forced sexual activity: Not on file   Other Topics Concern   • Not on file   Social History Narrative    ** Merged History Encounter **          Social History     Tobacco Use   Smoking Status Current Some Day Smoker   • Packs/day: 0.50   • Types: Cigarettes   Smokeless Tobacco Never Used     Social History     Substance and Sexual Activity   Alcohol Use No   CURRENT MEDICATIONS:  1.  Aspirin 81 mg daily.  2.  Calcium gluconate 1 g IV x1 dose.  3.  Coreg 25 mg once daily.  4.  Protonix 40 mg IV daily.  5.  Senokot 2 tablets twice daily.  6.  Sevelamer 800 mg 5 times a  day.    Allergies/Intolerances:  Allergies   Allergen Reactions   • Chloraprep One Step Itching       History reviewed with the patient    Other Current Medications:    Current Facility-Administered Medications:   •  Notify, , , Once **AND** glucose 4 g chewable tablet 16 g, 16 g, Oral, Q15 MIN PRN, 4 g at 01/28/20 0043 **AND** DEXTROSE 10% BOLUS 250 mL, 250 mL, Intravenous, Q15 MIN PRN, Giuseppe Ruiz D.O.  •  Notify, , , Once **AND** glucose 4 g chewable tablet 16 g, 16 g, Oral, Q15 MIN PRN, 6 g at 01/28/20 1414 **AND** DEXTROSE 10% BOLUS 250 mL, 250 mL, Intravenous, Q15 MIN PRN, Giuseppe Ruiz D.O., 250 mL at 01/28/20 1420  •  ipratropium-albuterol (DUONEB) nebulizer solution, 3 mL, Nebulization, ONCE (RT), Capo Soni M.D.  •  pantoprazole (PROTONIX) injection 40 mg, 40 mg, Intravenous, DAILY, Capo Soni M.D., 40 mg at 01/28/20 0905  •  [START ON 1/29/2020] epoetin she (EPOGEN/PROCRIT) injection 10,000 Units, 10,000 Units, Subcutaneous, MO, WE + FR, Cathi Finley M.D.  •  calcium GLUConate 1,000 mg in  mL IVPB, 1,000 mg, Intravenous, Once, Shawna Copeland M.D., Stopped at 01/27/20 2315  •  HYDROcodone-acetaminophen (NORCO) 7.5-325 MG per tablet 1 Tab, 1 Tab, Oral, Q6HRS PRN, Giuseppe Ruiz D.O.  •  senna-docusate (PERICOLACE or SENOKOT S) 8.6-50 MG per tablet 2 Tab, 2 Tab, Oral, BID, Stopped at 01/28/20 0600 **AND** polyethylene glycol/lytes (MIRALAX) PACKET 1 Packet, 1 Packet, Oral, QDAY PRN **AND** magnesium hydroxide (MILK OF MAGNESIA) suspension 30 mL, 30 mL, Oral, QDAY PRN **AND** bisacodyl (DULCOLAX) suppository 10 mg, 10 mg, Rectal, QDAY PRN, LINNETTE CandelariaO.  •  Respiratory Care per Protocol, , Nebulization, Continuous RT, GLENDY Candelaria.O.  •  acetaminophen (TYLENOL) tablet 650 mg, 650 mg, Oral, Q6HRS PRN, LINNETTE CandelariaO.  •  ondansetron (ZOFRAN) syringe/vial injection 4 mg, 4 mg, Intravenous, Q4HRS PRN, GLENDY Candelaria.O.  •  ondansetron (ZOFRAN ODT)  dispertab 4 mg, 4 mg, Oral, Q4HRS PRN, Giuseppe Ruiz D.O.  •  cloNIDine (CATAPRES) tablet 0.1 mg, 0.1 mg, Oral, Q4HRS PRN, Giuseppe Ruiz D.O.  [unfilled]    Most Recent Vital Signs:  /53   Pulse 84   Temp 36.3 °C (97.4 °F) (Axillary)   Resp 20   Ht 1.829 m (6')   Wt 82.6 kg (182 lb 1.6 oz)   SpO2 95%   BMI 24.70 kg/m²   Temp  Av.4 °C (97.6 °F)  Min: 35.8 °C (96.5 °F)  Max: 36.7 °C (98.1 °F)  PAST MEDICAL HISTORY:  1.  End-stage renal disease.  The patient is on chronic hemodialysis Monday,   Wednesday, and  via a left arm AV fistula.  2.  Hypertension.  The patient is noted to be on a regimen of lisinopril and   carvedilol at home as well as midodrine t.i.d.  3.  COPD.  The patient is reportedly on oxygen at home.  4.  History of CHF.  The patient did have an echocardiogram in 10/2019 that   showed an ejection fraction of 35%.  5.  Anemia secondary to chronic kidney disease.  6.  CKD bone mineral disorder/renal osteodystrophy.  7.  Secondary hyperparathyroidism.     PAST SURGICAL HISTORY:  1.  Left arm AV fistula creation.  2.  Endoscopy.  3.  Colonoscopy.     ALLERGIES:  THE PATIENT IS ALLERGIC TO CHLORAPREP.     SOCIAL HISTORY:  The patient reports that he smokes cigarettes and he smokes   about half pack per day.  He has been smoking since age 22.  He denies any   alcohol or illicit drug use.     FAMILY HISTORY:  The patient reports that his mother had kidney problems,   unclear etiology.     Physical Exam:  General: Nontoxic, no acute distress  HEENT: sclera anicteric, PERRL, EOMI, MMM, no oral lesions  Neck: supple, no lymphadenopathy  Chest: CTAB, no r/r/w, normal work of breathing.  Cardiac: Regular, no murmurs no gallops heard  Abdomen: + bowel sounds, soft, non-tender, non-distended, no HSM  Extremities: No edema. No joint swelling. Shunt in the left arm  Skin: no rashes or erythema  Neuro: Alert and oriented times 3, non-focal exam    Pertinent Lab Results:  Recent  Labs     01/28/20  0621 01/28/20  0837 01/28/20  1418   WBC 9.9 11.2* 10.7      Recent Labs     01/28/20  0621 01/28/20  0837 01/28/20  1418   HEMOGLOBIN 5.9* 6.1* 7.6*   HEMATOCRIT 18.5* 18.7* 22.7*   MCV 93.4 92.6 89.4   MCH 30.1 30.2 29.9   PLATELETCT 261 293 312         Recent Labs     01/27/20  2105 01/28/20 0621   SODIUM 139 138   POTASSIUM 7.9* 7.6*   CHLORIDE 91* 89*   CO2 18* 22   CREATININE 12.85* 14.62*        Recent Labs     01/27/20 2105   ALBUMIN 2.8*      Recent Labs     01/27/20 2105   ASTSGOT 8*   ALTSGPT 6   TBILIRUBIN 0.2   ALKPHOSPHAT 34   GLOBULIN 3.1       [unfilled]      Pertinent Micro:  Results     Procedure Component Value Units Date/Time    URINALYSIS [[id number]]     Order Status:  No result Specimen:  Urine, Clean Catch         Blood Culture   Date Value Ref Range Status   08/27/2017   Final    Blood culture testing and Gram stain, if indicated, are  performed at [facility], [address] [place] [address].  Positive blood cultures are  sent to [facility], [address] [place] [address], for organism identification and  susceptibility testing.  No growth after 5 days of incubation.          Studies:              Results for orders placed during the hospital encounter of 01/27/20   DX-CHEST-2 VIEWS    Impression No evidence of acute cardiopulmonary process.                                            Results for orders placed during the hospital encounter of 07/04/06   DX-LUMBAR SPINE-2 OR 3 VIEWS    Impression IMPRESSION:    MILD DEGENERATIVE CHANGES AND MILD ALIGNMENT ABNORMALITIES WITH NO   EVIDENCE OF ANY ACUTE BONY FINDINGS.      [email or web address]        Read By ADITI AUSTIN MD on Jul 4 2006  2:31PM  : LVD Transcription Date: Jul 5 2006 12:40AM  THIS DOCUMENT HAS BEEN ELECTRONICALLY SIGNED BY: ADITI AUSTIN MD on Jul 6 2006  8:33AM                                          IMPRESSION:     Anemia  Melena  CRF on  dialysis  Hypertension  Cardiomyopathy  Malnutrition       PLAN:   Bridger Savage is a  Presents with anemia and melena along with multiple electrolyte abnormalities.  He recently underwent dialysis and on his return there has been significant change in his mentation.  Presently there is no signs of active bleeding.  Recommend monitoring his H&H every 6 hours and keeping greater than 7/21 with blood transfusions.  Keep him on IV Protonix drip.  Clear liquids are fine for the time being.  Would make him n.p.o. after midnight.  The plan will be to perform an upper endoscopy sometime tomorrow if we can consent him.  Would recommend 2 large IV bore needles for IV hydration.  30 further questions or concerns please feel free to give us a call at [phone].    Discussed with IM. Will continue to follow    Emil Monge M.D.

## 2020-01-30 ENCOUNTER — ANESTHESIA (OUTPATIENT)
Dept: SURGERY | Facility: MEDICAL CENTER | Age: 70
DRG: 640 | End: 2020-01-30
Payer: MEDICARE

## 2020-01-30 ENCOUNTER — ANESTHESIA EVENT (OUTPATIENT)
Dept: SURGERY | Facility: MEDICAL CENTER | Age: 70
DRG: 640 | End: 2020-01-30
Payer: MEDICARE

## 2020-01-30 LAB
ALBUMIN SERPL BCP-MCNC: 3.1 G/DL (ref 3.2–4.9)
ALBUMIN/GLOB SERPL: 1.3 G/DL
ALP SERPL-CCNC: 38 U/L (ref 30–99)
ALT SERPL-CCNC: 9 U/L (ref 2–50)
ANION GAP SERPL CALC-SCNC: 14 MMOL/L (ref 0–11.9)
AST SERPL-CCNC: 14 U/L (ref 12–45)
BASOPHILS # BLD AUTO: 0.4 % (ref 0–1.8)
BASOPHILS # BLD: 0.03 K/UL (ref 0–0.12)
BILIRUB SERPL-MCNC: 0.2 MG/DL (ref 0.1–1.5)
BUN SERPL-MCNC: 29 MG/DL (ref 8–22)
CALCIUM SERPL-MCNC: 8.6 MG/DL (ref 8.4–10.2)
CHLORIDE SERPL-SCNC: 94 MMOL/L (ref 96–112)
CO2 SERPL-SCNC: 28 MMOL/L (ref 20–33)
CREAT SERPL-MCNC: 5.51 MG/DL (ref 0.5–1.4)
EOSINOPHIL # BLD AUTO: 0.26 K/UL (ref 0–0.51)
EOSINOPHIL NFR BLD: 3.6 % (ref 0–6.9)
ERYTHROCYTE [DISTWIDTH] IN BLOOD BY AUTOMATED COUNT: 49.4 FL (ref 35.9–50)
FERRITIN SERPL-MCNC: 53.6 NG/ML (ref 22–322)
GLOBULIN SER CALC-MCNC: 2.3 G/DL (ref 1.9–3.5)
GLUCOSE BLD-MCNC: 77 MG/DL (ref 65–99)
GLUCOSE BLD-MCNC: 79 MG/DL (ref 65–99)
GLUCOSE BLD-MCNC: 88 MG/DL (ref 65–99)
GLUCOSE BLD-MCNC: 94 MG/DL (ref 65–99)
GLUCOSE BLD-MCNC: 95 MG/DL (ref 65–99)
GLUCOSE SERPL-MCNC: 98 MG/DL (ref 65–99)
HCT VFR BLD AUTO: 21.9 % (ref 42–52)
HGB BLD-MCNC: 7.1 G/DL (ref 14–18)
IMM GRANULOCYTES # BLD AUTO: 0.05 K/UL (ref 0–0.11)
IMM GRANULOCYTES NFR BLD AUTO: 0.7 % (ref 0–0.9)
LYMPHOCYTES # BLD AUTO: 1.59 K/UL (ref 1–4.8)
LYMPHOCYTES NFR BLD: 22.1 % (ref 22–41)
MCH RBC QN AUTO: 30.7 PG (ref 27–33)
MCHC RBC AUTO-ENTMCNC: 32.4 G/DL (ref 33.7–35.3)
MCV RBC AUTO: 94.8 FL (ref 81.4–97.8)
MONOCYTES # BLD AUTO: 0.62 K/UL (ref 0–0.85)
MONOCYTES NFR BLD AUTO: 8.6 % (ref 0–13.4)
NEUTROPHILS # BLD AUTO: 4.64 K/UL (ref 1.82–7.42)
NEUTROPHILS NFR BLD: 64.6 % (ref 44–72)
NRBC # BLD AUTO: 0 K/UL
NRBC BLD-RTO: 0 /100 WBC
PHOSPHATE SERPL-MCNC: 5.2 MG/DL (ref 2.5–4.5)
PLATELET # BLD AUTO: 247 K/UL (ref 164–446)
PMV BLD AUTO: 9.9 FL (ref 9–12.9)
POTASSIUM SERPL-SCNC: 4.9 MMOL/L (ref 3.6–5.5)
PROT SERPL-MCNC: 5.4 G/DL (ref 6–8.2)
RBC # BLD AUTO: 2.31 M/UL (ref 4.7–6.1)
SODIUM SERPL-SCNC: 136 MMOL/L (ref 135–145)
WBC # BLD AUTO: 7.2 K/UL (ref 4.8–10.8)

## 2020-01-30 PROCEDURE — 160048 HCHG OR STATISTICAL LEVEL 1-5: Performed by: INTERNAL MEDICINE

## 2020-01-30 PROCEDURE — 700105 HCHG RX REV CODE 258: Performed by: STUDENT IN AN ORGANIZED HEALTH CARE EDUCATION/TRAINING PROGRAM

## 2020-01-30 PROCEDURE — 84100 ASSAY OF PHOSPHORUS: CPT

## 2020-01-30 PROCEDURE — 160002 HCHG RECOVERY MINUTES (STAT): Performed by: INTERNAL MEDICINE

## 2020-01-30 PROCEDURE — A9270 NON-COVERED ITEM OR SERVICE: HCPCS | Performed by: HOSPITALIST

## 2020-01-30 PROCEDURE — 99232 SBSQ HOSP IP/OBS MODERATE 35: CPT | Performed by: HOSPITALIST

## 2020-01-30 PROCEDURE — 500066 HCHG BITE BLOCK, ECT: Performed by: INTERNAL MEDICINE

## 2020-01-30 PROCEDURE — 160035 HCHG PACU - 1ST 60 MINS PHASE I: Performed by: INTERNAL MEDICINE

## 2020-01-30 PROCEDURE — 700111 HCHG RX REV CODE 636 W/ 250 OVERRIDE (IP): Performed by: STUDENT IN AN ORGANIZED HEALTH CARE EDUCATION/TRAINING PROGRAM

## 2020-01-30 PROCEDURE — 700101 HCHG RX REV CODE 250: Performed by: STUDENT IN AN ORGANIZED HEALTH CARE EDUCATION/TRAINING PROGRAM

## 2020-01-30 PROCEDURE — 85025 COMPLETE CBC W/AUTO DIFF WBC: CPT

## 2020-01-30 PROCEDURE — 160009 HCHG ANES TIME/MIN: Performed by: INTERNAL MEDICINE

## 2020-01-30 PROCEDURE — 700102 HCHG RX REV CODE 250 W/ 637 OVERRIDE(OP): Performed by: HOSPITALIST

## 2020-01-30 PROCEDURE — 82962 GLUCOSE BLOOD TEST: CPT

## 2020-01-30 PROCEDURE — 700105 HCHG RX REV CODE 258: Performed by: INTERNAL MEDICINE

## 2020-01-30 PROCEDURE — 82728 ASSAY OF FERRITIN: CPT

## 2020-01-30 PROCEDURE — 160203 HCHG ENDO MINUTES - 1ST 30 MINS LEVEL 4: Performed by: INTERNAL MEDICINE

## 2020-01-30 PROCEDURE — 770020 HCHG ROOM/CARE - TELE (206)

## 2020-01-30 PROCEDURE — 30233N1 TRANSFUSION OF NONAUTOLOGOUS RED BLOOD CELLS INTO PERIPHERAL VEIN, PERCUTANEOUS APPROACH: ICD-10-PCS | Performed by: INTERNAL MEDICINE

## 2020-01-30 PROCEDURE — 0DJ08ZZ INSPECTION OF UPPER INTESTINAL TRACT, VIA NATURAL OR ARTIFICIAL OPENING ENDOSCOPIC: ICD-10-PCS | Performed by: INTERNAL MEDICINE

## 2020-01-30 PROCEDURE — 80053 COMPREHEN METABOLIC PANEL: CPT

## 2020-01-30 RX ORDER — SODIUM CHLORIDE 9 MG/ML
INJECTION, SOLUTION INTRAVENOUS
Status: DISCONTINUED | OUTPATIENT
Start: 2020-01-30 | End: 2020-01-30 | Stop reason: SURG

## 2020-01-30 RX ORDER — IPRATROPIUM BROMIDE AND ALBUTEROL SULFATE 2.5; .5 MG/3ML; MG/3ML
3 SOLUTION RESPIRATORY (INHALATION)
Status: DISCONTINUED | OUTPATIENT
Start: 2020-01-30 | End: 2020-01-30 | Stop reason: HOSPADM

## 2020-01-30 RX ORDER — ONDANSETRON 2 MG/ML
4 INJECTION INTRAMUSCULAR; INTRAVENOUS
Status: DISCONTINUED | OUTPATIENT
Start: 2020-01-30 | End: 2020-01-30 | Stop reason: HOSPADM

## 2020-01-30 RX ORDER — OMEPRAZOLE 20 MG/1
20 CAPSULE, DELAYED RELEASE ORAL 2 TIMES DAILY
Status: DISCONTINUED | OUTPATIENT
Start: 2020-01-30 | End: 2020-01-31 | Stop reason: HOSPADM

## 2020-01-30 RX ORDER — MIDODRINE HYDROCHLORIDE 10 MG/1
10 TABLET ORAL 3 TIMES DAILY
Status: DISCONTINUED | OUTPATIENT
Start: 2020-01-30 | End: 2020-01-31 | Stop reason: HOSPADM

## 2020-01-30 RX ORDER — PHENYLEPHRINE HYDROCHLORIDE 10 MG/ML
INJECTION, SOLUTION INTRAMUSCULAR; INTRAVENOUS; SUBCUTANEOUS
Status: DISPENSED
Start: 2020-01-30 | End: 2020-01-30

## 2020-01-30 RX ORDER — OMEPRAZOLE 20 MG/1
20 CAPSULE, DELAYED RELEASE ORAL 2 TIMES DAILY
Qty: 30 CAP | Refills: 3 | Status: SHIPPED | OUTPATIENT
Start: 2020-01-30 | End: 2020-06-15

## 2020-01-30 RX ORDER — SODIUM CHLORIDE, SODIUM LACTATE, POTASSIUM CHLORIDE, CALCIUM CHLORIDE 600; 310; 30; 20 MG/100ML; MG/100ML; MG/100ML; MG/100ML
INJECTION, SOLUTION INTRAVENOUS CONTINUOUS
Status: DISCONTINUED | OUTPATIENT
Start: 2020-01-30 | End: 2020-01-30 | Stop reason: HOSPADM

## 2020-01-30 RX ORDER — DIPHENHYDRAMINE HYDROCHLORIDE 50 MG/ML
12.5 INJECTION INTRAMUSCULAR; INTRAVENOUS
Status: DISCONTINUED | OUTPATIENT
Start: 2020-01-30 | End: 2020-01-30 | Stop reason: HOSPADM

## 2020-01-30 RX ORDER — SODIUM CHLORIDE 9 MG/ML
INJECTION, SOLUTION INTRAVENOUS ONCE
Status: COMPLETED | OUTPATIENT
Start: 2020-01-30 | End: 2020-01-30

## 2020-01-30 RX ORDER — HALOPERIDOL 5 MG/ML
1 INJECTION INTRAMUSCULAR
Status: DISCONTINUED | OUTPATIENT
Start: 2020-01-30 | End: 2020-01-30 | Stop reason: HOSPADM

## 2020-01-30 RX ADMIN — OMEPRAZOLE 20 MG: 20 CAPSULE, DELAYED RELEASE ORAL at 10:57

## 2020-01-30 RX ADMIN — OMEPRAZOLE 20 MG: 20 CAPSULE, DELAYED RELEASE ORAL at 17:43

## 2020-01-30 RX ADMIN — PROPOFOL 50 MG: 10 INJECTION, EMULSION INTRAVENOUS at 08:01

## 2020-01-30 RX ADMIN — SODIUM CHLORIDE: 9 INJECTION, SOLUTION INTRAVENOUS at 07:55

## 2020-01-30 RX ADMIN — EPHEDRINE SULFATE 10 MG: 50 INJECTION INTRAMUSCULAR; INTRAVENOUS; SUBCUTANEOUS at 08:09

## 2020-01-30 RX ADMIN — PROPOFOL 20 MG: 10 INJECTION, EMULSION INTRAVENOUS at 08:02

## 2020-01-30 RX ADMIN — EPHEDRINE SULFATE 5 MG: 50 INJECTION INTRAMUSCULAR; INTRAVENOUS; SUBCUTANEOUS at 08:17

## 2020-01-30 RX ADMIN — MIDODRINE HYDROCHLORIDE 10 MG: 10 TABLET ORAL at 17:42

## 2020-01-30 RX ADMIN — PROPOFOL 50 MG: 10 INJECTION, EMULSION INTRAVENOUS at 07:59

## 2020-01-30 RX ADMIN — SODIUM CHLORIDE: 9 INJECTION, SOLUTION INTRAVENOUS at 07:38

## 2020-01-30 RX ADMIN — MIDODRINE HYDROCHLORIDE 10 MG: 10 TABLET ORAL at 10:57

## 2020-01-30 ASSESSMENT — ENCOUNTER SYMPTOMS
ORTHOPNEA: 1
DIARRHEA: 1
SORE THROAT: 0
PALPITATIONS: 0
NEUROLOGICAL NEGATIVE: 1
HEMOPTYSIS: 0
NAUSEA: 0
CHILLS: 0
SHORTNESS OF BREATH: 0
ABDOMINAL PAIN: 1
COUGH: 0
CLAUDICATION: 0
FEVER: 0
HEARTBURN: 0
PSYCHIATRIC NEGATIVE: 1
WHEEZING: 0
PND: 0
VOMITING: 0
EYES NEGATIVE: 1

## 2020-01-30 ASSESSMENT — COGNITIVE AND FUNCTIONAL STATUS - GENERAL
STANDING UP FROM CHAIR USING ARMS: A LITTLE
DAILY ACTIVITIY SCORE: 24
CLIMB 3 TO 5 STEPS WITH RAILING: A LITTLE
MOBILITY SCORE: 21
SUGGESTED CMS G CODE MODIFIER DAILY ACTIVITY: CH
SUGGESTED CMS G CODE MODIFIER MOBILITY: CJ
WALKING IN HOSPITAL ROOM: A LITTLE

## 2020-01-30 ASSESSMENT — PATIENT HEALTH QUESTIONNAIRE - PHQ9
2. FEELING DOWN, DEPRESSED, IRRITABLE, OR HOPELESS: NOT AT ALL
1. LITTLE INTEREST OR PLEASURE IN DOING THINGS: NOT AT ALL
SUM OF ALL RESPONSES TO PHQ9 QUESTIONS 1 AND 2: 0

## 2020-01-30 NOTE — DISCHARGE PLANNING
ATTN: Case Management  RE: Referral for Home Health    Reason for referral denial: INSURANCE IS OUT OF NETWORK               Unfortunately, we are not able to accept this referral for the reason listed above. If further clarity is needed, our Transitional Care Specialists are available to discuss any barriers to service at [phone].      We look forward to collaborating with you in the future,  [facility] Team

## 2020-01-30 NOTE — ANESTHESIA TIME REPORT
Anesthesia Start and Stop Event Times     Date Time Event    1/30/2020 0753 Ready for Procedure     0755 Anesthesia Start     0816 Anesthesia Stop        Responsible Staff  01/30/20    Name Role Begin End    Castro Amaro M.D. Anesth 0755 0816        Preop Diagnosis (Free Text):  Pre-op Diagnosis     anemia        Preop Diagnosis (Codes):  Diagnosis Information     Diagnosis Code(s): Duodenitis [535.6]     Gastritis [K29.70]     Gastric ulcer [531]        Post op Diagnosis  Gastritis      Premium Reason  Non-Premium    Comments:

## 2020-01-30 NOTE — PROGRESS NOTES
[facility] Nephrology Daily Progress Note    Date of Service  1/30/2020    Author: Edith SALCEDO, [facility]-NP  Collaborating Physician: Dr. Slade     Chief Complaint   This is a 69-year-old  male with past medical history significant for end-stage renal disease, on chronic   hemodialysis Monday, Wednesday, and Fridays, hypertension, anemia secondary to chronic kidney disease, COPD, CKD bone mineral disorder/renal osteodystrophy, secondary hyperparathyroidism, cardiomyopathy with an EF of 35% by echocardiogram in 10/2019 who was admitted with complaints of shortness of breath after missing his outpatient hemodialysis treatment and was found to have significant hyperkalemia with potassium of 7.9 and now with concern for GI bleed as the patient is having melena and drop in hemoglobin.  The patient reports that he normally dialyzes Monday, Wednesday, and Fridays via a left arm AV fistula.  His last outpatient dialysis treatment was on Friday, 01/24/2020.  The patient was supposed to go to his regular dialysis yesterday on 01/27/2020; however, because of diarrhea, he was unable to go to his outpatient dialysis.  The patient reports that he has had this diarrhea for about 2 weeks and he has noted some black tarry stools at home.  He reports that his appetite has been very poor.  He has not really been eating much.  He presented to the emergency room with complaints of worsening shortness of breath and labs revealed significant hyperkalemia with potassium of 7.9 and elevated BUN level of 169.  Urgent hemodialysis has been ordered and the patient is receiving it at this time.  Also, on admission, he was noted to have anemia with hemoglobin of 7.1 and it did drop this morning to 5.9.  The patient reports that he has had these black tarry stools for about 2 weeks.  He is a somewhat poor historian.  Apparently, he did have some dental work a   few weeks ago and has not been feeling well since packed red blood cell  transfusion has been ordered to be given with dialysis today.    Daily Nephrology Summary:   1/28/20: consult done, HD performed  1/29/20: seen on HD, mild hypotension with HD, AXO, feels weak, tired.  + occult stool, GI to take for Endoscopy tomorrow.    1/30/20: sitting up in bed, still feels weak, had EGD this am, no interventions done, plan to follow up as outpt     Review of Systems  Review of Systems   Constitutional: Positive for malaise/fatigue. Negative for chills and fever.   HENT: Negative for congestion, hearing loss, sore throat and tinnitus.    Eyes: Negative.    Respiratory: Negative for cough, hemoptysis, shortness of breath and wheezing.    Cardiovascular: Positive for orthopnea. Negative for chest pain, palpitations, claudication, leg swelling and PND.   Gastrointestinal: Positive for abdominal pain, diarrhea and melena. Negative for heartburn, nausea and vomiting.   Genitourinary: Negative.    Musculoskeletal: Positive for joint pain.   Skin: Negative.    Neurological: Negative.    Endo/Heme/Allergies: Negative.    Psychiatric/Behavioral: Negative.         Physical Exam  Temp:  [36.1 °C (97 °F)-36.9 °C (98.4 °F)] 36.1 °C (97 °F)  Pulse:  [66-85] 70  Resp:  [16-20] 16  BP: ()/(47-63) 98/61  SpO2:  [95 %-100 %] 99 %    Physical Exam  Constitutional:       Appearance: Normal appearance. He is normal weight.   HENT:      Head: Normocephalic and atraumatic.      Nose: Nose normal.      Mouth/Throat:      Mouth: Mucous membranes are dry.   Eyes:      Extraocular Movements: Extraocular movements intact.      Conjunctiva/sclera: Conjunctivae normal.      Pupils: Pupils are equal, round, and reactive to light.   Neck:      Musculoskeletal: Normal range of motion and neck supple.   Cardiovascular:      Rate and Rhythm: Normal rate and regular rhythm.      Pulses: Normal pulses.      Heart sounds: Normal heart sounds. No murmur. No friction rub. No gallop.    Pulmonary:      Effort: Pulmonary effort is  normal.      Breath sounds: Normal breath sounds.   Abdominal:      General: Bowel sounds are normal.      Palpations: Abdomen is soft.      Tenderness: There is tenderness.   Musculoskeletal: Normal range of motion.   Skin:     General: Skin is warm and dry.   Neurological:      General: No focal deficit present.      Mental Status: He is alert and oriented to person, place, and time.   Psychiatric:         Mood and Affect: Mood normal.         Behavior: Behavior normal.         Thought Content: Thought content normal.         Judgment: Judgment normal.         Fluids    Intake/Output Summary (Last 24 hours) at 1/30/2020 1101  Last data filed at 1/30/2020 0845  Gross per 24 hour   Intake 1057.67 ml   Output --   Net 1057.67 ml       Laboratory  Recent Labs     01/29/20  0650 01/29/20  1353 01/30/20  0359   WBC 8.8 9.4 7.2   RBC 2.31* 2.51* 2.31*   HEMOGLOBIN 7.0* 7.6* 7.1*   HEMATOCRIT 20.8* 23.1* 21.9*   MCV 90.0 92.0 94.8   MCH 30.3 30.3 30.7   MCHC 33.7 32.9* 32.4*   RDW 46.7 48.3 49.4   PLATELETCT 248 243 247   MPV 9.8 9.1 9.9     Recent Labs     01/28/20  1620 01/29/20  0650 01/30/20  0359   SODIUM 135 122* 136   POTASSIUM 4.7 6.3* 4.9   CHLORIDE 89* 80* 94*   CO2 30 29 28   GLUCOSE 106* 86 98   BUN 59* 71* 29*   CREATININE 5.86* 8.03* 5.51*   CALCIUM 9.1 8.7 8.6         No results for input(s): NTPROBNP in the last 72 hours.        Imaging    Assessment/Plan  ASSESSMENT:  1.  End-stage renal disease   - Maint HD q MWF and prn   - via a left arm arteriovenous fistula.    2.  Hyperkalemia, corrected    - manage with HD   3.  Anemia secondary to chronic kidney disease, but also concern for   gastrointestinal bleed now due to melena and drop in hemoglobin   - DILAN with HD   - iron replete   4.  Gastrointestinal bleed.  The patient has been having black tarry   stools/melena and hemoglobin has dropped since admission.   - s/p EGD - no interventions done   - GI will follow up as an outpt  5.  Leukocytosis, resolved     - The patient was afebrile overnight.  6.  Chronic kidney disease, bone mineral disorder/renal osteodystrophy.  The   patient is on a binder 5 times a day apparently per his MAR  7.  Hypertension, now hypotensive    - BP meds on hold     -  As an outpatient, the patient is on midodrine t.i.d. as     well as lisinopril and carvedilol.   8.  Secondary hyperparathyroidism.  The patient is on Sensipar as an   outpatient, unclear if he is compliant.  9.  Chronic obstructive pulmonary disease.  10.  Cardiomyopathy.  The patient with history of congestive heart failure.    His ejection fraction is 35% by echocardiogram in 10/2019.  He currently does   not appear fluid overloaded.  11.  Severe protein-calorie malnutrition   - no dietary protein restrictions  12. Hyponatremia, corrected    - manage with HD      PLAN:  1.  No HD today   2.  HD q MWF and prn  3.  Transfuse prn Hgn < 7.0  4.  DILAN with HD   6.  Follow GI recs   7.  Hold Sensipar for now  8.  Recommend holding blood pressure medications for now and monitoring  9.  Dose adjust all medications for his decreased GFR.  10.  Culture if patient spikes a fever.  11.  No dietary protein restrictions at this time  12.  Okay to transition to outpt hemodialysis when medically stable

## 2020-01-30 NOTE — PROGRESS NOTES
Pt refused afternoon blood sugar. RN got dinner BS four hours later and pt was 54. RN did sugar tabs and apple juice. Pt sugar 15 mins later was 74. Pt still currently eating dinner.

## 2020-01-30 NOTE — PROGRESS NOTES
1910 Received report from JERMAINE Scott. Pt most recent FSBS was 74. Plan is to have egd 1/30, will make NPO at midnight. Safety measures in place, care assumed.     2326 Vitals and FSBS taken.

## 2020-01-30 NOTE — CARE PLAN
Problem: Communication  Goal: The ability to communicate needs accurately and effectively will improve  Outcome: PROGRESSING AS EXPECTED  Note:   Pt will feel comfortable communicating questions and concerns with treatment team. Will continue to foster this relationship. Discussed poc and tests for 1/30.       Problem: Safety  Goal: Will remain free from injury  Outcome: PROGRESSING AS EXPECTED  Note:   Bed alarm on.

## 2020-01-30 NOTE — FACE TO FACE
Face to Face Supporting Documentation - Home Health    The encounter with this patient was in whole or in part the primary reason for home health admission.    Date of encounter:   Patient:                    MRN:                       YOB: 2018  Bridger Savage  [id number]  1950     Home health to see patient for:  Home health aide    Skilled need for:  Exacerbation of Chronic Disease State med review. safety eval    Skilled nursing interventions to include:  Comment: none    Homebound status evidenced by:  Need the aid of supportive devices such as crutches, canes, wheelchairs or walkers. Leaving home requires a considerable and taxing effort. There is a normal inability to leave the home.    Community Physician to provide follow up care: Jasmine Shields M.D.     Optional Interventions? No      I certify the face to face encounter for this home health care referral meets the CMS requirements and the encounter/clinical assessment with the patient was, in whole, or in part, for the medical condition(s) listed above, which is the primary reason for home health care. Based on my clinical findings: the service(s) are medically necessary, support the need for home health care, and the homebound criteria are met.  I certify that this patient has had a face to face encounter by myself.  Capo Soni M.D. - NPI: [id number]

## 2020-01-30 NOTE — DISCHARGE PLANNING
Agency/Facility Name: [facility]   Spoke To: Mary Beth  Outcome: referral pending. Mary Beth is on her way to see pt.     LSW notified.

## 2020-01-30 NOTE — DISCHARGE PLANNING
"LSW spoke with son Aurelio regarding pts POC. Per Aurelio, pt has a brother Emil who lives 5 minutes away and checks on pts several times a week. Pt does live alone. LSW asked Aurelio about reports of pt not taking medications correctly or stooling himself Aurelio did not seem aware of this. Aurelio stated that pt is very stubborn and may not be open to moving into custodial/GH. Per Aurelio, when family does check on pt, pt accuses them of \"just making sure I am alive.\"  LSW stated that staff is concerned of pts safety. Aurelio stated understanding. LSW informed Aurelio that MD has ordered HHC and Aurelio consented to have referral sent back to [facility] as pt has used them before.  LSW stated she would speak with IDT team regarding pts d/c plan.      Care Transition Team Assessment    Information Source  Orientation : Oriented x 4  Information Given By: Patient  Who is responsible for making decisions for patient? : Patient    Readmission Evaluation  Is this a readmission?: Yes - unplanned readmission    Elopement Risk  Legal Hold: No  Ambulatory or Self Mobile in Wheelchair: Yes  Disoriented: No  Psychiatric Symptoms: None  History of Wandering: No  Elopement this Admit: No  Vocalizing Wanting to Leave: No  Displays Behaviors, Body Language Wanting to Leave: No-Not at Risk for Elopement  Elopement Risk: Not at Risk for Elopement    Interdisciplinary Discharge Planning  Patient or legal guardian wants to designate a caregiver (see row info): No    Discharge Preparedness  What is your plan after discharge?: Home with help, Home health care  What are your discharge supports?: Child, Sibling  Prior Functional Level: Ambulatory, Independent with Activities of Daily Living    Functional Assesment  Prior Functional Level: Ambulatory, Independent with Activities of Daily Living    Finances  Financial Barriers to Discharge: No  Prescription Coverage: Yes              Advance Directive  Advance Directive?: POLST    Domestic Abuse  Have you ever " been the victim of abuse or violence?: No  Physical Abuse or Sexual Abuse: No  Verbal Abuse or Emotional Abuse: No  Possible Abuse Reported to:: Not Applicable    Psychological Assessment  History of Substance Abuse: None  History of Psychiatric Problems: No  Non-compliant with Treatment: Yes    Discharge Risks or Barriers  Discharge risks or barriers?: Lives alone, no community support  Patient risk factors: Complex medical needs, Lack of outside supports, Vulnerable adult    Anticipated Discharge Information  Anticipated discharge disposition: [facility], Home  Discharge Address: ([address] [place] [address] [place] [address])

## 2020-01-30 NOTE — PROGRESS NOTES
Telemetry Shift Summary    Rhythm SR  HR Range 61-82  Ectopy rPVC, PAC  Measurements 0.16/0.10/0.36    Per [facility]    Normal Values  Rhythm SR  HR Range    Measurements 0.12-0.20 / 0.06-0.10  / 0.30-0.52

## 2020-01-30 NOTE — DISCHARGE PLANNING
ORALIAW spoke with Adela from [facility]. Pt has been accepted. They will not be able to see pt till Sunday. MD aware . Per Adela, they are reaching out to pts brother to update him on POC.

## 2020-01-30 NOTE — DISCHARGE PLANNING
Received Choice form at [phone]  Agency/Facility Name: [facility]  Referral sent per Choice form @ [phone]

## 2020-01-30 NOTE — ANESTHESIA PREPROCEDURE EVALUATION
Relevant Problems   PULMONARY   (+) COPD (chronic obstructive pulmonary disease) ([facility])   (+) COPD exacerbation ([facility])      CARDIAC   (+) CHF (congestive heart failure) ([facility])   (+) Essential hypertension         (+) ESRD (end stage renal disease) ([facility])   (+) ESRD (end stage renal disease) on dialysis ([facility])       Physical Exam    Airway   Mallampati: II  TM distance: >3 FB  Neck ROM: full       Cardiovascular - normal exam  Rhythm: regular  Rate: normal  (-) murmur     Dental - normal exam  (+) upper dentures         Pulmonary - normal exam  Breath sounds clear to auscultation     Abdominal    Neurological - normal exam                 Anesthesia Plan    ASA 3   ASA physical status 3 criteria: moderate reduction of ejection fraction and COPD    Plan - general                       Informed Consent:

## 2020-01-30 NOTE — OR NURSING
0811 To PACU from OR via gurney, side rails up x 2 for safety, lungs clear bilaterally, pt arrives lying on L side and responds verbally to RN but slurring words. Appears jaundiced. Bruit noted to L AV fistula for dialysis; pt reports dialysis M/W/F. Hyperactive BS to LLQ, RUQ and RLQ. IV covered with coban to L wrist; RN assessed and IV site free of redness or swelling.   0815 Dr Amaro aware of hypotension and given Ephedrine IV in PACU. Pt denies pain or nausea. Abdomen soft and non-tender.   0820 Pt repositioned on gurney for comfort. Remains awake without stimulation.   0825 Pt remains awake and denies pain and nausea. Talking consistently with RN without stimulation.   0830 Dr Amaro informed of recurring hypotension SBP in 70s. Dr Amaro given add'l dose of IV Ephedrine after pt reports dizziness when HOB elevated from 30 to approx 45 degrees. Dr Amaro states goal as SBP 80s.   0845 Pt remains awake and denies pain or nausea. Denies dizziness. Pt responds appropriately to RN.   0855 Meets criteria for transfer back to [facility]/Tele.

## 2020-01-30 NOTE — CARE PLAN
Pt is doing better at calling for assistance before getting out of bed. Pt will be discharged with home health care.

## 2020-01-30 NOTE — DOCUMENTATION QUERY
[facility]                                                                       Query Response Note      PATIENT:               AURELIA DAVIS  ACCT #:                  [id number]  MRN:                     [id number]  :                      1950  ADMIT DATE:       2020 8:47 PM  DISCH DATE:          RESPONDING  PROVIDER #:        [id number]           QUERY TEXT:    Anemia due to unspecified blood loss is documented in the medical record.  Please specify the acuity of the blood loss.     NOTE:  If an appropriate response is not listed below, please respond with a new note.      The patient's Clinical Indicators include:  Anemia is documented in this medical record.  (also anemia due to CKD), pt adm w/hemoglobin on 7.1 w/drop to 5.9 on 20 likely due to GI bleed w/melena noted.  Treatment:  transfusion PRBC's to keep hgb > 7  serial CBC;s   Risk:  Pt has ESRD and GI bleed requiring transfusion  Options provided:   -- Acute blood loss anemia   -- Chronic blood loss   -- Unable to determine      Query created by: Cielo Cartwright on 2020 2:33 PM    RESPONSE TEXT:    Acute blood loss anemia       QUERY TEXT:    Congestive Heart Failure is documented in the Medical Record. Please document the type and acuity (includes probable or suspected).     NOTE:  If an appropriate response is not listed below, please respond with a new note.    The patient's Clinical Indicators include:  Pt has noted in this medical record a history of CHF, currently not volume overloaded  -last EF 35% w/normal diastolic function on 10/1/19  Treatment:  holding medical tx due to medical issues and BP issues  Risk:  Pt w/ESRD w/severe hyperkalemia and GI bleed requiring transfusion  Options provided:   -- Acute Systolic heart failure   -- Chronic Systolic heart failure   -- Acute on Chronic Systolic heart failure   -- Acute Diastolic heart failure   -- Chronic  Diastolic heart failure   -- Acute on Chronic Diastolic heart failure   -- Acute Systolic and Diastolic heart failure   -- Chronic Systolic and Diastolic heart failure   -- Acute on Chronic Systolic and diastolic heart failure   -- Unable to determine      Query created by: Cielo Cartwright on 1/30/2020 2:38 PM    RESPONSE TEXT:    Chronic Systolic heart failure          Electronically signed by:  TAMEKA GILMAN MD 1/30/2020 3:41 PM

## 2020-01-30 NOTE — OR NURSING
0730: Spoke to Anesthesia re labs and protonix, Protonix infusion completed/disconnected per MD, No labs needed at this time-labs from today at 0400, R wrist IV intact/flushes-NS started

## 2020-01-30 NOTE — PROCEDURES
DATE OF SERVICE:  01/30/2020    PROCEDURE:  Esophagogastroduodenoscopy.    PREOPERATIVE DIAGNOSIS:  Gastrointestinal bleed.    POSTOPERATIVE DIAGNOSES:  Prepyloric ulcer, portal hypertensive gastropathy,   mild gastritis, duodenitis and esophagitis.    ANESTHESIA:  Per anesthesiologist.    DESCRIPTION OF PROCEDURE:  After informed consent and appropriate sedation,   the patient was placed in left lateral position and the gastroscope was   advanced through the oropharynx into the esophagus through to the second   portion of the duodenum.  The second portion of the duodenum was unremarkable.    The scope was then withdrawn.  The duodenal bulb did have some signs of the   duodenitis.  The scope was then withdrawn back into the stomach.  There was a   prepyloric clean based ulcer that was approximately 6 mm with no stigmata and   required no intervention.  There was mild gastritis in the distal body of the   stomach and on retroflexion, the gastric cardia and fundus showed evidence of   what appeared to be portal hypertensive gastropathy.  The stomach was   decompressed.  The scope was withdrawn.  The esophagus showed LA grade A   esophagitis and the remainder of the esophagus was unremarkable.  The bowel   was decompressed.  The scope was withdrawn.  There were no immediate postop   complications.    RECOMMENDATIONS:  1.  PPI IV b.i.d. while inpatient upon discharge.  Okay to be oral PPI b.i.d.    Recommend repeating EGD as an outpatient in 8 weeks to ensure the ulcer has   healed.  2.  Follow up with Dr. Monge as an outpatient or whoever saw the patient   first for consultation.  Recommend followup be scheduled upon discharge.  3.  Okay to advance diet as tolerated.  4.  Recommend looking into potential liver disease including cirrhosis because   of the nonspecific signs of possible portal hypertensive gastropathy in the   stomach.  5.  We will sign off for now.  Please call with any questions or concerns and    have the patient schedule followup as well as a repeat EGD.  I will also call   the office.       ____________________________________     DO MESHA Macedo    DD:  01/30/2020 08:08:24  DT:  01/30/2020 08:49:59    D#:  [id number]  Job#:  [id number]

## 2020-01-31 VITALS
HEART RATE: 80 BPM | DIASTOLIC BLOOD PRESSURE: 57 MMHG | TEMPERATURE: 98 F | WEIGHT: 173.28 LBS | SYSTOLIC BLOOD PRESSURE: 90 MMHG | RESPIRATION RATE: 18 BRPM | HEIGHT: 72 IN | OXYGEN SATURATION: 100 % | BODY MASS INDEX: 23.47 KG/M2

## 2020-01-31 LAB
GLUCOSE BLD-MCNC: 82 MG/DL (ref 65–99)
GLUCOSE BLD-MCNC: 83 MG/DL (ref 65–99)

## 2020-01-31 PROCEDURE — A9270 NON-COVERED ITEM OR SERVICE: HCPCS | Performed by: HOSPITALIST

## 2020-01-31 PROCEDURE — 99239 HOSP IP/OBS DSCHRG MGMT >30: CPT | Performed by: HOSPITALIST

## 2020-01-31 PROCEDURE — 82962 GLUCOSE BLOOD TEST: CPT

## 2020-01-31 PROCEDURE — 700102 HCHG RX REV CODE 250 W/ 637 OVERRIDE(OP): Performed by: HOSPITALIST

## 2020-01-31 PROCEDURE — 90935 HEMODIALYSIS ONE EVALUATION: CPT

## 2020-01-31 PROCEDURE — 700111 HCHG RX REV CODE 636 W/ 250 OVERRIDE (IP): Performed by: INTERNAL MEDICINE

## 2020-01-31 RX ADMIN — OMEPRAZOLE 20 MG: 20 CAPSULE, DELAYED RELEASE ORAL at 05:15

## 2020-01-31 RX ADMIN — ERYTHROPOIETIN 10000 UNITS: 10000 INJECTION, SOLUTION INTRAVENOUS; SUBCUTANEOUS at 11:00

## 2020-01-31 RX ADMIN — MIDODRINE HYDROCHLORIDE 10 MG: 10 TABLET ORAL at 05:15

## 2020-01-31 ASSESSMENT — ENCOUNTER SYMPTOMS
HEMOPTYSIS: 0
BACK PAIN: 0
FEVER: 0
DEPRESSION: 0
PND: 0
ORTHOPNEA: 1
BLURRED VISION: 0
ABDOMINAL PAIN: 1
EYES NEGATIVE: 1
PSYCHIATRIC NEGATIVE: 1
TINGLING: 0
VOMITING: 0
NEUROLOGICAL NEGATIVE: 1
SHORTNESS OF BREATH: 0
HEARTBURN: 0
NECK PAIN: 0
EYE PAIN: 0
PALPITATIONS: 0
SORE THROAT: 0
DIARRHEA: 1
CHILLS: 0
DIZZINESS: 0
SHORTNESS OF BREATH: 1
COUGH: 0
INSOMNIA: 0
ABDOMINAL PAIN: 0
CLAUDICATION: 0
WHEEZING: 0
NAUSEA: 0

## 2020-01-31 ASSESSMENT — LIFESTYLE VARIABLES
ALCOHOL_USE: NO
HAVE YOU EVER FELT YOU SHOULD CUT DOWN ON YOUR DRINKING: NO
TOTAL SCORE: 0
TOTAL SCORE: 0
HAVE PEOPLE ANNOYED YOU BY CRITICIZING YOUR DRINKING: NO
HOW MANY TIMES IN THE PAST YEAR HAVE YOU HAD 5 OR MORE DRINKS IN A DAY: 0
CONSUMPTION TOTAL: NEGATIVE
EVER FELT BAD OR GUILTY ABOUT YOUR DRINKING: NO
EVER HAD A DRINK FIRST THING IN THE MORNING TO STEADY YOUR NERVES TO GET RID OF A HANGOVER: NO
TOTAL SCORE: 0
AVERAGE NUMBER OF DAYS PER WEEK YOU HAVE A DRINK CONTAINING ALCOHOL: 0
ON A TYPICAL DAY WHEN YOU DRINK ALCOHOL HOW MANY DRINKS DO YOU HAVE: 0

## 2020-01-31 NOTE — PROGRESS NOTES
Bedside report given to VICKI RN. POC discussed. Pt up to restroom. Safety precautions in place.

## 2020-01-31 NOTE — PROGRESS NOTES
[facility] Medicine Daily Progress Note    Date of Service  1/31/2020    Chief Complaint  69 y.o. male admitted 1/27/2020 with dyspnea.     Hospital Course    He has a known history of ESRD on dialysis. He had missed dialysis prior to admission for diarrhea. He was found to have severe hyperkalemia. He was admitted and treated with medical therapy. His admission was later complicated by evidence for GI bleeding. Both nephrology and GI were consulted. He was emergently dialyzed.        Interval Problem Update  1/28- I was contacted this am by the RN after his repeat labs resulted. His potassium is no better and his hgb dropped to 5.9. His blood sugar dropped over night to the 20s as well. He apparently is having dark stools with red streaking per the RN. I transfused him (to be given with dialysis), gave emergent calcium, and bicarbonate. I started a PPI and ordered stool occuIt blood and serial hemoglobins. I immediately contacted nephrology for emergent dialysis.   1/29- egd planned for tomorrow. His hgb is slowly dropping. I added a protonix gtt per GI recommendations.   1/30- I had a long discussion wiht him today about his recurrent admission and my concern that he is not functioning well at home. He insists that he is and refused placement, rehab or other options. He did accept home health but wanted to be discharged. Later in the day he oziel he had no ride and wanted to stay because he did not have a ride to dialysis the following day.     Consultants/Specialty  nephrology-  GI      Code Status  full    Disposition  Guarded    This patient is critically ill with a very high risk for mobidity and death. The last time this happened he was intubated in the ICU. Low threshold to transfer. However dialysis will be happening immediately which should take care of the immediate issue of potassium. If his bleeding does not improve I will contact GI immediately but he is too unstable for intervention at this time.  Critical care time from 733-812am today. No overlap.     Review of Systems  Review of Systems   Constitutional: Positive for malaise/fatigue. Negative for chills and fever.   HENT: Negative for sore throat.    Eyes: Negative for blurred vision and pain.   Respiratory: Positive for shortness of breath. Negative for cough.    Cardiovascular: Negative for chest pain and palpitations.   Gastrointestinal: Positive for melena. Negative for abdominal pain and nausea.   Genitourinary: Negative for dysuria and urgency.   Musculoskeletal: Negative for back pain and neck pain.   Skin: Negative for itching and rash.   Neurological: Negative for dizziness and tingling.   Psychiatric/Behavioral: Negative for depression. The patient does not have insomnia.    All other systems reviewed and are negative.       Physical Exam  Temp:  [36.1 °C (97 °F)-36.8 °C (98.2 °F)] 36.6 °C (97.8 °F)  Pulse:  [65-81] 81  Resp:  [16-20] 18  BP: ()/(47-87) 102/62  SpO2:  [87 %-100 %] 92 %    Physical Exam  Vitals signs and nursing note reviewed.   Constitutional:       General: He is not in acute distress.     Appearance: He is well-developed. He is not diaphoretic.      Comments: Patient seen and examined at the bedside. Plan discussed at bedside with the RN.    HENT:      Right Ear: External ear normal.      Left Ear: External ear normal.      Nose: Nose normal.   Eyes:      General: No scleral icterus.        Right eye: No discharge.         Left eye: No discharge.   Neck:      Vascular: No JVD.      Trachea: No tracheal deviation.   Cardiovascular:      Rate and Rhythm: Normal rate.      Heart sounds: Normal heart sounds. No murmur.   Pulmonary:      Effort: Pulmonary effort is normal. No respiratory distress.      Breath sounds: Normal breath sounds. No wheezing or rales.   Abdominal:      General: Bowel sounds are normal. There is no distension.      Palpations: Abdomen is soft.      Tenderness: There is no tenderness. There is no  guarding.   Musculoskeletal:         General: No tenderness.      Comments: Left arm fistula   Skin:     General: Skin is warm and dry.      Findings: No erythema.   Neurological:      Mental Status: He is alert and oriented to person, place, and time.   Psychiatric:         Behavior: Behavior normal.         Fluids    Intake/Output Summary (Last 24 hours) at 1/31/2020 0816  Last data filed at 1/30/2020 0845  Gross per 24 hour   Intake 175 ml   Output --   Net 175 ml       Laboratory  Recent Labs     01/29/20  0650 01/29/20  1353 01/30/20  0359   WBC 8.8 9.4 7.2   RBC 2.31* 2.51* 2.31*   HEMOGLOBIN 7.0* 7.6* 7.1*   HEMATOCRIT 20.8* 23.1* 21.9*   MCV 90.0 92.0 94.8   MCH 30.3 30.3 30.7   MCHC 33.7 32.9* 32.4*   RDW 46.7 48.3 49.4   PLATELETCT 248 243 247   MPV 9.8 9.1 9.9     Recent Labs     01/28/20  1620 01/29/20  0650 01/30/20  0359   SODIUM 135 122* 136   POTASSIUM 4.7 6.3* 4.9   CHLORIDE 89* 80* 94*   CO2 30 29 28   GLUCOSE 106* 86 98   BUN 59* 71* 29*   CREATININE 5.86* 8.03* 5.51*   CALCIUM 9.1 8.7 8.6                   Imaging  DX-CHEST-2 VIEWS   Final Result      No evidence of acute cardiopulmonary process.           Assessment/Plan  * GIB (gastrointestinal bleeding)- (present on admission)  Assessment & Plan  Not improved.   Transfuse  Protonix drip  Pending egd  Serial hgb    Hyperkalemia- (present on admission)  Assessment & Plan  Initially Improved with dialysis. Now worsening again. Dialysis per nephrology. Keep on tele.       ESRD (end stage renal disease) on dialysis (HCC)- (present on admission)  Assessment & Plan  -With a missed hemodialysis yesterday.   Now with a critical potassium    -Now with profound hyperkalemia with new ekg changes.   Emergent dialysis requested today.       High anion gap metabolic acidosis- (present on admission)  Assessment & Plan  Improved with dialysis.     Leukocytosis  Assessment & Plan  -No additional sign of infection  -Repeat CBC in the morning    Essential  hypertension- (present on admission)  Assessment & Plan  -Patient is generally on Coreg and lisinopril but then is on midodrine as well?  Off midodrine  -Holding lisinopril due to hyperkalemia  -continue PRN clonidine  Coreg was only QD and BP is soft. Hold for now.     Non-ischemic cardiomyopathy (HCC)- (present on admission)  Assessment & Plan  Continue to hold medical therapy given other medical issues and BP issues.     Tobacco abuse- (present on admission)  Assessment & Plan  -Tobacco cessation counseling and education provided     COPD (chronic obstructive pulmonary disease) (HCC)- (present on admission)  Assessment & Plan  -No acute exacerbation  Rt protocol prn       VTE prophylaxis: scd

## 2020-01-31 NOTE — ANESTHESIA POSTPROCEDURE EVALUATION
Patient: Bridger Savage    Procedure Summary     Date:  01/30/20 Room / Location:   ENDOSCOPIC ULTRASOUND ROOM / SURGERY [facility]    Anesthesia Start:  0755 Anesthesia Stop:  0816    Procedure:  GASTROSCOPY Diagnosis:       Duodenitis      Gastritis      Gastric ulcer      (Gastric ulcer [531.ICD-9-CM])    Surgeon:  Richard Cano D.O. Responsible Provider:  Castro Amaro M.D.    Anesthesia Type:  general ASA Status:  3          Final Anesthesia Type: general  Last vitals  BP   Blood Pressure : (!) 97/55    Temp   36.7 °C (98.1 °F)    Pulse   Pulse: 66   Resp   16    SpO2   98 %      Anesthesia Post Evaluation    Patient location during evaluation: PACU  Patient participation: complete - patient participated  Level of consciousness: awake and alert    Airway patency: patent  Anesthetic complications: no  Cardiovascular status: hemodynamically stable  Respiratory status: acceptable  Hydration status: euvolemic    PONV: none           Nurse Pain Score: 0 (NPRS)

## 2020-01-31 NOTE — CARE PLAN
Problem: Safety  Goal: Will remain free from injury  Outcome: PROGRESSING AS EXPECTED    Safety measures in place, Pt uses call light appropriately.     Problem: Communication  Goal: The ability to communicate needs accurately and effectively will improve  Outcome: PROGRESSING AS EXPECTED    Pt called communicates needs to staff appropriately; Pt called daughter-in-law to coordinate ride home for discharge.

## 2020-01-31 NOTE — PROGRESS NOTES
Hemodialysis done today, started @ 0906 and ended @ 1209 with net UF= 1000ml, HD time decreased to 3 hours per ERVIN Ricketts. VSS post HD. Report given to JERMAINE Messer. See flow sheet for details.

## 2020-01-31 NOTE — PROGRESS NOTES
Telemetry Shift Summary    Rhythm SR  HR Range 66-75  Ectopy rPVC  Measurements 0.18/0.08/0.40        Normal Values  Rhythm SR  HR Range    Measurements 0.12-0.20 / 0.06-0.10  / 0.30-0.52

## 2020-01-31 NOTE — DISCHARGE SUMMARY
Discharge Summary    CHIEF COMPLAINT ON ADMISSION  Chief Complaint   Patient presents with   • Shortness of Breath       Reason for Admission  SOB     Admission Date  1/27/2020    CODE STATUS  Prior    HPI & HOSPITAL COURSE  This is a 69 y.o. male here with dyspnea.     He has a known history of ESRD on dialysis. He had missed dialysis prior to admission for diarrhea. He was found to have severe hyperkalemia. He was admitted and treated with medical therapy. His admission was later complicated by evidence for GI bleeding. Both nephrology and GI were consulted. He was emergently dialyzed.   His electrolytes and edema improved. He had no further complications during his admission. I did try to get him to accept placement into a group home as he has had multiple admission for noncompliance. He refused this but was willing to accept home health. This was set up and he will follow up with his PCP.     Therefore, he is discharged in good and stable condition to home with close outpatient follow-up.    The patient met 2-midnight criteria for an inpatient stay at the time of discharge.    Discharge Date  1/31/2020    FOLLOW UP ITEMS POST DISCHARGE  none    DISCHARGE DIAGNOSES  Principal Problem:    GIB (gastrointestinal bleeding) POA: Yes  Active Problems:    Hyperkalemia POA: Yes    ESRD (end stage renal disease) on dialysis (HCC) POA: Yes    Non-ischemic cardiomyopathy (HCC) POA: Yes    Essential hypertension POA: Yes    High anion gap metabolic acidosis POA: Yes    COPD (chronic obstructive pulmonary disease) (HCC) POA: Yes    Tobacco abuse POA: Yes  Resolved Problems:    * No resolved hospital problems. *      FOLLOW UP  Future Appointments   Date Time Provider Department Center   2/18/2020  9:00 AM Jasmine Shields M.D. [facility] None   7/14/2020  2:20 PM Jasmine Shields M.D. [facility] None     Jasmine Shields M.D.  [address] [address]  [phone]    On 1/18/2021        MEDICATIONS ON DISCHARGE      Medication List      START taking these medications      Instructions   omeprazole 20 MG delayed-release capsule  Commonly known as:  PRILOSEC   Take 1 Cap by mouth 2 Times a Day.  Dose:  20 mg        CONTINUE taking these medications      Instructions   acetaminophen 500 MG Tabs  Commonly known as:  TYLENOL   Take 500-1,000 mg by mouth every 6 hours as needed for Moderate Pain.  Dose:  500-1,000 mg     albuterol 108 (90 Base) MCG/ACT Aers inhalation aerosol   Inhale 2 Puffs by mouth every 6 hours as needed for Shortness of Breath.  Dose:  2 Puff     amoxicillin 875 MG tablet  Commonly known as:  AMOXIL   Take 875 mg by mouth 2 times a day. Pt picked up on 1/23/2020 for 10 day course.  Dose:  875 mg     Anoro Ellipta 62.5-25 MCG/INH Aepb inhaler  Generic drug:  umeclidinium-vilanterol   Inhale 1 Puff by mouth every day.  Dose:  1 Puff     ANTI-DIARRHEAL PO   Take 1-2 Tabs by mouth as needed (For diarrea).  Dose:  1-2 Tab     diphenhydrAMINE 25 MG Tabs  Commonly known as:  BENADRYL   Take 25 mg by mouth every 6 hours as needed for Sleep or Itching. Indications: Hayfever  Dose:  25 mg     HYDROcodone-acetaminophen 7.5-325 MG per tablet  Commonly known as:  NORCO   Take 1-2 Tabs by mouth every 6 hours as needed for Severe Pain.  Dose:  1-2 Tab     midodrine 10 MG tablet  Commonly known as:  PROAMATINE   Take 10 mg by mouth 3 times a day. Take 1 tablet by mouth three times a day  Dose:  10 mg     Sensipar 60 MG Tabs  Generic drug:  Cinacalcet HCl   Take 60 mg by mouth every day.  Dose:  60 mg        STOP taking these medications    aspirin EC 81 MG Tbec  Commonly known as:  ECOTRIN            Allergies  Allergies   Allergen Reactions   • Chloraprep One Step Itching       DIET  No orders of the defined types were placed in this encounter.      ACTIVITY  As tolerated.  Weight bearing as tolerated    CONSULTATIONS  nephrology    PROCEDURES  none    LABORATORY  Lab Results   Component Value Date    SODIUM 136 01/30/2020     POTASSIUM 4.9 01/30/2020    CHLORIDE 94 (L) 01/30/2020    CO2 28 01/30/2020    GLUCOSE 98 01/30/2020    BUN 29 (H) 01/30/2020    CREATININE 5.51 (HH) 01/30/2020        Lab Results   Component Value Date    WBC 7.2 01/30/2020    HEMOGLOBIN 7.1 (L) 01/30/2020    HEMATOCRIT 21.9 (L) 01/30/2020    PLATELETCT 247 01/30/2020        Total time of the discharge process exceeds 34 minutes.

## 2020-01-31 NOTE — PROGRESS NOTES
[facility] Nephrology Daily Progress Note    Date of Service  1/31/2020    Author: Edith SALCEDO, [facility]-NP  Collaborating Physician: Dr. Slade     Chief Complaint   This is a 69-year-old  male with past medical history significant for end-stage renal disease, on chronic   hemodialysis Monday, Wednesday, and Fridays, hypertension, anemia secondary to chronic kidney disease, COPD, CKD bone mineral disorder/renal osteodystrophy, secondary hyperparathyroidism, cardiomyopathy with an EF of 35% by echocardiogram in 10/2019 who was admitted with complaints of shortness of breath after missing his outpatient hemodialysis treatment and was found to have significant hyperkalemia with potassium of 7.9 and now with concern for GI bleed as the patient is having melena and drop in hemoglobin.  The patient reports that he normally dialyzes Monday, Wednesday, and Fridays via a left arm AV fistula.  His last outpatient dialysis treatment was on Friday, 01/24/2020.  The patient was supposed to go to his regular dialysis yesterday on 01/27/2020; however, because of diarrhea, he was unable to go to his outpatient dialysis.  The patient reports that he has had this diarrhea for about 2 weeks and he has noted some black tarry stools at home.  He reports that his appetite has been very poor.  He has not really been eating much.  He presented to the emergency room with complaints of worsening shortness of breath and labs revealed significant hyperkalemia with potassium of 7.9 and elevated BUN level of 169.  Urgent hemodialysis has been ordered and the patient is receiving it at this time.  Also, on admission, he was noted to have anemia with hemoglobin of 7.1 and it did drop this morning to 5.9.  The patient reports that he has had these black tarry stools for about 2 weeks.  He is a somewhat poor historian.  Apparently, he did have some dental work a   few weeks ago and has not been feeling well since packed red blood cell  transfusion has been ordered to be given with dialysis today.    Daily Nephrology Summary:   1/28/20: consult done, HD performed  1/29/20: seen on HD, mild hypotension with HD, AXO, feels weak, tired.  + occult stool, GI to take for Endoscopy tomorrow.    1/30/20: sitting up in bed, still feels weak, had EGD this am, no interventions done, plan to follow up as outpt   1/31/20:  Patient laying in bed on HD.  HD inpatient prior to discharge today.  Resume HD as outpatient.     Review of Systems  Review of Systems   Constitutional: Positive for malaise/fatigue. Negative for chills and fever.   HENT: Negative for congestion, hearing loss, sore throat and tinnitus.    Eyes: Negative.    Respiratory: Negative for cough, hemoptysis, shortness of breath and wheezing.    Cardiovascular: Positive for orthopnea. Negative for chest pain, palpitations, claudication, leg swelling and PND.   Gastrointestinal: Positive for abdominal pain, diarrhea and melena. Negative for heartburn, nausea and vomiting.   Genitourinary: Negative.    Musculoskeletal: Positive for joint pain.   Skin: Negative.    Neurological: Negative.    Endo/Heme/Allergies: Negative.    Psychiatric/Behavioral: Negative.         Physical Exam  Temp:  [36.2 °C (97.2 °F)-36.8 °C (98.2 °F)] 36.6 °C (97.8 °F)  Pulse:  [65-81] 81  Resp:  [16-20] 18  BP: ()/(50-87) 102/62  SpO2:  [87 %-98 %] 92 %    Physical Exam  Constitutional:       Appearance: Normal appearance. He is normal weight.   HENT:      Head: Normocephalic and atraumatic.      Nose: Nose normal.      Mouth/Throat:      Mouth: Mucous membranes are dry.   Eyes:      Extraocular Movements: Extraocular movements intact.      Conjunctiva/sclera: Conjunctivae normal.      Pupils: Pupils are equal, round, and reactive to light.   Neck:      Musculoskeletal: Normal range of motion and neck supple.   Cardiovascular:      Rate and Rhythm: Normal rate and regular rhythm.      Pulses: Normal pulses.      Heart  sounds: No friction rub. No gallop.    Pulmonary:      Effort: Pulmonary effort is normal.      Breath sounds: Normal breath sounds.   Abdominal:      General: Bowel sounds are normal.      Palpations: Abdomen is soft.      Tenderness: There is tenderness.   Musculoskeletal: Normal range of motion.   Skin:     General: Skin is warm and dry.   Neurological:      General: No focal deficit present.      Mental Status: He is alert and oriented to person, place, and time.   Psychiatric:         Mood and Affect: Mood normal.         Behavior: Behavior normal.         Thought Content: Thought content normal.         Judgment: Judgment normal.         Fluids  No intake or output data in the 24 hours ending 01/31/20 0951    Laboratory  Recent Labs     01/29/20  0650 01/29/20  1353 01/30/20  0359   WBC 8.8 9.4 7.2   RBC 2.31* 2.51* 2.31*   HEMOGLOBIN 7.0* 7.6* 7.1*   HEMATOCRIT 20.8* 23.1* 21.9*   MCV 90.0 92.0 94.8   MCH 30.3 30.3 30.7   MCHC 33.7 32.9* 32.4*   RDW 46.7 48.3 49.4   PLATELETCT 248 243 247   MPV 9.8 9.1 9.9     Recent Labs     01/28/20  1620 01/29/20  0650 01/30/20  0359   SODIUM 135 122* 136   POTASSIUM 4.7 6.3* 4.9   CHLORIDE 89* 80* 94*   CO2 30 29 28   GLUCOSE 106* 86 98   BUN 59* 71* 29*   CREATININE 5.86* 8.03* 5.51*   CALCIUM 9.1 8.7 8.6         No results for input(s): NTPROBNP in the last 72 hours.        Imaging    Assessment/Plan  ASSESSMENT:  1.  End-stage renal disease   - Maint HD q MWF and prn   - via a left arm arteriovenous fistula.    2.  Hyperkalemia, corrected    - manage with HD   3.  Anemia secondary to chronic kidney disease, but also concern for   gastrointestinal bleed now due to melena and drop in hemoglobin   - DILAN with HD   - iron replete   4.  Gastrointestinal bleed.  The patient has been having black tarry   stools/melena and hemoglobin has dropped since admission.   - s/p EGD - no interventions done   - GI will follow up as an outpt  5.  Leukocytosis, resolved    - The patient  was afebrile overnight.  6.  Chronic kidney disease, bone mineral disorder/renal osteodystrophy.  The   patient is on a binder 5 times a day apparently per his MAR  7.  Hypertension, now hypotensive    - BP meds on hold     -  As an outpatient, the patient is on midodrine t.i.d.   8.  Secondary hyperparathyroidism.  The patient is on Sensipar as an   outpatient, unclear if he is compliant.  9.  Chronic obstructive pulmonary disease.  10.  Cardiomyopathy.  The patient with history of congestive heart failure.    His ejection fraction is 35% by echocardiogram in 10/2019.  He currently does   not appear fluid overloaded.  11.  Severe protein-calorie malnutrition   - no dietary protein restrictions  12. Hyponatremia, corrected    - manage with HD      PLAN:  1.  HD today prior to discharge  2.  HD q MWF and prn  3.  Transfuse prn Hgn < 7.0  4.  DILAN with HD   6.  Follow GI recs   7.  Hold Sensipar for now  8. Midodrine  9.  Dose adjust all medications for his decreased GFR.  10.  Culture if patient spikes a fever.  11.  No dietary protein restrictions at this time  12.  Okay to transition to outpt hemodialysis when medically stable

## 2020-01-31 NOTE — ANESTHESIA QCDR
2019 [facility] Clinical Data Registry (for Quality Improvement)     Postoperative nausea/vomiting risk protocol (Adult = 18 yrs and Pediatric 3-17 yrs)- (430 and 463)  General inhalation anesthetic (NOT TIVA) with PONV risk factors: No  Provision of anti-emetic therapy with at least 2 different classes of agents: N/A  Patient DID NOT receive anti-emetic therapy and reason is documented in Medical Record: N/A    Multimodal Pain Management- (477)  Non-emergent surgery AND patient age >= 18: Yes  Use of Multimodal Pain Management, two or more drugs and/or interventions, NOT including systemic opioids: Yes  Exception: Documented allergy to multiple classes of analgesics: N/A    Smoking Abstinence (404)  Patient is current smoker (cigarette, pipe, e-cig, marijuanna): No  Elective Surgery:   Abstinence instructions provided prior to day of surgery:   Patient abstained from smoking on day of surgery:     Pre-Op Beta-Blocker in Isolated CABG (44)  Isolated CABG AND patient age >= 18: No  Beta-blocker admin within 24 hours of surgical incision:   Exception:of medical reason(s) for not administering beta blocker within 24 hours prior to surgical incision (e.g., not  indicated,other medical reason):     PACU assessment of acute postoperative pain prior to Anesthesia Care End- Applies to Patients Age = 18- (ABG7)  Initial PACU pain score is which of the following: < 7/10  Patient unable to report pain score: N/A    Post-anesthetic transfer of care checklist/protocol to PACU/ICU- (426 and 427)  Upon conclusion of case, patient transferred to which of the following locations: PACU/Non-ICU  Use of transfer checklist/protocol: Yes  Exclusion: Service Performed in Patient Hospital Room (and thus did not require transfer): N/A  Unplanned admission to ICU related to anesthesia service up through end of PACU care- (MD51)  Unplanned admission to ICU (not initially anticipated at anesthesia start time): No

## 2020-01-31 NOTE — PROGRESS NOTES
FS taken, 95, patient has no c/o pain. Patient requesting a cup of ice. No other needs at this time.

## 2020-01-31 NOTE — PROGRESS NOTES
Pt aox4, discharge summary given, discharge education provided.  Pt verbalized understanding.  Follow up scheduled. IV removed. All belongings returned. Family at bedside, no further concerns. Escorted by staff to the [facility]. Discharged safely.

## 2020-01-31 NOTE — PROGRESS NOTES
Bedside report received from Sonia DEAN. Assumed care. POC discussed. Pt resting comfortably in bed. Safety precautions in place.

## 2020-01-31 NOTE — CARE PLAN
Problem: Infection  Goal: Will remain free from infection  Outcome: PROGRESSING AS EXPECTED  Note:   Pt shows no s/s of infection, Pt is afebrile, WBC 7.2. Standard precautions in place, hand washing performed before and after pt care.        Problem: Pain Management  Goal: Pain level will decrease to patient's comfort goal  Outcome: PROGRESSING AS EXPECTED  Note:   Patient has no c/o pain throughout shift, will continue to monitor.

## 2020-01-31 NOTE — DISCHARGE INSTRUCTIONS
Discharge Instructions    Discharged to home by car with relative. Discharged via wheelchair, hospital escort: Yes.  Special equipment needed: Not Applicable    Be sure to schedule a follow-up appointment with your primary care doctor or any specialists as instructed.     Discharge Plan:   Diet Plan: (P) Discussed  Activity Level: (P) Discussed  Smoking Cessation Offered: Patient Refused  Confirmed Follow up Appointment: (P) Appointment Scheduled  Confirmed Symptoms Management: (P) Discussed  Medication Reconciliation Updated: (P) Yes  Influenza Vaccine Indication: Not indicated: Previously immunized this influenza season and > 8 years of age    I understand that a diet low in cholesterol, fat, and sodium is recommended for good health. Unless I have been given specific instructions below for another diet, I accept this instruction as my diet prescription.   Other diet: Regular diet    Special Instructions: None    · Is patient discharged on Warfarin / Coumadin?   No     Depression / Suicide Risk    As you are discharged from this [facility] facility, it is important to learn how to keep safe from harming yourself.    Recognize the warning signs:  · Abrupt changes in personality, positive or negative- including increase in energy   · Giving away possessions  · Change in eating patterns- significant weight changes-  positive or negative  · Change in sleeping patterns- unable to sleep or sleeping all the time   · Unwillingness or inability to communicate  · Depression  · Unusual sadness, discouragement and loneliness  · Talk of wanting to die  · Neglect of personal appearance   · Rebelliousness- reckless behavior  · Withdrawal from people/activities they love  · Confusion- inability to concentrate     If you or a loved one observes any of these behaviors or has concerns about self-harm, here's what you can do:  · Talk about it- your feelings and reasons for harming yourself  · Remove any means that you might use to  hurt yourself (examples: pills, rope, extension cords, firearm)  · Get professional help from the community (Mental Health, Substance Abuse, psychological counseling)  · Do not be alone:Call your Safe Contact- someone whom you trust who will be there for you.  · Call your local CRISIS HOTLINE 840-3433 or 347-270-1552  · Call your local Children's Mobile Crisis Response Team [place] (604) 089-1545 or www.Truzip  · Call the toll free National Suicide Prevention Hotlines   · National Suicide Prevention Lifeline 323-101-STMX (3034)  · National Hope Line Network 800-SUICIDE (421-1182)    Esophagogastrectomy, Care After  Refer to this sheet in the next few weeks. These instructions provide you with information on caring for yourself after your procedure. Your caregiver may also give you more specific instructions. Your treatment has been planned according to current medical practices, but problems sometimes occur. Call your caregiver if you have any problems or questions after your procedure.  HOME CARE INSTRUCTIONS   · Know how to care for your temporary feeding tube. You will be given instructions on how to use it before you leave the hospital. You will have one for about 2-3 weeks. The feeding tube will be removed when you are able to take an oral diet that is sufficient. A home health nurse may come to your home to supervise your use of the tube.  · Take all medicines as directed.  · Do not use over-the-counter medicines, vitamins, or herbal supplements without your caregiver's approval.  · Do not drive while you are using narcotic pain medicines or medicines for nausea.  · Follow the diet prescribed by your caregiver.  · Follow your caregiver's recommendations for activity level, amount of weight you can lift, and time frame for return to work. These recommendations will depend on the type of surgery you had and your general health.  · Care for your surgical cuts (incisions) and bandages (dressings)  as directed by your caregiver.  · Check with your surgeon before you fly on a plane. Air travel may be allowed 1 week after surgery.  · You may shower unless instructed otherwise. Do not scrub the incisions. Pat them dry. The use of your arms overhead to wash your hair may cause fatigue, shortness of breath, or pain. You may need someone to help you.  · No baths, hot tubs, or swimming until your caregiver approves.  · Check with your surgeon before resuming sexual activity. Sexual activity may be restricted for 4-6 weeks after surgery to allow the incisions to heal adequately.  · Keep all follow-up appointments as directed by your caregiver.  SEEK MEDICAL CARE IF:   · You have increased pain that does not improve with medicine.  · You have trouble swallowing.  · You have heartburn or indigestion.  · You feel nauseous or vomit.  · You cannot have a bowel movement (constipated) or have diarrhea.  · You have a rash.  · You have burning with urination, or need to urinate more frequently than usual.  · You have a new cough.  SEEK IMMEDIATE MEDICAL CARE IF:   · You have a fever or persistent symptoms for more than 2-3 days.  · You have a fever and your symptoms suddenly get worse.  · You have chills.  · You have chest pain.  · You have shortness of breath.  · Your dressing on your incision becomes soaked with blood.  · Your incision becomes red, opens up, or has a creamy or bad smelling discharge.  · You have a severe increase in pain.  · You have pain, tenderness, or redness in your calf.     This information is not intended to replace advice given to you by your health care provider. Make sure you discuss any questions you have with your health care provider.     Document Released: 06/18/2013 Document Revised: 10/08/2014 Document Reviewed: 06/18/2013  [employer] Interactive Patient Education ©2016 [employer]        Shortness of Breath, Adult  Shortness of breath is when a person has trouble breathing enough air, or  when a person feels like she or he is having trouble breathing in enough air. Shortness of breath could be a sign of medical problem.  Follow these instructions at home:  Pay attention to any changes in your symptoms. Take these actions to help with your condition:  · Do not smoke. Smoking is a common cause of shortness of breath. If you smoke and you need help quitting, ask your health care provider.  · Avoid things that can irritate your airways, such as:  ¨ Mold.  ¨ Dust.  ¨ Air pollution.  ¨ Chemical fumes.  ¨ Things that can cause allergy symptoms (allergens), if you have allergies.  · Keep your living space clean and free of mold and dust.  · Rest as needed. Slowly return to your usual activities.  · Take over-the-counter and prescription medicines, including oxygen and inhaled medicines, only as told by your health care provider.  · Keep all follow-up visits as told by your health care provider. This is important.  Contact a health care provider if:  · Your condition does not improve as soon as expected.  · You have a hard time doing your normal activities, even after you rest.  · You have new symptoms.  Get help right away if:  · Your shortness of breath gets worse.  · You have shortness of breath when you are resting.  · You feel light-headed or you faint.  · You have a cough that is not controlled with medicines.  · You cough up blood.  · You have pain with breathing.  · You have pain in your chest, arms, shoulders, or abdomen.  · You have a fever.  · You cannot walk up stairs or exercise the way that you normally do.  This information is not intended to replace advice given to you by your health care provider. Make sure you discuss any questions you have with your health care provider.  Document Released: 09/12/2002 Document Revised: 07/08/2017 Document Reviewed: 05/25/2017  [employer] Interactive Patient Education © 2017 [employer]      Omeprazole tablets (OTC)  What is this medicine?  OMEPRAZOLE (oh ME  pray zol) prevents the production of acid in the stomach. It is used to treat the symptoms of heartburn. You can buy this medicine without a prescription. This product is not for long-term use, unless otherwise directed by your doctor or health care professional.  This medicine may be used for other purposes; ask your health care provider or pharmacist if you have questions.  COMMON BRAND NAME(S): Prilosec OTC  What should I tell my health care provider before I take this medicine?  They need to know if you have any of these conditions:  -black or bloody stools  -chest pain  -difficulty swallowing  -have had heartburn for over 3 months  -have heartburn with dizziness, lightheadedness or sweating  -liver disease  -lupus  -stomach pain  -unexplained weight loss  -vomiting with blood  -wheezing  -an unusual or allergic reaction to omeprazole, other medicines, foods, dyes, or preservatives  -pregnant or trying to get pregnant  -breast-feeding  How should I use this medicine?  Take this medicine by mouth. Follow the directions on the product label. If you are taking this medicine without a prescription, take one tablet every day. Do not use for longer than 14 days or repeat a course of treatment more often than every 4 months unless directed by a doctor or healthcare professional. Take your dose at regular intervals every 24 hours. Swallow the tablet whole with a drink of water. Do not crush, break or chew. This medicine works best if taken on an empty stomach 30 minutes before breakfast. If you are using this medicine with the prescription of your doctor or healthcare professional, follow the directions you were given. Do not take your medicine more often than directed.  Talk to your pediatrician regarding the use of this medicine in children. Special care may be needed.  Overdosage: If you think you have taken too much of this medicine contact a poison control center or emergency room at once.  NOTE: This medicine is  only for you. Do not share this medicine with others.  What if I miss a dose?  If you miss a dose, take it as soon as you can. If it is almost time for your next dose, take only that dose. Do not take double or extra doses.  What may interact with this medicine?  Do not take this medicine with any of the following medications:  -atazanavir  -clopidogrel  -nelfinavir  This medicine may also interact with the following medications:  -ampicillin  -certain medicines for anxiety or sleep  -certain medicines that treat or prevent blood clots like warfarin  -cyclosporine  -diazepam  -digoxin  -disulfiram  -iron salts  -methotrexate  -mycophenolate mofetil  -phenytoin  -prescription medicine for fungal or yeast infection like itraconazole, ketoconazole, voriconazole  -saquinavir  -tacrolimus  This list may not describe all possible interactions. Give your health care provider a list of all the medicines, herbs, non-prescription drugs, or dietary supplements you use. Also tell them if you smoke, drink alcohol, or use illegal drugs. Some items may interact with your medicine.  What should I watch for while using this medicine?  It can take several days before your heartburn gets better. Check with your doctor or health care professional if your condition does not start to get better, or if it gets worse.  Do not treat diarrhea with over the counter products. Contact your doctor if you have diarrhea that lasts more than 2 days or if it is severe and watery.  Do not treat yourself for heartburn with this medicine for more than 14 days in a row. You should only use this medicine for a 2-week treatment period once every 4 months. If your symptoms return shortly after your therapy is complete, or within the 4 month time frame, call your doctor or health care professional.  What side effects may I notice from receiving this medicine?  Side effects that you should report to your doctor or health care professional as soon as  possible:  -allergic reactions like skin rash, itching or hives, swelling of the face, lips, or tongue  -bone, muscle or joint pain  -breathing problems  -chest pain or chest tightness  -dark yellow or brown urine  -diarrhea  -dizziness  -fast, irregular heartbeat  -feeling faint or lightheaded  -fever or sore throat  -muscle spasm  -palpitations  -rash on cheeks or arms that gets worse in the sun  -redness, blistering, peeling or loosening of the skin, including inside the mouth  -seizures  -tremors  -unusual bleeding or bruising  -unusually weak or tired  -yellowing of the eyes or skin  Side effects that usually do not require medical attention (report to your doctor or health care professional if they continue or are bothersome):  -constipation  -dry mouth  -headache  -loose stools  -nausea  This list may not describe all possible side effects. Call your doctor for medical advice about side effects. You may report side effects to FDA at 6-331-FDA-8323.  Where should I keep my medicine?  Keep out of the reach of children.  Store at room temperature between 20 and 25 degrees C (68 and 77 degrees F). Protect from light and moisture. Throw away any unused medicine after the expiration date.  NOTE: This sheet is a summary. It may not cover all possible information. If you have questions about this medicine, talk to your doctor, pharmacist, or health care provider.  © 2018 Elsevier/Gold Standard (2017-01-19 13:06:31)

## 2020-02-01 NOTE — PROGRESS NOTES
Monitor Summary     Rhythm:SR 64-90  Measurements: 0.14/0.08/0.38  ECTOPIES: n/a        Normal Values  Rhythm SR  HR Range    Measurements 0.12-0.20 / 0.06-0.10  / 0.30-0.52

## 2020-05-28 ENCOUNTER — HOSPITAL ENCOUNTER (EMERGENCY)
Facility: MEDICAL CENTER | Age: 70
End: 2020-05-28
Attending: EMERGENCY MEDICINE
Payer: MEDICARE

## 2020-05-28 ENCOUNTER — OFFICE VISIT (OUTPATIENT)
Dept: URGENT CARE | Facility: CLINIC | Age: 70
End: 2020-05-28
Payer: MEDICARE

## 2020-05-28 VITALS
RESPIRATION RATE: 18 BRPM | WEIGHT: 179 LBS | OXYGEN SATURATION: 97 % | DIASTOLIC BLOOD PRESSURE: 68 MMHG | TEMPERATURE: 98.2 F | SYSTOLIC BLOOD PRESSURE: 120 MMHG | HEART RATE: 88 BPM | BODY MASS INDEX: 25.06 KG/M2 | HEIGHT: 71 IN

## 2020-05-28 VITALS
HEART RATE: 78 BPM | SYSTOLIC BLOOD PRESSURE: 122 MMHG | HEIGHT: 72 IN | TEMPERATURE: 97.4 F | BODY MASS INDEX: 24.22 KG/M2 | OXYGEN SATURATION: 96 % | RESPIRATION RATE: 16 BRPM | WEIGHT: 178.79 LBS | DIASTOLIC BLOOD PRESSURE: 83 MMHG

## 2020-05-28 DIAGNOSIS — K04.7 DENTAL INFECTION: ICD-10-CM

## 2020-05-28 DIAGNOSIS — R22.0 RIGHT FACIAL SWELLING: ICD-10-CM

## 2020-05-28 PROCEDURE — 99283 EMERGENCY DEPT VISIT LOW MDM: CPT

## 2020-05-28 PROCEDURE — 700102 HCHG RX REV CODE 250 W/ 637 OVERRIDE(OP): Performed by: EMERGENCY MEDICINE

## 2020-05-28 PROCEDURE — 99213 OFFICE O/P EST LOW 20 MIN: CPT | Performed by: PHYSICIAN ASSISTANT

## 2020-05-28 PROCEDURE — A9270 NON-COVERED ITEM OR SERVICE: HCPCS | Performed by: EMERGENCY MEDICINE

## 2020-05-28 RX ORDER — AMOXICILLIN 500 MG/1
500 CAPSULE ORAL DAILY
Qty: 7 CAP | Refills: 0 | Status: SHIPPED | OUTPATIENT
Start: 2020-05-28 | End: 2020-06-04

## 2020-05-28 RX ORDER — AMOXICILLIN 250 MG/1
500 CAPSULE ORAL ONCE
Status: COMPLETED | OUTPATIENT
Start: 2020-05-28 | End: 2020-05-28

## 2020-05-28 RX ADMIN — AMOXICILLIN 500 MG: 250 CAPSULE ORAL at 13:26

## 2020-05-28 ASSESSMENT — FIBROSIS 4 INDEX
FIB4 SCORE: 1.27
FIB4 SCORE: 1.27

## 2020-05-28 ASSESSMENT — ENCOUNTER SYMPTOMS
HEADACHES: 1
FEVER: 0
FACIAL SWELLING: 1
CHILLS: 0

## 2020-05-28 NOTE — ED TRIAGE NOTES
"Chief Complaint   Patient presents with   • Dental Pain     c/o Right lower jaw pain   • Facial Pain     reports swelling and increased pain Right side of face     /81   Pulse 89   Temp 36.3 °C (97.4 °F) (Temporal)   Resp 19   Ht 1.829 m (6')   Wt 81.1 kg (178 lb 12.7 oz)   SpO2 94%   BMI 24.25 kg/m²     Pt reports has seen dentist and UC, \"but nobody will give me antibx.\"    Covid Screen Negative    "

## 2020-05-28 NOTE — PROGRESS NOTES
Subjective:   Bridger Savage is a 69 y.o. male who presents today with   Chief Complaint   Patient presents with   • Jaw Pain     started on right side, x3 days        Facial Swelling   This is a new problem. Episode onset: 3 days. The problem occurs constantly. The problem has been gradually worsening. Associated symptoms include headaches. Pertinent negatives include no chills or fever. The symptoms are aggravated by eating. He has tried oral narcotics for the symptoms. The treatment provided no relief.     Patient states swelling was affecting both sides last night but started on the right. Unable to eat any food.  Patient states he was seen by his dentist on Tuesday but the swelling was not as significant as it is today.  Patient denies any other associated symptoms. Patient has had similar occurrence in the past.  PMH:  has a past medical history of Asthma, Breath shortness, Chronic obstructive pulmonary disease (HCC), Cold, Congestive heart failure (HCC), Dental disorder, Dialysis patient (HCC), Emphysema of lung (HCC), Renal disorder, Snoring, and Urinary incontinence.  MEDS:   Current Outpatient Medications:   •  omeprazole (PRILOSEC) 20 MG delayed-release capsule, Take 1 Cap by mouth 2 Times a Day., Disp: 30 Cap, Rfl: 3  •  HYDROcodone-acetaminophen (NORCO) 7.5-325 MG per tablet, Take 1-2 Tabs by mouth every 6 hours as needed for Severe Pain., Disp: , Rfl:   •  albuterol 108 (90 Base) MCG/ACT Aero Soln inhalation aerosol, Inhale 2 Puffs by mouth every 6 hours as needed for Shortness of Breath., Disp: , Rfl:   •  Loperamide HCl (ANTI-DIARRHEAL PO), Take 1-2 Tabs by mouth as needed (For diarrea)., Disp: , Rfl:   •  diphenhydrAMINE (BENADRYL) 25 MG Tab, Take 25 mg by mouth every 6 hours as needed for Sleep or Itching. Indications: Hayfever, Disp: , Rfl:   •  acetaminophen (TYLENOL) 500 MG Tab, Take 500-1,000 mg by mouth every 6 hours as needed for Moderate Pain., Disp: , Rfl:   •  midodrine (PROAMATINE) 10  "MG tablet, Take 10 mg by mouth 3 times a day. Take 1 tablet by mouth three times a day, Disp: , Rfl:   •  Cinacalcet HCl (SENSIPAR) 60 MG Tab, Take 60 mg by mouth every day., Disp: , Rfl:   •  ANORO ELLIPTA 62.5-25 MCG/INH AEROSOL POWDER, BREATH ACTIVATED inhaler, Inhale 1 Puff by mouth every day., Disp: , Rfl: 11  •  amoxicillin (AMOXIL) 875 MG tablet, Take 875 mg by mouth 2 times a day. Pt picked up on 1/23/2020 for 10 day course., Disp: , Rfl:   ALLERGIES:   Allergies   Allergen Reactions   • Chloraprep One Step Itching     SURGHX:   Past Surgical History:   Procedure Laterality Date   • GASTROSCOPY-ENDO  1/30/2020    Procedure: GASTROSCOPY;  Surgeon: Richard Cano D.O.;  Location: [facility];  Service: Gastroenterology   • GASTROSCOPY  2/1/2018    Procedure: GASTROSCOPY;  Surgeon: Morteza Olivo M.D.;  Location: SURGERY [facility];  Service: Gastroenterology   • COLONOSCOPY  2/1/2018    Procedure: COLONOSCOPY;  Surgeon: Morteza Olivo M.D.;  Location: SURGERY [facility];  Service: Gastroenterology   • GASTROSCOPY-ENDO N/A 8/28/2017    Procedure: GASTROSCOPY-ENDO;  Surgeon: Pankaj Choudhury M.D.;  Location: SURGERY [facility];  Service: EUS   • OTHER      Patient had an arm fistula put in for dialysis     SOCHX:  reports that he has been smoking cigarettes. He has been smoking about 0.50 packs per day. He has never used smokeless tobacco. He reports that he does not drink alcohol or use drugs.  FH: Reviewed with patient, not pertinent to this visit.       Review of Systems   Constitutional: Negative for chills and fever.   HENT: Positive for facial swelling.         Dental pain   Neurological: Positive for headaches.   All other systems reviewed and are negative.       Objective:   /68   Pulse 88   Temp 36.8 °C (98.2 °F) (Temporal)   Resp 18   Ht 1.803 m (5' 11\")   Wt 81.2 kg (179 lb)   SpO2 97%   BMI 24.97 kg/m²   Physical Exam  Vitals signs and nursing note " reviewed.   Constitutional:       General: He is not in acute distress.     Appearance: He is well-developed.   HENT:      Head: Normocephalic and atraumatic.        Comments: Significant right facial swelling. TTP to right side of face and submandibular area. TTP to floor of mouth under right side of tongue.     Right Ear: Hearing normal.      Left Ear: Hearing normal.      Mouth/Throat:      Comments: patent airway  Eyes:      Pupils: Pupils are equal, round, and reactive to light.   Cardiovascular:      Rate and Rhythm: Normal rate and regular rhythm.      Heart sounds: Normal heart sounds.   Pulmonary:      Effort: Pulmonary effort is normal.   Musculoskeletal:      Comments: Normal movement in all 4 extremities   Lymphadenopathy:      Head:      Right side of head: Submental and submandibular adenopathy present.      Left side of head: Submental adenopathy present.   Skin:     General: Skin is warm and dry.          Neurological:      Mental Status: He is alert.      Coordination: Coordination normal.   Psychiatric:         Mood and Affect: Mood normal.       Assessment/Plan:   Assessment    1. Right facial swelling  Given clinical presentation discussed with patient he would benefit most from inpatient evaluation and treatment to rule out Panchito's angina versus tooth abscess along with his ESRD and other co morbidities.  Differential diagnosis, natural history, supportive care, and indications for immediate follow-up discussed.   Patient given instructions and understanding of medications and treatment.    If not improving in 3-5 days, F/U with PCP or return to UC if symptoms worsen.    Patient agreeable to plan.      Please note that this dictation was created using voice recognition software. I have made every reasonable attempt to correct obvious errors, but I expect that there are errors of grammar and possibly content that I did not discover before finalizing the note.    Felice Dyer PA-C

## 2020-05-28 NOTE — ED NOTES
Assumed care on discharge. Medicated with 1 dose of antibiotic and then releaesed with 1 RX and all follow-up, advised to return with any issues or concerns, worsening.

## 2020-05-28 NOTE — ED PROVIDER NOTES
ED Provider Note    CHIEF COMPLAINT  Chief Complaint   Patient presents with   • Dental Pain     c/o Right lower jaw pain   • Facial Pain     reports swelling and increased pain Right side of face       HPI  Bridger Savage is a 69 y.o. male who presents right jaw pain and swelling.  Started a few days ago.  Gradual getting worse.  Reports he saw dentist and was given a prescription for pain medication but did not get antibiotics.  He reports he got an x-ray and told that everything was fine.  He has had worsening swelling.  He has not had fever.  His pain is worse when he puts his dentures in.    REVIEW OF SYSTEMS  Pertinent negative: No fever, headache, rash, trauma    PAST MEDICAL HISTORY  Past Medical History:   Diagnosis Date   • Asthma    • Breath shortness     uses oxygen 3.5 L as needed   • Chronic obstructive pulmonary disease (HCC)    • Cold     12/27/17 cold   • Congestive heart failure ([facility])    • Dental disorder     upper and lower dentures   • Dialysis patient ([facility])     MWF   • Emphysema of lung ([facility])    • Hemodialysis patient ([facility])     M, W, F   • Renal disorder    • Snoring    • Urinary incontinence        SOCIAL HISTORY  Social History     Tobacco Use   • Smoking status: Current Some Day Smoker     Packs/day: 0.50     Types: Cigarettes   • Smokeless tobacco: Never Used   Substance Use Topics   • Alcohol use: No   • Drug use: No       SURGICAL HISTORY  Past Surgical History:   Procedure Laterality Date   • GASTROSCOPY-ENDO  1/30/2020    Procedure: GASTROSCOPY;  Surgeon: Richard Cano D.O.;  Location: [facility];  Service: Gastroenterology   • GASTROSCOPY  2/1/2018    Procedure: GASTROSCOPY;  Surgeon: Morteza Olivo M.D.;  Location: [facility];  Service: Gastroenterology   • COLONOSCOPY  2/1/2018    Procedure: COLONOSCOPY;  Surgeon: Morteza Olivo M.D.;  Location: [facility];  Service: Gastroenterology   • GASTROSCOPY-ENDO N/A 8/28/2017    Procedure:  GASTROSCOPY-ENDO;  Surgeon: Pankaj Choudhury M.D.;  Location: SURGERY [facility];  Service: EUS   • OTHER      Patient had an arm fistula put in for dialysis       ALLERGIES  Allergies   Allergen Reactions   • Chloraprep One Step Itching       PHYSICAL EXAM  VITAL SIGNS: /81   Pulse 89   Temp 36.3 °C (97.4 °F) (Temporal)   Resp 19   Ht 1.829 m (6')   Wt 81.1 kg (178 lb 12.7 oz)   SpO2 94%   BMI 24.25 kg/m²    Constitutional: Awake and alert. Nontoxic  HENT: Significant swelling over the right mandible.  No tongue elevation or sublingual edema.  There appears to be a root of the tooth in the region of the right mandibular bicuspid.  Tenderness and swelling is in this region.  There is no palpable abscess.  Eyes: Grossly normal  Neck: Normal range of motion  Cardiovascular: Normal heart rate   Thorax & Lungs: No respiratory distress  Abdomen: Nontender  Skin:  No pathologic rash.   Extremities: Fistula left arm with good thrill  Psychiatric: Affect normal      COURSE & MEDICAL DECISION MAKING  Patient presents with right mandibular dental space infection.  No suggestion of abscess he will be treated with renal dose amoxicillin.  Done first dose in the ED.  He needs to see his dentist or oral surgeon to have the tooth pulled.  I discussed this with him.  He was referred to Dr. Castaneda.  Advised that he call today to make an appointment.  He will return to the ER for worsening, not improving or concern.    FINAL IMPRESSION  1.  Dental space infection      Disposition: home in good condition      This dictation was created using voice recognition software. The accuracy of the dictation is limited to the abilities of the software.  The nursing notes were reviewed and certain aspects of this information were incorporated into this note.      Electronically signed by: Jaziel Jensen M.D., 5/28/2020 1:21 PM

## 2020-06-15 ENCOUNTER — APPOINTMENT (OUTPATIENT)
Dept: RADIOLOGY | Facility: MEDICAL CENTER | Age: 70
End: 2020-06-15
Attending: EMERGENCY MEDICINE
Payer: MEDICARE

## 2020-06-15 ENCOUNTER — HOSPITAL ENCOUNTER (EMERGENCY)
Facility: MEDICAL CENTER | Age: 70
End: 2020-06-15
Attending: EMERGENCY MEDICINE | Admitting: EMERGENCY MEDICINE
Payer: MEDICARE

## 2020-06-15 VITALS
HEIGHT: 72 IN | OXYGEN SATURATION: 98 % | DIASTOLIC BLOOD PRESSURE: 99 MMHG | HEART RATE: 89 BPM | WEIGHT: 181.88 LBS | RESPIRATION RATE: 20 BRPM | BODY MASS INDEX: 24.63 KG/M2 | SYSTOLIC BLOOD PRESSURE: 167 MMHG | TEMPERATURE: 97.6 F

## 2020-06-15 DIAGNOSIS — E16.2 HYPOGLYCEMIA: ICD-10-CM

## 2020-06-15 DIAGNOSIS — R09.02 HYPOXIA: ICD-10-CM

## 2020-06-15 DIAGNOSIS — E87.5 HYPERKALEMIA: ICD-10-CM

## 2020-06-15 DIAGNOSIS — R06.00 DYSPNEA, UNSPECIFIED TYPE: ICD-10-CM

## 2020-06-15 LAB
ALBUMIN SERPL BCP-MCNC: 4.1 G/DL (ref 3.2–4.9)
ALBUMIN/GLOB SERPL: 1.2 G/DL
ALP SERPL-CCNC: 72 U/L (ref 30–99)
ALT SERPL-CCNC: 7 U/L (ref 2–50)
ANION GAP SERPL CALC-SCNC: 17 MMOL/L (ref 7–16)
ANION GAP SERPL CALC-SCNC: 18 MMOL/L (ref 7–16)
AST SERPL-CCNC: 14 U/L (ref 12–45)
BASOPHILS # BLD AUTO: 0.4 % (ref 0–1.8)
BASOPHILS # BLD: 0.04 K/UL (ref 0–0.12)
BILIRUB SERPL-MCNC: 0.8 MG/DL (ref 0.1–1.5)
BUN SERPL-MCNC: 32 MG/DL (ref 8–22)
BUN SERPL-MCNC: 32 MG/DL (ref 8–22)
CALCIUM SERPL-MCNC: 8.9 MG/DL (ref 8.4–10.2)
CALCIUM SERPL-MCNC: 9.6 MG/DL (ref 8.4–10.2)
CHLORIDE SERPL-SCNC: 97 MMOL/L (ref 96–112)
CHLORIDE SERPL-SCNC: 99 MMOL/L (ref 96–112)
CO2 SERPL-SCNC: 24 MMOL/L (ref 20–33)
CO2 SERPL-SCNC: 24 MMOL/L (ref 20–33)
COVID ORDER STATUS COVID19: NORMAL
CREAT SERPL-MCNC: 10.99 MG/DL (ref 0.5–1.4)
CREAT SERPL-MCNC: 11.2 MG/DL (ref 0.5–1.4)
EKG IMPRESSION: NORMAL
EOSINOPHIL # BLD AUTO: 0.05 K/UL (ref 0–0.51)
EOSINOPHIL NFR BLD: 0.5 % (ref 0–6.9)
ERYTHROCYTE [DISTWIDTH] IN BLOOD BY AUTOMATED COUNT: 64.3 FL (ref 35.9–50)
GLOBULIN SER CALC-MCNC: 3.3 G/DL (ref 1.9–3.5)
GLUCOSE BLD-MCNC: 128 MG/DL (ref 65–99)
GLUCOSE SERPL-MCNC: 32 MG/DL (ref 65–99)
GLUCOSE SERPL-MCNC: 82 MG/DL (ref 65–99)
HCT VFR BLD AUTO: 33.1 % (ref 42–52)
HGB BLD-MCNC: 10.3 G/DL (ref 14–18)
IMM GRANULOCYTES # BLD AUTO: 0.06 K/UL (ref 0–0.11)
IMM GRANULOCYTES NFR BLD AUTO: 0.6 % (ref 0–0.9)
LACTATE BLD-SCNC: 1.9 MMOL/L (ref 0.5–2)
LACTATE BLD-SCNC: 2.2 MMOL/L (ref 0.5–2)
LYMPHOCYTES # BLD AUTO: 0.94 K/UL (ref 1–4.8)
LYMPHOCYTES NFR BLD: 9.7 % (ref 22–41)
MCH RBC QN AUTO: 27.2 PG (ref 27–33)
MCHC RBC AUTO-ENTMCNC: 31.1 G/DL (ref 33.7–35.3)
MCV RBC AUTO: 87.6 FL (ref 81.4–97.8)
MONOCYTES # BLD AUTO: 0.91 K/UL (ref 0–0.85)
MONOCYTES NFR BLD AUTO: 9.4 % (ref 0–13.4)
NEUTROPHILS # BLD AUTO: 7.68 K/UL (ref 1.82–7.42)
NEUTROPHILS NFR BLD: 79.4 % (ref 44–72)
NRBC # BLD AUTO: 0 K/UL
NRBC BLD-RTO: 0 /100 WBC
PLATELET # BLD AUTO: 289 K/UL (ref 164–446)
PMV BLD AUTO: 9.9 FL (ref 9–12.9)
POTASSIUM SERPL-SCNC: 5 MMOL/L (ref 3.6–5.5)
POTASSIUM SERPL-SCNC: 7 MMOL/L (ref 3.6–5.5)
PROT SERPL-MCNC: 7.4 G/DL (ref 6–8.2)
RBC # BLD AUTO: 3.78 M/UL (ref 4.7–6.1)
SARS-COV-2 RNA RESP QL NAA+PROBE: NOTDETECTED
SODIUM SERPL-SCNC: 139 MMOL/L (ref 135–145)
SODIUM SERPL-SCNC: 140 MMOL/L (ref 135–145)
SPECIMEN SOURCE: NORMAL
WBC # BLD AUTO: 9.7 K/UL (ref 4.8–10.8)

## 2020-06-15 PROCEDURE — 71045 X-RAY EXAM CHEST 1 VIEW: CPT

## 2020-06-15 PROCEDURE — U0003 INFECTIOUS AGENT DETECTION BY NUCLEIC ACID (DNA OR RNA); SEVERE ACUTE RESPIRATORY SYNDROME CORONAVIRUS 2 (SARS-COV-2) (CORONAVIRUS DISEASE [COVID-19]), AMPLIFIED PROBE TECHNIQUE, MAKING USE OF HIGH THROUGHPUT TECHNOLOGIES AS DESCRIBED BY CMS-2020-01-R: HCPCS

## 2020-06-15 PROCEDURE — U0004 COV-19 TEST NON-CDC HGH THRU: HCPCS

## 2020-06-15 PROCEDURE — 93005 ELECTROCARDIOGRAM TRACING: CPT | Performed by: EMERGENCY MEDICINE

## 2020-06-15 PROCEDURE — 87040 BLOOD CULTURE FOR BACTERIA: CPT

## 2020-06-15 PROCEDURE — 700105 HCHG RX REV CODE 258: Performed by: EMERGENCY MEDICINE

## 2020-06-15 PROCEDURE — 99285 EMERGENCY DEPT VISIT HI MDM: CPT

## 2020-06-15 PROCEDURE — 85025 COMPLETE CBC W/AUTO DIFF WBC: CPT

## 2020-06-15 PROCEDURE — 96375 TX/PRO/DX INJ NEW DRUG ADDON: CPT | Mod: XU

## 2020-06-15 PROCEDURE — 80053 COMPREHEN METABOLIC PANEL: CPT

## 2020-06-15 PROCEDURE — 700102 HCHG RX REV CODE 250 W/ 637 OVERRIDE(OP): Performed by: EMERGENCY MEDICINE

## 2020-06-15 PROCEDURE — 700111 HCHG RX REV CODE 636 W/ 250 OVERRIDE (IP): Performed by: INTERNAL MEDICINE

## 2020-06-15 PROCEDURE — 82962 GLUCOSE BLOOD TEST: CPT

## 2020-06-15 PROCEDURE — 96376 TX/PRO/DX INJ SAME DRUG ADON: CPT | Mod: XU

## 2020-06-15 PROCEDURE — 700111 HCHG RX REV CODE 636 W/ 250 OVERRIDE (IP): Performed by: EMERGENCY MEDICINE

## 2020-06-15 PROCEDURE — 90935 HEMODIALYSIS ONE EVALUATION: CPT

## 2020-06-15 PROCEDURE — 83605 ASSAY OF LACTIC ACID: CPT

## 2020-06-15 PROCEDURE — 96365 THER/PROPH/DIAG IV INF INIT: CPT | Mod: XU

## 2020-06-15 PROCEDURE — 96366 THER/PROPH/DIAG IV INF ADDON: CPT

## 2020-06-15 PROCEDURE — 80048 BASIC METABOLIC PNL TOTAL CA: CPT

## 2020-06-15 PROCEDURE — 94760 N-INVAS EAR/PLS OXIMETRY 1: CPT

## 2020-06-15 PROCEDURE — 36415 COLL VENOUS BLD VENIPUNCTURE: CPT

## 2020-06-15 PROCEDURE — 700101 HCHG RX REV CODE 250: Performed by: EMERGENCY MEDICINE

## 2020-06-15 PROCEDURE — 94640 AIRWAY INHALATION TREATMENT: CPT

## 2020-06-15 PROCEDURE — C9803 HOPD COVID-19 SPEC COLLECT: HCPCS | Performed by: EMERGENCY MEDICINE

## 2020-06-15 RX ORDER — CLINDAMYCIN HYDROCHLORIDE 300 MG/1
300 CAPSULE ORAL EVERY 6 HOURS
Status: SHIPPED | COMMUNITY
Start: 2020-06-04 | End: 2020-10-20

## 2020-06-15 RX ORDER — HEPARIN SODIUM 1000 [USP'U]/ML
1500 INJECTION, SOLUTION INTRAVENOUS; SUBCUTANEOUS
Status: DISCONTINUED | OUTPATIENT
Start: 2020-06-15 | End: 2020-06-16 | Stop reason: HOSPADM

## 2020-06-15 RX ORDER — DEXTROSE MONOHYDRATE 25 G/50ML
50 INJECTION, SOLUTION INTRAVENOUS ONCE
Status: COMPLETED | OUTPATIENT
Start: 2020-06-15 | End: 2020-06-15

## 2020-06-15 RX ADMIN — HEPARIN SODIUM 1500 UNITS: 1000 INJECTION INTRAVENOUS; SUBCUTANEOUS at 18:24

## 2020-06-15 RX ADMIN — ALBUTEROL SULFATE 10 MG: 5 SOLUTION RESPIRATORY (INHALATION) at 15:35

## 2020-06-15 RX ADMIN — DEXTROSE MONOHYDRATE 50 ML: 25 INJECTION, SOLUTION INTRAVENOUS at 17:35

## 2020-06-15 RX ADMIN — DEXTROSE MONOHYDRATE 50 ML: 25 INJECTION, SOLUTION INTRAVENOUS at 15:03

## 2020-06-15 RX ADMIN — SODIUM BICARBONATE 75 MEQ: 84 INJECTION, SOLUTION INTRAVENOUS at 15:02

## 2020-06-15 RX ADMIN — INSULIN HUMAN 10 UNITS: 100 INJECTION, SOLUTION PARENTERAL at 15:02

## 2020-06-15 ASSESSMENT — FIBROSIS 4 INDEX: FIB4 SCORE: 1.27

## 2020-06-15 NOTE — FLOWSHEET NOTE
06/15/20 1535   Events/Summary/Plan   Events/Summary/Plan ER TX   Vital Signs   Pulse 100   Respiration (!) 22   Pulse Oximetry 100 %   $ Pulse Oximetry (Spot Check) Yes   Respiratory Assessment   Level of Consciousness Alert   Breath Sounds   RUL Breath Sounds Diminished;Fine Crackles   RML Breath Sounds Diminished;Fine Crackles   RLL Breath Sounds Diminished   AMALIA Breath Sounds Diminished   LLL Breath Sounds Diminished   Oxygen   O2 (LPM) 2   O2 Delivery Device Silicone Nasal Cannula

## 2020-06-15 NOTE — ED NOTES
Med Rec complete per pt's pharmacy  Unable to reach pt by phone, last doses unknown      Pt filled a 10 day course of CLINDAMYCIN on 6/04

## 2020-06-15 NOTE — ED TRIAGE NOTES
Cough, fever, sob, fatigue, and feels like he will pass out. Vomiting and diarrhea. Last dialysis was on friday

## 2020-06-15 NOTE — ED PROVIDER NOTES
ED Provider Note    CHIEF COMPLAINT  Chief Complaint   Patient presents with   • Shortness of Breath     sob, passing out, last dialysis was on friday  vomiting and diarrhea started today       HPI  Bridger Savage is a 69 y.o. male who presents with shortness of breath.  He began feeling poorly on Friday that was his last dialysis over the weekend he is felt even more fatigued sleepy has had a cough nonproductive felt feverish and mildly short of breath he has no actual chest pain leg pain or swelling no sputum production.  All other systems are negative.    REVIEW OF SYSTEMS  See HPI for further details    PAST MEDICAL HISTORY  Past Medical History:   Diagnosis Date   • Asthma    • Breath shortness     uses oxygen 3.5 L as needed   • Chronic obstructive pulmonary disease (HCC)    • Cold     12/27/17 cold   • Congestive heart failure ([facility])    • Dental disorder     upper and lower dentures   • Dialysis patient ([facility])     MWF   • Emphysema of lung ([facility])    • Hemodialysis patient ([facility])     M, W, F   • Renal disorder    • Snoring    • Urinary incontinence        FAMILY HISTORY  Family History   Problem Relation Age of Onset   • Heart Disease Mother    • Diabetes Mother    • Kidney Disease Mother         kidney failure   • Asthma Father    • Diabetes Brother    • No Known Problems Maternal Grandmother    • No Known Problems Maternal Grandfather    • No Known Problems Paternal Grandmother    • No Known Problems Paternal Grandfather        SOCIAL HISTORY  Social History     Socioeconomic History   • Marital status:      Spouse name: Not on file   • Number of children: Not on file   • Years of education: Not on file   • Highest education level: Not on file   Occupational History   • Not on file   Social Needs   • Financial resource strain: Not on file   • Food insecurity     Worry: Not on file     Inability: Not on file   • Transportation needs     Medical: Not on file     Non-medical: Not on file   Tobacco Use   •  Smoking status: Current Some Day Smoker     Packs/day: 0.50     Types: Cigarettes   • Smokeless tobacco: Never Used   Substance and Sexual Activity   • Alcohol use: No   • Drug use: No   • Sexual activity: Not on file   Lifestyle   • Physical activity     Days per week: Not on file     Minutes per session: Not on file   • Stress: Not on file   Relationships   • Social connections     Talks on phone: Not on file     Gets together: Not on file     Attends Alevism service: Not on file     Active member of club or organization: Not on file     Attends meetings of clubs or organizations: Not on file     Relationship status: Not on file   • Intimate partner violence     Fear of current or ex partner: Not on file     Emotionally abused: Not on file     Physically abused: Not on file     Forced sexual activity: Not on file   Other Topics Concern   • Not on file   Social History Narrative    ** Merged History Encounter **            SURGICAL HISTORY  Past Surgical History:   Procedure Laterality Date   • GASTROSCOPY-ENDO  1/30/2020    Procedure: GASTROSCOPY;  Surgeon: Richard Cano D.O.;  Location: SURGERY [facility];  Service: Gastroenterology   • GASTROSCOPY  2/1/2018    Procedure: GASTROSCOPY;  Surgeon: Morteza Olivo M.D.;  Location: SURGERY [facility];  Service: Gastroenterology   • COLONOSCOPY  2/1/2018    Procedure: COLONOSCOPY;  Surgeon: Morteza Olivo M.D.;  Location: [facility];  Service: Gastroenterology   • GASTROSCOPY-ENDO N/A 8/28/2017    Procedure: GASTROSCOPY-ENDO;  Surgeon: Pankaj Choudhury M.D.;  Location: SURGERY [facility];  Service: EUS   • OTHER      Patient had an arm fistula put in for dialysis       CURRENT MEDICATIONS  Home Medications     Reviewed by eLti Hansen (Pharmacy Tech) on 06/15/20 at 1553  Med List Status: Complete   Medication Last Dose Status   albuterol 108 (90 Base) MCG/ACT Aero Soln inhalation aerosol UNK Active   ANORO ELLIPTA  62.5-25 MCG/INH AEROSOL POWDER, BREATH ACTIVATED inhaler UNK Active   clindamycin (CLEOCIN) 300 MG Cap UNK Active   HYDROcodone-acetaminophen (NORCO) 7.5-325 MG per tablet UNK Active   midodrine (PROAMATINE) 10 MG tablet UNK Active   patiromer (VELTASSA) 8.4 g Pack UNK Active                ALLERGIES  Allergies   Allergen Reactions   • Chloraprep One Step Itching       PHYSICAL EXAM  VITAL SIGNS: BP (!) 199/92   Pulse (!) 112   Temp 36.8 °C (98.2 °F) (Oral)   Resp (!) 40   Ht 1.829 m (6')   Wt 82.5 kg (181 lb 14.1 oz)   SpO2 (!) 80% Comment: pt keeps taking his oxygen off  BMI 24.67 kg/m²     Constitutional: Patient is alert and oriented x3 in mild distress   HENT: Moist mucous membranes  Eyes:   No conjunctivitis or icterus  Neck: Neck is midline no palpable thyroid  Lymphatic: Anterior cervical lymphadenopathy  Cardiovascular: Normal heart rate    Thorax & Lungs: Clear to auscultation  Back: No CVA tenderness  Abdomen: Nontender  Neurologic: Normal motor sensation  Extremities: No pretibial edema  Psychiatric: Affect normal, Judgment normal, Mood normal.     Results for orders placed or performed during the hospital encounter of 06/15/20   Lactic acid (lactate): Repeat if initial lactic acid result is greater than 2   Result Value Ref Range    Lactic Acid 1.9 0.5 - 2.0 mmol/L   CBC WITH DIFFERENTIAL   Result Value Ref Range    WBC 9.7 4.8 - 10.8 K/uL    RBC 3.78 (L) 4.70 - 6.10 M/uL    Hemoglobin 10.3 (L) 14.0 - 18.0 g/dL    Hematocrit 33.1 (L) 42.0 - 52.0 %    MCV 87.6 81.4 - 97.8 fL    MCH 27.2 27.0 - 33.0 pg    MCHC 31.1 (L) 33.7 - 35.3 g/dL    RDW 64.3 (H) 35.9 - 50.0 fL    Platelet Count 289 164 - 446 K/uL    MPV 9.9 9.0 - 12.9 fL    Neutrophils-Polys 79.40 (H) 44.00 - 72.00 %    Lymphocytes 9.70 (L) 22.00 - 41.00 %    Monocytes 9.40 0.00 - 13.40 %    Eosinophils 0.50 0.00 - 6.90 %    Basophils 0.40 0.00 - 1.80 %    Immature Granulocytes 0.60 0.00 - 0.90 %    Nucleated RBC 0.00 /100 WBC    Neutrophils  (Absolute) 7.68 (H) 1.82 - 7.42 K/uL    Lymphs (Absolute) 0.94 (L) 1.00 - 4.80 K/uL    Monos (Absolute) 0.91 (H) 0.00 - 0.85 K/uL    Eos (Absolute) 0.05 0.00 - 0.51 K/uL    Baso (Absolute) 0.04 0.00 - 0.12 K/uL    Immature Granulocytes (abs) 0.06 0.00 - 0.11 K/uL    NRBC (Absolute) 0.00 K/uL   COMP METABOLIC PANEL   Result Value Ref Range    Sodium 139 135 - 145 mmol/L    Potassium 7.0 (HH) 3.6 - 5.5 mmol/L    Chloride 97 96 - 112 mmol/L    Co2 24 20 - 33 mmol/L    Anion Gap 18.0 (H) 7.0 - 16.0    Glucose 82 65 - 99 mg/dL    Bun 32 (H) 8 - 22 mg/dL    Creatinine 10.99 (HH) 0.50 - 1.40 mg/dL    Calcium 9.6 8.4 - 10.2 mg/dL    AST(SGOT) 14 12 - 45 U/L    ALT(SGPT) 7 2 - 50 U/L    Alkaline Phosphatase 72 30 - 99 U/L    Total Bilirubin 0.8 0.1 - 1.5 mg/dL    Albumin 4.1 3.2 - 4.9 g/dL    Total Protein 7.4 6.0 - 8.2 g/dL    Globulin 3.3 1.9 - 3.5 g/dL    A-G Ratio 1.2 g/dL   LACTIC ACID   Result Value Ref Range    Lactic Acid 2.2 (H) 0.5 - 2.0 mmol/L   ESTIMATED GFR   Result Value Ref Range    GFR If  6 (A) >60 mL/min/1.73 m 2    GFR If Non African American 5 (A) >60 mL/min/1.73 m 2   COVID/SARS CoV-2 PCR    Specimen: Nasopharyngeal; Respirate   Result Value Ref Range    COVID Order Status Received    BASIC METABOLIC PANEL   Result Value Ref Range    Sodium 140 135 - 145 mmol/L    Potassium 5.0 3.6 - 5.5 mmol/L    Chloride 99 96 - 112 mmol/L    Co2 24 20 - 33 mmol/L    Glucose 32 (LL) 65 - 99 mg/dL    Bun 32 (H) 8 - 22 mg/dL    Creatinine 11.20 (HH) 0.50 - 1.40 mg/dL    Calcium 8.9 8.4 - 10.2 mg/dL    Anion Gap 17.0 (H) 7.0 - 16.0   SARS-CoV-2, PCR (In-House)   Result Value Ref Range    SARS-CoV-2 Source NP Swab     SARS-CoV-2 by PCR NotDetected NotDetected   ESTIMATED GFR   Result Value Ref Range    GFR If  6 (A) >60 mL/min/1.73 m 2    GFR If Non African American 5 (A) >60 mL/min/1.73 m 2   EKG (NOW)   Result Value Ref Range    Report       [facility] Emergency  Dept.    Test Date:  2020-06-15  Pt Name:    AURELIA DAVIS                  Department: [facility]  MRN:        [id number]                      Room:       -ROOM 5  Gender:     Male                         Technician: HRR  :        [id number]                   Requested By:GARO MILLER  Order #:    [id number]                    Reading MD:    Measurements  Intervals                                Axis  Rate:       92                           P:          66  AR:         156                          QRS:        29  QRSD:       106                          T:          88  QT:         396  QTc:        490    Interpretive Statements  SINUS RHYTHM  BORDERLINE INTRAVENTRICULAR CONDUCTION DELAY  BORDERLINE PROLONGED QT INTERVAL  BASELINE WANDER IN LEAD(S) II,III,aVF  Compared to ECG 2020 22:19:43  T-wave abnormality no longer present        DX-CHEST-PORTABLE (1 VIEW)   Final Result      Moderate pulmonary edema and enlargement of the cardiac silhouette            COURSE & MEDICAL DECISION MAKING  Pertinent Labs & Imaging studies reviewed. (See chart for details)  Contacted by lab at approximately 2:35 PM of a potassium of 7.  0.    Patient was given albuterol nebulization continuous to lower the potassium he was also started on bicarb drip and a given insulin and D50.  We did recheck his labs.  His glucose comes back critically low at 32 so he is going to be given a second amp of D50.  Potassium is improved 5.0 dialysis is here to dialyze the patient.    Note I spent more than 30 minutes of critical care time with this patient    FINAL IMPRESSION  1.   1. Dyspnea, unspecified type     2. Hypoxia     3. Hyperkalemia     4. Hypoglycemia         2.   3.         Electronically signed by: Garo Miller M.D., 6/15/2020 2:33 PM

## 2020-06-16 NOTE — ED NOTES
Ramesh from Lab called with critical result of GLUCOSE 32 and CREATININE 11.2 and  at 1525. Critical lab result read back to Ramesh.   Dr. Loera notified of critical lab result at 1727.  Critical lab result read back by Dr. Loera.

## 2020-06-16 NOTE — PROGRESS NOTES
[facility] Progress Note     Hemodialysis treatment ordered today per Dr. López x 3 hours.   Treatment initiated at 1827, ended at 2128.      Patient hypertensive pre, intra, and post HD. Primary RN and MD aware.     Otherwise tx well tolerated.    See paper flow sheet for details.      Net UF 2,000 mL.      Needles removed from access site. Dressings applied and sites held x 10 minutes; verified no bleeding. Positive bruit/thrill post tx. Staff RN to monitor AVF for breakthrough bleeding. Should breakthrough bleeding occur, staff RN to apply pressure to access sites until bleeding resolved. Notify Dialysis and Nephrologist for follow-up.     Report given to Primary Nurse, JESUS Wilson RN.

## 2020-06-16 NOTE — ED PROVIDER NOTES
ED Provider Note    Addendum: This is a patient who was originally seen by my partner.  Patient with end-stage renal disease on hemodialysis.  He had last dialyzed on Friday.  Had not yet gone to dialysis today and presented to ER  with shortness of breath that his work-up had revealed a high potassium of 7.  He was treated urgently with a bicarbonate drip insulin and D50 unfortunately was a drop in glucose which was treated with D50.  T specialist had consists been consulted concerning admission.  Apparently admission was thought to likely be required until dialysis could be obtained arranged.  However the dialysis nurse was able to come down to the emergency room and he is now had a full dialysis treatment.  His shortness of breath has resolved at this time, believed to secondary to pulmonary edema with fluid overload which is now been treated with dialysis and the hyperkalemia of course treated.  The patient is feeling significantly better and I think at this point can be discharged home.  The patient does wish to discharge home at this time.  I think that this point the patient can follow-up on his usual schedule of dialysis    Impression: Hyperkalemia                       Hypoxia                        Renal failure

## 2020-06-16 NOTE — CONSULTS
DATE OF SERVICE:  06/15/2020    NEPHROLOGY CONSULTATION      REASON FOR CONSULTATION:  This is nephrology consultation for evaluation and   management of end-stage renal disease and hyperkalemia.    REQUESTING PHYSICIAN:  From [facility].    HISTORY OF PRESENT ILLNESS:  The patient is a 69-year-old gentleman with   history of end-stage renal disease, on hemodialysis Monday, Wednesday and   Friday via left upper arm AV fistula; history of hypertension; COPD, who   presented to the emergency room with complaints of shortness of breath and   found to have a serum potassium of 7.0, hence, nephrology was asked to see him   for his hyperkalemia and ESRD.  He is unable to give me a full history since   he is a bit lethargic at the moment.  Most of this history is obtained from   the medical record.    REVIEW OF SYSTEMS:  Unable to fully assess given his overall medical   condition.    PAST MEDICAL HISTORY:  ESRD, on hemodialysis; hypertension; COPD; anemia of   chronic kidney disease; renal osteodystrophy.    PAST SURGICAL HISTORY:  Left arm AV fistula creation, endoscopy and   colonoscopy.    ALLERGIES:  HE IS ALLERGIC TO CHLORAPREP.    SOCIAL HISTORY:  He is a chronic smoker since the age of 22.  Denies alcohol   or illicit drug use.    FAMILY HISTORY:  There is no family history of kidney problems that we know of   since he cannot tell us.      CURRENT MEDICATIONS:  Reviewed and is in the hospital record.    PHYSICAL EXAMINATION:  VITAL SIGNS:  On examination, vitals revealing a blood pressure of 199/92 with   a pulse of 112, temperature 98.2.  GENERAL:  He is lying in bed, no major hemodynamic distress.  HEENT:  Normocephalic, atraumatic.  NECK:  Supple.  No JVD, no thyromegaly.  CHEST:  With crackles at the base bilaterally.  CARDIOVASCULAR:  S1, S2 heard.  ABDOMEN:  Soft, nontender, nondistended.  Bowel sounds are present.  EXTREMITIES:  Without any evidence of cyanosis or clubbing.  SKIN:   With no evidence of rash, pruritus or wounds.  NEUROLOGIC:  Unable to fully assess, but he is moving all 4 extremities.  LYMPHATICS:  No cervical lymphadenopathy, no axillary lymphadenopathy.  PSYCHIATRIC:  Unable to fully assess given his overall medical condition.    LABORATORY DATA:  His sodium is 139, potassium is 7, 97 chloride, 24 bicarb,   BUN and creatinine 82/10.9 with a calcium of 9.6, albumin of 4.1.  White count   of 9.7, hemoglobin 10.3, platelet count 289.    ASSESSMENT AND PLAN:  1.  End-stage renal disease, on hemodialysis Monday, Wednesday and Friday.  2.  Hyperkalemia.  3.  Renal osteodystrophy.  4.  Anemia of chronic kidney disease.  5.  History of cardiomyopathy.    PLAN:  We will arrange for dialysis today as soon as possible given his   critical hyperkalemia.  Avoid nephrotoxins.  Renal dose medications as   necessary.  Place him on a renal low sodium diet.    Thank you for allowing us to care for the patient.  We will follow him closely   with you.    Total critical care time over 65 minutes.       ____________________________________     MD MIKIE KAUR / DIONNE    DD:  06/15/2020 17:26:38  DT:  06/15/2020 21:30:12    D#:  [id number]  Job#:  [id number]

## 2020-06-16 NOTE — ED NOTES
Dialysis states shes pulled a full 2 L. Pt A&Ox4. No apparent distress.  Discharge instructions provided.  Pt verbalized the understanding of discharge instructions to follow up with PCP and to return to ER if condition worsens.  Pt ambulated out of ER without difficulty.

## 2020-06-16 NOTE — ED NOTES
Fingerstick checked. WNL. Dr Boss in to inform pt he can most likely go home after dialysis. Pt agreeable.

## 2020-06-16 NOTE — DISCHARGE PLANNING
Outpatient Dialysis Note    Confirmed patient is active at:    [facility]  [address]   [place] [address]       Schedule: Monday, Wednesday, Friday  Time: 6:15 am    Spoke with Alvin at facility who confirmed.    Forwarded records for review.      Tara Booth- Dialysis Coordinator  [facility] # [phone]

## 2020-08-24 NOTE — PROGRESS NOTES
Cont tadalafil prn-   Patient admitted from ER Pt intubated RT at bedside Vent settings verified with Brenda RT. Dr. Figueroa at bedside. Pt has gtts running and soft bilateral wrist restraints, order verified. Attempted to call wu Carey, message left. Dialysis RN in unit and treatment and orders discussed. VSS Pt sedated.

## 2020-10-20 ENCOUNTER — HOSPITAL ENCOUNTER (EMERGENCY)
Facility: MEDICAL CENTER | Age: 70
End: 2020-10-20
Attending: EMERGENCY MEDICINE
Payer: MEDICARE

## 2020-10-20 VITALS
SYSTOLIC BLOOD PRESSURE: 138 MMHG | WEIGHT: 185.85 LBS | HEIGHT: 72 IN | OXYGEN SATURATION: 96 % | DIASTOLIC BLOOD PRESSURE: 72 MMHG | RESPIRATION RATE: 16 BRPM | BODY MASS INDEX: 25.17 KG/M2 | TEMPERATURE: 97.7 F | HEART RATE: 83 BPM

## 2020-10-20 DIAGNOSIS — L03.811 CELLULITIS OF HEAD EXCEPT FACE: ICD-10-CM

## 2020-10-20 PROCEDURE — 700102 HCHG RX REV CODE 250 W/ 637 OVERRIDE(OP): Performed by: EMERGENCY MEDICINE

## 2020-10-20 PROCEDURE — 99283 EMERGENCY DEPT VISIT LOW MDM: CPT

## 2020-10-20 PROCEDURE — A9270 NON-COVERED ITEM OR SERVICE: HCPCS | Performed by: EMERGENCY MEDICINE

## 2020-10-20 RX ORDER — CEPHALEXIN 500 MG/1
500 CAPSULE ORAL 4 TIMES DAILY
Qty: 40 CAP | Refills: 0 | Status: SHIPPED | OUTPATIENT
Start: 2020-10-20 | End: 2020-10-30

## 2020-10-20 RX ORDER — DOXYCYCLINE 100 MG/1
100 TABLET ORAL ONCE
Status: COMPLETED | OUTPATIENT
Start: 2020-10-20 | End: 2020-10-20

## 2020-10-20 RX ORDER — CEPHALEXIN 250 MG/1
500 CAPSULE ORAL ONCE
Status: COMPLETED | OUTPATIENT
Start: 2020-10-20 | End: 2020-10-20

## 2020-10-20 RX ORDER — DOXYCYCLINE 100 MG/1
100 CAPSULE ORAL 2 TIMES DAILY
Qty: 20 CAP | Refills: 0 | Status: SHIPPED | OUTPATIENT
Start: 2020-10-20 | End: 2020-10-30

## 2020-10-20 RX ORDER — SEVELAMER CARBONATE FOR ORAL SUSPENSION 2400 MG/1
1 POWDER, FOR SUSPENSION ORAL
Status: SHIPPED | COMMUNITY
End: 2021-03-12

## 2020-10-20 RX ORDER — CINACALCET 60 MG/1
1 TABLET, FILM COATED ORAL DAILY
Status: ON HOLD | COMMUNITY
End: 2022-10-10

## 2020-10-20 RX ADMIN — DOXYCYCLINE 100 MG: 100 TABLET, FILM COATED ORAL at 22:43

## 2020-10-20 RX ADMIN — CEPHALEXIN 500 MG: 250 CAPSULE ORAL at 22:43

## 2020-10-20 ASSESSMENT — FIBROSIS 4 INDEX: FIB4 SCORE: 1.25

## 2020-10-21 NOTE — ED PROVIDER NOTES
ED Provider Note        Primary care provider: Jasmine Shields M.D.    I verified that the patient was wearing a mask and I was wearing appropriate PPE every time I entered the room. The patient's mask was on the patient at all times during my encounter except for a brief view of the oropharynx.      CHIEF COMPLAINT  Chief Complaint   Patient presents with   • Other     multiple pustular skin lesions x 1 wk BUE and scalp Denies fever       HPI  Bridger Savage is a 70 y.o. male who presents to the Emergency Department with chief complaint of skin lesions.  Patient reports that over the last approximately 10 days he has had multiple lesions pop he is noted to them on bilateral upper extremities and most severely along his scalp.  States that his scalp is been tender he has had multiple bumpy lesions that he can feel on his scalp he had a friend look at it this evening with a flashlight was told that there was pus coming out of several of the lesions and that he should go to the hospital.  He reports the first lesion that he noticed was over the right upper extremity however he has noticed smaller lesions in the left upper extremity near his hemodialysis fistula.  No fevers no chills no nausea no vomiting no chest pain shortness of breath no constipation no diarrhea no other acute symptoms or concerns.  The pain is rated as minimal and is only modified by palpation of the scalp.    REVIEW OF SYSTEMS  10 systems reviewed and otherwise negative, pertinent positives and negatives listed in the history of present illness.    PAST MEDICAL HISTORY   has a past medical history of Asthma, Breath shortness, Chronic obstructive pulmonary disease (HCC), Cold, Congestive heart failure (HCC), Dental disorder, Dialysis patient (HCC), Emphysema of lung (HCC), Hemodialysis patient (HCC), Renal disorder, Snoring, and Urinary incontinence.    SURGICAL HISTORY   has a past surgical history that includes gastroscopy-endo (N/A,  8/28/2017); gastroscopy (2/1/2018); colonoscopy (2/1/2018); other; and gastroscopy-endo (1/30/2020).    SOCIAL HISTORY  Social History     Tobacco Use   • Smoking status: Current Some Day Smoker     Packs/day: 0.50     Types: Cigarettes   • Smokeless tobacco: Never Used   Substance Use Topics   • Alcohol use: No   • Drug use: No      Social History     Substance and Sexual Activity   Drug Use No       FAMILY HISTORY  Non-Contributory    CURRENT MEDICATIONS  Home Medications     Reviewed by Nkechi Ge R.N. (Registered Nurse) on 10/20/20 at 2045  Med List Status: Complete   Medication Last Dose Status   albuterol 108 (90 Base) MCG/ACT Aero Soln inhalation aerosol 10/18/2020 Active   ANORO ELLIPTA 62.5-25 MCG/INH AEROSOL POWDER, BREATH ACTIVATED inhaler 10/20/2020 Active   Cinacalcet HCl (SENSIPAR) 60 MG Tab 10/20/2020 Active   midodrine (PROAMATINE) 10 MG tablet 10/20/2020 Active   patiromer (VELTASSA) 8.4 g Pack 10/19/2020 Active   Sevelamer Carbonate 2.4 g Pack 10/20/2020 Active                ALLERGIES  Allergies   Allergen Reactions   • Chloraprep One Step Itching       PHYSICAL EXAM  VITAL SIGNS: /72   Pulse 83   Temp 36.5 °C (97.7 °F) (Temporal)   Resp 16   Ht 1.829 m (6')   Wt 84.3 kg (185 lb 13.6 oz)   SpO2 96%   BMI 25.21 kg/m²   Pulse ox interpretation: I interpret this pulse ox as normal.  Constitutional: Alert and oriented x 3, minimal distress  HEENT: Scalp is blown skin atraumatic normocephalic, pupils are equal round reactive to light extraocular movements are intact. The nares is clear, external ears are normal, mouth shows moist mucous membranes  Neck: Supple, no JVD no tracheal deviation  Cardiovascular: Regular rate and rhythm no murmur rub or gallop 2+ pulses peripherally x4  Thorax & Lungs: No respiratory distress, no wheezes rales or rhonchi, No chest tenderness.   GI: Soft nontender nondistended positive bowel sounds, no peritoneal signs  Skin: Patient has approximately 4 cm  erythematous slightly indurated lesion over the posterior proximal forearm it does have skin breakdown over the central aspect appears to have spontaneously drained no proximal streaking or lymphadenopathy.  Over the majority of the patient's scalp there is several areas of erythema and induration with many of these having skin breakdown overlying the tops and multiple areas having thick honey colored crusting.  Musculoskeletal: Moving all extremities with full range and 5 of 5 strength, no acute  deformity  Neurologic: Cranial nerves III through XII are grossly intact, no sensory deficit, no cerebellar dysfunction   Psychiatric: Appropriate affect for situation at this time          COURSE & MEDICAL DECISION MAKING  Pertinent Labs & Imaging studies reviewed. (See chart for details)    10:17 PM - Patient seen and examined at bedside.         Patient noted to have slightly elevated blood pressure likely circumstantial secondary to presenting complaint. Referred to primary care physician for further evaluation.    Medical Decision Makin-year-old male presents with abnormal skin lesion right upper extremity minimally on the left upper extremity and also on his scalp.  No fevers no chills no nausea no vomiting vital signs are unremarkable patient has no sign of systemic illness or toxicity.  He has been completing his dialysis regularly states besides the pain in his scalp that he feels fine.  I do not feel as though any labs or imaging are indicated at this time the lesion of the scalp I think is likely a cellulitis might primary suspicion is that this is impetigo however patient will be covered with broad-spectrum antibiotics was put on Keflex and doxycycline.  Given instructions to take all antibiotics to return immediately should he have worsening pain redness swelling drainage fevers chills nausea vomiting any other acute symptoms or concerns of otherwise advised him to follow-up with his primary care  physician in 3 to 4 days for recheck to assure that this is improving.  Patient is also instructed to wash his hair daily with gentle shampoo and warm water.  Discharged in stable and improved condition.    /72   Pulse 83   Temp 36.5 °C (97.7 °F) (Temporal)   Resp 16   Ht 1.829 m (6')   Wt 84.3 kg (185 lb 13.6 oz)   SpO2 96%   BMI 25.21 kg/m²     Jasmine Shields M.D.  [address]  [facility] [address] [phone]    In 3 days      [facility], Emergency Dept  [address]  [facility] [address] [phone]    if symptoms persist, If symptoms worsen, or if you develop any other symptoms or concerns      New Prescriptions    CEPHALEXIN (KEFLEX) 500 MG CAP    Take 1 Cap by mouth 4 times a day for 10 days.    DOXYCYCLINE (MONODOX) 100 MG CAPSULE    Take 1 Cap by mouth 2 times a day for 10 days.       FINAL IMPRESSION  1. cellulitis      This dictation has been created using voice recognition software and/or scribes. The accuracy of the dictation is limited by the abilities of the software and the expertise of the scribes. I expect there may be some errors of grammar and possibly content. I made every attempt to manually correct the errors within my dictation. However, errors related to voice recognition software and/or scribes may still exist and should be interpreted within the appropriate context.

## 2021-01-01 ENCOUNTER — HOSPITAL ENCOUNTER (EMERGENCY)
Facility: MEDICAL CENTER | Age: 71
End: 2021-01-01
Attending: EMERGENCY MEDICINE | Admitting: EMERGENCY MEDICINE
Payer: MEDICARE

## 2021-01-01 VITALS
RESPIRATION RATE: 16 BRPM | BODY MASS INDEX: 26.28 KG/M2 | OXYGEN SATURATION: 96 % | WEIGHT: 194 LBS | TEMPERATURE: 97 F | DIASTOLIC BLOOD PRESSURE: 67 MMHG | HEIGHT: 72 IN | HEART RATE: 91 BPM | SYSTOLIC BLOOD PRESSURE: 106 MMHG

## 2021-01-01 DIAGNOSIS — L03.011 CELLULITIS OF FINGER OF RIGHT HAND: ICD-10-CM

## 2021-01-01 DIAGNOSIS — L02.511 ABSCESS OF FINGER OF RIGHT HAND: ICD-10-CM

## 2021-01-01 PROCEDURE — 87205 SMEAR GRAM STAIN: CPT

## 2021-01-01 PROCEDURE — 99283 EMERGENCY DEPT VISIT LOW MDM: CPT | Mod: 25

## 2021-01-01 PROCEDURE — 87070 CULTURE OTHR SPECIMN AEROBIC: CPT

## 2021-01-01 PROCEDURE — 303977 HCHG I & D

## 2021-01-01 PROCEDURE — 87077 CULTURE AEROBIC IDENTIFY: CPT

## 2021-01-01 RX ORDER — SULFAMETHOXAZOLE AND TRIMETHOPRIM 800; 160 MG/1; MG/1
1 TABLET ORAL ONCE
Qty: 1 TAB | Refills: 0 | Status: SHIPPED | OUTPATIENT
Start: 2021-01-01 | End: 2021-01-01

## 2021-01-01 RX ORDER — AMOXICILLIN AND CLAVULANATE POTASSIUM 500; 125 MG/1; MG/1
1 TABLET, FILM COATED ORAL DAILY
Qty: 7 TAB | Refills: 0 | Status: SHIPPED | OUTPATIENT
Start: 2021-01-01 | End: 2021-01-08

## 2021-01-01 ASSESSMENT — FIBROSIS 4 INDEX: FIB4 SCORE: 1.25

## 2021-01-01 NOTE — LETTER
1/3/2021               Bridger Savage  [address] [place] [address]        Dear Bridger (MR#[id number])    This letter is sent in regards to your, recent visit to the [facility] Emergency Department on 1/1/2021.  During the visit, tests were performed to assist the physician in a medical diagnosis.  A review of those tests requires that we notify you of the following:    Your wound culture was POSITIVE for a bacteria called Group A Streptococcus. The antibiotic prescribed for you (amoxicillin/clavulanate) should be active to treat this bacteria. IT IS IMPORTANT THAT YOU CONTINUE TAKING YOUR ANTIBIOTIC UNTIL IT IS FINISHED.      Please feel free to contact me at the number below if you have any questions or concerns. Thank you for your cooperation in the matter.    Sincerely,  ED Culture Follow-Up Staff  Prashant Frederick PharmD    [facility], Emergency Department  [address] [place] [id number]  [phone] (ED Culture Line)  [phone]

## 2021-01-01 NOTE — ED PROVIDER NOTES
ED Provider Note    CHIEF COMPLAINT  Chief Complaint   Patient presents with   • Digit Pain   • Burn        HPI  Bridger Savage is a 70 y.o. male who presents the ED with complaints of a burn to the dorsal aspect of his right index finger.  The patient states he burned it on a grill couple of days ago pain got significantly worse today presents the emerge department for evaluation.  Patient denies any fevers chills nausea vomiting tetanus is up-to-date.    REVIEW OF SYSTEMS  See HPI for further details. All other systems are negative.     PAST MEDICAL HISTORY  Past Medical History:   Diagnosis Date   • Asthma    • Breath shortness     uses oxygen 3.5 L as needed   • Chronic obstructive pulmonary disease (HCC)    • Cold     12/27/17 cold   • Congestive heart failure ([facility])    • Dental disorder     upper and lower dentures   • Dialysis patient ([facility])     MWF   • Emphysema of lung ([facility])    • Hemodialysis patient ([facility])     M, W, F   • Renal disorder    • Snoring    • Urinary incontinence        FAMILY HISTORY  Family History   Problem Relation Age of Onset   • Heart Disease Mother    • Diabetes Mother    • Kidney Disease Mother         kidney failure   • Asthma Father    • Diabetes Brother    • No Known Problems Maternal Grandmother    • No Known Problems Maternal Grandfather    • No Known Problems Paternal Grandmother    • No Known Problems Paternal Grandfather      Patient's family history has been discussed and is been found to be noncontributory to his present illness  SOCIAL HISTORY  Social History     Socioeconomic History   • Marital status:      Spouse name: Not on file   • Number of children: Not on file   • Years of education: Not on file   • Highest education level: Not on file   Occupational History   • Not on file   Social Needs   • Financial resource strain: Not on file   • Food insecurity     Worry: Not on file     Inability: Not on file   • Transportation needs     Medical: Not on file      Non-medical: Not on file   Tobacco Use   • Smoking status: Current Some Day Smoker     Packs/day: 0.50     Types: Cigarettes   • Smokeless tobacco: Never Used   Substance and Sexual Activity   • Alcohol use: No   • Drug use: No   • Sexual activity: Not on file   Lifestyle   • Physical activity     Days per week: Not on file     Minutes per session: Not on file   • Stress: Not on file   Relationships   • Social connections     Talks on phone: Not on file     Gets together: Not on file     Attends [employer] service: Not on file     Active member of club or organization: Not on file     Attends meetings of clubs or organizations: Not on file     Relationship status: Not on file   • Intimate partner violence     Fear of current or ex partner: Not on file     Emotionally abused: Not on file     Physically abused: Not on file     Forced sexual activity: Not on file   Other Topics Concern   • Not on file   Social History Narrative    ** Merged History Encounter **                   SURGICAL HISTORY  Past Surgical History:   Procedure Laterality Date   • GASTROSCOPY-ENDO  1/30/2020    Procedure: GASTROSCOPY;  Surgeon: Richard Cano D.O.;  Location: [facility];  Service: Gastroenterology   • GASTROSCOPY  2/1/2018    Procedure: GASTROSCOPY;  Surgeon: Morteza Olivo M.D.;  Location: [facility];  Service: Gastroenterology   • COLONOSCOPY  2/1/2018    Procedure: COLONOSCOPY;  Surgeon: Morteza Olivo M.D.;  Location: [facility];  Service: Gastroenterology   • GASTROSCOPY-ENDO N/A 8/28/2017    Procedure: GASTROSCOPY-ENDO;  Surgeon: Pankaj Choudhury M.D.;  Location: [facility];  Service: EUS   • OTHER      Patient had an arm fistula put in for dialysis       CURRENT MEDICATIONS   Home Medications    **Home medications have not yet been reviewed for this encounter**         ALLERGIES   Allergies   Allergen Reactions   • Chloraprep One Step Itching       PHYSICAL  EXAM  VITAL SIGNS: /62   Pulse 89   Temp 36.1 °C (97 °F) (Temporal)   Resp 20   Ht 1.829 m (6')   Wt 88 kg (194 lb 0.1 oz)   SpO2 95%   BMI 26.31 kg/m²    Pulse Ox interpretation nonhypoxic    Constitutional: Well developed, Well nourished, No acute distress, Non-toxic appearance.     Skin: Warm, Dry, No erythema,   Musculoskeletal: Intact distal pulses, no clubbing, no cyanosis, dorsal aspect of the finger on the PIP joint there is a large hematoma with erythema surrounding it.  Neurologic: Alert & oriented x 3, Normal motor function, Normal sensory function, No focal deficits noted.     RADIOLOGY/PROCEDURES  No orders to display     Incision and Drainage Procedure Note    Indication: Abscess    Procedure: The patient was positioned appropriately and the skin over the incision site was prepped with alcohol. Local anesthesia was needed due to the skin being dead..  An incision was then made over the radial side of the wound and approximately 2 cc of purulent and bloody material was expressed. Loculations were not present. The drainage cavity was then dressed with a sterile dressing. The patient’s tetanus status was up to date and did not require a booster dose.    The patient tolerated the procedure well.    Complications: None        COURSE & MEDICAL DECISION MAKING  Pertinent Labs & Imaging studies reviewed. (See chart for details)  Patient presents for evaluation.  The patient does have hematoma that appears to be infected over that dorsal aspect of the finger.  It was incised and a total 2 cc of purulent bloody material was removed.  There is a surrounding amount of cellulitis I will start the patient on Bactrim and Augmentin because of his hemodialysis will be one half dose daily and after dialysis on dialysis days.  The patient is to continue with soaking in warm water with hydroperoxide twice daily for 15 minutes until healed recommend follow the primary care physician 1 week for recheck return  as needed.    FINAL IMPRESSION  1. Abscess of finger of right hand     2. Cellulitis of finger of right hand            The patient will return for new or worsening symptoms and is stable at the time of discharge.    The patient is referred to a primary physician for blood pressure management, diabetic screening, and for all other preventative health concerns.        DISPOSITION:  Patient will be discharged home in stable condition.    FOLLOW UP:  Jasmine Shields M.D.  [address] [id number] [phone]    Schedule an appointment as soon as possible for a visit in 1 week  For re-check, Return if any symptoms worsen      OUTPATIENT MEDICATIONS:  New Prescriptions    AMOXICILLIN-CLAVULANATE (AUGMENTIN) 500-125 MG TAB    Take 1 Tab by mouth every day for 7 days.    SULFAMETHOXAZOLE-TRIMETHOPRIM (BACTRIM DS) 800-160 MG TABLET    Take 1 Tab by mouth one time for 1 dose.               Electronically signed by: Srinivasa Rizo M.D., 1/1/2021 2:48 PM

## 2021-01-01 NOTE — ED TRIAGE NOTES
C/O right index burn incurred 3 days ago.  The digit has become more painful and swollen since the initial injury.   Chief Complaint   Patient presents with   • Digit Pain   • Burn     /62   Pulse 89   Temp 36.1 °C (97 °F) (Temporal)   Resp 20   Ht 1.829 m (6')   Wt 88 kg (194 lb 0.1 oz)   SpO2 95%   BMI 26.31 kg/m²

## 2021-01-02 LAB
GRAM STN SPEC: NORMAL
SIGNIFICANT IND 70042: NORMAL
SITE SITE: NORMAL
SOURCE SOURCE: NORMAL

## 2021-01-03 LAB
BACTERIA WND AEROBE CULT: ABNORMAL
BACTERIA WND AEROBE CULT: ABNORMAL
GRAM STN SPEC: ABNORMAL
SIGNIFICANT IND 70042: ABNORMAL
SITE SITE: ABNORMAL
SOURCE SOURCE: ABNORMAL

## 2021-01-03 NOTE — ED NOTES
ED Positive Culture Follow-up/Notification Note:    Date: 1/3/21     Patient seen in the ED on 1/1/2021 for c/o a burn to the dorsal aspect of his right index finger. Pt states he burned it on a grill a couple of day ago and has noted worsening pain. Pt underwent I&D in the ED and instructed to soak in warm water and rinse w/ hydrogen peroxide w/ f/u w/ PCP in 1 week.  1. Abscess of finger of right hand    2. Cellulitis of finger of right hand       Discharge Medication List as of 1/1/2021  2:52 PM      START taking these medications    Details   sulfamethoxazole-trimethoprim (BACTRIM DS) 800-160 MG tablet Take 1 Tab by mouth one time for 1 dose.Take in the evening and on dialysis days take after dialysisDisp-1 Tab, R-0, Print Rx Paper      amoxicillin-clavulanate (AUGMENTIN) 500-125 MG Tab Take 1 Tab by mouth every day for 7 days.Take in the evening and on dialysis days take after dialysisDisp-7 Tab, R-0, Print Rx Paper             Allergies: Chloraprep one step     Vitals:    01/01/21 1414 01/01/21 1511   BP: 122/62 106/67   Pulse: 89 91   Resp: 20 16   Temp: 36.1 °C (97 °F)    TempSrc: Temporal    SpO2: 95% 96%   Weight: 88 kg (194 lb 0.1 oz)    Height: 1.829 m (6')        Final cultures:   Results     Procedure Component Value Units Date/Time    CULTURE WOUND W/ GRAM STAIN [[id number]]  (Abnormal) Collected: 01/01/21 1457    Order Status: Completed Specimen: Wound from Abscess Updated: 01/03/21 1108     Significant Indicator POS     Source WND     Site finger of right hand     Culture Result -     Gram Stain Result Few WBCs.  Moderate Gram positive cocci.       Culture Result Beta Hemolytic Streptococcus group A  Moderate growth      Narrative:      CALL  Jeronimo  [facility] tel. [phone],  CALLED  [facility] tel. [phone] 01/02/2021, 16:55, RB PERF. RESULTS CALLED TO:  [id number]    GRAM STAIN [[id number]] Collected: 01/01/21 1457    Order Status: Completed Specimen: Wound Updated: 01/02/21 0921     Significant Indicator .      Source WND     Site finger of right hand     Gram Stain Result Few WBCs.  Moderate Gram positive cocci.            Plan:   Appropriate antibiotic therapy prescribed. No changes required based upon culture result.  Sent letter to patient to notify of positive culture result and encourage compliance with prescribed antibiotics.     Prashant Frederick, PharmD

## 2021-03-03 DIAGNOSIS — Z23 NEED FOR VACCINATION: ICD-10-CM

## 2021-03-12 ENCOUNTER — HOSPITAL ENCOUNTER (OUTPATIENT)
Facility: MEDICAL CENTER | Age: 71
End: 2021-03-13
Attending: EMERGENCY MEDICINE | Admitting: INTERNAL MEDICINE
Payer: MEDICARE

## 2021-03-12 ENCOUNTER — APPOINTMENT (OUTPATIENT)
Dept: RADIOLOGY | Facility: MEDICAL CENTER | Age: 71
End: 2021-03-12
Attending: EMERGENCY MEDICINE
Payer: MEDICARE

## 2021-03-12 DIAGNOSIS — Z99.2 STAGE 5 CHRONIC KIDNEY DISEASE ON CHRONIC DIALYSIS (HCC): ICD-10-CM

## 2021-03-12 DIAGNOSIS — N18.6 STAGE 5 CHRONIC KIDNEY DISEASE ON CHRONIC DIALYSIS (HCC): ICD-10-CM

## 2021-03-12 DIAGNOSIS — J22 ACUTE LOWER RESPIRATORY INFECTION: ICD-10-CM

## 2021-03-12 PROBLEM — R06.02 SHORTNESS OF BREATH: Status: ACTIVE | Noted: 2021-03-12

## 2021-03-12 LAB
ALBUMIN SERPL BCP-MCNC: 4.2 G/DL (ref 3.2–4.9)
ALBUMIN/GLOB SERPL: 1.3 G/DL
ALP SERPL-CCNC: 79 U/L (ref 30–99)
ALT SERPL-CCNC: 11 U/L (ref 2–50)
ANION GAP SERPL CALC-SCNC: 25 MMOL/L (ref 7–16)
AST SERPL-CCNC: 15 U/L (ref 12–45)
BASOPHILS # BLD AUTO: 1.1 % (ref 0–1.8)
BASOPHILS # BLD: 0.07 K/UL (ref 0–0.12)
BILIRUB SERPL-MCNC: 0.2 MG/DL (ref 0.1–1.5)
BUN SERPL-MCNC: 67 MG/DL (ref 8–22)
CALCIUM SERPL-MCNC: 9 MG/DL (ref 8.4–10.2)
CHLORIDE SERPL-SCNC: 93 MMOL/L (ref 96–112)
CO2 SERPL-SCNC: 19 MMOL/L (ref 20–33)
COMMENT 1642: NORMAL
CREAT SERPL-MCNC: 17.53 MG/DL (ref 0.5–1.4)
EOSINOPHIL # BLD AUTO: 0.29 K/UL (ref 0–0.51)
EOSINOPHIL NFR BLD: 4.5 % (ref 0–6.9)
ERYTHROCYTE [DISTWIDTH] IN BLOOD BY AUTOMATED COUNT: 49.1 FL (ref 35.9–50)
FLUAV RNA SPEC QL NAA+PROBE: NEGATIVE
FLUBV RNA SPEC QL NAA+PROBE: NEGATIVE
GLOBULIN SER CALC-MCNC: 3.2 G/DL (ref 1.9–3.5)
GLUCOSE SERPL-MCNC: 77 MG/DL (ref 65–99)
HCT VFR BLD AUTO: 39.3 % (ref 42–52)
HGB BLD-MCNC: 11.6 G/DL (ref 14–18)
HYPOCHROMIA BLD QL SMEAR: ABNORMAL
IMM GRANULOCYTES # BLD AUTO: 0.03 K/UL (ref 0–0.11)
IMM GRANULOCYTES NFR BLD AUTO: 0.5 % (ref 0–0.9)
LACTATE BLD-SCNC: 2.5 MMOL/L (ref 0.5–2)
LACTATE BLD-SCNC: 2.9 MMOL/L (ref 0.5–2)
LACTATE BLD-SCNC: 3.2 MMOL/L (ref 0.5–2)
LG PLATELETS BLD QL SMEAR: NORMAL
LYMPHOCYTES # BLD AUTO: 1.13 K/UL (ref 1–4.8)
LYMPHOCYTES NFR BLD: 17.5 % (ref 22–41)
MCH RBC QN AUTO: 24.3 PG (ref 27–33)
MCHC RBC AUTO-ENTMCNC: 29.5 G/DL (ref 33.7–35.3)
MCV RBC AUTO: 82.2 FL (ref 81.4–97.8)
MONOCYTES # BLD AUTO: 0.65 K/UL (ref 0–0.85)
MONOCYTES NFR BLD AUTO: 10.1 % (ref 0–13.4)
NEUTROPHILS # BLD AUTO: 4.28 K/UL (ref 1.82–7.42)
NEUTROPHILS NFR BLD: 66.3 % (ref 44–72)
NRBC # BLD AUTO: 0 K/UL
NRBC BLD-RTO: 0 /100 WBC
NT-PROBNP SERPL IA-MCNC: 2976 PG/ML (ref 0–125)
PLATELET # BLD AUTO: 269 K/UL (ref 164–446)
PLATELET BLD QL SMEAR: NORMAL
PMV BLD AUTO: 10.7 FL (ref 9–12.9)
POLYCHROMASIA BLD QL SMEAR: NORMAL
POTASSIUM SERPL-SCNC: 5 MMOL/L (ref 3.6–5.5)
PROCALCITONIN SERPL-MCNC: 0.98 NG/ML
PROT SERPL-MCNC: 7.4 G/DL (ref 6–8.2)
RBC # BLD AUTO: 4.78 M/UL (ref 4.7–6.1)
RBC BLD AUTO: PRESENT
RSV RNA SPEC QL NAA+PROBE: NEGATIVE
SARS-COV-2 RNA RESP QL NAA+PROBE: NOTDETECTED
SODIUM SERPL-SCNC: 137 MMOL/L (ref 135–145)
SPECIMEN SOURCE: NORMAL
WBC # BLD AUTO: 6.5 K/UL (ref 4.8–10.8)

## 2021-03-12 PROCEDURE — 80053 COMPREHEN METABOLIC PANEL: CPT

## 2021-03-12 PROCEDURE — 71045 X-RAY EXAM CHEST 1 VIEW: CPT

## 2021-03-12 PROCEDURE — 0241U HCHG SARS-COV-2 COVID-19 NFCT DS RESP RNA 4 TRGT MIC: CPT

## 2021-03-12 PROCEDURE — 700102 HCHG RX REV CODE 250 W/ 637 OVERRIDE(OP): Performed by: INTERNAL MEDICINE

## 2021-03-12 PROCEDURE — 700101 HCHG RX REV CODE 250: Performed by: EMERGENCY MEDICINE

## 2021-03-12 PROCEDURE — 99220 PR INITIAL OBSERVATION CARE,LEVL III: CPT | Performed by: INTERNAL MEDICINE

## 2021-03-12 PROCEDURE — 700105 HCHG RX REV CODE 258: Performed by: EMERGENCY MEDICINE

## 2021-03-12 PROCEDURE — 87040 BLOOD CULTURE FOR BACTERIA: CPT | Mod: 91

## 2021-03-12 PROCEDURE — 700111 HCHG RX REV CODE 636 W/ 250 OVERRIDE (IP): Performed by: EMERGENCY MEDICINE

## 2021-03-12 PROCEDURE — 84145 PROCALCITONIN (PCT): CPT

## 2021-03-12 PROCEDURE — 94640 AIRWAY INHALATION TREATMENT: CPT

## 2021-03-12 PROCEDURE — A9270 NON-COVERED ITEM OR SERVICE: HCPCS | Performed by: INTERNAL MEDICINE

## 2021-03-12 PROCEDURE — 83880 ASSAY OF NATRIURETIC PEPTIDE: CPT

## 2021-03-12 PROCEDURE — 36415 COLL VENOUS BLD VENIPUNCTURE: CPT

## 2021-03-12 PROCEDURE — G0378 HOSPITAL OBSERVATION PER HR: HCPCS

## 2021-03-12 PROCEDURE — 36415 COLL VENOUS BLD VENIPUNCTURE: CPT | Mod: XU

## 2021-03-12 PROCEDURE — 83605 ASSAY OF LACTIC ACID: CPT

## 2021-03-12 PROCEDURE — 96367 TX/PROPH/DG ADDL SEQ IV INF: CPT

## 2021-03-12 PROCEDURE — C9803 HOPD COVID-19 SPEC COLLECT: HCPCS | Performed by: EMERGENCY MEDICINE

## 2021-03-12 PROCEDURE — 99285 EMERGENCY DEPT VISIT HI MDM: CPT

## 2021-03-12 PROCEDURE — 94760 N-INVAS EAR/PLS OXIMETRY 1: CPT

## 2021-03-12 PROCEDURE — 85025 COMPLETE CBC W/AUTO DIFF WBC: CPT

## 2021-03-12 PROCEDURE — 96365 THER/PROPH/DIAG IV INF INIT: CPT

## 2021-03-12 RX ORDER — ONDANSETRON 4 MG/1
4 TABLET, ORALLY DISINTEGRATING ORAL EVERY 4 HOURS PRN
Status: DISCONTINUED | OUTPATIENT
Start: 2021-03-12 | End: 2021-03-13 | Stop reason: HOSPADM

## 2021-03-12 RX ORDER — ONDANSETRON 2 MG/ML
4 INJECTION INTRAMUSCULAR; INTRAVENOUS EVERY 4 HOURS PRN
Status: DISCONTINUED | OUTPATIENT
Start: 2021-03-12 | End: 2021-03-13 | Stop reason: HOSPADM

## 2021-03-12 RX ORDER — POLYETHYLENE GLYCOL 3350 17 G/17G
1 POWDER, FOR SOLUTION ORAL
Status: DISCONTINUED | OUTPATIENT
Start: 2021-03-12 | End: 2021-03-13 | Stop reason: HOSPADM

## 2021-03-12 RX ORDER — MULTIVIT WITH MINERALS/LUTEIN
2000 TABLET ORAL 3 TIMES DAILY
Status: ON HOLD | COMMUNITY
End: 2022-10-10

## 2021-03-12 RX ORDER — ACETAMINOPHEN 325 MG/1
650 TABLET ORAL EVERY 6 HOURS PRN
Status: DISCONTINUED | OUTPATIENT
Start: 2021-03-12 | End: 2021-03-13 | Stop reason: HOSPADM

## 2021-03-12 RX ORDER — AMOXICILLIN 250 MG
2 CAPSULE ORAL 2 TIMES DAILY
Status: DISCONTINUED | OUTPATIENT
Start: 2021-03-12 | End: 2021-03-13 | Stop reason: HOSPADM

## 2021-03-12 RX ORDER — SEVELAMER CARBONATE 800 MG/1
3200 TABLET, FILM COATED ORAL 2 TIMES DAILY WITH MEALS
COMMUNITY

## 2021-03-12 RX ORDER — SEVELAMER CARBONATE 800 MG/1
3200 TABLET, FILM COATED ORAL 3 TIMES DAILY
Status: DISCONTINUED | OUTPATIENT
Start: 2021-03-12 | End: 2021-03-13 | Stop reason: HOSPADM

## 2021-03-12 RX ORDER — BISACODYL 10 MG
10 SUPPOSITORY, RECTAL RECTAL
Status: DISCONTINUED | OUTPATIENT
Start: 2021-03-12 | End: 2021-03-13 | Stop reason: HOSPADM

## 2021-03-12 RX ORDER — AZITHROMYCIN 250 MG/1
500 TABLET, FILM COATED ORAL DAILY
Status: DISCONTINUED | OUTPATIENT
Start: 2021-03-13 | End: 2021-03-13 | Stop reason: HOSPADM

## 2021-03-12 RX ORDER — CALCIUM CARBONATE 500 MG/1
500 TABLET, CHEWABLE ORAL 3 TIMES DAILY PRN
Status: DISCONTINUED | OUTPATIENT
Start: 2021-03-12 | End: 2021-03-13 | Stop reason: HOSPADM

## 2021-03-12 RX ORDER — AZITHROMYCIN 500 MG/1
500 INJECTION, POWDER, LYOPHILIZED, FOR SOLUTION INTRAVENOUS ONCE
Status: COMPLETED | OUTPATIENT
Start: 2021-03-12 | End: 2021-03-12

## 2021-03-12 RX ORDER — CARVEDILOL 25 MG/1
25 TABLET ORAL 2 TIMES DAILY
Status: ON HOLD | COMMUNITY
End: 2022-10-10

## 2021-03-12 RX ORDER — HEPARIN SODIUM 5000 [USP'U]/ML
5000 INJECTION, SOLUTION INTRAVENOUS; SUBCUTANEOUS EVERY 8 HOURS
Status: DISCONTINUED | OUTPATIENT
Start: 2021-03-12 | End: 2021-03-13 | Stop reason: HOSPADM

## 2021-03-12 RX ORDER — LISINOPRIL 10 MG/1
10 TABLET ORAL DAILY
Status: ON HOLD | COMMUNITY
End: 2022-10-10

## 2021-03-12 RX ORDER — CINACALCET 30 MG/1
60 TABLET, FILM COATED ORAL DAILY
Status: DISCONTINUED | OUTPATIENT
Start: 2021-03-13 | End: 2021-03-13 | Stop reason: HOSPADM

## 2021-03-12 RX ORDER — MIDODRINE HYDROCHLORIDE 5 MG/1
10 TABLET ORAL
Status: DISCONTINUED | OUTPATIENT
Start: 2021-03-12 | End: 2021-03-13 | Stop reason: HOSPADM

## 2021-03-12 RX ORDER — ALBUTEROL SULFATE 90 UG/1
2 AEROSOL, METERED RESPIRATORY (INHALATION)
Status: DISCONTINUED | OUTPATIENT
Start: 2021-03-12 | End: 2021-03-13 | Stop reason: HOSPADM

## 2021-03-12 RX ADMIN — SODIUM CHLORIDE 3 G: 900 INJECTION INTRAVENOUS at 12:09

## 2021-03-12 RX ADMIN — CALCIUM CARBONATE (ANTACID) CHEW TAB 500 MG 500 MG: 500 CHEW TAB at 18:38

## 2021-03-12 RX ADMIN — AZITHROMYCIN MONOHYDRATE 500 MG: 500 INJECTION, POWDER, LYOPHILIZED, FOR SOLUTION INTRAVENOUS at 13:12

## 2021-03-12 RX ADMIN — SEVELAMER CARBONATE 3200 MG: 800 TABLET, FILM COATED ORAL at 17:37

## 2021-03-12 RX ADMIN — ALBUTEROL SULFATE 2.5 MG: 2.5 SOLUTION RESPIRATORY (INHALATION) at 11:56

## 2021-03-12 RX ADMIN — ALBUTEROL SULFATE 2 PUFF: 90 AEROSOL, METERED RESPIRATORY (INHALATION) at 17:10

## 2021-03-12 RX ADMIN — SENNOSIDES AND DOCUSATE SODIUM 2 TABLET: 8.6; 5 TABLET ORAL at 17:37

## 2021-03-12 RX ADMIN — MIDODRINE HYDROCHLORIDE 10 MG: 5 TABLET ORAL at 17:38

## 2021-03-12 ASSESSMENT — COGNITIVE AND FUNCTIONAL STATUS - GENERAL
SUGGESTED CMS G CODE MODIFIER DAILY ACTIVITY: CH
SUGGESTED CMS G CODE MODIFIER MOBILITY: CH
DAILY ACTIVITIY SCORE: 24
MOBILITY SCORE: 24

## 2021-03-12 ASSESSMENT — LIFESTYLE VARIABLES
TOTAL SCORE: 0
EVER HAD A DRINK FIRST THING IN THE MORNING TO STEADY YOUR NERVES TO GET RID OF A HANGOVER: NO
HOW MANY TIMES IN THE PAST YEAR HAVE YOU HAD 5 OR MORE DRINKS IN A DAY: 0
TOTAL SCORE: 0
ALCOHOL_USE: NO
TOTAL SCORE: 0
CONSUMPTION TOTAL: NEGATIVE
ON A TYPICAL DAY WHEN YOU DRINK ALCOHOL HOW MANY DRINKS DO YOU HAVE: 0
AVERAGE NUMBER OF DAYS PER WEEK YOU HAVE A DRINK CONTAINING ALCOHOL: 0
HAVE PEOPLE ANNOYED YOU BY CRITICIZING YOUR DRINKING: NO
EVER FELT BAD OR GUILTY ABOUT YOUR DRINKING: NO
HAVE YOU EVER FELT YOU SHOULD CUT DOWN ON YOUR DRINKING: NO

## 2021-03-12 ASSESSMENT — ENCOUNTER SYMPTOMS
WHEEZING: 0
FEVER: 1
VOMITING: 0
COUGH: 1
SPUTUM PRODUCTION: 1
HEADACHES: 1
DIZZINESS: 0
HEMOPTYSIS: 0
NAUSEA: 0
NERVOUS/ANXIOUS: 0
SHORTNESS OF BREATH: 1
ORTHOPNEA: 1
ABDOMINAL PAIN: 0

## 2021-03-12 ASSESSMENT — PATIENT HEALTH QUESTIONNAIRE - PHQ9
2. FEELING DOWN, DEPRESSED, IRRITABLE, OR HOPELESS: NOT AT ALL
SUM OF ALL RESPONSES TO PHQ9 QUESTIONS 1 AND 2: 0
1. LITTLE INTEREST OR PLEASURE IN DOING THINGS: NOT AT ALL
2. FEELING DOWN, DEPRESSED, IRRITABLE, OR HOPELESS: NOT AT ALL
SUM OF ALL RESPONSES TO PHQ9 QUESTIONS 1 AND 2: 0
1. LITTLE INTEREST OR PLEASURE IN DOING THINGS: NOT AT ALL

## 2021-03-12 ASSESSMENT — FIBROSIS 4 INDEX
FIB4 SCORE: 1.25
FIB4 SCORE: 1.14

## 2021-03-12 NOTE — ED TRIAGE NOTES
Presents complaining of congestion, non productive cough, and rhinorrhea recurring for the past 3 days.  Chief Complaint   Patient presents with   • Congestion   • Cough   • Runny Nose     /71   Pulse 90   Temp 37.2 °C (99 °F) (Oral)   Resp 18   Ht 1.829 m (6')   Wt 87 kg (191 lb 12.8 oz)   SpO2 96%   BMI 26.01 kg/m²

## 2021-03-12 NOTE — ED PROVIDER NOTES
ED Provider Note    CHIEF COMPLAINT  Chief Complaint   Patient presents with    Congestion    Cough    Runny Nose       HPI  Bridger Savage is a 70 y.o. male who presents complaining of cough congestion runny nose fever started on Monday.  Patient states that he missed dialysis on Wednesday because he was starting to have worsening of the symptoms and felt a little feverish.  He states that yesterday he felt a little bit better so he went to get dialysis this morning.  At dialysis he was found to have a temperature of 101 so they sent him here for further evaluation.  The patient has not had a Covid vaccination.  He is a smoker and does continue to smoke.  He is not on oxygen at home.  He does not feel short of breath and does not have any pain in his chest.  He states that his cough is nonproductive.  He does not know if he has been exposed to anyone with Covid.  His last dialysis was on Monday and he has not had dialysis Wednesday or today yet.  He denies any rashes or joint swelling.  He denies any leg edema PND orthopnea.  He states that he is on inhalers at home but ran out of one of them.    REVIEW OF SYSTEMS  HEENT:  No ear pain, congestion or sore throat   EYES: no discharge redness or vision changes  CARDIAC: no chest pain, palpitations    PULMONARY: The history of present illness  GI: no vomiting diarrhea or abdominal pain   : no dysuria, back pain or hematuria   Neuro: no weakness, numbness aphasia or headache  Musculoskeletal: no swelling deformity or pain no joint swelling  Endocrine: positive fevers, sweating, weight loss   SKIN: no rash, erythema or contusions     See history of present illness all other systems are negative    PAST MEDICAL HISTORY  Past Medical History:   Diagnosis Date    Asthma     Breath shortness     uses oxygen 3.5 L as needed    Chronic obstructive pulmonary disease (HCC)     Cold     12/27/17 cold    Congestive heart failure (HCC)     Dental disorder     upper and lower  dentures    Dialysis patient ([facility])     MWF    Emphysema of lung ([facility])     Hemodialysis patient ([facility])     M, W, F    Renal disorder     Snoring     Urinary incontinence        FAMILY HISTORY  Family History   Problem Relation Age of Onset    Heart Disease Mother     Diabetes Mother     Kidney Disease Mother         kidney failure    Asthma Father     Diabetes Brother     No Known Problems Maternal Grandmother     No Known Problems Maternal Grandfather     No Known Problems Paternal Grandmother     No Known Problems Paternal Grandfather        SOCIAL HISTORY  Social History     Socioeconomic History    Marital status:      Spouse name: Not on file    Number of children: Not on file    Years of education: Not on file    Highest education level: Not on file   Occupational History    Not on file   Tobacco Use    Smoking status: Current Some Day Smoker     Packs/day: 0.50     Types: Cigarettes    Smokeless tobacco: Never Used   Substance and Sexual Activity    Alcohol use: No    Drug use: No    Sexual activity: Not on file   Other Topics Concern    Not on file   Social History Narrative    ** Merged History Encounter **          Social Determinants of Health     Financial Resource Strain:     Difficulty of Paying Living Expenses:    Food Insecurity:     Worried About Running Out of Food in the Last Year:     Ran Out of Food in the Last Year:    Transportation Needs:     Lack of Transportation (Medical):     Lack of Transportation (Non-Medical):    Physical Activity:     Days of Exercise per Week:     Minutes of Exercise per Session:    Stress:     Feeling of Stress :    Social Connections:     Frequency of Communication with Friends and Family:     Frequency of Social Gatherings with Friends and Family:     Attends Yarsanism Services:     Active Member of Clubs or Organizations:     Attends Club or Organization Meetings:     Marital Status:    Intimate Partner Violence:     Fear of Current or Ex-Partner:      Emotionally Abused:     Physically Abused:     Sexually Abused:        SURGICAL HISTORY  Past Surgical History:   Procedure Laterality Date    GASTROSCOPY-ENDO  1/30/2020    Procedure: GASTROSCOPY;  Surgeon: Richard Cano D.O.;  Location: SURGERY [facility];  Service: Gastroenterology    GASTROSCOPY  2/1/2018    Procedure: GASTROSCOPY;  Surgeon: Morteza Olivo M.D.;  Location: [facility];  Service: Gastroenterology    COLONOSCOPY  2/1/2018    Procedure: COLONOSCOPY;  Surgeon: Morteza Olivo M.D.;  Location: SURGERY [facility];  Service: Gastroenterology    GASTROSCOPY-ENDO N/A 8/28/2017    Procedure: GASTROSCOPY-ENDO;  Surgeon: Pankaj Choudhury M.D.;  Location: [facility];  Service: EUS    OTHER      Patient had an arm fistula put in for dialysis       CURRENT MEDICATIONS  Home Medications       Reviewed by Leti Barbosa (Pharmacy Tech) on 03/12/21 at 1158  Med List Status: Complete     Medication Last Dose Status   albuterol 108 (90 Base) MCG/ACT Aero Soln inhalation aerosol 3/12/2021 Active   ANORO ELLIPTA 62.5-25 MCG/INH AEROSOL POWDER, BREATH ACTIVATED inhaler 3/10/2021 Active   Ascorbic Acid (VITAMIN C) 1000 MG Tab 3/12/2021 Active   aspirin EC (ECOTRIN) 81 MG Tablet Delayed Response 3/12/2021 Active   carvedilol (COREG) 25 MG Tab 3/12/2021 Active   Cinacalcet HCl (SENSIPAR) 60 MG Tab 3/11/2021 Active   lisinopril (PRINIVIL) 10 MG Tab 3/12/2021 Active   midodrine (PROAMATINE) 10 MG tablet 3/12/2021 Active   sevelamer carbonate (RENVELA) 800 MG Tab tablet 3/12/2021 Active                    ALLERGIES  Allergies   Allergen Reactions    Chloraprep One Step Itching       PHYSICAL EXAM  VITAL SIGNS: /61   Pulse 80   Temp 37.2 °C (99 °F) (Oral)   Resp 18   Ht 1.829 m (6')   Wt 87 kg (191 lb 12.8 oz)   SpO2 96%   BMI 26.01 kg/m²   Constitutional: Well developed, Well nourished, No acute distress, Non-toxic appearance.   HEENT: Normocephalic, Atraumatic,   external ears normal, pharynx pink,  Mucous  Membranes moist, No rhinorrhea or mucosal edema  Eyes: PERRL, EOMI, Conjunctiva normal, No discharge.   Neck: Normal range of motion, No tenderness, Supple, No stridor.   Lymphatic: No lymphadenopathy    Cardiovascular: Regular Rate and Rhythm, No murmurs,  rubs, or gallops.   Thorax & Lungs: Positive for scattered diffuse wheezes and rhonchi in all lung fields speaking full sentences without tachypnea or respiratory distress  Abdomen: Bowel sounds normal, Soft, non tender, non distended,  No pulsatile masses., no rebound guarding or peritoneal signs.   Skin: Warm, Dry, No erythema, No rash,   Back:  No CVA tenderness,  No spinal tenderness, bony crepitance step offs or instability.   Extremities: Equal, intact distal pulses, No cyanosis, clubbing or edema,  No tenderness.   Musculoskeletal: Good range of motion in all major joints. No tenderness to palpation or major deformities noted.   Neurologic: Alert & oriented x 3, Cranial nerves II-XII intact, Equal strength and sensation upper and lower extremities bilaterally,  No focal deficits noted.   Psychiatric: Affect normal, Judgment normal, Mood normal. No suicidal or homicidal ideation      RADIOLOGY/PROCEDURES  DX-CHEST-PORTABLE (1 VIEW)   Final Result      Mild cardiomegaly.            COURSE & MEDICAL DECISION MAKING  Pertinent Labs & Imaging studies reviewed. (See chart for details)  Differential diagnosis: Pneumonia, electrolyte disturbance, volume overload, Covid    An IV was started and labs were drawn.  I gave him antibiotics per the septic protocol but did not give him fluids because he is a dialysis patient who is already missed 2 dialysis sessions.    Results for orders placed or performed during the hospital encounter of 03/12/21   CBC WITH DIFFERENTIAL   Result Value Ref Range    WBC 6.5 4.8 - 10.8 K/uL    RBC 4.78 4.70 - 6.10 M/uL    Hemoglobin 11.6 (L) 14.0 - 18.0 g/dL    Hematocrit 39.3 (L) 42.0 - 52.0  %    MCV 82.2 81.4 - 97.8 fL    MCH 24.3 (L) 27.0 - 33.0 pg    MCHC 29.5 (L) 33.7 - 35.3 g/dL    RDW 49.1 35.9 - 50.0 fL    Platelet Count 269 164 - 446 K/uL    MPV 10.7 9.0 - 12.9 fL    Neutrophils-Polys 66.30 44.00 - 72.00 %    Lymphocytes 17.50 (L) 22.00 - 41.00 %    Monocytes 10.10 0.00 - 13.40 %    Eosinophils 4.50 0.00 - 6.90 %    Basophils 1.10 0.00 - 1.80 %    Immature Granulocytes 0.50 0.00 - 0.90 %    Nucleated RBC 0.00 /100 WBC    Neutrophils (Absolute) 4.28 1.82 - 7.42 K/uL    Lymphs (Absolute) 1.13 1.00 - 4.80 K/uL    Monos (Absolute) 0.65 0.00 - 0.85 K/uL    Eos (Absolute) 0.29 0.00 - 0.51 K/uL    Baso (Absolute) 0.07 0.00 - 0.12 K/uL    Immature Granulocytes (abs) 0.03 0.00 - 0.11 K/uL    NRBC (Absolute) 0.00 K/uL    Hypochromia 1+    COMP METABOLIC PANEL   Result Value Ref Range    Sodium 137 135 - 145 mmol/L    Potassium 5.0 3.6 - 5.5 mmol/L    Chloride 93 (L) 96 - 112 mmol/L    Co2 19 (L) 20 - 33 mmol/L    Anion Gap 25.0 (H) 7.0 - 16.0    Glucose 77 65 - 99 mg/dL    Bun 67 (H) 8 - 22 mg/dL    Creatinine 17.53 (HH) 0.50 - 1.40 mg/dL    Calcium 9.0 8.4 - 10.2 mg/dL    AST(SGOT) 15 12 - 45 U/L    ALT(SGPT) 11 2 - 50 U/L    Alkaline Phosphatase 79 30 - 99 U/L    Total Bilirubin 0.2 0.1 - 1.5 mg/dL    Albumin 4.2 3.2 - 4.9 g/dL    Total Protein 7.4 6.0 - 8.2 g/dL    Globulin 3.2 1.9 - 3.5 g/dL    A-G Ratio 1.3 g/dL   COV-2, FLU A/B, AND RSV BY PCR (2-4 HOURS CEPHEID): Collect NP swab in VTM    Specimen: Nasopharyngeal; Respirate   Result Value Ref Range    SARS-CoV-2 Source NP Swab    LACTIC ACID   Result Value Ref Range    Lactic Acid 2.9 (H) 0.5 - 2.0 mmol/L   ESTIMATED GFR   Result Value Ref Range    GFR If  3 (A) >60 mL/min/1.73 m 2    GFR If Non African American 3 (A) >60 mL/min/1.73 m 2   PLATELET ESTIMATE   Result Value Ref Range    Plt Estimation Normal    MORPHOLOGY   Result Value Ref Range    RBC Morphology Present     Large Platelets 1+     Polychromia 1+    DIFFERENTIAL  COMMENT   Result Value Ref Range    Comments-Diff see below    PROCALCITONIN   Result Value Ref Range    Procalcitonin 0.98 (H) <0.25 ng/mL   proBrain Natriuretic Peptide, NT   Result Value Ref Range    NT-proBNP 2976 (H) 0 - 125 pg/mL         11:00am I spoke with the hospitalist Dr. Godinez who has accepted the patient for admission.    Patient has had high blood pressure while in the emergency department, felt likely secondary to medical condition. Counseled patient to monitor blood pressure at home and follow up with primary care physician.    Critical Care  Due to the real possibility of a deterioration of this patient's condition required the highest level of my preparedness for sudden emergent intervention. I provided critical care services which included medication orders, frequent reevaluations of the patient's condition and response to treatment, ordering and reviewing test results and discussing the case with various consultants. The critical care time associated with the care of the patient was 40 minutes. Review chart for interventions. This time is exclusive of any other billable procedures.     FINAL IMPRESSION  1. Acute lower respiratory infection    2. Stage 5 chronic kidney disease on chronic dialysis (HCC)           admitted          Electronically signed by: Marcia Lofton M.D., 3/12/2021 10:29 AM

## 2021-03-12 NOTE — CONSULTS
[facility] -  CONSULTATION NOTE               Author: Sandie Murillo M.D. Date & Time: 3/12/2021  1:31 PM       REASON FOR CONSULTATION:   Inpatient hemodialysis management.    CHIEF COMPLAINT:   fever    HISTORY OF PRESENT ILLNESS:    69-year-old gentleman with   history of end-stage renal disease, on hemodialysis Monday, Wednesday and   Friday via left upper arm AV fistula; history of hypertension; COPD, who   presented to the emergency room from HD clinic for fever, was not dialyzed since Friday. Pt also has sore throat and rhinorrhea for 2 days. Covid test was sent.   Patient was found to have pneumonia and is being admitted for fruther care.   Nephrology was called for HD.     REVIEW OF SYSTEMS:    10 point ROS reviewed and is as per HPI/daily summary or otherwise negative      PAST MEDICAL HISTORY:   Past Medical History:   Diagnosis Date   • Asthma    • Breath shortness     uses oxygen 3.5 L as needed   • Chronic obstructive pulmonary disease (HCC)    • Cold     12/27/17 cold   • Congestive heart failure (HCC)    • Dental disorder     upper and lower dentures   • Dialysis patient (HCC)     MWF   • Emphysema of lung (HCC)    • Hemodialysis patient (HCC)     M, W, F   • Renal disorder    • Snoring    • Urinary incontinence          PAST SURGICAL HISTORY:   Past Surgical History:   Procedure Laterality Date   • GASTROSCOPY-ENDO  1/30/2020    Procedure: GASTROSCOPY;  Surgeon: Richard Cano D.O.;  Location: [facility];  Service: Gastroenterology   • GASTROSCOPY  2/1/2018    Procedure: GASTROSCOPY;  Surgeon: Morteza Olivo M.D.;  Location: [facility];  Service: Gastroenterology   • COLONOSCOPY  2/1/2018    Procedure: COLONOSCOPY;  Surgeon: Morteza Olivo M.D.;  Location: [facility];  Service: Gastroenterology   • GASTROSCOPY-ENDO N/A 8/28/2017    Procedure: GASTROSCOPY-ENDO;  Surgeon: Pankaj Choudhury M.D.;  Location: [facility];   Service: EUS   • OTHER      Patient had an arm fistula put in for dialysis       FAMILY HISTORY:   No family history of kidney disease    SOCIAL HISTORY:   No smoking, no etoh, no illicit drugs.    HOME MEDICATIONS:   Reviewed and documented in chart    ALLERGIES:  Chloraprep one step    PHYSICAL EXAM:  VS:  /61   Pulse 80   Temp 37.2 °C (99 °F) (Oral)   Resp 18   Ht 1.829 m (6')   Wt 87 kg (191 lb 12.8 oz)   SpO2 96%   BMI 26.01 kg/m²   Physical Exam   Constitutional: He is oriented to person, place, and time. He appears well-developed and well-nourished. No distress.   HENT:   Head: Normocephalic and atraumatic.   Nose: Nose normal.   Eyes: Pupils are equal, round, and reactive to light. Conjunctivae and EOM are normal. Left eye exhibits no discharge.   Neck: No JVD present. No tracheal deviation present. No thyromegaly present.   Cardiovascular: Normal rate, regular rhythm, normal heart sounds and intact distal pulses. Exam reveals no gallop and no friction rub.   No murmur heard.  Pulmonary/Chest: Effort normal and breath sounds normal. No respiratory distress. He has no wheezes.   Abdominal: Soft. Bowel sounds are normal. He exhibits no distension. There is no abdominal tenderness. There is no rebound.   Musculoskeletal:         General: No edema. Normal range of motion.      Cervical back: Normal range of motion and neck supple.   Neurological: He is alert and oriented to person, place, and time.   Skin: Skin is warm and dry. No rash noted. No erythema.   Psychiatric: He has a normal mood and affect. His behavior is normal.   ASCENCION GERMAN B/t+     LABS:  Recent Results (from the past 24 hour(s))   CBC WITH DIFFERENTIAL    Collection Time: 03/12/21 11:35 AM   Result Value Ref Range    WBC 6.5 4.8 - 10.8 K/uL    RBC 4.78 4.70 - 6.10 M/uL    Hemoglobin 11.6 (L) 14.0 - 18.0 g/dL    Hematocrit 39.3 (L) 42.0 - 52.0 %    MCV 82.2 81.4 - 97.8 fL    MCH 24.3 (L) 27.0 - 33.0 pg    MCHC 29.5 (L) 33.7 - 35.3 g/dL     RDW 49.1 35.9 - 50.0 fL    Platelet Count 269 164 - 446 K/uL    MPV 10.7 9.0 - 12.9 fL    Neutrophils-Polys 66.30 44.00 - 72.00 %    Lymphocytes 17.50 (L) 22.00 - 41.00 %    Monocytes 10.10 0.00 - 13.40 %    Eosinophils 4.50 0.00 - 6.90 %    Basophils 1.10 0.00 - 1.80 %    Immature Granulocytes 0.50 0.00 - 0.90 %    Nucleated RBC 0.00 /100 WBC    Neutrophils (Absolute) 4.28 1.82 - 7.42 K/uL    Lymphs (Absolute) 1.13 1.00 - 4.80 K/uL    Monos (Absolute) 0.65 0.00 - 0.85 K/uL    Eos (Absolute) 0.29 0.00 - 0.51 K/uL    Baso (Absolute) 0.07 0.00 - 0.12 K/uL    Immature Granulocytes (abs) 0.03 0.00 - 0.11 K/uL    NRBC (Absolute) 0.00 K/uL    Hypochromia 1+    COMP METABOLIC PANEL    Collection Time: 03/12/21 11:35 AM   Result Value Ref Range    Sodium 137 135 - 145 mmol/L    Potassium 5.0 3.6 - 5.5 mmol/L    Chloride 93 (L) 96 - 112 mmol/L    Co2 19 (L) 20 - 33 mmol/L    Anion Gap 25.0 (H) 7.0 - 16.0    Glucose 77 65 - 99 mg/dL    Bun 67 (H) 8 - 22 mg/dL    Creatinine 17.53 (HH) 0.50 - 1.40 mg/dL    Calcium 9.0 8.4 - 10.2 mg/dL    AST(SGOT) 15 12 - 45 U/L    ALT(SGPT) 11 2 - 50 U/L    Alkaline Phosphatase 79 30 - 99 U/L    Total Bilirubin 0.2 0.1 - 1.5 mg/dL    Albumin 4.2 3.2 - 4.9 g/dL    Total Protein 7.4 6.0 - 8.2 g/dL    Globulin 3.2 1.9 - 3.5 g/dL    A-G Ratio 1.3 g/dL   LACTIC ACID    Collection Time: 03/12/21 11:35 AM   Result Value Ref Range    Lactic Acid 2.9 (H) 0.5 - 2.0 mmol/L   ESTIMATED GFR    Collection Time: 03/12/21 11:35 AM   Result Value Ref Range    GFR If  3 (A) >60 mL/min/1.73 m 2    GFR If Non African American 3 (A) >60 mL/min/1.73 m 2   PLATELET ESTIMATE    Collection Time: 03/12/21 11:35 AM   Result Value Ref Range    Plt Estimation Normal    MORPHOLOGY    Collection Time: 03/12/21 11:35 AM   Result Value Ref Range    RBC Morphology Present     Large Platelets 1+     Polychromia 1+    DIFFERENTIAL COMMENT    Collection Time: 03/12/21 11:35 AM   Result Value Ref Range     Comments-Diff see below    COV-2, FLU A/B, AND RSV BY PCR (2-4 HOURS CEPHEID): Collect NP swab in VTM    Collection Time: 03/12/21 12:10 PM    Specimen: Nasopharyngeal; Respirate   Result Value Ref Range    SARS-CoV-2 Source NP Swab        (click the triangle to expand results)    IMAGING:  DX-CHEST-PORTABLE (1 VIEW)   Final Result      Mild cardiomegaly.          PMH/FH/SH/meds/labs/images reviewed     ASSESSMENT:  # End-stage renal disease              - Maint HD q MWF and prn              - via a left arm arteriovenous fistula.    # Anemia secondary to chronic kidney disease              - at the goal   #Chronic kidney disease, bone mineral disorder/renal osteodystrophy.    - per HD unit   # Hypertension, at the goal   # Secondary hyperparathyroidism.  The patient is on Sensipar as an   outpatient, unclear if he is compliant.  #Chronic obstructive pulmonary disease.  #Cardiomyopathy.  The patient with history of congestive heart failure.    His ejection fraction is 35% by echocardiogram in 10/2019.  He currently does   not appear fluid overloaded.  # Severe protein-calorie malnutrition              - no dietary protein restrictions  # Pneumonia  - covid test in process     PLAN:  - HD today and then HD q MWF and prn  - no need for DILAN at this time   - antibiotics per primary team  -  Dose adjust all medications for his decreased GFR.  -  No dietary protein restrictions at this timedialysis    Thank you for this consult, we will continue to follow.    Total time spent 60 mins which includes in-person and not in-person work.   Sandie Murillo MD

## 2021-03-12 NOTE — ED NOTES
1105 bld cx x 1 drawn , unable to place iv  1135 Iv placed , labs  2 nd bld cx drawn and taken to lab. poc update given to pt, results pending at this time  1145 pcxr completed  1210 RT completed with tx.  1217 abx infusing as ordered, covid swab collected. poc explained to pt

## 2021-03-12 NOTE — H&P
Hospital Medicine History & Physical Note    Date of Service  3/12/2021    Primary Care Physician  Jasmine Shields M.D.    Consultants  nephrology    Code Status  DNAR/DNI    Chief Complaint  Chief Complaint   Patient presents with   • Congestion   • Cough   • Runny Nose       History of Presenting Illness  70 y.o. male who presented 3/12/2021 with shortness of breath, cough.  PMH ESRD on HD, CHF, COPD who has been having shortness of breath and cough for the last several days.  He had missed his dialysis on Wednesday because he was feeling ill.  He has mild clear sputum, sneezing, headache.  He denies chest pain.  He went to dialysis today but they told him he had a temperature over 100 and told him he needs a Covid test so he came to the ER.  Chest x-ray showed cardiomegaly.  Covid and influenza screen were negative.    Review of Systems  Review of Systems   Constitutional: Positive for fever and malaise/fatigue.   HENT: Positive for congestion.    Respiratory: Positive for cough, sputum production and shortness of breath. Negative for hemoptysis and wheezing.    Cardiovascular: Positive for orthopnea. Negative for chest pain and leg swelling.   Gastrointestinal: Negative for abdominal pain, nausea and vomiting.   Genitourinary: Negative for frequency.   Neurological: Positive for headaches. Negative for dizziness.   Psychiatric/Behavioral: The patient is not nervous/anxious.    All other systems reviewed and are negative.      Past Medical History   has a past medical history of Asthma, Breath shortness, Chronic obstructive pulmonary disease (HCC), Cold, Congestive heart failure (HCC), Dental disorder, Dialysis patient (HCC), Emphysema of lung (HCC), Hemodialysis patient (HCC), Renal disorder, Snoring, and Urinary incontinence.    Surgical History   has a past surgical history that includes gastroscopy-endo (N/A, 8/28/2017); gastroscopy (2/1/2018); colonoscopy (2/1/2018); other; and gastroscopy-endo (1/30/2020).      Family History  family history includes Asthma in his father; Diabetes in his brother and mother; Heart Disease in his mother; Kidney Disease in his mother; No Known Problems in his maternal grandfather, maternal grandmother, paternal grandfather, and paternal grandmother.     Social History   reports that he has been smoking cigarettes. He has been smoking about 0.50 packs per day. He has never used smokeless tobacco. He reports that he does not drink alcohol and does not use drugs.    Allergies  Allergies   Allergen Reactions   • Chloraprep One Step Itching       Medications  Prior to Admission Medications   Prescriptions Last Dose Informant Patient Reported? Taking?   ANORO ELLIPTA 62.5-25 MCG/INH AEROSOL POWDER, BREATH ACTIVATED inhaler 3/10/2021 at K Patient Yes No   Sig: Inhale 1 Puff by mouth every day.   Ascorbic Acid (VITAMIN C) 1000 MG Tab 3/12/2021 at 0400 Patient Yes Yes   Sig: Take 2,000 mg by mouth 3 times a day.   Cinacalcet HCl (SENSIPAR) 60 MG Tab 3/11/2021 at AM Patient Yes No   Sig: Take 1 Tab by mouth every day.   albuterol 108 (90 Base) MCG/ACT Aero Soln inhalation aerosol 3/12/2021 at 0400 Patient Yes No   Sig: Inhale 2 Puffs by mouth every 6 hours as needed for Shortness of Breath.   aspirin EC (ECOTRIN) 81 MG Tablet Delayed Response 3/12/2021 at 0400 Patient Yes Yes   Sig: Take 81 mg by mouth every morning.   carvedilol (COREG) 25 MG Tab 3/12/2021 at 0400 Patient Yes Yes   Sig: Take 25 mg by mouth 2 Times a Day.   lisinopril (PRINIVIL) 10 MG Tab 3/12/2021 at 0400 Patient Yes Yes   Sig: Take 10 mg by mouth every day.   midodrine (PROAMATINE) 10 MG tablet 3/12/2021 at 0400 Patient Yes No   Sig: Take 10 mg by mouth 3 times a day. Take 1 tablet by mouth three times a day   sevelamer carbonate (RENVELA) 800 MG Tab tablet 3/12/2021 at 0400 Patient Yes Yes   Sig: Take 3,200 mg by mouth 3 times a day.      Facility-Administered Medications: None       Physical Exam  Temp:  [37.1 °C (98.8  °F)-37.2 °C (99 °F)] 37.1 °C (98.8 °F)  Pulse:  [76-94] 76  Resp:  [18] 18  BP: (100-117)/(51-71) 100/71  SpO2:  [89 %-98 %] 98 %    Physical Exam  Vitals and nursing note reviewed.   Constitutional:       General: He is not in acute distress.     Appearance: He is not toxic-appearing or diaphoretic.   HENT:      Head: Normocephalic.      Mouth/Throat:      Mouth: Mucous membranes are moist.   Eyes:      General:         Right eye: No discharge.         Left eye: No discharge.   Cardiovascular:      Rate and Rhythm: Normal rate and regular rhythm.   Pulmonary:      Effort: Pulmonary effort is normal. No respiratory distress.      Breath sounds: Rales present. No wheezing.   Abdominal:      Palpations: Abdomen is soft.      Tenderness: There is no abdominal tenderness.   Musculoskeletal:      Cervical back: Neck supple.      Right lower leg: No edema.      Left lower leg: No edema.      Comments: Left arm fistula with palpable pulse   Skin:     General: Skin is warm and dry.   Neurological:      Mental Status: He is alert and oriented to person, place, and time.         Laboratory:  Recent Labs     03/12/21  1135   WBC 6.5   RBC 4.78   HEMOGLOBIN 11.6*   HEMATOCRIT 39.3*   MCV 82.2   MCH 24.3*   MCHC 29.5*   RDW 49.1   PLATELETCT 269   MPV 10.7     Recent Labs     03/12/21  1135   SODIUM 137   POTASSIUM 5.0   CHLORIDE 93*   CO2 19*   GLUCOSE 77   BUN 67*   CREATININE 17.53*   CALCIUM 9.0     Recent Labs     03/12/21  1135   ALTSGPT 11   ASTSGOT 15   ALKPHOSPHAT 79   TBILIRUBIN 0.2   GLUCOSE 77         Recent Labs     03/12/21  1135   NTPROBNP 2976*         No results for input(s): TROPONINT in the last 72 hours.    Imaging:  DX-CHEST-PORTABLE (1 VIEW)   Final Result      Mild cardiomegaly.            Assessment/Plan:  I anticipate this patient is appropriate for observation status at this time.    * Shortness of breath  Assessment & Plan  With cough.  He had possible fever at dialysis, none here  Chest x-ray shows  mild cardiomegaly  Covid and flu screen is negative  Procalcitonin is elevated  Possible fluid overload versus early pneumonia  Continue antibiotics for now, trend procalcitonin  Plan for dialysis today    ESRD (end stage renal disease) on dialysis ([facility])- (present on admission)  Assessment & Plan  Had missed dialysis and appears fluid overloaded  Nephrology consulted and plan for dialysis  On midodrine, continue    ACC/AHA stage C systolic heart failure ([facility])- (present on admission)  Assessment & Plan  Not on medications due to low blood pressure  Appears fluid overloaded, plan for dialysis    Tobacco abuse- (present on admission)  Assessment & Plan  Current use    COPD (chronic obstructive pulmonary disease) ([facility])- (present on admission)  Assessment & Plan  No current wheezing, monitor

## 2021-03-12 NOTE — ED NOTES
Pharmacy Medication Reconciliation      Medication reconciliation updated and complete per pt at bedside with medication list. Reviewed list and returned to pt at bedside  Allergies have been verified and updated   No oral ABX within the last 14 days  Patient home pharmacy:[facility] [place]    Pt reports he receives dialysis on Monday, Wednesday & Friday

## 2021-03-12 NOTE — ASSESSMENT & PLAN NOTE
Had missed dialysis and appears fluid overloaded  Nephrology consulted and plan for dialysis  On midodrine, continue

## 2021-03-13 VITALS
BODY MASS INDEX: 25.98 KG/M2 | HEART RATE: 61 BPM | SYSTOLIC BLOOD PRESSURE: 105 MMHG | OXYGEN SATURATION: 96 % | TEMPERATURE: 97.5 F | HEIGHT: 72 IN | DIASTOLIC BLOOD PRESSURE: 66 MMHG | RESPIRATION RATE: 18 BRPM | WEIGHT: 191.8 LBS

## 2021-03-13 LAB
ANION GAP SERPL CALC-SCNC: 26 MMOL/L (ref 7–16)
BASOPHILS # BLD AUTO: 0.7 % (ref 0–1.8)
BASOPHILS # BLD: 0.04 K/UL (ref 0–0.12)
BUN SERPL-MCNC: 76 MG/DL (ref 8–22)
CALCIUM SERPL-MCNC: 8.8 MG/DL (ref 8.4–10.2)
CHLORIDE SERPL-SCNC: 95 MMOL/L (ref 96–112)
CO2 SERPL-SCNC: 19 MMOL/L (ref 20–33)
CREAT SERPL-MCNC: 18.67 MG/DL (ref 0.5–1.4)
EOSINOPHIL # BLD AUTO: 0.3 K/UL (ref 0–0.51)
EOSINOPHIL NFR BLD: 5.1 % (ref 0–6.9)
ERYTHROCYTE [DISTWIDTH] IN BLOOD BY AUTOMATED COUNT: 48.4 FL (ref 35.9–50)
GLUCOSE SERPL-MCNC: 72 MG/DL (ref 65–99)
HAV IGM SERPL QL IA: NORMAL
HBV CORE IGM SER QL: NORMAL
HBV SURFACE AG SER QL: NORMAL
HCT VFR BLD AUTO: 36.3 % (ref 42–52)
HCV AB SER QL: NORMAL
HGB BLD-MCNC: 10.6 G/DL (ref 14–18)
IMM GRANULOCYTES # BLD AUTO: 0.04 K/UL (ref 0–0.11)
IMM GRANULOCYTES NFR BLD AUTO: 0.7 % (ref 0–0.9)
LYMPHOCYTES # BLD AUTO: 1.26 K/UL (ref 1–4.8)
LYMPHOCYTES NFR BLD: 21.6 % (ref 22–41)
MCH RBC QN AUTO: 23.6 PG (ref 27–33)
MCHC RBC AUTO-ENTMCNC: 29.2 G/DL (ref 33.7–35.3)
MCV RBC AUTO: 80.8 FL (ref 81.4–97.8)
MONOCYTES # BLD AUTO: 0.77 K/UL (ref 0–0.85)
MONOCYTES NFR BLD AUTO: 13.2 % (ref 0–13.4)
NEUTROPHILS # BLD AUTO: 3.43 K/UL (ref 1.82–7.42)
NEUTROPHILS NFR BLD: 58.7 % (ref 44–72)
NRBC # BLD AUTO: 0 K/UL
NRBC BLD-RTO: 0 /100 WBC
PLATELET # BLD AUTO: 245 K/UL (ref 164–446)
PMV BLD AUTO: 9.8 FL (ref 9–12.9)
POTASSIUM SERPL-SCNC: 5 MMOL/L (ref 3.6–5.5)
RBC # BLD AUTO: 4.49 M/UL (ref 4.7–6.1)
SODIUM SERPL-SCNC: 140 MMOL/L (ref 135–145)
WBC # BLD AUTO: 5.8 K/UL (ref 4.8–10.8)

## 2021-03-13 PROCEDURE — 80074 ACUTE HEPATITIS PANEL: CPT

## 2021-03-13 PROCEDURE — 99217 PR OBSERVATION CARE DISCHARGE: CPT | Performed by: HOSPITALIST

## 2021-03-13 PROCEDURE — 80048 BASIC METABOLIC PNL TOTAL CA: CPT

## 2021-03-13 PROCEDURE — 94760 N-INVAS EAR/PLS OXIMETRY 1: CPT

## 2021-03-13 PROCEDURE — 700105 HCHG RX REV CODE 258: Performed by: INTERNAL MEDICINE

## 2021-03-13 PROCEDURE — 94640 AIRWAY INHALATION TREATMENT: CPT

## 2021-03-13 PROCEDURE — A9270 NON-COVERED ITEM OR SERVICE: HCPCS | Performed by: INTERNAL MEDICINE

## 2021-03-13 PROCEDURE — 700102 HCHG RX REV CODE 250 W/ 637 OVERRIDE(OP): Performed by: INTERNAL MEDICINE

## 2021-03-13 PROCEDURE — 90935 HEMODIALYSIS ONE EVALUATION: CPT

## 2021-03-13 PROCEDURE — 36415 COLL VENOUS BLD VENIPUNCTURE: CPT

## 2021-03-13 PROCEDURE — 700111 HCHG RX REV CODE 636 W/ 250 OVERRIDE (IP): Performed by: INTERNAL MEDICINE

## 2021-03-13 PROCEDURE — G0378 HOSPITAL OBSERVATION PER HR: HCPCS

## 2021-03-13 PROCEDURE — 85025 COMPLETE CBC W/AUTO DIFF WBC: CPT

## 2021-03-13 PROCEDURE — 96367 TX/PROPH/DG ADDL SEQ IV INF: CPT

## 2021-03-13 RX ORDER — AZITHROMYCIN 250 MG/1
250 TABLET, FILM COATED ORAL DAILY
Qty: 2 TABLET | Refills: 0 | Status: SHIPPED | OUTPATIENT
Start: 2021-03-13 | End: 2021-03-15

## 2021-03-13 RX ORDER — AMOXICILLIN AND CLAVULANATE POTASSIUM 875; 125 MG/1; MG/1
1 TABLET, FILM COATED ORAL 2 TIMES DAILY
Qty: 6 TABLET | Refills: 0 | Status: SHIPPED | OUTPATIENT
Start: 2021-03-13 | End: 2021-03-16

## 2021-03-13 RX ADMIN — SEVELAMER CARBONATE 3200 MG: 800 TABLET, FILM COATED ORAL at 13:05

## 2021-03-13 RX ADMIN — MIDODRINE HYDROCHLORIDE 10 MG: 5 TABLET ORAL at 13:05

## 2021-03-13 RX ADMIN — ALBUTEROL SULFATE 2 PUFF: 90 AEROSOL, METERED RESPIRATORY (INHALATION) at 08:13

## 2021-03-13 RX ADMIN — CINACALCET HYDROCHLORIDE 60 MG: 30 TABLET, FILM COATED ORAL at 07:17

## 2021-03-13 RX ADMIN — CEFTRIAXONE SODIUM 1 G: 1 INJECTION, POWDER, FOR SOLUTION INTRAMUSCULAR; INTRAVENOUS at 07:13

## 2021-03-13 RX ADMIN — ASPIRIN 81 MG: 81 TABLET, COATED ORAL at 07:17

## 2021-03-13 RX ADMIN — MIDODRINE HYDROCHLORIDE 10 MG: 5 TABLET ORAL at 07:17

## 2021-03-13 RX ADMIN — AZITHROMYCIN MONOHYDRATE 500 MG: 250 TABLET ORAL at 07:17

## 2021-03-13 RX ADMIN — SEVELAMER CARBONATE 3200 MG: 800 TABLET, FILM COATED ORAL at 07:12

## 2021-03-13 RX ADMIN — UMECLIDINIUM BROMIDE AND VILANTEROL TRIFENATATE 1 PUFF: 62.5; 25 POWDER RESPIRATORY (INHALATION) at 08:14

## 2021-03-13 ASSESSMENT — ENCOUNTER SYMPTOMS
GASTROINTESTINAL NEGATIVE: 1
PND: 0
CONSTIPATION: 0
CARDIOVASCULAR NEGATIVE: 1
HEARTBURN: 0
PALPITATIONS: 0
MUSCULOSKELETAL NEGATIVE: 1
NAUSEA: 0
FEVER: 0
HEADACHES: 0
VOMITING: 0
MYALGIAS: 0
ABDOMINAL PAIN: 0
CHILLS: 0
CONSTITUTIONAL NEGATIVE: 1
WEIGHT LOSS: 0
ORTHOPNEA: 0
DIARRHEA: 0
DIAPHORESIS: 0
DIZZINESS: 0

## 2021-03-13 NOTE — DISCHARGE PLANNING
Anticipated Discharge Disposition:   Home after hemodialysis     Action:   Chart review complete.     Discussed patient's plan of care and plans for discharge during rounds. Per MD, patient to be discharged home after hemodialysis. Patient to go home with PO antibiotics. No discharge needs identified. RN CM will continue to follow and assist as needed.     Barriers to Discharge:   None     Plan:   Hospital care management will continue to assist with discharge planning needs.

## 2021-03-13 NOTE — PROGRESS NOTES
Assumed care of pt at 0715. Received bedside report from Shaina DEAN. Dialysis RN in to see patient. Pt receiving dialysis. No complaints of pain this morning. No signs of peripheral edema. Pedal pulses 2+. BLUL lobes diminished. BLLL diminished with crackles. Pt encouraged to turn and cough. No other complaints this morning. Bed in lowest position, call light in reach.

## 2021-03-13 NOTE — PROGRESS NOTES
Report received from ER RN. Pt arrive via W/C he's fully ambulatory with steady gait.  Assume care. Pt. AAOx4 pt is bed,  Assessment completed. VSS. Lungs all throughout wheezing and crackles. good CMS and pulses to hitesh LE, denies numbness and tingling. Dressing in place DCI, Pt was update for the care for the day. White board updated, All question answered. Pt has call light within reach,  bed is in the lowest position. Pt has no other needs at this time.     1715 HD RN called states HD will be done tomorrow. Pt was updated.  1747 Dr Godinez aware about HD cancellation today.   1825 Pt refused lab work.

## 2021-03-13 NOTE — CARE PLAN
Problem: Communication  Goal: The ability to communicate needs accurately and effectively will improve  Outcome: PROGRESSING AS EXPECTED     Problem: Safety  Goal: Will remain free from injury  Outcome: PROGRESSING AS EXPECTED  Goal: Will remain free from falls  Outcome: PROGRESSING AS EXPECTED     Problem: Infection  Goal: Will remain free from infection  Outcome: PROGRESSING AS EXPECTED     Problem: Venous Thromboembolism (VTW)/Deep Vein Thrombosis (DVT) Prevention:  Goal: Patient will participate in Venous Thrombosis (VTE)/Deep Vein Thrombosis (DVT)Prevention Measures  Outcome: PROGRESSING AS EXPECTED     Problem: Bowel/Gastric:  Goal: Normal bowel function is maintained or improved  Outcome: PROGRESSING AS EXPECTED  Goal: Will not experience complications related to bowel motility  Outcome: PROGRESSING AS EXPECTED     Problem: Knowledge Deficit  Goal: Knowledge of disease process/condition, treatment plan, diagnostic tests, and medications will improve  Outcome: PROGRESSING AS EXPECTED  Goal: Knowledge of the prescribed therapeutic regimen will improve  Outcome: PROGRESSING AS EXPECTED     Problem: Discharge Barriers/Planning  Goal: Patient's continuum of care needs will be met  Outcome: PROGRESSING AS EXPECTED     Problem: Respiratory:  Goal: Respiratory status will improve  Outcome: PROGRESSING AS EXPECTED     Problem: Psychosocial Needs:  Goal: Level of anxiety will decrease  Outcome: PROGRESSING AS EXPECTED     Problem: Urinary Elimination:  Goal: Ability to reestablish a normal urinary elimination pattern will improve  Outcome: PROGRESSING AS EXPECTED

## 2021-03-13 NOTE — ASSESSMENT & PLAN NOTE
With cough.  He had possible fever at dialysis, none here  Chest x-ray shows mild cardiomegaly  Covid and flu screen is negative  Procalcitonin is elevated  Possible fluid overload versus early pneumonia  Continue antibiotics for now, trend procalcitonin  Plan for dialysis today

## 2021-03-13 NOTE — DISCHARGE INSTRUCTIONS
Discharge Instructions    Discharged to home by car with self. Discharged via walking, hospital escort: Yes.  Special equipment needed: Not Applicable    Be sure to schedule a follow-up appointment with your primary care doctor or any specialists as instructed.     Discharge Plan:        I understand that a diet low in cholesterol, fat, and sodium is recommended for good health. Unless I have been given specific instructions below for another diet, I accept this instruction as my diet prescription.   Other diet: Renal diet      Depression / Suicide Risk    As you are discharged from this [facility] facility, it is important to learn how to keep safe from harming yourself.    Recognize the warning signs:  · Abrupt changes in personality, positive or negative- including increase in energy   · Giving away possessions  · Change in eating patterns- significant weight changes-  positive or negative  · Change in sleeping patterns- unable to sleep or sleeping all the time   · Unwillingness or inability to communicate  · Depression  · Unusual sadness, discouragement and loneliness  · Talk of wanting to die  · Neglect of personal appearance   · Rebelliousness- reckless behavior  · Withdrawal from people/activities they love  · Confusion- inability to concentrate     If you or a loved one observes any of these behaviors or has concerns about self-harm, here's what you can do:  · Talk about it- your feelings and reasons for harming yourself  · Remove any means that you might use to hurt yourself (examples: pills, rope, extension cords, firearm)  · Get professional help from the community (Mental Health, Substance Abuse, psychological counseling)  · Do not be alone:Call your Safe Contact- someone whom you trust who will be there for you.  · Call your local CRISIS HOTLINE 610-4762 or 725-582-7307  · Call your local Children's Mobile Crisis Response Team [place] (595) 882-2434 or www.[employer]  · Call the toll free  National Suicide Prevention Hotlines   · National Suicide Prevention Lifeline 775-628-XTAN (3536)  · National Hope Line Network 800-SUICIDE (110-9918)

## 2021-03-13 NOTE — PROGRESS NOTES
Nephrology Daily Progress Note    Author: AMY Fisher   Collaborating Physician: Dr. Jai Chaudhari    Date of Service  3/13/2021    HPI  69-year-old gentleman with history of end-stage renal disease, on hemodialysis Monday, Wednesday and Friday via left upper arm AV fistula; history of hypertension; COPD, who presented to the emergency room from HD clinic for fever, was not dialyzed since Friday. Pt also has sore throat and rhinorrhea for 2 days. Covid test was sent. Patient was found to have pneumonia and is being admitted for fruther care. Nephrology was called for HD management.      REVIEW OF SYSTEMS:    10 point ROS reviewed and is as per HPI/daily summary or otherwise negative    Daily Nephrology Summary  03/13: Sitting up in bed receiving breathing tx and on phone.  HD setting up for tx.  Patient not interested in conversation.  Able to converse with no SOB.      Review of Systems  Review of Systems   Constitutional: Negative.  Negative for chills, diaphoresis, fever, malaise/fatigue and weight loss.   Cardiovascular: Negative.  Negative for chest pain, palpitations, orthopnea, leg swelling and PND.   Gastrointestinal: Negative.  Negative for abdominal pain, constipation, diarrhea, heartburn, nausea and vomiting.   Musculoskeletal: Negative.  Negative for joint pain and myalgias.   Skin: Negative.    Neurological: Negative for dizziness and headaches.        Physical Exam  Temp:  [36.3 °C (97.4 °F)-37.2 °C (99 °F)] 36.4 °C (97.5 °F)  Pulse:  [71-94] 74  Resp:  [18] 18  BP: ()/(51-71) 99/65  SpO2:  [89 %-98 %] 96 %    Physical Exam  Constitutional:       General: He is not in acute distress.     Appearance: Normal appearance. He is normal weight. He is not ill-appearing, toxic-appearing or diaphoretic.   HENT:      Head: Normocephalic and atraumatic.      Mouth/Throat:      Mouth: Mucous membranes are moist.      Pharynx: Oropharynx is clear.   Eyes:      Extraocular Movements: Extraocular  movements intact.      Conjunctiva/sclera: Conjunctivae normal.      Pupils: Pupils are equal, round, and reactive to light.   Cardiovascular:      Pulses: Normal pulses.      Heart sounds: Normal heart sounds. No murmur. No friction rub. No gallop.       Comments: Left AVF +Bruit/+Thrill  Pulmonary:      Effort: Pulmonary effort is normal. No respiratory distress.      Breath sounds: Normal breath sounds. No wheezing, rhonchi or rales.   Abdominal:      General: Abdomen is flat. Bowel sounds are normal. There is no distension.      Palpations: There is no mass.   Musculoskeletal:         General: No swelling.      Cervical back: Normal range of motion and neck supple.   Skin:     General: Skin is warm and dry.      Coloration: Skin is not jaundiced or pale.      Findings: No bruising or erythema.   Neurological:      General: No focal deficit present.      Mental Status: He is alert. Mental status is at baseline.   Psychiatric:         Mood and Affect: Mood normal.         Behavior: Behavior normal.         Thought Content: Thought content normal.         Judgment: Judgment normal.         Fluids    Intake/Output Summary (Last 24 hours) at 3/13/2021 0825  Last data filed at 3/12/2021 1700  Gross per 24 hour   Intake 220 ml   Output --   Net 220 ml       Laboratory  Recent Labs     03/12/21  1135 03/13/21  0349   WBC 6.5 5.8   RBC 4.78 4.49*   HEMOGLOBIN 11.6* 10.6*   HEMATOCRIT 39.3* 36.3*   MCV 82.2 80.8*   MCH 24.3* 23.6*   MCHC 29.5* 29.2*   RDW 49.1 48.4   PLATELETCT 269 245   MPV 10.7 9.8     Recent Labs     03/12/21  1135 03/13/21  0349   SODIUM 137 140   POTASSIUM 5.0 5.0   CHLORIDE 93* 95*   CO2 19* 19*   GLUCOSE 77 72   BUN 67* 76*   CREATININE 17.53* 18.67*   CALCIUM 9.0 8.8         Recent Labs     03/12/21  1135   NTPROBNP 2976*           Imaging  DX-CHEST-PORTABLE (1 VIEW)  Order: [id number]  Status:  Final result   Visible to patient:  Argenis (scheduled for 3/13/2021 11:25 PM) Next appt:   None  Details    Reading Physician Reading Date Result Priority   Gomez Mayers M.D.  [phone] 3/12/2021 STAT      Narrative & Impression        3/12/2021 11:00 AM     HISTORY/REASON FOR EXAM:  Chest congestion.     TECHNIQUE/EXAM DESCRIPTION AND NUMBER OF VIEWS:  Single portable view of the chest.     COMPARISON: None     FINDINGS:     There is no evidence of focal consolidation or evidence of pulmonary edema.  There is no pleural effusion.  The heart is mildly enlarged.        IMPRESSION:     Mild cardiomegaly.               Assessment/Plan  # End-stage renal disease              - Maint HD q MWF and prn              - via a left arm arteriovenous fistula.    # Anemia secondary to chronic kidney disease              - at the goal   #Chronic kidney disease, bone mineral disorder/renal osteodystrophy.    - per HD unit   # Hypertension, at the goal   # Secondary hyperparathyroidism.  The patient is on Sensipar as an   outpatient, unclear if he is compliant.  #Chronic obstructive pulmonary disease.  #Cardiomyopathy.  The patient with history of congestive heart failure.    His ejection fraction is 35% by echocardiogram in 10/2019.  He currently does   not appear fluid overloaded.  # Severe protein-calorie malnutrition              - no dietary protein restrictions  # Pneumonia  - covid test (-)  - Inf A &B (-)       PLAN:  - HD today (SAT) and then HD q MWF and prn  - no need for DILAN at this time   - Antibiotics per primary team  -  Dose adjust all medications for his decreased GFR.  -  No dietary protein restrictions at this timedialysis   - Respiratory Protocol      Simin SALCEDO   [facility]  [phone]

## 2021-03-13 NOTE — CARE PLAN
Problem: Safety  Goal: Will remain free from injury  Outcome: PROGRESSING AS EXPECTED  Note: Educated pt to call for assistance if experiencing dizziness or fatigue after dialysis.     Problem: Respiratory:  Goal: Respiratory status will improve  Outcome: PROGRESSING AS EXPECTED  Note: Pt educated to deep breathe and cough. Pt also educated to engage in frequent ambulation. Will discuss smoking cessation with patient.

## 2021-03-13 NOTE — CARE PLAN
Problem: Safety  Goal: Will remain free from injury  Outcome: PROGRESSING AS EXPECTED     Problem: Infection  Goal: Will remain free from infection  Outcome: PROGRESSING AS EXPECTED     Problem: Venous Thromboembolism (VTW)/Deep Vein Thrombosis (DVT) Prevention:  Goal: Patient will participate in Venous Thrombosis (VTE)/Deep Vein Thrombosis (DVT)Prevention Measures  Outcome: PROGRESSING AS EXPECTED     Problem: Respiratory:  Goal: Respiratory status will improve  Outcome: PROGRESSING AS EXPECTED     Problem: Psychosocial Needs:  Goal: Level of anxiety will decrease  Outcome: PROGRESSING AS EXPECTED

## 2021-03-13 NOTE — DISCHARGE SUMMARY
Discharge Summary    CHIEF COMPLAINT ON ADMISSION  Chief Complaint   Patient presents with   • Congestion   • Cough   • Runny Nose       Reason for Admission  Flu like Symptoms      Admission Date  3/12/2021    CODE STATUS  DNAR/DNI    HPI & HOSPITAL COURSE  This is a 70 y.o. male here with shortness of breath.  The patient apparently is in end-stage renal failure disease patient on hemodialysis every Monday Wednesday Friday.  His last hemodialysis was on Monday.  Since then he has been missing the hemodialysis sessions and now has gone to the point where he is fluid overloaded.  Initial white blood cell count is normal, chest x-ray does not appear to have any infiltrates, the patient on physical exam does have diffuse crackles indicating interstitial edema but no severe overt pulmonary edema.  Patient at this point has received nephrology consultation and hemodialysis.  Afterwards the patient will be resuming hemodialysis every Monday Wednesday Friday as an outpatient this has been discussed with him.  The reason that the dialysis was not done as an outpatient as the patient had a low temperature of 100 °F and with this they would not dialyze him.  Patient does came into the emergency room for evaluation he is negative on his COVID-19 test as well as his influenza test.  He was started on Rocephin azithromycin combination for possible early pneumonia.  We will continue 3 more days of antibiotic treatment to make sure he does not have ongoing pneumonia although this is less likely.  Patient now has been stabilized and can discharge home with outpatient follow-ups and monitoring.    Therefore, he is discharged in good and stable condition to home with close outpatient follow-up.    The patient recovered much more quickly than anticipated on admission.    Discharge Date  3/13/2021    FOLLOW UP ITEMS POST DISCHARGE  Follow-up with hemodialysis on Monday  Follow-up with the primary care physician in 7 to 10 days  Follow  with nephrology as scheduled    DISCHARGE DIAGNOSES  Principal Problem:    Shortness of breath POA: Unknown  Active Problems:    ESRD (end stage renal disease) on dialysis ([facility]) POA: Yes    ACC/AHA stage C systolic heart failure ([facility]) POA: Yes    COPD (chronic obstructive pulmonary disease) ([facility]) POA: Yes    Tobacco abuse POA: Yes  Resolved Problems:    * No resolved hospital problems. *      FOLLOW UP  No future appointments.  No follow-up provider specified.    MEDICATIONS ON DISCHARGE     Medication List      START taking these medications      Instructions   amoxicillin-clavulanate 875-125 MG Tabs  Commonly known as: AUGMENTIN   Take 1 tablet by mouth 2 times a day for 3 days.  Dose: 1 tablet     azithromycin 250 MG Tabs  Commonly known as: ZITHROMAX   Take 1 tablet by mouth every day for 2 days.  Dose: 250 mg        CONTINUE taking these medications      Instructions   albuterol 108 (90 Base) MCG/ACT Aers inhalation aerosol   Inhale 2 Puffs by mouth every 6 hours as needed for Shortness of Breath.  Dose: 2 Puff     Anoro Ellipta 62.5-25 MCG/INH Aepb inhaler  Generic drug: umeclidinium-vilanterol   Inhale 1 Puff by mouth every day.  Dose: 1 Puff     aspirin EC 81 MG Tbec  Commonly known as: ECOTRIN   Take 81 mg by mouth every morning.  Dose: 81 mg     carvedilol 25 MG Tabs  Commonly known as: COREG   Take 25 mg by mouth 2 Times a Day.  Dose: 25 mg     lisinopril 10 MG Tabs  Commonly known as: PRINIVIL   Take 10 mg by mouth every day.  Dose: 10 mg     midodrine 10 MG tablet  Commonly known as: PROAMATINE   Take 10 mg by mouth 3 times a day. Take 1 tablet by mouth three times a day  Dose: 10 mg     Renvela 800 MG Tabs tablet  Generic drug: sevelamer carbonate   Take 3,200 mg by mouth 3 times a day.  Dose: 3,200 mg     Sensipar 60 MG Tabs  Generic drug: Cinacalcet HCl   Take 1 Tab by mouth every day.  Dose: 1 tablet     Vitamin C 1000 MG Tabs   Take 2,000 mg by mouth 3 times a day.  Dose: 2,000 mg             Allergies  Allergies   Allergen Reactions   • Chloraprep One Step Itching       DIET  Orders Placed This Encounter   Procedures   • Diet Order Diet: Renal     Standing Status:   Standing     Number of Occurrences:   1     Order Specific Question:   Diet:     Answer:   Renal [8]       ACTIVITY  As tolerated.  Weight bearing as tolerated    CONSULTATIONS  Nephrology    PROCEDURES  Hemodialysis    LABORATORY  Lab Results   Component Value Date    SODIUM 140 03/13/2021    POTASSIUM 5.0 03/13/2021    CHLORIDE 95 (L) 03/13/2021    CO2 19 (L) 03/13/2021    GLUCOSE 72 03/13/2021    BUN 76 (HH) 03/13/2021    CREATININE 18.67 (HH) 03/13/2021        Lab Results   Component Value Date    WBC 5.8 03/13/2021    HEMOGLOBIN 10.6 (L) 03/13/2021    HEMATOCRIT 36.3 (L) 03/13/2021    PLATELETCT 245 03/13/2021        Total time of the discharge process exceeds 37 minutes.

## 2021-03-13 NOTE — PROGRESS NOTES
Hemodialysis done today started @ 0837 and ended @ 1210 with net UF= 1018ml, unable to remove more fluids due to low BP, Dr Chaudhari notified. VSS post HD. Report given to JERMAINE Martínez. See flow sheet for details.

## 2021-03-13 NOTE — PROGRESS NOTES
Telemetry Shift Summary    Rhythm SR  HR Range 60s-80s  Ectopy Rare PVC/PAC  Measurements 0.18/0.10/0.40        Normal Values  Rhythm SR  HR Range    Measurements 0.12-0.20 / 0.06-0.10  / 0.30-0.52

## 2021-03-13 NOTE — PROGRESS NOTES
Discussed discharge instructions with patient. Pt verbalized understanding. IV removed, paperwork signed. Pt brought down to emergency exit via wheelchair Aga RN.

## 2021-03-14 NOTE — PROGRESS NOTES
Tele strip at 1404 shows SR at 82.      Measurements from am strip were as follows:  AZ=0.16  QRS=0.08  QT=0.40    Tele Shift Summary:    Rhythm : SR  Rate : 70-93  Ectopy : Per CCT Roseline, pt had occasional PVCs, rare PACs and rare trigeminy.     Telemetry monitoring strips placed in pt's chart.

## 2022-04-02 ENCOUNTER — OFFICE VISIT (OUTPATIENT)
Dept: URGENT CARE | Facility: CLINIC | Age: 72
End: 2022-04-02
Payer: MEDICARE

## 2022-04-02 ENCOUNTER — APPOINTMENT (OUTPATIENT)
Dept: RADIOLOGY | Facility: IMAGING CENTER | Age: 72
End: 2022-04-02
Attending: FAMILY MEDICINE
Payer: MEDICARE

## 2022-04-02 VITALS
BODY MASS INDEX: 23.02 KG/M2 | OXYGEN SATURATION: 97 % | RESPIRATION RATE: 18 BRPM | TEMPERATURE: 98.2 F | WEIGHT: 195 LBS | HEIGHT: 77 IN | SYSTOLIC BLOOD PRESSURE: 108 MMHG | HEART RATE: 74 BPM | DIASTOLIC BLOOD PRESSURE: 68 MMHG

## 2022-04-02 DIAGNOSIS — R06.02 SOB (SHORTNESS OF BREATH): ICD-10-CM

## 2022-04-02 PROBLEM — J96.01 ACUTE RESPIRATORY FAILURE WITH HYPOXIA (HCC): Status: RESOLVED | Noted: 2019-09-30 | Resolved: 2022-04-02

## 2022-04-02 PROBLEM — D64.9 ANEMIA: Status: RESOLVED | Noted: 2018-08-11 | Resolved: 2022-04-02

## 2022-04-02 PROBLEM — K92.2 GIB (GASTROINTESTINAL BLEEDING): Status: RESOLVED | Noted: 2017-08-27 | Resolved: 2022-04-02

## 2022-04-02 PROBLEM — J44.1 COPD EXACERBATION (HCC): Status: RESOLVED | Noted: 2017-08-27 | Resolved: 2022-04-02

## 2022-04-02 PROBLEM — R09.02 HYPOXIA: Status: RESOLVED | Noted: 2017-08-27 | Resolved: 2022-04-02

## 2022-04-02 PROBLEM — D68.9 COAGULATION DEFECT, UNSPECIFIED (HCC): Status: ACTIVE | Noted: 2020-07-10
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
## 2022-04-02 PROBLEM — I50.9 CHF (CONGESTIVE HEART FAILURE) (HCC): Status: RESOLVED | Noted: 2017-08-27 | Resolved: 2022-04-02

## 2022-04-02 PROCEDURE — 71046 X-RAY EXAM CHEST 2 VIEWS: CPT | Mod: TC,FY | Performed by: FAMILY MEDICINE

## 2022-04-02 PROCEDURE — 99213 OFFICE O/P EST LOW 20 MIN: CPT | Performed by: FAMILY MEDICINE

## 2022-04-02 ASSESSMENT — FIBROSIS 4 INDEX: FIB4 SCORE: 1.27

## 2022-04-02 NOTE — PROGRESS NOTES
"  Subjective:      71 y.o. male presents to urgent care for cold symptoms that started about a week ago.  He is experiencing shortness of breath, fatigue, and body aches. No associated cough, sore throat, headache, fever, or diarrhea. He tried Nyquil without any significant improvement in symptoms.  He notes that overall symptoms have been improving, but came today as they have not entirely resolved.  He is on dialysis for end stage renal failure, last treatment was yesterday. He smokes an average of 0.5 pack of cigarettes per week. He has COPD for which he takes Anoro Ellipta and albuterol. He is not vaccinated against COVID. No known sick contacts.     He denies any other questions or concerns at this time.    Current problem list, medication, and past medical/surgical history were reviewed in Epic.    ROS  See HPI     Objective:      /68 (BP Location: Right arm, Patient Position: Sitting, BP Cuff Size: Adult)   Pulse 74   Temp 36.8 °C (98.2 °F) (Temporal)   Resp 18   Ht 1.956 m (6' 5\")   Wt 88.5 kg (195 lb)   SpO2 97%   BMI 23.12 kg/m²     Physical Exam  Constitutional:       General: He is not in acute distress.     Appearance: He is not diaphoretic.   Cardiovascular:      Rate and Rhythm: Normal rate and regular rhythm.      Heart sounds: Normal heart sounds.   Pulmonary:      Effort: Pulmonary effort is normal. No respiratory distress.      Breath sounds: Wheezing (throughout) present.   Neurological:      Mental Status: He is alert.   Psychiatric:         Mood and Affect: Affect normal.         Judgment: Judgment normal.       Assessment/Plan:     1. SOB (shortness of breath)  Chest x-ray negative for any acute processes.  Upon further discussion with patient he states he is close to his baseline, he was in contact with an RSV positive patient a couple of days ago and just wants to make sure he cannot get anyone sick.  We did discuss given his kidney failure, chronic hypoxia, heart failure, and " several other comorbidities he should have a low threshold if he has a change in symptoms to go to the emergency department  - DX-CHEST-2 VIEWS; Future      Instructed to return to Urgent Care or nearest Emergency Department if symptoms fail to improve, for any change in condition, further concerns, or new concerning symptoms. Patient states understanding of the plan of care and discharge instructions.    Whitney Barber M.D.

## 2022-06-23 ENCOUNTER — OFFICE VISIT (OUTPATIENT)
Dept: URGENT CARE | Facility: CLINIC | Age: 72
End: 2022-06-23
Payer: MEDICARE

## 2022-06-23 VITALS
RESPIRATION RATE: 14 BRPM | SYSTOLIC BLOOD PRESSURE: 102 MMHG | HEART RATE: 86 BPM | BODY MASS INDEX: 25.73 KG/M2 | TEMPERATURE: 99 F | OXYGEN SATURATION: 95 % | WEIGHT: 190 LBS | DIASTOLIC BLOOD PRESSURE: 68 MMHG | HEIGHT: 72 IN

## 2022-06-23 DIAGNOSIS — H93.8X2 EAR FULLNESS, LEFT: ICD-10-CM

## 2022-06-23 DIAGNOSIS — H10.32 ACUTE BACTERIAL CONJUNCTIVITIS OF LEFT EYE: ICD-10-CM

## 2022-06-23 DIAGNOSIS — H61.22 IMPACTED CERUMEN OF LEFT EAR: ICD-10-CM

## 2022-06-23 PROCEDURE — 99213 OFFICE O/P EST LOW 20 MIN: CPT | Performed by: NURSE PRACTITIONER

## 2022-06-23 RX ORDER — POLYMYXIN B SULFATE AND TRIMETHOPRIM 1; 10000 MG/ML; [USP'U]/ML
1 SOLUTION OPHTHALMIC EVERY 4 HOURS
Qty: 10 ML | Refills: 0 | Status: SHIPPED | OUTPATIENT
Start: 2022-06-23 | End: 2022-06-30

## 2022-06-23 ASSESSMENT — ENCOUNTER SYMPTOMS
EYE PAIN: 0
CONSTITUTIONAL NEGATIVE: 1
FEVER: 0
EYE DISCHARGE: 1

## 2022-06-23 ASSESSMENT — VISUAL ACUITY: OU: 1

## 2022-06-23 ASSESSMENT — FIBROSIS 4 INDEX: FIB4 SCORE: 1.27

## 2022-06-24 NOTE — PROGRESS NOTES
Subjective:     Bridger Savage is a 71 y.o. male who presents for Ear Fullness (X 2 weeks, clogged LT ear.) and Eye Drainage (X LT eye, drainage, redness)       Ear Fullness  Pertinent negatives include no fever.   Eye Drainage  Pertinent negatives include no fever.     Patient reports 2 weeks ago, he started to notice left ear fullness.  Reports decreased hearing.  Reports it worsens when pushing outside his left ear.    Around that same time, started to notice left eye yellowish thick discharge.    Patient reports chronic hearing loss at right ear since he was 8 years old.  Unchanged.    Patient was screened prior to rooming and denied COVID-19 diagnosis or contact with a person who has been diagnosed or is suspected to have COVID-19. During this visit, appropriate PPE was worn, hand hygiene was performed, and the patient and any visitors were masked.     PMH:  has a past medical history of Asthma, Breath shortness, Chronic obstructive pulmonary disease (HCC), Cold, Congestive heart failure (HCC), Dental disorder, Dialysis patient (HCC), Emphysema of lung (HCC), Hemodialysis patient (HCC), Renal disorder, Snoring, and Urinary incontinence.    MEDS:   Current Outpatient Medications:   •  polymixin-trimethoprim (POLYTRIM) 51615-1.1 UNIT/ML-% Solution, Administer 1 Drop into the left eye every 4 hours for 7 days., Disp: 10 mL, Rfl: 0  •  ANORO ELLIPTA 62.5-25 MCG/INH AEROSOL POWDER, BREATH ACTIVATED inhaler, Inhale 1 Puff every day., Disp: 1 Each, Rfl: 11  •  sevelamer carbonate (RENVELA) 800 MG Tab tablet, Take 3,200 mg by mouth 3 times a day., Disp: , Rfl:   •  Ascorbic Acid (VITAMIN C) 1000 MG Tab, Take 2,000 mg by mouth 3 times a day., Disp: , Rfl:   •  Cinacalcet HCl 60 MG Tab, Take 1 Tab by mouth every day., Disp: , Rfl:   •  albuterol 108 (90 Base) MCG/ACT Aero Soln inhalation aerosol, Inhale 2 Puffs by mouth every 6 hours as needed for Shortness of Breath., Disp: , Rfl:   •  midodrine (PROAMATINE) 10 MG  tablet, Take 10 mg by mouth 3 times a day. Take 1 tablet by mouth three times a day, Disp: , Rfl:   •  aspirin EC (ECOTRIN) 81 MG Tablet Delayed Response, Take 81 mg by mouth every morning. (Patient not taking: Reported on 6/23/2022), Disp: , Rfl:   •  carvedilol (COREG) 25 MG Tab, Take 25 mg by mouth 2 Times a Day., Disp: , Rfl:   •  lisinopril (PRINIVIL) 10 MG Tab, Take 10 mg by mouth every day. (Patient not taking: Reported on 6/23/2022), Disp: , Rfl:     ALLERGIES:   Allergies   Allergen Reactions   • Chloraprep One Step Itching   • Chlorhexidine Rash   • Fluoroplex Rash     SURGHX:   Past Surgical History:   Procedure Laterality Date   • GASTROSCOPY-ENDO  1/30/2020    Procedure: GASTROSCOPY;  Surgeon: Richard Cano D.O.;  Location: [facility];  Service: Gastroenterology   • GASTROSCOPY  2/1/2018    Procedure: GASTROSCOPY;  Surgeon: Morteza Olivo M.D.;  Location: [facility];  Service: Gastroenterology   • COLONOSCOPY  2/1/2018    Procedure: COLONOSCOPY;  Surgeon: Morteza Olivo M.D.;  Location: [facility];  Service: Gastroenterology   • GASTROSCOPY-ENDO N/A 8/28/2017    Procedure: GASTROSCOPY-ENDO;  Surgeon: Pankaj Choudhury M.D.;  Location: [facility];  Service: EUS   • OTHER      Patient had an arm fistula put in for dialysis     SOCHX:  reports that he has been smoking cigarettes. He has been smoking about 0.50 packs per day. He has never used smokeless tobacco. He reports that he does not drink alcohol and does not use drugs.     FH: Reviewed with patient, not pertinent to this visit.    Review of Systems   Constitutional: Negative.  Negative for fever.   HENT: Positive for hearing loss (Left, fullness). Negative for ear pain.    Eyes: Positive for discharge. Negative for pain.   All other systems reviewed and are negative.    Additional details per HPI.      Objective:     /68   Pulse 86   Temp 37.2 °C (99 °F) (Temporal)   Resp 14    Ht 1.829 m (6')   Wt 86.2 kg (190 lb)   SpO2 95%   BMI 25.77 kg/m²     Physical Exam  Nursing note reviewed.   Constitutional:       General: He is not in acute distress.     Appearance: He is well-developed. He is not ill-appearing or toxic-appearing.   HENT:      Right Ear: Tympanic membrane and ear canal normal.      Left Ear: There is impacted cerumen.   Eyes:      General: Lids are normal. Vision grossly intact.         Left eye: Discharge (Yellow, thick) present.     Extraocular Movements: Extraocular movements intact.      Conjunctiva/sclera:      Left eye: Left conjunctiva is injected.   Cardiovascular:      Rate and Rhythm: Normal rate.   Pulmonary:      Effort: Pulmonary effort is normal. No respiratory distress.   Musculoskeletal:         General: No deformity. Normal range of motion.      Cervical back: Normal range of motion.   Skin:     General: Skin is warm and dry.      Coloration: Skin is not pale.   Neurological:      Mental Status: He is alert and oriented to person, place, and time.      Motor: No weakness.   Psychiatric:         Behavior: Behavior normal. Behavior is cooperative.       Assessment/Plan:     1. Ear fullness, left    2. Impacted cerumen of left ear    3. Acute bacterial conjunctivitis of left eye  - polymixin-trimethoprim (POLYTRIM) 68174-1.1 UNIT/ML-% Solution; Administer 1 Drop into the left eye every 4 hours for 7 days.  Dispense: 10 mL; Refill: 0    Ear lavage was performed in left external auditory canal with large amount of cerumen removed by MA. Patient tolerated well. No complications. Re-examination reveals left external auditory canal clear of cerumen. TM intact with no erythema, bulging, or perforation. Patient reports hearing has improved in left ear.     Rx as above sent electronically.     Differential diagnosis, natural history, supportive care, over-the-counter symptom management per 's instructions, close monitoring, and indications for immediate  follow-up discussed.     All questions answered. Patient agrees with the plan of care.    Discharge summary provided.

## 2022-07-04 ENCOUNTER — HOSPITAL ENCOUNTER (EMERGENCY)
Facility: MEDICAL CENTER | Age: 72
End: 2022-07-04
Attending: EMERGENCY MEDICINE
Payer: MEDICARE

## 2022-07-04 ENCOUNTER — APPOINTMENT (OUTPATIENT)
Dept: RADIOLOGY | Facility: MEDICAL CENTER | Age: 72
End: 2022-07-04
Attending: EMERGENCY MEDICINE
Payer: MEDICARE

## 2022-07-04 VITALS
HEIGHT: 72 IN | RESPIRATION RATE: 18 BRPM | TEMPERATURE: 98.6 F | DIASTOLIC BLOOD PRESSURE: 78 MMHG | OXYGEN SATURATION: 92 % | HEART RATE: 62 BPM | SYSTOLIC BLOOD PRESSURE: 126 MMHG | WEIGHT: 189.15 LBS | BODY MASS INDEX: 25.62 KG/M2

## 2022-07-04 DIAGNOSIS — U07.1 COVID-19: ICD-10-CM

## 2022-07-04 LAB
ALBUMIN SERPL BCP-MCNC: 4 G/DL (ref 3.2–4.9)
ALBUMIN/GLOB SERPL: 1.3 G/DL
ALP SERPL-CCNC: 71 U/L (ref 30–99)
ALT SERPL-CCNC: 13 U/L (ref 2–50)
ANION GAP SERPL CALC-SCNC: 14 MMOL/L (ref 7–16)
AST SERPL-CCNC: 13 U/L (ref 12–45)
BASOPHILS # BLD AUTO: 0.1 % (ref 0–1.8)
BASOPHILS # BLD: 0.01 K/UL (ref 0–0.12)
BILIRUB SERPL-MCNC: 0.5 MG/DL (ref 0.1–1.5)
BUN SERPL-MCNC: 15 MG/DL (ref 8–22)
CALCIUM SERPL-MCNC: 8.6 MG/DL (ref 8.4–10.2)
CHLORIDE SERPL-SCNC: 95 MMOL/L (ref 96–112)
CO2 SERPL-SCNC: 29 MMOL/L (ref 20–33)
CREAT SERPL-MCNC: 7.14 MG/DL (ref 0.5–1.4)
EKG IMPRESSION: NORMAL
EOSINOPHIL # BLD AUTO: 0.1 K/UL (ref 0–0.51)
EOSINOPHIL NFR BLD: 1.4 % (ref 0–6.9)
ERYTHROCYTE [DISTWIDTH] IN BLOOD BY AUTOMATED COUNT: 54.6 FL (ref 35.9–50)
FLUAV RNA SPEC QL NAA+PROBE: NEGATIVE
FLUBV RNA SPEC QL NAA+PROBE: NEGATIVE
GFR SERPLBLD CREATININE-BSD FMLA CKD-EPI: 8 ML/MIN/1.73 M 2
GLOBULIN SER CALC-MCNC: 3.1 G/DL (ref 1.9–3.5)
GLUCOSE SERPL-MCNC: 79 MG/DL (ref 65–99)
HCT VFR BLD AUTO: 44.5 % (ref 42–52)
HGB BLD-MCNC: 14.3 G/DL (ref 14–18)
IMM GRANULOCYTES # BLD AUTO: 0.03 K/UL (ref 0–0.11)
IMM GRANULOCYTES NFR BLD AUTO: 0.4 % (ref 0–0.9)
LACTATE BLD-SCNC: 1.3 MMOL/L (ref 0.5–2)
LYMPHOCYTES # BLD AUTO: 1.03 K/UL (ref 1–4.8)
LYMPHOCYTES NFR BLD: 14.7 % (ref 22–41)
MCH RBC QN AUTO: 27.1 PG (ref 27–33)
MCHC RBC AUTO-ENTMCNC: 32.1 G/DL (ref 33.7–35.3)
MCV RBC AUTO: 84.3 FL (ref 81.4–97.8)
MONOCYTES # BLD AUTO: 0.57 K/UL (ref 0–0.85)
MONOCYTES NFR BLD AUTO: 8.1 % (ref 0–13.4)
NEUTROPHILS # BLD AUTO: 5.27 K/UL (ref 1.82–7.42)
NEUTROPHILS NFR BLD: 75.3 % (ref 44–72)
NRBC # BLD AUTO: 0 K/UL
NRBC BLD-RTO: 0 /100 WBC
PLATELET # BLD AUTO: 181 K/UL (ref 164–446)
PMV BLD AUTO: 9.8 FL (ref 9–12.9)
POTASSIUM SERPL-SCNC: 4.9 MMOL/L (ref 3.6–5.5)
PROT SERPL-MCNC: 7.1 G/DL (ref 6–8.2)
RBC # BLD AUTO: 5.28 M/UL (ref 4.7–6.1)
RSV RNA SPEC QL NAA+PROBE: NEGATIVE
SARS-COV-2 RNA RESP QL NAA+PROBE: DETECTED
SODIUM SERPL-SCNC: 138 MMOL/L (ref 135–145)
SPECIMEN SOURCE: ABNORMAL
TROPONIN T SERPL-MCNC: 56 NG/L (ref 6–19)
TROPONIN T SERPL-MCNC: 60 NG/L (ref 6–19)
WBC # BLD AUTO: 7 K/UL (ref 4.8–10.8)

## 2022-07-04 PROCEDURE — 94760 N-INVAS EAR/PLS OXIMETRY 1: CPT

## 2022-07-04 PROCEDURE — 71045 X-RAY EXAM CHEST 1 VIEW: CPT

## 2022-07-04 PROCEDURE — 83605 ASSAY OF LACTIC ACID: CPT

## 2022-07-04 PROCEDURE — 80053 COMPREHEN METABOLIC PANEL: CPT

## 2022-07-04 PROCEDURE — 93005 ELECTROCARDIOGRAM TRACING: CPT | Performed by: EMERGENCY MEDICINE

## 2022-07-04 PROCEDURE — 84484 ASSAY OF TROPONIN QUANT: CPT

## 2022-07-04 PROCEDURE — 85025 COMPLETE CBC W/AUTO DIFF WBC: CPT

## 2022-07-04 PROCEDURE — 36415 COLL VENOUS BLD VENIPUNCTURE: CPT

## 2022-07-04 PROCEDURE — 87040 BLOOD CULTURE FOR BACTERIA: CPT

## 2022-07-04 PROCEDURE — C9803 HOPD COVID-19 SPEC COLLECT: HCPCS | Performed by: EMERGENCY MEDICINE

## 2022-07-04 PROCEDURE — A9270 NON-COVERED ITEM OR SERVICE: HCPCS | Performed by: EMERGENCY MEDICINE

## 2022-07-04 PROCEDURE — 700102 HCHG RX REV CODE 250 W/ 637 OVERRIDE(OP): Performed by: EMERGENCY MEDICINE

## 2022-07-04 PROCEDURE — 99283 EMERGENCY DEPT VISIT LOW MDM: CPT

## 2022-07-04 PROCEDURE — 94640 AIRWAY INHALATION TREATMENT: CPT

## 2022-07-04 PROCEDURE — 0241U HCHG SARS-COV-2 COVID-19 NFCT DS RESP RNA 4 TRGT MIC: CPT

## 2022-07-04 RX ORDER — ALBUTEROL SULFATE 90 UG/1
2 AEROSOL, METERED RESPIRATORY (INHALATION) ONCE
Status: COMPLETED | OUTPATIENT
Start: 2022-07-04 | End: 2022-07-04

## 2022-07-04 RX ADMIN — ALBUTEROL SULFATE 2 PUFF: 90 AEROSOL, METERED RESPIRATORY (INHALATION) at 21:02

## 2022-07-04 ASSESSMENT — FIBROSIS 4 INDEX: FIB4 SCORE: 1.27

## 2022-07-05 NOTE — ED NOTES
DC instructions reviewed. Verbalizes understanding and denies further need. Ambulated to exit with a steady gait. N95 masks provided and pt shown how to wear appropriately and requested pt wear mask at least until out of the facility due to pt disbelief that covid is real or not.

## 2022-07-05 NOTE — ED NOTES
Pt to ED today for evaluation of left ear pain, congestion, and generalized ill feeling for the past week. Pt visibly uncomfortable. Slight SOB associated with congestion. States he feels like crap.

## 2022-07-05 NOTE — ED PROVIDER NOTES
"ED Provider Note    Scribed for Marcia Lofton M.D. by Christopher Espinosa. 7/4/2022  7:24 PM    Primary care provider: Pcp Pt States None  Means of arrival: Walk-in  History obtained from: Patient  History limited by: None    CHIEF COMPLAINT  Chief Complaint   Patient presents with   • Nasal Congestion     Nasal congestion, left ear congestion, fatigue for more than a week.  \"Feels beat up.\"  Denies any fever/nausea/vomiting but does have decrease appetite.     • Congestion     Chest congestion.     HPI  Bridger Savage is a 71 y.o. male who presents to the Emergency Department for acute, mild nasal congestion onset over a week ago. Per patient, he has been experiencing COVID like symptoms. He says he is currently no vaccinated and has not been tested for the virus before. He has associated symptoms of fatigue, body ache, and cough, but denies fever, leg swelling, rhinorrhea, or ear pain. No alleviating or exacerbating factors reported. Patient has a history of COPD and is on inhalers. He additionally notes he is on dialysis with his last one being today. Patient smokes cigarettes. Denies alcohol use. Denies history of heart attack or stroke.    REVIEW OF SYSTEMS  HEENT:  Congestion, no rhinorrhea, no ear pain.  PULMONARY: cough, congestion  GI: no vomiting, diarrhea, or abdominal pain   : no dysuria, back pain, or hematuria   Musculoskeletal: body aches, no leg swelling  Endocrine: no fevers    See history of present illness. All other systems are negative. C.    PAST MEDICAL HISTORY   has a past medical history of Asthma, Breath shortness, Chronic obstructive pulmonary disease (HCC), Cold, Congestive heart failure (HCC), Dental disorder, Dialysis patient (HCC), Emphysema of lung (HCC), Hemodialysis patient (HCC), Renal disorder, Snoring, and Urinary incontinence.    SURGICAL HISTORY   has a past surgical history that includes gastroscopy-endo (N/A, 8/28/2017); gastroscopy (2/1/2018); colonoscopy (2/1/2018); other; " and gastroscopy-endo (1/30/2020).    SOCIAL HISTORY  Social History     Tobacco Use   • Smoking status: Current Some Day Smoker     Packs/day: 0.50     Types: Cigarettes   • Smokeless tobacco: Never Used   Vaping Use   • Vaping Use: Never used   Substance Use Topics   • Alcohol use: No   • Drug use: No      Social History     Substance and Sexual Activity   Drug Use No       FAMILY HISTORY  Family History   Problem Relation Age of Onset   • Heart Disease Mother    • Diabetes Mother    • Kidney Disease Mother         kidney failure   • Asthma Father    • Diabetes Brother    • No Known Problems Maternal Grandmother    • No Known Problems Maternal Grandfather    • No Known Problems Paternal Grandmother    • No Known Problems Paternal Grandfather        CURRENT MEDICATIONS  Home Medications     Reviewed by Leslie Hernandez R.N. (Registered Nurse) on 07/04/22 at 1855  Med List Status: Complete   Medication Last Dose Status   albuterol 108 (90 Base) MCG/ACT Aero Soln inhalation aerosol 7/4/2022 Active   ANORO ELLIPTA 62.5-25 MCG/INH AEROSOL POWDER, BREATH ACTIVATED inhaler 7/4/2022 Active   Ascorbic Acid (VITAMIN C) 1000 MG Tab 7/4/2022 Active   aspirin EC (ECOTRIN) 81 MG Tablet Delayed Response  Active   carvedilol (COREG) 25 MG Tab 7/4/2022 Active   Cinacalcet HCl 60 MG Tab 7/4/2022 Active   lisinopril (PRINIVIL) 10 MG Tab  Active   midodrine (PROAMATINE) 10 MG tablet 7/4/2022 Active   sevelamer carbonate (RENVELA) 800 MG Tab tablet 7/4/2022 Active                ALLERGIES  Allergies   Allergen Reactions   • Chloraprep One Step Itching   • Chlorhexidine Rash   • Fluoroplex Rash       PHYSICAL EXAM  VITAL SIGNS: /81   Pulse 70   Temp 37.2 °C (98.9 °F) (Temporal)   Resp 18   Ht 1.829 m (6')   Wt 85.8 kg (189 lb 2.5 oz)   SpO2 95%   BMI 25.65 kg/m²     Constitutional: Well developed, Well nourished, No acute distress, Non-toxic appearance.   HEENT: Normocephalic, Atraumatic,  external ears normal, left ear  slightly injected, Throat is erythematous, Congested with rhinorrhea and mucosal edema, Mucous  Membranes moist.  Eyes: PERRL, EOMI, Conjunctiva normal, No discharge.   Neck: Normal range of motion, No tenderness, Supple, No stridor.   Lymphatic: No lymphadenopathy    Cardiovascular: Regular Rate and Rhythm, No murmurs,  rubs, or gallops.   Thorax & Lungs: Lungs diffuse wheezes and rhonchi in all lung fields.  Abdomen: Bowel sounds normal, Soft, non tender, non distended,  No pulsatile masses., no rebound guarding or peritoneal signs.   Skin: Warm, Dry, No erythema, No rash, Fistula on left arm from dialysis.  Back:  No CVA tenderness,  No spinal tenderness, bony crepitance, step offs, or instability.   Neurologic: Alert & oriented clear speech no focal deficits  Extremities: Equal, intact distal pulses, No cyanosis, clubbing or edema,  No tenderness, Fistula on left arm from dialysis.  Musculoskeletal: Good range of motion in all major joints. No tenderness to palpation or major deformities noted.     DIAGNOSTIC STUDIES / PROCEDURES    LABS  Results for orders placed or performed during the hospital encounter of 07/04/22   CoV-2, FLU A/B, and RSV by PCR (2-4 Hours [facility]) : Collect NP swab in VTM    Specimen: Respirate   Result Value Ref Range    Influenza virus A RNA Negative Negative    Influenza virus B, PCR Negative Negative    RSV, PCR Negative Negative    SARS-CoV-2 by PCR DETECTED (AA)     SARS-CoV-2 Source NP Swab    CBC WITH DIFFERENTIAL   Result Value Ref Range    WBC 7.0 4.8 - 10.8 K/uL    RBC 5.28 4.70 - 6.10 M/uL    Hemoglobin 14.3 14.0 - 18.0 g/dL    Hematocrit 44.5 42.0 - 52.0 %    MCV 84.3 81.4 - 97.8 fL    MCH 27.1 27.0 - 33.0 pg    MCHC 32.1 (L) 33.7 - 35.3 g/dL    RDW 54.6 (H) 35.9 - 50.0 fL    Platelet Count 181 164 - 446 K/uL    MPV 9.8 9.0 - 12.9 fL    Neutrophils-Polys 75.30 (H) 44.00 - 72.00 %    Lymphocytes 14.70 (L) 22.00 - 41.00 %    Monocytes 8.10 0.00 - 13.40 %    Eosinophils 1.40 0.00  - 6.90 %    Basophils 0.10 0.00 - 1.80 %    Immature Granulocytes 0.40 0.00 - 0.90 %    Nucleated RBC 0.00 /100 WBC    Neutrophils (Absolute) 5.27 1.82 - 7.42 K/uL    Lymphs (Absolute) 1.03 1.00 - 4.80 K/uL    Monos (Absolute) 0.57 0.00 - 0.85 K/uL    Eos (Absolute) 0.10 0.00 - 0.51 K/uL    Baso (Absolute) 0.01 0.00 - 0.12 K/uL    Immature Granulocytes (abs) 0.03 0.00 - 0.11 K/uL    NRBC (Absolute) 0.00 K/uL   COMP METABOLIC PANEL   Result Value Ref Range    Sodium 138 135 - 145 mmol/L    Potassium 4.9 3.6 - 5.5 mmol/L    Chloride 95 (L) 96 - 112 mmol/L    Co2 29 20 - 33 mmol/L    Anion Gap 14.0 7.0 - 16.0    Glucose 79 65 - 99 mg/dL    Bun 15 8 - 22 mg/dL    Creatinine 7.14 (HH) 0.50 - 1.40 mg/dL    Calcium 8.6 8.4 - 10.2 mg/dL    AST(SGOT) 13 12 - 45 U/L    ALT(SGPT) 13 2 - 50 U/L    Alkaline Phosphatase 71 30 - 99 U/L    Total Bilirubin 0.5 0.1 - 1.5 mg/dL    Albumin 4.0 3.2 - 4.9 g/dL    Total Protein 7.1 6.0 - 8.2 g/dL    Globulin 3.1 1.9 - 3.5 g/dL    A-G Ratio 1.3 g/dL   Troponin   Result Value Ref Range    Troponin T 60 (H) 6 - 19 ng/L   LACTIC ACID   Result Value Ref Range    Lactic Acid 1.3 0.5 - 2.0 mmol/L   ESTIMATED GFR   Result Value Ref Range    GFR (CKD-EPI) 8 (A) >60 mL/min/1.73 m 2   TROPONIN   Result Value Ref Range    Troponin T 56 (H) 6 - 19 ng/L   EKG   Result Value Ref Range    Report       [facility] Emergency Dept.    Test Date:  2022  Pt Name:    AURELIA DAVIS                  Department: [facility]  MRN:        [id number]                      [facility]  Gender:     Male                         Technician: IMANI  :        1950                   Requested By:CIRO GUZMAN  Order #:    [id number]                    Reading MD: CIRO GUZMAN MD    Measurements  Intervals                                Axis  Rate:       65                           P:          69  WI:         150                          QRS:        46  QRSD:       106                           T:          79  QT:         440  QTc:        458    Interpretive Statements  Sinus rhythm  Compared to ECG 06/15/2020 14:50:22  No significant changes  Electronically Signed On 7-4-2022 20:07:22 PDT by CIRO GUZMAN MD       All labs reviewed by me.    EKG  12 Lead EKG interpreted by me as shown above.    RADIOLOGY  DX-CHEST-PORTABLE (1 VIEW)   Final Result      1.  There is no acute cardiopulmonary process.        The radiologist's interpretation of all radiological studies have been reviewed by me.    COURSE & MEDICAL DECISION MAKING  Nursing notes, VS, PMSFHx reviewed in chart.    7:24 PM - Patient seen and examined at bedside. Discussed plan of care, including labs and radiology. Patient agrees to plan of care. Patient will be treated with albuterol. Ordered DX-Chest, CBC w/ Diff, CMP, Blood Culture x2, Troponin, CoV-2, Flu A/B, RSV, and EKG to evaluate his symptoms. The differential diagnoses include but are not limited to: Pneumonia vs. COVID vs. Flu vs. COPD Exacerbation    8:25 PM - Ordered Troponin to evaluate his symptoms.    8:57 PM - Patient was reevaluated at bedside. Discussed lab results which show he has tested positive for COVID. He will be ambulated with pulse ox to evaluate if he needs oxygen prior to discharge. Discussed plans and precautions for discharge, including isolation. Patient agrees to plan for discharge.    The patient will return for new or worsening symptoms and is stable at the time of discharge.    DISPOSITION:  Patient will be discharged home in stable condition.    FOLLOW UP:  [facility], Emergency Dept  [address]  [facility] [id number] [phone]    As needed, If symptoms worsen    FINAL IMPRESSION  1. COVID-19       Christopher NG (Drake), am scribing for, and in the presence of, Ciro Guzman M.D..    Electronically signed by: Christopher Rosen), 7/4/2022    Ciro NG M.D. personally performed the services described in  this documentation, as scribed by Christopher Espinosa in my presence, and it is both accurate and complete.    The note accurately reflects work and decisions made by me.  Marcia Lofton M.D.  7/4/2022  10:02 PM

## 2022-07-05 NOTE — ED NOTES
Pt's daughter contacted ED and requested to know what was going on with her father. Spoke with pt and no information to be provided to daughter. UC notified that if daughter contacts ED again, per pt request, no information to be discussed and he will be the on communicating his information to her.

## 2022-07-05 NOTE — ED TRIAGE NOTES
"Chief Complaint   Patient presents with   • Nasal Congestion     Nasal congestion, left ear congestion, fatigue for more than a week.  \"Feels beat up.\"  Denies any fever/nausea/vomiting but does have decrease appetite.       /81   Pulse 70   Temp 37.2 °C (98.9 °F) (Temporal)   Resp 18   Ht 1.829 m (6')   Wt 85.8 kg (189 lb 2.5 oz)   SpO2 95%   BMI 25.65 kg/m²     Not covid vaccinated.  "

## 2022-10-10 ENCOUNTER — APPOINTMENT (OUTPATIENT)
Dept: RADIOLOGY | Facility: MEDICAL CENTER | Age: 72
DRG: 189 | End: 2022-10-10
Attending: STUDENT IN AN ORGANIZED HEALTH CARE EDUCATION/TRAINING PROGRAM
Payer: MEDICARE

## 2022-10-10 ENCOUNTER — HOSPITAL ENCOUNTER (INPATIENT)
Facility: MEDICAL CENTER | Age: 72
LOS: 5 days | DRG: 189 | End: 2022-10-15
Attending: STUDENT IN AN ORGANIZED HEALTH CARE EDUCATION/TRAINING PROGRAM | Admitting: HOSPITALIST
Payer: MEDICARE

## 2022-10-10 DIAGNOSIS — R79.89 ELEVATED TROPONIN: ICD-10-CM

## 2022-10-10 DIAGNOSIS — R06.02 SHORTNESS OF BREATH: ICD-10-CM

## 2022-10-10 DIAGNOSIS — J96.01 ACUTE RESPIRATORY FAILURE WITH HYPOXIA (HCC): ICD-10-CM

## 2022-10-10 DIAGNOSIS — J44.1 ACUTE EXACERBATION OF CHRONIC OBSTRUCTIVE PULMONARY DISEASE (COPD) (HCC): ICD-10-CM

## 2022-10-10 DIAGNOSIS — I50.9 CONGESTIVE HEART FAILURE, UNSPECIFIED HF CHRONICITY, UNSPECIFIED HEART FAILURE TYPE (HCC): ICD-10-CM

## 2022-10-10 DIAGNOSIS — J81.0 ACUTE PULMONARY EDEMA (HCC): ICD-10-CM

## 2022-10-10 DIAGNOSIS — J44.9 CHRONIC OBSTRUCTIVE PULMONARY DISEASE, UNSPECIFIED COPD TYPE (HCC): ICD-10-CM

## 2022-10-10 DIAGNOSIS — N18.6 ESRD (END STAGE RENAL DISEASE) (HCC): ICD-10-CM

## 2022-10-10 PROBLEM — J81.1 PULMONARY EDEMA: Status: ACTIVE | Noted: 2022-10-10

## 2022-10-10 PROBLEM — D64.9 NORMOCYTIC ANEMIA: Status: ACTIVE | Noted: 2022-10-10

## 2022-10-10 PROBLEM — Z71.89 ACP (ADVANCE CARE PLANNING): Status: ACTIVE | Noted: 2022-10-10

## 2022-10-10 LAB
ALBUMIN SERPL BCP-MCNC: 4 G/DL (ref 3.2–4.9)
ALBUMIN/GLOB SERPL: 1.3 G/DL
ALP SERPL-CCNC: 71 U/L (ref 30–99)
ALT SERPL-CCNC: 13 U/L (ref 2–50)
ANION GAP SERPL CALC-SCNC: 17 MMOL/L (ref 7–16)
ANION GAP SERPL CALC-SCNC: 19 MMOL/L (ref 7–16)
AST SERPL-CCNC: 21 U/L (ref 12–45)
BASOPHILS # BLD AUTO: 0.6 % (ref 0–1.8)
BASOPHILS # BLD: 0.05 K/UL (ref 0–0.12)
BILIRUB SERPL-MCNC: 0.7 MG/DL (ref 0.1–1.5)
BUN SERPL-MCNC: 41 MG/DL (ref 8–22)
BUN SERPL-MCNC: 48 MG/DL (ref 8–22)
CALCIUM SERPL-MCNC: 8.5 MG/DL (ref 8.4–10.2)
CALCIUM SERPL-MCNC: 8.8 MG/DL (ref 8.4–10.2)
CHLORIDE SERPL-SCNC: 95 MMOL/L (ref 96–112)
CHLORIDE SERPL-SCNC: 95 MMOL/L (ref 96–112)
CO2 SERPL-SCNC: 25 MMOL/L (ref 20–33)
CO2 SERPL-SCNC: 26 MMOL/L (ref 20–33)
CREAT SERPL-MCNC: 10.92 MG/DL (ref 0.5–1.4)
CREAT SERPL-MCNC: 11.32 MG/DL (ref 0.5–1.4)
EOSINOPHIL # BLD AUTO: 0.12 K/UL (ref 0–0.51)
EOSINOPHIL NFR BLD: 1.4 % (ref 0–6.9)
ERYTHROCYTE [DISTWIDTH] IN BLOOD BY AUTOMATED COUNT: 55.4 FL (ref 35.9–50)
GFR SERPLBLD CREATININE-BSD FMLA CKD-EPI: 4 ML/MIN/1.73 M 2
GFR SERPLBLD CREATININE-BSD FMLA CKD-EPI: 5 ML/MIN/1.73 M 2
GLOBULIN SER CALC-MCNC: 3.2 G/DL (ref 1.9–3.5)
GLUCOSE BLD STRIP.AUTO-MCNC: 111 MG/DL (ref 65–99)
GLUCOSE BLD STRIP.AUTO-MCNC: 113 MG/DL (ref 65–99)
GLUCOSE BLD STRIP.AUTO-MCNC: 92 MG/DL (ref 65–99)
GLUCOSE SERPL-MCNC: 139 MG/DL (ref 65–99)
GLUCOSE SERPL-MCNC: 88 MG/DL (ref 65–99)
HAV IGM SERPL QL IA: NORMAL
HBV CORE IGM SER QL: NORMAL
HBV SURFACE AB SERPL IA-ACNC: 142 MIU/ML (ref 0–10)
HBV SURFACE AG SER QL: NORMAL
HCT VFR BLD AUTO: 37.3 % (ref 42–52)
HCV AB SER QL: NORMAL
HGB BLD-MCNC: 11.7 G/DL (ref 14–18)
IMM GRANULOCYTES # BLD AUTO: 0.04 K/UL (ref 0–0.11)
IMM GRANULOCYTES NFR BLD AUTO: 0.5 % (ref 0–0.9)
LYMPHOCYTES # BLD AUTO: 0.68 K/UL (ref 1–4.8)
LYMPHOCYTES NFR BLD: 8.1 % (ref 22–41)
MCH RBC QN AUTO: 28.7 PG (ref 27–33)
MCHC RBC AUTO-ENTMCNC: 31.4 G/DL (ref 33.7–35.3)
MCV RBC AUTO: 91.6 FL (ref 81.4–97.8)
MONOCYTES # BLD AUTO: 0.68 K/UL (ref 0–0.85)
MONOCYTES NFR BLD AUTO: 8.1 % (ref 0–13.4)
NEUTROPHILS # BLD AUTO: 6.83 K/UL (ref 1.82–7.42)
NEUTROPHILS NFR BLD: 81.3 % (ref 44–72)
NRBC # BLD AUTO: 0 K/UL
NRBC BLD-RTO: 0 /100 WBC
NT-PROBNP SERPL IA-MCNC: ABNORMAL PG/ML (ref 0–125)
PLATELET # BLD AUTO: 203 K/UL (ref 164–446)
PMV BLD AUTO: 9.9 FL (ref 9–12.9)
POTASSIUM SERPL-SCNC: 5.5 MMOL/L (ref 3.6–5.5)
POTASSIUM SERPL-SCNC: 5.7 MMOL/L (ref 3.6–5.5)
PROCALCITONIN SERPL-MCNC: 1.05 NG/ML
PROT SERPL-MCNC: 7.2 G/DL (ref 6–8.2)
RBC # BLD AUTO: 4.07 M/UL (ref 4.7–6.1)
SODIUM SERPL-SCNC: 138 MMOL/L (ref 135–145)
SODIUM SERPL-SCNC: 139 MMOL/L (ref 135–145)
TROPONIN T SERPL-MCNC: 84 NG/L (ref 6–19)
TROPONIN T SERPL-MCNC: 85 NG/L (ref 6–19)
TROPONIN T SERPL-MCNC: 92 NG/L (ref 6–19)
WBC # BLD AUTO: 8.4 K/UL (ref 4.8–10.8)

## 2022-10-10 PROCEDURE — 99291 CRITICAL CARE FIRST HOUR: CPT | Performed by: STUDENT IN AN ORGANIZED HEALTH CARE EDUCATION/TRAINING PROGRAM

## 2022-10-10 PROCEDURE — 700102 HCHG RX REV CODE 250 W/ 637 OVERRIDE(OP): Performed by: STUDENT IN AN ORGANIZED HEALTH CARE EDUCATION/TRAINING PROGRAM

## 2022-10-10 PROCEDURE — 94760 N-INVAS EAR/PLS OXIMETRY 1: CPT

## 2022-10-10 PROCEDURE — 87070 CULTURE OTHR SPECIMN AEROBIC: CPT

## 2022-10-10 PROCEDURE — 700102 HCHG RX REV CODE 250 W/ 637 OVERRIDE(OP): Performed by: HOSPITALIST

## 2022-10-10 PROCEDURE — 87186 SC STD MICRODIL/AGAR DIL: CPT | Mod: 91

## 2022-10-10 PROCEDURE — 99291 CRITICAL CARE FIRST HOUR: CPT

## 2022-10-10 PROCEDURE — 96374 THER/PROPH/DIAG INJ IV PUSH: CPT

## 2022-10-10 PROCEDURE — 36415 COLL VENOUS BLD VENIPUNCTURE: CPT

## 2022-10-10 PROCEDURE — 94644 CONT INHLJ TX 1ST HOUR: CPT

## 2022-10-10 PROCEDURE — 86706 HEP B SURFACE ANTIBODY: CPT

## 2022-10-10 PROCEDURE — 94640 AIRWAY INHALATION TREATMENT: CPT

## 2022-10-10 PROCEDURE — 71045 X-RAY EXAM CHEST 1 VIEW: CPT

## 2022-10-10 PROCEDURE — 87205 SMEAR GRAM STAIN: CPT

## 2022-10-10 PROCEDURE — 82962 GLUCOSE BLOOD TEST: CPT

## 2022-10-10 PROCEDURE — 99291 CRITICAL CARE FIRST HOUR: CPT | Performed by: HOSPITALIST

## 2022-10-10 PROCEDURE — 80074 ACUTE HEPATITIS PANEL: CPT

## 2022-10-10 PROCEDURE — 93005 ELECTROCARDIOGRAM TRACING: CPT | Performed by: STUDENT IN AN ORGANIZED HEALTH CARE EDUCATION/TRAINING PROGRAM

## 2022-10-10 PROCEDURE — 84484 ASSAY OF TROPONIN QUANT: CPT

## 2022-10-10 PROCEDURE — A9270 NON-COVERED ITEM OR SERVICE: HCPCS | Performed by: STUDENT IN AN ORGANIZED HEALTH CARE EDUCATION/TRAINING PROGRAM

## 2022-10-10 PROCEDURE — 80048 BASIC METABOLIC PNL TOTAL CA: CPT

## 2022-10-10 PROCEDURE — 90935 HEMODIALYSIS ONE EVALUATION: CPT

## 2022-10-10 PROCEDURE — 96375 TX/PRO/DX INJ NEW DRUG ADDON: CPT

## 2022-10-10 PROCEDURE — A9270 NON-COVERED ITEM OR SERVICE: HCPCS | Performed by: HOSPITALIST

## 2022-10-10 PROCEDURE — 700101 HCHG RX REV CODE 250: Performed by: HOSPITALIST

## 2022-10-10 PROCEDURE — 700111 HCHG RX REV CODE 636 W/ 250 OVERRIDE (IP): Performed by: STUDENT IN AN ORGANIZED HEALTH CARE EDUCATION/TRAINING PROGRAM

## 2022-10-10 PROCEDURE — 87077 CULTURE AEROBIC IDENTIFY: CPT

## 2022-10-10 PROCEDURE — 700101 HCHG RX REV CODE 250: Performed by: STUDENT IN AN ORGANIZED HEALTH CARE EDUCATION/TRAINING PROGRAM

## 2022-10-10 PROCEDURE — 85025 COMPLETE CBC W/AUTO DIFF WBC: CPT

## 2022-10-10 PROCEDURE — 80053 COMPREHEN METABOLIC PANEL: CPT

## 2022-10-10 PROCEDURE — 700101 HCHG RX REV CODE 250

## 2022-10-10 PROCEDURE — 5A1D70Z PERFORMANCE OF URINARY FILTRATION, INTERMITTENT, LESS THAN 6 HOURS PER DAY: ICD-10-PCS | Performed by: INTERNAL MEDICINE

## 2022-10-10 PROCEDURE — 83880 ASSAY OF NATRIURETIC PEPTIDE: CPT

## 2022-10-10 PROCEDURE — 700111 HCHG RX REV CODE 636 W/ 250 OVERRIDE (IP): Performed by: HOSPITALIST

## 2022-10-10 PROCEDURE — 84145 PROCALCITONIN (PCT): CPT

## 2022-10-10 PROCEDURE — 94660 CPAP INITIATION&MGMT: CPT

## 2022-10-10 PROCEDURE — 770020 HCHG ROOM/CARE - TELE (206)

## 2022-10-10 RX ORDER — ONDANSETRON 2 MG/ML
4 INJECTION INTRAMUSCULAR; INTRAVENOUS EVERY 4 HOURS PRN
Status: DISCONTINUED | OUTPATIENT
Start: 2022-10-10 | End: 2022-10-15 | Stop reason: HOSPADM

## 2022-10-10 RX ORDER — SEVELAMER CARBONATE 800 MG/1
3200 TABLET, FILM COATED ORAL 3 TIMES DAILY
Status: DISCONTINUED | OUTPATIENT
Start: 2022-10-10 | End: 2022-10-15

## 2022-10-10 RX ORDER — PREDNISONE 20 MG/1
40 TABLET ORAL DAILY
Status: DISCONTINUED | OUTPATIENT
Start: 2022-10-10 | End: 2022-10-10

## 2022-10-10 RX ORDER — OMEPRAZOLE 20 MG/1
20 CAPSULE, DELAYED RELEASE ORAL DAILY
COMMUNITY

## 2022-10-10 RX ORDER — ACETAMINOPHEN 325 MG/1
650 TABLET ORAL EVERY 6 HOURS PRN
Status: DISCONTINUED | OUTPATIENT
Start: 2022-10-10 | End: 2022-10-15 | Stop reason: HOSPADM

## 2022-10-10 RX ORDER — CINACALCET 30 MG/1
60 TABLET, FILM COATED ORAL DAILY
Status: DISCONTINUED | OUTPATIENT
Start: 2022-10-10 | End: 2022-10-15 | Stop reason: HOSPADM

## 2022-10-10 RX ORDER — FUROSEMIDE 10 MG/ML
40 INJECTION INTRAMUSCULAR; INTRAVENOUS ONCE
Status: COMPLETED | OUTPATIENT
Start: 2022-10-10 | End: 2022-10-10

## 2022-10-10 RX ORDER — IPRATROPIUM BROMIDE AND ALBUTEROL SULFATE 2.5; .5 MG/3ML; MG/3ML
3 SOLUTION RESPIRATORY (INHALATION)
Status: COMPLETED | OUTPATIENT
Start: 2022-10-10 | End: 2022-10-10

## 2022-10-10 RX ORDER — METHYLPREDNISOLONE SODIUM SUCCINATE 125 MG/2ML
125 INJECTION, POWDER, LYOPHILIZED, FOR SOLUTION INTRAMUSCULAR; INTRAVENOUS ONCE
Status: COMPLETED | OUTPATIENT
Start: 2022-10-10 | End: 2022-10-10

## 2022-10-10 RX ORDER — BISACODYL 10 MG
10 SUPPOSITORY, RECTAL RECTAL
Status: DISCONTINUED | OUTPATIENT
Start: 2022-10-10 | End: 2022-10-15 | Stop reason: HOSPADM

## 2022-10-10 RX ORDER — IPRATROPIUM BROMIDE AND ALBUTEROL SULFATE 2.5; .5 MG/3ML; MG/3ML
3 SOLUTION RESPIRATORY (INHALATION)
Status: DISCONTINUED | OUTPATIENT
Start: 2022-10-10 | End: 2022-10-15 | Stop reason: HOSPADM

## 2022-10-10 RX ORDER — ROPINIROLE 0.25 MG/1
0.25 TABLET, FILM COATED ORAL DAILY
COMMUNITY

## 2022-10-10 RX ORDER — POLYETHYLENE GLYCOL 3350 17 G/17G
1 POWDER, FOR SOLUTION ORAL
Status: DISCONTINUED | OUTPATIENT
Start: 2022-10-10 | End: 2022-10-15 | Stop reason: HOSPADM

## 2022-10-10 RX ORDER — CALCIUM GLUCONATE 20 MG/ML
1 INJECTION, SOLUTION INTRAVENOUS ONCE
Status: COMPLETED | OUTPATIENT
Start: 2022-10-10 | End: 2022-10-10

## 2022-10-10 RX ORDER — DEXTROSE MONOHYDRATE 25 G/50ML
25 INJECTION, SOLUTION INTRAVENOUS ONCE
Status: COMPLETED | OUTPATIENT
Start: 2022-10-10 | End: 2022-10-10

## 2022-10-10 RX ORDER — IPRATROPIUM BROMIDE AND ALBUTEROL SULFATE 2.5; .5 MG/3ML; MG/3ML
SOLUTION RESPIRATORY (INHALATION)
Status: COMPLETED
Start: 2022-10-10 | End: 2022-10-10

## 2022-10-10 RX ORDER — ONDANSETRON 4 MG/1
4 TABLET, ORALLY DISINTEGRATING ORAL EVERY 4 HOURS PRN
Status: DISCONTINUED | OUTPATIENT
Start: 2022-10-10 | End: 2022-10-15 | Stop reason: HOSPADM

## 2022-10-10 RX ORDER — AMOXICILLIN 250 MG
2 CAPSULE ORAL 2 TIMES DAILY
Status: DISCONTINUED | OUTPATIENT
Start: 2022-10-10 | End: 2022-10-15 | Stop reason: HOSPADM

## 2022-10-10 RX ORDER — AMOXICILLIN AND CLAVULANATE POTASSIUM 875; 125 MG/1; MG/1
1 TABLET, FILM COATED ORAL ONCE
Status: COMPLETED | OUTPATIENT
Start: 2022-10-10 | End: 2022-10-10

## 2022-10-10 RX ORDER — CARVEDILOL 25 MG/1
25 TABLET ORAL 2 TIMES DAILY
Status: DISCONTINUED | OUTPATIENT
Start: 2022-10-10 | End: 2022-10-15 | Stop reason: HOSPADM

## 2022-10-10 RX ADMIN — IPRATROPIUM BROMIDE AND ALBUTEROL SULFATE 3 ML: 2.5; .5 SOLUTION RESPIRATORY (INHALATION) at 03:17

## 2022-10-10 RX ADMIN — ACETAMINOPHEN 650 MG: 325 TABLET, FILM COATED ORAL at 17:36

## 2022-10-10 RX ADMIN — AMOXICILLIN AND CLAVULANATE POTASSIUM 1 TABLET: 875; 125 TABLET, FILM COATED ORAL at 03:30

## 2022-10-10 RX ADMIN — SEVELAMER CARBONATE 3200 MG: 800 TABLET, FILM COATED ORAL at 08:30

## 2022-10-10 RX ADMIN — IPRATROPIUM BROMIDE AND ALBUTEROL SULFATE 3 ML: .5; 2.5 SOLUTION RESPIRATORY (INHALATION) at 04:01

## 2022-10-10 RX ADMIN — FUROSEMIDE 40 MG: 10 INJECTION, SOLUTION INTRAMUSCULAR; INTRAVENOUS at 04:30

## 2022-10-10 RX ADMIN — IPRATROPIUM BROMIDE AND ALBUTEROL SULFATE 3 ML: .5; 2.5 SOLUTION RESPIRATORY (INHALATION) at 04:02

## 2022-10-10 RX ADMIN — SENNOSIDES AND DOCUSATE SODIUM 2 TABLET: 50; 8.6 TABLET ORAL at 17:36

## 2022-10-10 RX ADMIN — IPRATROPIUM BROMIDE AND ALBUTEROL SULFATE 3 ML: .5; 2.5 SOLUTION RESPIRATORY (INHALATION) at 04:03

## 2022-10-10 RX ADMIN — CARVEDILOL 25 MG: 25 TABLET, FILM COATED ORAL at 17:36

## 2022-10-10 RX ADMIN — CALCIUM GLUCONATE 1 G: 20 INJECTION, SOLUTION INTRAVENOUS at 05:05

## 2022-10-10 RX ADMIN — CINACALCET HYDROCHLORIDE 60 MG: 30 TABLET, FILM COATED ORAL at 08:30

## 2022-10-10 RX ADMIN — METHYLPREDNISOLONE SODIUM SUCCINATE 125 MG: 125 INJECTION, POWDER, FOR SOLUTION INTRAMUSCULAR; INTRAVENOUS at 03:25

## 2022-10-10 RX ADMIN — IPRATROPIUM BROMIDE AND ALBUTEROL SULFATE 3 ML: 2.5; .5 SOLUTION RESPIRATORY (INHALATION) at 19:52

## 2022-10-10 RX ADMIN — SEVELAMER CARBONATE 3200 MG: 800 TABLET, FILM COATED ORAL at 12:35

## 2022-10-10 RX ADMIN — SEVELAMER CARBONATE 3200 MG: 800 TABLET, FILM COATED ORAL at 17:36

## 2022-10-10 RX ADMIN — INSULIN HUMAN 4.5 UNITS: 100 INJECTION, SOLUTION PARENTERAL at 05:18

## 2022-10-10 RX ADMIN — DEXTROSE MONOHYDRATE 25 G: 25 INJECTION, SOLUTION INTRAVENOUS at 05:05

## 2022-10-10 ASSESSMENT — ENCOUNTER SYMPTOMS
HEADACHES: 0
BRUISES/BLEEDS EASILY: 0
SHORTNESS OF BREATH: 1
BLURRED VISION: 0
WHEEZING: 1
PALPITATIONS: 0
SORE THROAT: 0
INSOMNIA: 0
VOMITING: 0
DIZZINESS: 0
FOCAL WEAKNESS: 0
FEVER: 0
NECK PAIN: 0
COUGH: 1
COUGH: 0
LOSS OF CONSCIOUSNESS: 0
MYALGIAS: 0
NAUSEA: 0
DOUBLE VISION: 0
WEAKNESS: 0
DEPRESSION: 0

## 2022-10-10 ASSESSMENT — LIFESTYLE VARIABLES
EVER HAD A DRINK FIRST THING IN THE MORNING TO STEADY YOUR NERVES TO GET RID OF A HANGOVER: NO
EVER FELT BAD OR GUILTY ABOUT YOUR DRINKING: NO
TOTAL SCORE: 0
ALCOHOL_USE: NO
CONSUMPTION TOTAL: NEGATIVE
HOW MANY TIMES IN THE PAST YEAR HAVE YOU HAD 5 OR MORE DRINKS IN A DAY: 0
EVER_SMOKED: YES
TOTAL SCORE: 0
TOTAL SCORE: 0
ON A TYPICAL DAY WHEN YOU DRINK ALCOHOL HOW MANY DRINKS DO YOU HAVE: 0
HAVE PEOPLE ANNOYED YOU BY CRITICIZING YOUR DRINKING: NO
AVERAGE NUMBER OF DAYS PER WEEK YOU HAVE A DRINK CONTAINING ALCOHOL: 0
HAVE YOU EVER FELT YOU SHOULD CUT DOWN ON YOUR DRINKING: NO

## 2022-10-10 ASSESSMENT — COGNITIVE AND FUNCTIONAL STATUS - GENERAL
CLIMB 3 TO 5 STEPS WITH RAILING: A LITTLE
SUGGESTED CMS G CODE MODIFIER MOBILITY: CI
DAILY ACTIVITIY SCORE: 24
MOBILITY SCORE: 23
SUGGESTED CMS G CODE MODIFIER DAILY ACTIVITY: CH

## 2022-10-10 ASSESSMENT — PAIN DESCRIPTION - PAIN TYPE
TYPE: ACUTE PAIN
TYPE: ACUTE PAIN;CHRONIC PAIN

## 2022-10-10 ASSESSMENT — PULMONARY FUNCTION TESTS
EPAP_CMH2O: 5

## 2022-10-10 ASSESSMENT — FIBROSIS 4 INDEX: FIB4 SCORE: 1.39

## 2022-10-10 NOTE — PROGRESS NOTES
Waiting fro [facility] to open to verify pts medications. Pt is not sure the names or strengths of his medications

## 2022-10-10 NOTE — ASSESSMENT & PLAN NOTE
TTE in 2019 with EF of 35% -currently in exacerbation with volume overload    -HD for volume removal as noted above  -Elevated troponin 84, likely demand -trend troponins -no chest pain or evidence of ACS at this time

## 2022-10-10 NOTE — PROGRESS NOTES
Received report form JERMAINE Koch. Pt is A&Ox4 and ambulatory. Pt is on BiPAP 30% FiO2 saturating in the 90s. No gtts running, VSS at this time. Updated pt on POC for the day, he verbalized understanding. Admit profile completed. All safety precautions in place, call light within reach. All needs met a this time.

## 2022-10-10 NOTE — H&P
"Hospital Medicine History & Physical Note    Date of Service  10/10/2022    Primary Care Physician  Pcp Pt States None    Consultants  I have called [facility] and spoke with Dr. Finley requesting nonemergency hemodialysis this morning  - Dr. Murillo will be covering      Code Status  Full Code    Chief Complaint  Chief Complaint   Patient presents with    Shortness of Breath     73 y/o male presents with a chief complaint of Shortness of breath. Patient stated that he has been symptomatic over the past week, and today experienced worse symptoms. He stated that he called 911 in which they placed him on oxygen. Patient stated that they did not transport because he stated that he would take himself. Patient stated that he has laos had nasal drainage. Patient denied any cough, fevers, or chills, chest pain, palpitations, or abdominal pain.     Nasal Drainage       History of Presenting Illness  Bridger Savage is a 72 y.o. male, with h/o ESRD on H (M-W-F [facility]),  Systolic CHF (EF 35% 10/1/19), COPD, who presented 10/10/2022 with complaints of shortness of breath, worse with exertion and associated with wheezing.patient also has audible wheezing and feels like his \"chest is full \".  He also has nonproductive cough associated but no fever.  Denies having Chest pain. H/o COVID infection in July.  He has been on hemodialysis now for 10 years and is anuric.    ER COURSE:  Afebrile tachycardic and tachypneic.  Hypoxic and is started on BiPAP while in the ED.  Laboratory showing potassium of 5.7, creatinine of 10.92.  Troponin is also elevated at 84 ng/L and proBNP > 28436      I discussed the plan of care with patient.    Review of Systems  Review of Systems   Constitutional:  Positive for malaise/fatigue. Negative for fever.   HENT:  Negative for congestion and sore throat.    Eyes:  Negative for blurred vision and double vision.   Respiratory:  Positive for shortness of breath. Negative for " cough.    Cardiovascular:  Negative for chest pain and palpitations.   Gastrointestinal:  Negative for nausea and vomiting.   Genitourinary:  Negative for dysuria and urgency.   Musculoskeletal:  Negative for myalgias and neck pain.   Skin:  Negative for itching and rash.   Neurological:  Negative for dizziness, weakness and headaches.   Endo/Heme/Allergies:  Does not bruise/bleed easily.   Psychiatric/Behavioral:  Negative for depression. The patient does not have insomnia.      Past Medical History   has a past medical history of Asthma, Breath shortness, Chronic obstructive pulmonary disease (HCC), Cold, Congestive heart failure (HCC), Dental disorder, Dialysis patient ([facility]), Emphysema of lung (HCC), Hemodialysis patient ([facility]), Renal disorder, Snoring, and Urinary incontinence.    Surgical History   has a past surgical history that includes gastroscopy-endo (N/A, 8/28/2017); gastroscopy (2/1/2018); colonoscopy (2/1/2018); other; and gastroscopy-endo (1/30/2020).     Family History  family history includes Asthma in his father; Diabetes in his brother and mother; Heart Disease in his mother; Kidney Disease in his mother; No Known Problems in his maternal grandfather, maternal grandmother, paternal grandfather, and paternal grandmother.   Family history reviewed with patient. There is no family history that is pertinent to the chief complaint.     Social History   reports that he has been smoking cigarettes. He has been smoking an average of .5 packs per day. He has never used smokeless tobacco. He reports that he does not drink alcohol and does not use drugs.    Allergies  Allergies   Allergen Reactions    Chloraprep One Step Itching    Chlorhexidine Rash    Fluoroplex Rash       Medications  Prior to Admission Medications   Prescriptions Last Dose Informant Patient Reported? Taking?   ANORO ELLIPTA 62.5-25 MCG/INH AEROSOL POWDER, BREATH ACTIVATED inhaler   No No   Sig: Inhale 1 Puff every day.   Ascorbic Acid  (VITAMIN C) 1000 MG Tab  Patient Yes No   Sig: Take 2,000 mg by mouth 3 times a day.   Cinacalcet HCl 60 MG Tab  Patient Yes No   Sig: Take 1 Tab by mouth every day.   albuterol 108 (90 Base) MCG/ACT Aero Soln inhalation aerosol  Patient Yes No   Sig: Inhale 2 Puffs by mouth every 6 hours as needed for Shortness of Breath.   aspirin EC (ECOTRIN) 81 MG Tablet Delayed Response  Patient Yes No   Sig: Take 81 mg by mouth every morning.   Patient not taking: Reported on 6/23/2022   carvedilol (COREG) 25 MG Tab  Patient Yes No   Sig: Take 25 mg by mouth 2 Times a Day.   lisinopril (PRINIVIL) 10 MG Tab  Patient Yes No   Sig: Take 10 mg by mouth every day.   Patient not taking: Reported on 6/23/2022   midodrine (PROAMATINE) 10 MG tablet  Patient Yes No   Sig: Take 10 mg by mouth 3 times a day. Take 1 tablet by mouth three times a day   sevelamer carbonate (RENVELA) 800 MG Tab tablet  Patient Yes No   Sig: Take 3,200 mg by mouth 3 times a day.      Facility-Administered Medications: None       Physical Exam  Temp:  [36.3 °C (97.3 °F)] 36.3 °C (97.3 °F)  Pulse:  [] 95  Resp:  [18-24] 24  BP: (127-153)/(72-87) 138/87  SpO2:  [89 %-99 %] 95 %  Blood Pressure : 138/87   Temperature: 36.3 °C (97.3 °F)   Pulse: 95   Respiration: (!) 24   Pulse Oximetry: 95 %       Physical Exam  Constitutional:       Appearance: Normal appearance.   HENT:      Head: Normocephalic and atraumatic.      Nose: Nose normal.      Mouth/Throat:      Mouth: Mucous membranes are moist.      Pharynx: Oropharynx is clear.   Eyes:      Extraocular Movements: Extraocular movements intact.      Pupils: Pupils are equal, round, and reactive to light.   Cardiovascular:      Rate and Rhythm: Regular rhythm. Tachycardia present.      Pulses: Normal pulses.      Heart sounds: Normal heart sounds.   Pulmonary:      Effort: Pulmonary effort is normal.      Breath sounds: Wheezing (Diffuse end-expiratory wheezing.) and rales present.   Abdominal:      General:  Abdomen is flat. Bowel sounds are normal.      Palpations: Abdomen is soft.   Musculoskeletal:      Cervical back: Normal range of motion and neck supple.      Comments: AV fistula to his left arm with positive thrill   Skin:     General: Skin is warm and dry.   Neurological:      General: No focal deficit present.      Mental Status: He is alert and oriented to person, place, and time.   Psychiatric:         Mood and Affect: Mood normal.         Behavior: Behavior normal.       -  Laboratory:  Recent Labs     10/10/22  0300   WBC 8.4   RBC 4.07*   HEMOGLOBIN 11.7*   HEMATOCRIT 37.3*   MCV 91.6   MCH 28.7   MCHC 31.4*   RDW 55.4*   PLATELETCT 203   MPV 9.9     Recent Labs     10/10/22  0300   SODIUM 138   POTASSIUM 5.7*   CHLORIDE 95*   CO2 26   GLUCOSE 88   BUN 41*   CREATININE 10.92*   CALCIUM 8.8     Recent Labs     10/10/22  0300   ALTSGPT 13   ASTSGOT 21   ALKPHOSPHAT 71   TBILIRUBIN 0.7   GLUCOSE 88         Recent Labs     10/10/22  0300   NTPROBNP >28628*         Recent Labs     10/10/22  0300   TROPONINT 84*       Imaging:  DX-CHEST-PORTABLE (1 VIEW)   Final Result         Ill-defined opacifications in each lung have increased compared to the prior radiograph.  This could indicate worsening of pulmonary edema or inflammation.          EKG:  My impression is: Sinus tachycardia at 95 bpm, LVH, no acute ST elevation.     Assessment/Plan:  Justification for Admission Status  I anticipate this patient will require at least two midnights for appropriate medical management, necessitating inpatient admission because Acute Hypoxic Respiratory Failure under the context of acute pulmonary edema and hemodialysis patient with underlying COPD exacerbation.  He is currently on BiPAP        * Acute hypoxemic respiratory failure (HCC)- (present on admission)  Assessment & Plan  -Inpatient status to the ICU as patient is on BiPAP.  -Acute hypoxic respiratory failure under the context of pulmonary edema and hemodialysis.  -He  will need to have a nonemergency hemodialysis arranged today.  -He follows with [facility] and has dialysis on Monday, Wednesdays and Fridays.  I have called [facility] and spoke with Dr. Dumont requesting nonemergency hemodialysis this morning  -I appreciate nephrology consult and recommendations.  -Currently on BiPAP 12/5/1930 percent.    Pulmonary edema  Assessment & Plan  -X-ray showing ill-defined opacifications on each lung with increased patterns as compared to prior.  Worsening pulmonary edema or infiltration.  -Patient will need dialysis today.  Plans above.    ESRD (end stage renal disease) on dialysis ([facility])- (present on admission)  Assessment & Plan  -Follows with [facility], Dr. Chaudhari.   -Hemodialysis sessions on Mondays, Wednesdays and Fridays.  We will need to dialysis session today.  Please call nephrology for arrangements.    Acute exacerbation of chronic obstructive pulmonary disease (COPD) ([facility])  Assessment & Plan  -Solu-Medrol IV given in the ED  -Respiratory therapist to eval and treat  -Albuterol as needed  -Continue Anoro Ellipta    ACC/AHA stage C systolic heart failure (HCC)- (present on admission)  Assessment & Plan  -Most recent echocardiogram was done on 10/1/2019 and EF was 35%.  -Patient is currently presenting with pulmonary edema and CHF exacerbation.  -He will need to take fluids with hemodialysis session.  -Troponin is also elevated at 84 ng/L, likely due to underlying kidney disease.  Follow-up serial cardiac enzymes.    Hyperkalemia- (present on admission)  Assessment & Plan  -Potassium on arrival was 5.7.  Giving calcium gluconate and insulin/glucose protocol.  -Additionally he will need hemodialysis session to be arranged this morning.  -Repeat BMP at 10 AM.    Normocytic anemia  Assessment & Plan  --Hemoglobin 11.7. monitor CBC in am    Essential hypertension- (present on admission)  Assessment & Plan  -Continue carvedilol.  Most  recent blood pressure is 138/87.    The patient remains critically ill.  Critical care time = 41 minutes in directly providing and coordinating critical care and extensive data review.  No time overlap and excludes procedures.    VTE prophylaxis: SCDs/TEDs

## 2022-10-10 NOTE — ASSESSMENT & PLAN NOTE
-Initial x-ray showing ill-defined opacifications on each lung with increased patterns as compared to prior, with worsened pulmonary edema or infiltration.  -Improved with hemodialysis.

## 2022-10-10 NOTE — ED PROVIDER NOTES
ED Provider Note    CHIEF COMPLAINT  Chief Complaint   Patient presents with    Shortness of Breath     71 y/o male presents with a chief complaint of Shortness of breath. Patient stated that he has been symptomatic over the past week, and today experienced worse symptoms. He stated that he called 911 in which they placed him on oxygen. Patient stated that they did not transport because he stated that he would take himself. Patient stated that he has laos had nasal drainage. Patient denied any cough, fevers, or chills, chest pain, palpitations, or abdominal pain.     Nasal Drainage       HPI  Bridger Savage is a 72 y.o. male who presents with shortness of breath.  He states his symptoms have been ongoing for a week but gradually worsening.  He also feels as though his  'Chest is full' but no pain'. he has a history of COPD.  History is quite limited due to patient distress.  He denies any recent illness, fevers or chills.  He endorses a chronic non productive cough without any changes.  He has had some nasal drainage but otherwise no congestion, fevers or chills.  .  Denies any history of pulmonary embolism, mopped assist or recent immobilization or unilateral leg swelling..  He has a history of end-stage renal disease and gets dialysis Monday Wednesday Friday.  He had dialysis on Friday and is anuric.   He had covid in July.     REVIEW OF SYSTEMS  Unable to obtain    PAST MEDICAL HISTORY  Past Medical History:   Diagnosis Date    Asthma     Breath shortness     uses oxygen 3.5 L as needed    Chronic obstructive pulmonary disease (HCC)     Cold     12/27/17 cold    Congestive heart failure (HCC)     Dental disorder     upper and lower dentures    Dialysis patient ([facility])     MWF    Emphysema of lung (HCC)     Hemodialysis patient ([facility])     M, W, F    Renal disorder     Snoring     Urinary incontinence        SOCIAL HISTORY  Social History     Tobacco Use    Smoking status: Some Days     Packs/day: 0.50     Types:  Cigarettes    Smokeless tobacco: Never   Vaping Use    Vaping Use: Never used   Substance Use Topics    Alcohol use: No    Drug use: No       SURGICAL HISTORY  Past Surgical History:   Procedure Laterality Date    GASTROSCOPY-ENDO  1/30/2020    Procedure: GASTROSCOPY;  Surgeon: Richard Cano D.O.;  Location: [facility];  Service: Gastroenterology    GASTROSCOPY  2/1/2018    Procedure: GASTROSCOPY;  Surgeon: Morteza Olivo M.D.;  Location: [facility];  Service: Gastroenterology    COLONOSCOPY  2/1/2018    Procedure: COLONOSCOPY;  Surgeon: Morteza Olivo M.D.;  Location: [facility];  Service: Gastroenterology    GASTROSCOPY-ENDO N/A 8/28/2017    Procedure: GASTROSCOPY-ENDO;  Surgeon: Pankaj Choudhury M.D.;  Location: [facility];  Service: EUS    OTHER      Patient had an arm fistula put in for dialysis       CURRENT MEDICATIONS  Home Medications       Reviewed by Kait Finley R.N. (Registered Nurse) on 10/10/22 at 0255  Med List Status: Not Addressed     Medication Last Dose Status   albuterol 108 (90 Base) MCG/ACT Aero Soln inhalation aerosol  Active   ANORO ELLIPTA 62.5-25 MCG/INH AEROSOL POWDER, BREATH ACTIVATED inhaler  Active   Ascorbic Acid (VITAMIN C) 1000 MG Tab  Active   aspirin EC (ECOTRIN) 81 MG Tablet Delayed Response  Active   carvedilol (COREG) 25 MG Tab  Active   Cinacalcet HCl 60 MG Tab  Active   lisinopril (PRINIVIL) 10 MG Tab  Active   midodrine (PROAMATINE) 10 MG tablet  Active   sevelamer carbonate (RENVELA) 800 MG Tab tablet  Active                    ALLERGIES  Allergies   Allergen Reactions    Chloraprep One Step Itching    Chlorhexidine Rash    Fluoroplex Rash       PHYSICAL EXAM  VITAL SIGNS: /73   Pulse 98   Temp 36.8 °C (98.3 °F) (Temporal)   Resp (!) 22   Ht 1.829 m (6')   Wt 86.2 kg (190 lb)   SpO2 95%   BMI 25.77 kg/m²    Constitutional: Awake and alert . In distress, speaking in short sentences  HENT: Normal  inspection  moist mucous membranes  Eyes: Normal inspection  Neck: Grossly normal range of motion.  Cardiovascular: Tachycardic heart rate, Normal rhythm.  Symmetric peripheral pulses.   Thorax & Lungs: He is in respiratory distress, He has bilateral whezzing and rales.  He is tachypneic  Abdomen: Soft, non-distended, nontender, no mass  Skin: No obvious rash.  Back: No tenderness, No CVA tenderness.   Extremities: Warm, well perfused. No clubbing, cyanosis, edema,   Neurologic: Grossly normal   Psychiatric: Normal for situation    RADIOLOGY/PROCEDURES  DX-CHEST-PORTABLE (1 VIEW)   Final Result         Ill-defined opacifications in each lung have increased compared to the prior radiograph.  This could indicate worsening of pulmonary edema or inflammation.           Imaging is interpreted by radiologist    Labs:  Results for orders placed or performed during the hospital encounter of 10/10/22   CBC WITH DIFFERENTIAL   Result Value Ref Range    WBC 8.4 4.8 - 10.8 K/uL    RBC 4.07 (L) 4.70 - 6.10 M/uL    Hemoglobin 11.7 (L) 14.0 - 18.0 g/dL    Hematocrit 37.3 (L) 42.0 - 52.0 %    MCV 91.6 81.4 - 97.8 fL    MCH 28.7 27.0 - 33.0 pg    MCHC 31.4 (L) 33.7 - 35.3 g/dL    RDW 55.4 (H) 35.9 - 50.0 fL    Platelet Count 203 164 - 446 K/uL    MPV 9.9 9.0 - 12.9 fL    Neutrophils-Polys 81.30 (H) 44.00 - 72.00 %    Lymphocytes 8.10 (L) 22.00 - 41.00 %    Monocytes 8.10 0.00 - 13.40 %    Eosinophils 1.40 0.00 - 6.90 %    Basophils 0.60 0.00 - 1.80 %    Immature Granulocytes 0.50 0.00 - 0.90 %    Nucleated RBC 0.00 /100 WBC    Neutrophils (Absolute) 6.83 1.82 - 7.42 K/uL    Lymphs (Absolute) 0.68 (L) 1.00 - 4.80 K/uL    Monos (Absolute) 0.68 0.00 - 0.85 K/uL    Eos (Absolute) 0.12 0.00 - 0.51 K/uL    Baso (Absolute) 0.05 0.00 - 0.12 K/uL    Immature Granulocytes (abs) 0.04 0.00 - 0.11 K/uL    NRBC (Absolute) 0.00 K/uL   COMP METABOLIC PANEL   Result Value Ref Range    Sodium 138 135 - 145 mmol/L    Potassium 5.7 (H) 3.6 - 5.5 mmol/L     Chloride 95 (L) 96 - 112 mmol/L    Co2 26 20 - 33 mmol/L    Anion Gap 17.0 (H) 7.0 - 16.0    Glucose 88 65 - 99 mg/dL    Bun 41 (H) 8 - 22 mg/dL    Creatinine 10.92 (HH) 0.50 - 1.40 mg/dL    Calcium 8.8 8.4 - 10.2 mg/dL    AST(SGOT) 21 12 - 45 U/L    ALT(SGPT) 13 2 - 50 U/L    Alkaline Phosphatase 71 30 - 99 U/L    Total Bilirubin 0.7 0.1 - 1.5 mg/dL    Albumin 4.0 3.2 - 4.9 g/dL    Total Protein 7.2 6.0 - 8.2 g/dL    Globulin 3.2 1.9 - 3.5 g/dL    A-G Ratio 1.3 g/dL   TROPONIN   Result Value Ref Range    Troponin T 84 (H) 6 - 19 ng/L   proBrain Natriuretic Peptide, NT   Result Value Ref Range    NT-proBNP >94288 (H) 0 - 125 pg/mL   ESTIMATED GFR   Result Value Ref Range    GFR (CKD-EPI) 5 (A) >60 mL/min/1.73 m 2   POCT glucose device results   Result Value Ref Range    POC Glucose, Blood 92 65 - 99 mg/dL   POCT glucose device results   Result Value Ref Range    POC Glucose, Blood 113 (H) 65 - 99 mg/dL       Medications   umeclidinium-vilanterol (ANORO ELLIPTA) inhaler 1 Puff (0 Puffs Inhalation Held 10/10/22 0600)   carvedilol (COREG) tablet 25 mg (0 mg Oral Held 10/10/22 0600)   cinacalcet (SENSIPAR) tablet 60 mg (has no administration in time range)   sevelamer carbonate (RENVELA) tablet 3,200 mg (has no administration in time range)   acetaminophen (Tylenol) tablet 650 mg (has no administration in time range)   ondansetron (ZOFRAN) syringe/vial injection 4 mg (has no administration in time range)   ondansetron (ZOFRAN ODT) dispertab 4 mg (has no administration in time range)   senna-docusate (PERICOLACE or SENOKOT S) 8.6-50 MG per tablet 2 Tablet (2 Tablets Oral Refused 10/10/22 0600)     And   polyethylene glycol/lytes (MIRALAX) PACKET 1 Packet (has no administration in time range)     And   magnesium hydroxide (MILK OF MAGNESIA) suspension 30 mL (has no administration in time range)     And   bisacodyl (DULCOLAX) suppository 10 mg (has no administration in time range)   Respiratory Therapy Consult (has no  administration in time range)   ipratropium-albuterol (DUONEB) nebulizer solution (has no administration in time range)   ipratropium-albuterol (DUONEB) nebulizer solution (3 mL Nebulization Given 10/10/22 0317)   methylPREDNISolone sod succ (SOLU-MEDROL) 125 MG injection 125 mg (125 mg Intravenous Given 10/10/22 0325)   amoxicillin-clavulanate (AUGMENTIN) 875-125 MG per tablet 1 Tablet (1 Tablet Oral Given 10/10/22 0330)   ipratropium-albuterol (DUONEB) nebulizer solution (3 mL Nebulization Given 10/10/22 0403)   furosemide (LASIX) injection 40 mg (40 mg Intravenous Given 10/10/22 0430)   calcium GLUConate 1 g in NaCl IVPB premix (1 g Intravenous Given 10/10/22 0505)   dextrose 50% (D50W) injection 25 g (25 g Intravenous Given 10/10/22 0505)     And   insulin regular (HumuLIN R,NovoLIN R) injection (4.5 Units Intravenous Given 10/10/22 0518)       COURSE & MEDICAL DECISION MAKING    This is a 72-year-old with end-stage renal disease on hemodialysis, systolic heart failure, COPD who presents with worsening shortness of breath.  On arrival he is tachypneic in respiratory distress, speaking in short sentences appearing he is wheezes and rales on exam.  He is also hypoxic, requiring 3 L of supplemental oxygen.  His labs are notable for a potassium of 5.7, creatinine 10.9.  Troponin elevated at 84 and BNP elevated at 35,000.     My highest consideration initially was for  COPD exacerbation given his significant wheeze and  was treated with DuoNebs.  This seemed to have some relief however he remained quite tachypneic and therefore was transitioned to BiPAP with significant improvement.  Later, o BNP returned significantly elevated suggestive of  volume overload.  He is at risk of this in the setting of hemodialysis but did have a full run of dialysis on Friday.  He also has systolic heart failure and I suspect he has a component of cardiogenic pulmonary edema.  His chest x-ray does also show pulmonary edema.  I also  treated him with steroids and antibiotics in an abundance of caution given my concern for possible COPD exacerbation as well.  I considered pulmonary embolism however unlikely given more likely etiology.  I also considered aortic dissection however unlikely without any complaints of chest pain.  He will be admitted to the intensive care unit given that he is newly on BiPAP.  He appears significantly improved at this time and from a respiratory standpoint seems to be much more stable.  Hemodialysis will be arranged as an inpatient.    Patient referred to primary provider for blood pressure management    FINAL IMPRESSION  1. Shortness of breath        2. Acute respiratory failure with hypoxia (HCC)        3. Acute pulmonary edema (HCC)        4. Elevated troponin          Critical care time : Bridger presents with an acute critical illness and in my judgement had significant potential for imminent deterioration. I provided 40  minutes of Critical Care time to this patient, exclusive of any separately billable procedures. This included bedside direction of care, frequent re-evaluations, time spent coordinating ongoing consultant care and time of medical documentation.  Additional critical needs were BIPAP management.        This dictation was created using voice recognition software. The accuracy of the dictation is limited to the abilities of the software.  The nursing notes were reviewed and certain aspects of this information were incorporated into this note.      Electronically signed by: Nkechi Dudley M.D., 10/10/2022 3:15 AM

## 2022-10-10 NOTE — ASSESSMENT & PLAN NOTE
- He likely has significant component of COPD exacerbation.  Was wheezing despite fluid removal with dialysis.  Suspect volume overload pushed him into COPD exacerbation.  -Significantly improved, adequate air entry and decreased wheezing.    -Transition to oral prednisone 40 mg daily, will need slow taper.  -Continue DuoNeb nebulization, and Anoro Ellipta inhalers.  Continue RT protocol per respiratory support.  -Exercise oximetry showed need for 2 L of oxygen continuously.  Will have CM/SW arrange for home oxygen.

## 2022-10-10 NOTE — PROGRESS NOTES
4 Eyes Skin Assessment Completed by JERMAINE Lind and JERMAINE Sullivan.    Head WDL  Ears WDL  Nose WDL  Mouth WDL  Neck WDL  Breast/Chest WDL  Shoulder Blades WDL  Spine WDL  (R) Arm/Elbow/Hand Bruising  (L) Arm/Elbow/Hand Bruising and Scab Fistula site  Abdomen WDL  Groin WDL  Scrotum/Coccyx/Buttocks Redness and Blanching  (R) Leg WDL  (L) Leg WDL  (R) Heel/Foot/Toe WDL  (L) Heel/Foot/Toe WDL          Devices In Places ECG, Blood Pressure Cuff, and Pulse Ox      Interventions In Place Pillows    Possible Skin Injury No    Pictures Uploaded Into Epic N/A  Wound Consult Placed N/A  RN Wound Prevention Protocol Ordered No

## 2022-10-10 NOTE — ASSESSMENT & PLAN NOTE
Secondary to fluid overload.  There is also some concern for COPD exacerbation so he was given steroids and nebs in the ED -wheezing is likely cardiac.  He does have a history of smoking but no formal diagnosis of COPD.    -Continue BiPAP - can reassess after volume removal with dialysis to see if he can tolerate nasal cannula  -Hemodialysis with goal volume removal of 2 to 3 L  -We will likely need to be dialyzed for couple of days for volume removal before going back to his regular schedule  -Hold any further steroids for now-if still has significant wheezing after volume removal, can start Pred 40 daily and ATC DuoNebs

## 2022-10-10 NOTE — ASSESSMENT & PLAN NOTE
-Follows with [facility] nephrology, Dr. Chaudhari.  Outpatient HD Monday Wednesday Friday.  -Getting hemodialysis per nephrology.

## 2022-10-10 NOTE — CONSULTS
"[facility] -  CONSULTATION NOTE               Author: Gus Murillo M.D. Date & Time: 10/10/2022  3:43 PM       REASON FOR CONSULTATION:   Inpatient hemodialysis management.    CHIEF COMPLAINT:   \"I can't breathe\"    HISTORY OF PRESENT ILLNESS:    73 yo M PMH ESRD MWF iHD, HTN, anemia of CKD, CKD-MBD, and HLD who presents to ED with CC as above.  He has had worsening SOB over the past week and got to the point where it was unbearable and he came to ED via EMS.  He denies missing any recent HD treatments.  Claims compliance with diet/fluids.  Complained of feeling of fullness in chest.  Hx of COVID-19 infxn in July 2022.  Labs in ED showed BNP > 35K, K+ 5.5.  No ECG changes.  Placed on BiPaP to maintain O2 sats and was admitted to ICU.  Otherwise denies F/C/N/V/CP.  No melena, hematochezia, hematemesis.  No HA, visual changes, or abdominal pain.    REVIEW OF SYSTEMS:    10 point ROS was performed and is as per HPI or otherwise negative.    PAST MEDICAL HISTORY:   Past Medical History:   Diagnosis Date    Asthma     Breath shortness     uses oxygen 3.5 L as needed    Chronic obstructive pulmonary disease (HCC)     Cold     12/27/17 cold    Congestive heart failure (HCC)     Dental disorder     upper and lower dentures    Dialysis patient (HCC)     MWF    Emphysema of lung (HCC)     Hemodialysis patient (HCC)     M, W, F    Renal disorder     Snoring     Urinary incontinence        PAST SURGICAL HISTORY:   Past Surgical History:   Procedure Laterality Date    GASTROSCOPY-ENDO  1/30/2020    Procedure: GASTROSCOPY;  Surgeon: Richard Cano D.O.;  Location: [facility];  Service: Gastroenterology    GASTROSCOPY  2/1/2018    Procedure: GASTROSCOPY;  Surgeon: Morteza Olivo M.D.;  Location: [facility];  Service: Gastroenterology    COLONOSCOPY  2/1/2018    Procedure: COLONOSCOPY;  Surgeon: Morteza Olivo M.D.;  Location: [facility];  Service: " Gastroenterology    GASTROSCOPY-ENDO N/A 8/28/2017    Procedure: GASTROSCOPY-ENDO;  Surgeon: Pankaj Choudhury M.D.;  [facility];  Service: EUS    OTHER      Patient had an arm fistula put in for dialysis       FAMILY HISTORY:   Family History   Problem Relation Age of Onset    Heart Disease Mother     Diabetes Mother     Kidney Disease Mother         kidney failure    Asthma Father     Diabetes Brother     No Known Problems Maternal Grandmother     No Known Problems Maternal Grandfather     No Known Problems Paternal Grandmother     No Known Problems Paternal Grandfather        SOCIAL HISTORY:   Social History     Tobacco Use   Smoking Status Some Days    Packs/day: 0.50    Types: Cigarettes   Smokeless Tobacco Never     Social History     Substance and Sexual Activity   Alcohol Use No     Social History     Substance and Sexual Activity   Drug Use No       HOME MEDICATIONS:   Reviewed and documented in chart.    LABORATORY STUDIES:   Recent Labs     10/10/22  0300 10/10/22  1052   SODIUM 138 139   POTASSIUM 5.7* 5.5   CHLORIDE 95* 95*   CO2 26 25   GLUCOSE 88 139*   BUN 41* 48*   CREATININE 10.92* 11.32*   CALCIUM 8.8 8.5       ALLERGIES:  Chloraprep one step, Chlorhexidine, and Fluoroplex    VS:  /73   Pulse 89   Temp 36.4 °C (97.5 °F) (Temporal)   Resp (!) 29   Ht 1.829 m (6')   Wt 86.2 kg (190 lb)   SpO2 91%   BMI 25.77 kg/m²     Physical Exam  Nursing note reviewed.   Constitutional:       General: He is in acute distress.      Appearance: He is ill-appearing.   Eyes:      General: No scleral icterus.  Cardiovascular:      Comments: No edema  Pulmonary:      Effort: Tachypnea and respiratory distress present.   Abdominal:      General: There is no distension.   Musculoskeletal:         General: No deformity.   Skin:     Findings: No rash.   Psychiatric:         Behavior: Behavior is cooperative.     FLUID BALANCE:  No intake/output data recorded.    IMAGING:  All imaging  reviewed from admission to present day    IMPRESSION:  # ESRD  - Etiology likely 2/2 HTN  - MWF iHD  # Acute respiratory failure with hypoxia  - On BiPaP  - Likely due to volume overload  - COPD exacerbation on differential if UF does not help  # HTN  - Goal BP < 140/90  - At goal  # Anemia of CKD  - Goal Hgb 10-11  - At goal  # CKD-MBD    PLAN:  - iHD today  - UF as tolerated  - No dietary protein restrictions  - Dose all meds per ESRD  - Low sodium diet    Thank you for the consultation!

## 2022-10-10 NOTE — ASSESSMENT & PLAN NOTE
- Due to ESRD.  Received calcium gluconate and insulin/glucose initially.  -Improved levels with dialysis.  Further dialysis per nephrology.  -Repeat BMP in the morning.  -Monitor on telemetry.

## 2022-10-10 NOTE — ASSESSMENT & PLAN NOTE
Currently volume loaded with a significantly elevated BNP, respiratory distress and wheezing    -Continue BiPAP - can reassess after volume removal with dialysis to see if he can tolerate nasal cannula  -Hemodialysis with goal volume removal of 2 to 3 L  -Appreciate nephrology recs  -We will likely need to be dialyzed for couple of days for volume removal before going back to his regular schedule  -Monitor electrolytes

## 2022-10-10 NOTE — ACP (ADVANCE CARE PLANNING)
Advance Care Planning Note    Discussion date:  10/10/2022  Discussion participants:  patient    The patient wishes to discuss Advanced Care Planning today and the following is a brief summary of our discussion.    Patient has capacity to make their own medical decisions.      Documents of Advance Directives:  POLST - has on 1/28/2020 stating that he is DNR and would only like selective treatments. Artificial nutrition for 30 days only.     Communication of relevant diagnoses: yes    Communication of prognosis: guarded    Communication of treatment goals/options: continue current plan and change of code status    Treatment decisions: Change CODE STATUS back to DNR/DNI -unclear of why he was made full code - he states that he did not have a discussion with anybody regarding that changed to full code.     Code status:   DNAR/DNI    The patient would like:   Life-sustaining antibiotics: antibiotics by mouth as necessary and antibiotics by IV as necessary   Artificially administered fluids: defined trial period of IV fluids, 30 days   Artificially administered nutrition: defined trial period of feeding tube, 30 days   Other limitations of medical interventions:  na     Time statement:  I discussed advance care planning with the patient for at least 30 minutes, including diagnosis, prognosis, plan of care, risks and benefits of any therapies that could be offered, as well as alternatives including palliation and hospice, as appropriate, exclusive of evaluation and management or other separately billable procedures.    Josi Adam M.D.

## 2022-10-10 NOTE — ED TRIAGE NOTES
Chief Complaint   Patient presents with    Shortness of Breath     73 y/o male presents with a chief complaint of Shortness of breath. Patient stated that he has been symptomatic over the past week, and today experienced worse symptoms. He stated that he called 911 in which they placed him on oxygen. Patient stated that they did not transport because he stated that he would take himself. Patient stated that he has laos had nasal drainage. Patient denied any cough, fevers, or chills, chest pain, palpitations, or abdominal pain.     Nasal Drainage     BP (!) 153/87   Pulse (!) 102   Temp 36.3 °C (97.3 °F) (Temporal)   Resp (!) 24   Ht 1.829 m (6')   Wt 86.2 kg (190 lb)   SpO2 92%   BMI 25.77 kg/m²

## 2022-10-10 NOTE — ASSESSMENT & PLAN NOTE
Patient is wondering why he was made full code during this admission-he states that he would like to be DNR/DNI and it is not an acceptable quality of life for him to be intubated/mechanically ventilated at any time or resuscitated in the event of cardiac arrest.  Looking back previously, he seems to have been DNR/DNI in the past as well.     -Change CODE STATUS to DNR/DNI

## 2022-10-10 NOTE — PROGRESS NOTES
Med rec updated and complete, per [facility] ([phone])  Allergies reviewed, per pt   Pt reports that he dose not know what medications he is taking.  Called [facility] @ [phone] to verify all medications.  Pt reports that he takes his TRELEGY 1 puff 3 times a day, not once a day.  Pt reports that he stopped taking his MIDODRINE 10MG about 2 weeks ago, pt only took one tablet day not three times a day.    Called [facility] @ [phone] to verify all medications pt last filled SEVELAMER CARBONATE (RENVELA) 800MG in 6/2021, pt reports that he took 4 tablets yesterday @ 1900.

## 2022-10-10 NOTE — ED NOTES
PT WAS TAKEN TO THE FLOOR WOI. SPOKE TO ADMITTING MD ABOUT 0600 MEDS. B/P MEDS WILL BE HELD, FLOOR RN WAS TOLD.

## 2022-10-10 NOTE — CONSULTS
Critical Care Consultation    Date of consult: 10/10/2022    Referring Physician  Josi Adam M.D.    Reason for Consultation  Respiratory failure    History of Presenting Illness  72 y.o. male with history of ESRD on HD ([facility] nephrology), systolic heart failure (EF 35% 10/1/2019), COPD who presented 10/10/2022 with dyspnea, cough and wheezing.  He was noted to be hypoxic in the ED and started on BiPAP for increased work of breathing.  Labs showed creatinine of 10.92, troponin of 84 and BNP >35,000.  Patient denies any chest pain.  10/10: Currently undergoing dialysis.  Still on BiPAP -seemed to have some increased work of breathing when he was trialed off BiPAP this morning.  RN stated patient would like to discuss his CODE STATUS - patient states that wants to be DNR/DNI and he's not sure who changed him to FULL CODE or why.     Chart review from the past 24 hours includes imaging, laboratory studies, vital signs and notes available.  Pertinent data for today's visit includes    Cardiac: -150s  Pulm: BiPAP 30%  Heme: stable  /renal: needs HD today  GI/endo: BG <180  ID:  no leukocytosis, on augmentin  I/O: na  Additional Labs/Imaging: CXR 10/10 w/increased vascular prominence and b/l lower lobe infiltrates   Additional information:  - HD today  - hold on steroids - can start if still has wheezing off bipap after HD - no formal CODP dx, +smoker  - patient states that he is DNR/DNI and he's not sure who changed him to FULL CODE or why    Code Status  DNAR/DNI    Review of Systems  Review of Systems   Constitutional:  Positive for malaise/fatigue. Negative for fever.   HENT:  Negative for congestion.    Respiratory:  Positive for cough, shortness of breath and wheezing.    Neurological:  Negative for focal weakness and loss of consciousness.     Past Medical History   has a past medical history of Asthma, Breath shortness, Chronic obstructive pulmonary disease (HCC), Cold, Congestive heart failure  (HCC), Dental disorder, Dialysis patient (HCC), Emphysema of lung (HCC), Hemodialysis patient (HCC), Renal disorder, Snoring, and Urinary incontinence.    Surgical History   has a past surgical history that includes gastroscopy-endo (N/A, 8/28/2017); gastroscopy (2/1/2018); colonoscopy (2/1/2018); other; and gastroscopy-endo (1/30/2020).    Family History  family history includes Asthma in his father; Diabetes in his brother and mother; Heart Disease in his mother; Kidney Disease in his mother; No Known Problems in his maternal grandfather, maternal grandmother, paternal grandfather, and paternal grandmother.    Social History   reports that he has been smoking cigarettes. He has been smoking an average of .5 packs per day. He has never used smokeless tobacco. He reports that he does not drink alcohol and does not use drugs.    Medications  Home Medications       Reviewed by Leti Pisano (Pharmacy Tech) on 10/10/22 at 0932  Med List Status: Complete     Medication Last Dose Status   albuterol 108 (90 Base) MCG/ACT Aero Soln inhalation aerosol 10/10/2022 Active   Fluticasone-Umeclidin-Vilant (TRELEGY) 100-62.5-25 MCG/INH AEROSOL POWDER, BREATH ACTIVATED inhalation 10/10/2022 Active   midodrine (PROAMATINE) 10 MG tablet > 2 weeks Active   omeprazole (PRILOSEC) 20 MG delayed-release capsule 10/9/2022 Active   ROPINIRole (REQUIP) 0.25 MG Tab 10/9/2022 Active   sevelamer carbonate (RENVELA) 800 MG Tab tablet 10/9/2022 Active                  Current Facility-Administered Medications   Medication Dose Route Frequency Provider Last Rate Last Admin    umeclidinium-vilanterol (ANORO ELLIPTA) inhaler 1 Puff  1 Puff Inhalation DAILY Sally Balbuena M.D.        carvedilol (COREG) tablet 25 mg  25 mg Oral BID Sally Balbuena M.D.        cinacalcet (SENSIPAR) tablet 60 mg  60 mg Oral DAILY Sally Balbuena M.D.   60 mg at 10/10/22 0830    sevelamer carbonate (RENVELA) tablet 3,200 mg  3,200 mg  Oral TID Sally Balbuena M.D.   3,200 mg at 10/10/22 0830    acetaminophen (Tylenol) tablet 650 mg  650 mg Oral Q6HRS PRN Sally Balbuena M.D.        ondansetron (ZOFRAN) syringe/vial injection 4 mg  4 mg Intravenous Q4HRS PRN Sally Balbuena M.D.        ondansetron (ZOFRAN ODT) dispertab 4 mg  4 mg Oral Q4HRS PRN Sally Balbuena M.D.        senna-docusate (PERICOLACE or SENOKOT S) 8.6-50 MG per tablet 2 Tablet  2 Tablet Oral BID Sally Balbuena M.D.        And    polyethylene glycol/lytes (MIRALAX) PACKET 1 Packet  1 Packet Oral QDAY PRN Sally Balbuena M.D.        And    magnesium hydroxide (MILK OF MAGNESIA) suspension 30 mL  30 mL Oral QDAY PRN Sally Balbuena M.D.        And    bisacodyl (DULCOLAX) suppository 10 mg  10 mg Rectal QDAY PRN Sally Balbuena M.D.        Respiratory Therapy Consult   Nebulization Continuous RT Sally Balbuena M.D.        ipratropium-albuterol (DUONEB) nebulizer solution  3 mL Nebulization Q4H PRN (RT) Sally Balbuena M.D.        influenza Vac High-Dose Quad (Fluzone) injection 0.7 mL  0.7 mL Intramuscular Once PRN Josi Adam M.D.           Allergies  Allergies   Allergen Reactions    Chloraprep One Step Itching    Chlorhexidine Rash    Fluoroplex Rash       Vital Signs last 24 hours  Temp:  [36.3 °C (97.3 °F)-36.9 °C (98.4 °F)] 36.4 °C (97.5 °F)  Pulse:  [] 86  Resp:  [18-48] 20  BP: (122-153)/(60-87) 140/76  SpO2:  [89 %-99 %] 95 %    Physical Exam  Physical Exam  Vitals and nursing note reviewed.   Constitutional:       General: He is not in acute distress.  HENT:      Head: Normocephalic.      Nose: No congestion.      Mouth/Throat:      Mouth: Mucous membranes are moist.   Eyes:      General: No scleral icterus.     Conjunctiva/sclera: Conjunctivae normal.   Cardiovascular:      Rate and Rhythm: Normal rate and regular rhythm.   Pulmonary:      Effort: No respiratory distress.       Breath sounds: Wheezing present.   Abdominal:      Palpations: Abdomen is soft.   Musculoskeletal:         General: No deformity.   Skin:     General: Skin is warm and dry.   Neurological:      General: No focal deficit present.      Mental Status: He is alert and oriented to person, place, and time.       Fluids    Intake/Output Summary (Last 24 hours) at 10/10/2022 1147  Last data filed at 10/10/2022 0800  Gross per 24 hour   Intake 345.83 ml   Output --   Net 345.83 ml       Laboratory  Recent Results (from the past 48 hour(s))   CBC WITH DIFFERENTIAL    Collection Time: 10/10/22  3:00 AM   Result Value Ref Range    WBC 8.4 4.8 - 10.8 K/uL    RBC 4.07 (L) 4.70 - 6.10 M/uL    Hemoglobin 11.7 (L) 14.0 - 18.0 g/dL    Hematocrit 37.3 (L) 42.0 - 52.0 %    MCV 91.6 81.4 - 97.8 fL    MCH 28.7 27.0 - 33.0 pg    MCHC 31.4 (L) 33.7 - 35.3 g/dL    RDW 55.4 (H) 35.9 - 50.0 fL    Platelet Count 203 164 - 446 K/uL    MPV 9.9 9.0 - 12.9 fL    Neutrophils-Polys 81.30 (H) 44.00 - 72.00 %    Lymphocytes 8.10 (L) 22.00 - 41.00 %    Monocytes 8.10 0.00 - 13.40 %    Eosinophils 1.40 0.00 - 6.90 %    Basophils 0.60 0.00 - 1.80 %    Immature Granulocytes 0.50 0.00 - 0.90 %    Nucleated RBC 0.00 /100 WBC    Neutrophils (Absolute) 6.83 1.82 - 7.42 K/uL    Lymphs (Absolute) 0.68 (L) 1.00 - 4.80 K/uL    Monos (Absolute) 0.68 0.00 - 0.85 K/uL    Eos (Absolute) 0.12 0.00 - 0.51 K/uL    Baso (Absolute) 0.05 0.00 - 0.12 K/uL    Immature Granulocytes (abs) 0.04 0.00 - 0.11 K/uL    NRBC (Absolute) 0.00 K/uL   COMP METABOLIC PANEL    Collection Time: 10/10/22  3:00 AM   Result Value Ref Range    Sodium 138 135 - 145 mmol/L    Potassium 5.7 (H) 3.6 - 5.5 mmol/L    Chloride 95 (L) 96 - 112 mmol/L    Co2 26 20 - 33 mmol/L    Anion Gap 17.0 (H) 7.0 - 16.0    Glucose 88 65 - 99 mg/dL    Bun 41 (H) 8 - 22 mg/dL    Creatinine 10.92 (HH) 0.50 - 1.40 mg/dL    Calcium 8.8 8.4 - 10.2 mg/dL    AST(SGOT) 21 12 - 45 U/L    ALT(SGPT) 13 2 - 50 U/L    Alkaline  Phosphatase 71 30 - 99 U/L    Total Bilirubin 0.7 0.1 - 1.5 mg/dL    Albumin 4.0 3.2 - 4.9 g/dL    Total Protein 7.2 6.0 - 8.2 g/dL    Globulin 3.2 1.9 - 3.5 g/dL    A-G Ratio 1.3 g/dL   TROPONIN    Collection Time: 10/10/22  3:00 AM   Result Value Ref Range    Troponin T 84 (H) 6 - 19 ng/L   proBrain Natriuretic Peptide, NT    Collection Time: 10/10/22  3:00 AM   Result Value Ref Range    NT-proBNP >34660 (H) 0 - 125 pg/mL   ESTIMATED GFR    Collection Time: 10/10/22  3:00 AM   Result Value Ref Range    GFR (CKD-EPI) 5 (A) >60 mL/min/1.73 m 2   POCT glucose device results    Collection Time: 10/10/22  5:05 AM   Result Value Ref Range    POC Glucose, Blood 92 65 - 99 mg/dL   POCT glucose device results    Collection Time: 10/10/22  6:40 AM   Result Value Ref Range    POC Glucose, Blood 113 (H) 65 - 99 mg/dL   POCT glucose device results    Collection Time: 10/10/22  7:40 AM   Result Value Ref Range    POC Glucose, Blood 111 (H) 65 - 99 mg/dL       Imaging  Reviewed    Assessment/Plan  * Acute hypoxemic respiratory failure (HCC)- (present on admission)  Assessment & Plan  Secondary to fluid overload.  There is also some concern for COPD exacerbation so he was given steroids and nebs in the ED -wheezing is likely cardiac.  He does have a history of smoking but no formal diagnosis of COPD.    -Continue BiPAP - can reassess after volume removal with dialysis to see if he can tolerate nasal cannula  -Hemodialysis with goal volume removal of 2 to 3 L  -We will likely need to be dialyzed for couple of days for volume removal before going back to his regular schedule  -Hold any further steroids for now-if still has significant wheezing after volume removal, can start Pred 40 daily and ATC DuoNebs    ACP (advance care planning)  Assessment & Plan  Patient is wondering why he was made full code during this admission-he states that he would like to be DNR/DNI and it is not an acceptable quality of life for him to be  intubated/mechanically ventilated at any time or resuscitated in the event of cardiac arrest.  Looking back previously, he seems to have been DNR/DNI in the past as well.     -Change CODE STATUS to DNR/DNI    Acute exacerbation of chronic obstructive pulmonary disease (COPD) ([facility])  Assessment & Plan  Plan as noted above    Pulmonary edema  Assessment & Plan  Plan as noted above    Hyperkalemia- (present on admission)  Assessment & Plan  - HD per nephrology  -Monitor electrolytes    Essential hypertension- (present on admission)  Assessment & Plan  - Home carvedilol    ACC/AHA stage C systolic heart failure (HCC)- (present on admission)  Assessment & Plan  TTE in 2019 with EF of 35% -currently in exacerbation with volume overload    -HD for volume removal as noted above  -Elevated troponin 84, likely demand -trend troponins -no chest pain or evidence of ACS at this time    ESRD (end stage renal disease) on dialysis ([facility])- (present on admission)  Assessment & Plan  Currently volume loaded with a significantly elevated BNP, respiratory distress and wheezing    -Continue BiPAP - can reassess after volume removal with dialysis to see if he can tolerate nasal cannula  -Hemodialysis with goal volume removal of 2 to 3 L  -Appreciate nephrology recs  -We will likely need to be dialyzed for couple of days for volume removal before going back to his regular schedule  -Monitor electrolytes       Discussed patient condition and risk of morbidity and/or mortality with RN, RT, and Pharmacy.    The patient remains critically ill.  Critical care time = 45 minutes in directly providing and coordinating critical care and extensive data review.  No time overlap and excludes procedures.        Josi Adam MD  Pulmonary and Critical Care Medicine  [facility]

## 2022-10-10 NOTE — ASSESSMENT & PLAN NOTE
- Was placed on BiPAP initially, but now down to 2 L of oxygen by nasal cannula.  Saturating well.  Has significant component of anxiety.  -Continue hemodialysis for volume removal as above.  -Continue management of COPD exacerbation with steroids, bronchodilators.  -X-ray showing bibasilar infiltrates.  Respiratory cultures growing Klebsiella and E cloacae however, no significant leukocytosis (only likely steroid related), and procalcitonin likely related to his underlying ESRD.  Antibiotic stewardship suspect colonization, and recommended to stop antibiotics and monitor off antibiotics.  -Continue RT protocol for respiratory support.  Wean oxygen as able to keep saturations above 90%.  Will need home oxygen, CM/SW to arrange.  -Continue as needed low-dose Ativan 0.5 mg every 4 hours for anxiety.

## 2022-10-10 NOTE — PROGRESS NOTES
[facility] Progress Note         HD today x 3.5 hours per Dr. RONAK Murillo. Tx initiated at 1100 and ended at 1431    Pt A/O x 4, on BiPap, with O2 sat of 88% to 94%, no bleeding, denies chest pain, and has no edema. Diminished BS on both lower lung fields. Consent secured for treatment    NET UF: 3000 ml    Patient tolerated tx. UFG met. BP within normal range, no HD complications were encountered. S/E by Dr. Murillo. All blood was returned aseptically. HD needles removed from LUE AVF. Dry gauze applied and changed without bleeding issue.(+) Bruit and thrill   pre/post tx. O2 sat still at 89% to 94% and RR >20 post HD  See eflow sheets for further details.    Report given to KEREN Barrett RN

## 2022-10-10 NOTE — ED NOTES
"Pt c/o the \"hot air to my face.\" Pt was educated on the need for the bipap to be close to his body temp and the need for this to aide in his breathing.   "

## 2022-10-10 NOTE — CARE PLAN
The patient is Stable - Low risk of patient condition declining or worsening         Progress made toward(s) clinical / shift goals:    Problem: Knowledge Deficit - Standard  Goal: Patient and family/care givers will demonstrate understanding of plan of care, disease process/condition, diagnostic tests and medications  Outcome: Progressing, educated pt on the importance of not drinking too much fluids, continuing to go to dialysis, and to stop smoking.      Problem: Respiratory  Goal: Patient will achieve/maintain optimum respiratory ventilation and gas exchange  Outcome: Progressing, pt weaned off of BiPAP down to 4L NC        Patient is not progressing towards the following goals:      Problem: Urinary - Renal Perfusion  Goal: Ability to achieve and maintain adequate renal perfusion and functioning will improve  Outcome: Not Progressing, pt has chronic ESRD and has Dialysis MWF. Pt is anuric. Plan is for dialysis today. 3000 pulled off of patient total

## 2022-10-10 NOTE — ASSESSMENT & PLAN NOTE
-Most recent echocardiogram was done on 10/1/2019 and EF was 35%.  -Initial chest x-ray showed pulmonary edema.  -Nephrology on board for hemodialysis for additional fluid removal.  -Troponin mildly elevated, but remained flat consistent with underlying ESRD.  Doubt ACS.    -Continue Coreg.  Holding off on ACE inhibitor due to ESRD/hyperkalemia.  -Will get an updated echocardiogram.

## 2022-10-11 ENCOUNTER — APPOINTMENT (OUTPATIENT)
Dept: RADIOLOGY | Facility: MEDICAL CENTER | Age: 72
DRG: 189 | End: 2022-10-11
Attending: INTERNAL MEDICINE
Payer: MEDICARE

## 2022-10-11 PROBLEM — Z71.89 ACP (ADVANCE CARE PLANNING): Status: RESOLVED | Noted: 2022-10-10 | Resolved: 2022-10-11

## 2022-10-11 LAB
ALBUMIN SERPL BCP-MCNC: 4.3 G/DL (ref 3.2–4.9)
ANION GAP SERPL CALC-SCNC: 17 MMOL/L (ref 7–16)
ANION GAP SERPL CALC-SCNC: 17 MMOL/L (ref 7–16)
BUN SERPL-MCNC: 31 MG/DL (ref 8–22)
BUN SERPL-MCNC: 43 MG/DL (ref 8–22)
CALCIUM SERPL-MCNC: 9.1 MG/DL (ref 8.4–10.2)
CALCIUM SERPL-MCNC: 9.2 MG/DL (ref 8.4–10.2)
CHLORIDE SERPL-SCNC: 93 MMOL/L (ref 96–112)
CHLORIDE SERPL-SCNC: 94 MMOL/L (ref 96–112)
CO2 SERPL-SCNC: 24 MMOL/L (ref 20–33)
CO2 SERPL-SCNC: 26 MMOL/L (ref 20–33)
CREAT SERPL-MCNC: 6.29 MG/DL (ref 0.5–1.4)
CREAT SERPL-MCNC: 8.6 MG/DL (ref 0.5–1.4)
ERYTHROCYTE [DISTWIDTH] IN BLOOD BY AUTOMATED COUNT: 55.9 FL (ref 35.9–50)
GFR SERPLBLD CREATININE-BSD FMLA CKD-EPI: 6 ML/MIN/1.73 M 2
GFR SERPLBLD CREATININE-BSD FMLA CKD-EPI: 9 ML/MIN/1.73 M 2
GLUCOSE SERPL-MCNC: 126 MG/DL (ref 65–99)
GLUCOSE SERPL-MCNC: 95 MG/DL (ref 65–99)
GRAM STN SPEC: NORMAL
HCT VFR BLD AUTO: 39.9 % (ref 42–52)
HGB BLD-MCNC: 12.2 G/DL (ref 14–18)
MCH RBC QN AUTO: 28.8 PG (ref 27–33)
MCHC RBC AUTO-ENTMCNC: 30.6 G/DL (ref 33.7–35.3)
MCV RBC AUTO: 94.1 FL (ref 81.4–97.8)
PHOSPHATE SERPL-MCNC: 6.3 MG/DL (ref 2.5–4.5)
PLATELET # BLD AUTO: 229 K/UL (ref 164–446)
PMV BLD AUTO: 10.2 FL (ref 9–12.9)
POTASSIUM SERPL-SCNC: 6 MMOL/L (ref 3.6–5.5)
POTASSIUM SERPL-SCNC: 7 MMOL/L (ref 3.6–5.5)
RBC # BLD AUTO: 4.24 M/UL (ref 4.7–6.1)
SIGNIFICANT IND 70042: NORMAL
SITE SITE: NORMAL
SODIUM SERPL-SCNC: 134 MMOL/L (ref 135–145)
SODIUM SERPL-SCNC: 137 MMOL/L (ref 135–145)
SOURCE SOURCE: NORMAL
WBC # BLD AUTO: 11.9 K/UL (ref 4.8–10.8)

## 2022-10-11 PROCEDURE — 71046 X-RAY EXAM CHEST 2 VIEWS: CPT

## 2022-10-11 PROCEDURE — 94760 N-INVAS EAR/PLS OXIMETRY 1: CPT

## 2022-10-11 PROCEDURE — 94640 AIRWAY INHALATION TREATMENT: CPT

## 2022-10-11 PROCEDURE — 99233 SBSQ HOSP IP/OBS HIGH 50: CPT | Performed by: INTERNAL MEDICINE

## 2022-10-11 PROCEDURE — 80048 BASIC METABOLIC PNL TOTAL CA: CPT

## 2022-10-11 PROCEDURE — 770020 HCHG ROOM/CARE - TELE (206)

## 2022-10-11 PROCEDURE — 36415 COLL VENOUS BLD VENIPUNCTURE: CPT

## 2022-10-11 PROCEDURE — 80069 RENAL FUNCTION PANEL: CPT

## 2022-10-11 PROCEDURE — 94660 CPAP INITIATION&MGMT: CPT

## 2022-10-11 PROCEDURE — A9270 NON-COVERED ITEM OR SERVICE: HCPCS | Performed by: INTERNAL MEDICINE

## 2022-10-11 PROCEDURE — A9270 NON-COVERED ITEM OR SERVICE: HCPCS | Performed by: HOSPITALIST

## 2022-10-11 PROCEDURE — 700102 HCHG RX REV CODE 250 W/ 637 OVERRIDE(OP): Performed by: HOSPITALIST

## 2022-10-11 PROCEDURE — 700101 HCHG RX REV CODE 250: Performed by: HOSPITALIST

## 2022-10-11 PROCEDURE — 700111 HCHG RX REV CODE 636 W/ 250 OVERRIDE (IP): Performed by: INTERNAL MEDICINE

## 2022-10-11 PROCEDURE — 85027 COMPLETE CBC AUTOMATED: CPT

## 2022-10-11 PROCEDURE — 700102 HCHG RX REV CODE 250 W/ 637 OVERRIDE(OP): Performed by: INTERNAL MEDICINE

## 2022-10-11 PROCEDURE — 90935 HEMODIALYSIS ONE EVALUATION: CPT

## 2022-10-11 RX ORDER — LORAZEPAM 0.5 MG/1
0.5 TABLET ORAL EVERY 4 HOURS PRN
Status: DISCONTINUED | OUTPATIENT
Start: 2022-10-11 | End: 2022-10-15 | Stop reason: HOSPADM

## 2022-10-11 RX ORDER — CALCIUM CARBONATE 500 MG/1
1000 TABLET, CHEWABLE ORAL 4 TIMES DAILY PRN
Status: DISCONTINUED | OUTPATIENT
Start: 2022-10-11 | End: 2022-10-15

## 2022-10-11 RX ORDER — METHYLPREDNISOLONE SODIUM SUCCINATE 125 MG/2ML
62.5 INJECTION, POWDER, LYOPHILIZED, FOR SOLUTION INTRAMUSCULAR; INTRAVENOUS EVERY 6 HOURS
Status: DISCONTINUED | OUTPATIENT
Start: 2022-10-11 | End: 2022-10-14

## 2022-10-11 RX ORDER — HEPARIN SODIUM 5000 [USP'U]/ML
5000 INJECTION, SOLUTION INTRAVENOUS; SUBCUTANEOUS EVERY 8 HOURS
Status: DISCONTINUED | OUTPATIENT
Start: 2022-10-11 | End: 2022-10-15 | Stop reason: HOSPADM

## 2022-10-11 RX ADMIN — IPRATROPIUM BROMIDE AND ALBUTEROL SULFATE 3 ML: 2.5; .5 SOLUTION RESPIRATORY (INHALATION) at 23:52

## 2022-10-11 RX ADMIN — UMECLIDINIUM BROMIDE AND VILANTEROL TRIFENATATE 1 PUFF: 62.5; 25 POWDER RESPIRATORY (INHALATION) at 07:18

## 2022-10-11 RX ADMIN — SEVELAMER CARBONATE 3200 MG: 800 TABLET, FILM COATED ORAL at 07:50

## 2022-10-11 RX ADMIN — CALCIUM CARBONATE (ANTACID) CHEW TAB 500 MG 1000 MG: 500 CHEW TAB at 14:40

## 2022-10-11 RX ADMIN — IPRATROPIUM BROMIDE AND ALBUTEROL SULFATE 3 ML: 2.5; .5 SOLUTION RESPIRATORY (INHALATION) at 18:17

## 2022-10-11 RX ADMIN — CARVEDILOL 25 MG: 25 TABLET, FILM COATED ORAL at 05:40

## 2022-10-11 RX ADMIN — IPRATROPIUM BROMIDE AND ALBUTEROL SULFATE 3 ML: 2.5; .5 SOLUTION RESPIRATORY (INHALATION) at 09:59

## 2022-10-11 RX ADMIN — METHYLPREDNISOLONE SODIUM SUCCINATE 62.5 MG: 125 INJECTION, POWDER, FOR SOLUTION INTRAMUSCULAR; INTRAVENOUS at 11:15

## 2022-10-11 RX ADMIN — IPRATROPIUM BROMIDE AND ALBUTEROL SULFATE 3 ML: 2.5; .5 SOLUTION RESPIRATORY (INHALATION) at 04:58

## 2022-10-11 RX ADMIN — METHYLPREDNISOLONE SODIUM SUCCINATE 62.5 MG: 125 INJECTION, POWDER, FOR SOLUTION INTRAMUSCULAR; INTRAVENOUS at 23:39

## 2022-10-11 RX ADMIN — METHYLPREDNISOLONE SODIUM SUCCINATE 62.5 MG: 125 INJECTION, POWDER, FOR SOLUTION INTRAMUSCULAR; INTRAVENOUS at 16:57

## 2022-10-11 RX ADMIN — CARVEDILOL 25 MG: 25 TABLET, FILM COATED ORAL at 16:56

## 2022-10-11 RX ADMIN — CINACALCET HYDROCHLORIDE 60 MG: 30 TABLET, FILM COATED ORAL at 05:40

## 2022-10-11 ASSESSMENT — COGNITIVE AND FUNCTIONAL STATUS - GENERAL
MOBILITY SCORE: 23
SUGGESTED CMS G CODE MODIFIER DAILY ACTIVITY: CH
CLIMB 3 TO 5 STEPS WITH RAILING: A LITTLE
SUGGESTED CMS G CODE MODIFIER MOBILITY: CI
DAILY ACTIVITIY SCORE: 24

## 2022-10-11 ASSESSMENT — PULMONARY FUNCTION TESTS: EPAP_CMH2O: 5

## 2022-10-11 NOTE — PROGRESS NOTES
4 Eyes Skin Assessment Completed by JERMAINE Peña and JERMAINE Conway.    Head WDL  Ears WDL  Nose WDL  Mouth WDL  Neck WDL  Breast/Chest WDL  Shoulder Blades WDL  Spine WDL  (R) Arm/Elbow/Hand WDL  (L) Arm/Elbow/Hand Scar fistula site  Abdomen WDL  Groin WDL  Scrotum/Coccyx/Buttocks WDL  (R) Leg WDL  (L) Leg WDL  (R) Heel/Foot/Toe WDL  (L) Heel/Foot/Toe WDL          Devices In Places Tele Box      Interventions In Place Pillows    Possible Skin Injury No    Pictures Uploaded Into Epic N/A  Wound Consult Placed N/A  RN Wound Prevention Protocol Ordered No

## 2022-10-11 NOTE — PROGRESS NOTES
Telemetry Shift Summary    Rhythm SR  HR Range 70s-80s  Ectopy occasional PVC/PAC  Measurements 0.16/0.10/0.48        Normal Values  Rhythm SR  HR Range    Measurements 0.12-0.20 / 0.06-0.10  / 0.30-0.52

## 2022-10-11 NOTE — CARE PLAN
The patient is Stable - Low risk of patient condition declining or worsening    Shift Goals  Clinical Goals: Wean oxygen  Patient Goals: Dialysis  Family Goals: N/A    Progress made toward(s) clinical / shift goals:    Problem: Knowledge Deficit - Standard  Goal: Patient and family/care givers will demonstrate understanding of plan of care, disease process/condition, diagnostic tests and medications  Outcome: Progressing  Note: Patient's knowledge of plan of care, diagnostics, and medications regularly assessed. RN answers patient questions appropriately, education provided. Patient verbalizes better understanding of their condition.       Problem: Pain - Standard  Goal: Alleviation of pain or a reduction in pain to the patient’s comfort goal  Outcome: Progressing       Patient is not progressing towards the following goals:

## 2022-10-11 NOTE — PROGRESS NOTES
12-hour chart check complete.    Monitor Summary  Rhythm: SR  Rate: 86-96, 13bts VTACH  Ectopy: Rtrip,pvc  Measurements: 0.12/0.08/0.36

## 2022-10-11 NOTE — PROGRESS NOTES
[facility] -  PROGRESS NOTE               Author: Gus Murillo M.D. Date & Time: 10/11/2022  1:12 PM     HPI:  71 yo M PMH ESRD MWF iHD, HTN, anemia of CKD, CKD-MBD, and HLD who presents to ED with CC as above.  He has had worsening SOB over the past week and got to the point where it was unbearable and he came to ED via EMS.  He denies missing any recent HD treatments.  Claims compliance with diet/fluids.  Complained of feeling of fullness in chest.  Hx of COVID-19 infxn in July 2022.  Labs in ED showed BNP > 35K, K+ 5.5.  No ECG changes.  Placed on BiPaP to maintain O2 sats and was admitted to ICU.  Otherwise denies F/C/N/V/CP.  No melena, hematochezia, hematemesis.  No HA, visual changes, or abdominal pain.    DAILY NEPHROLOGY SUMMARY:  10/10: Consult done  10/11: NAEO, SOB improved but still breathing heavy; NYHA Class IV    REVIEW OF SYSTEMS:    10 point ROS reviewed and is as per HPI/daily summary or otherwise negative    PMH/PSH/SH/FH:   Reviewed and unchanged since admission note    CURRENT MEDICATIONS:   Reviewed from admission to present day    VS:  BP (!) 99/61   Pulse 75   Temp 36.4 °C (97.5 °F) (Axillary)   Resp 20   Ht 1.829 m (6')   Wt 86.2 kg (190 lb)   SpO2 91%   BMI 25.77 kg/m²     Physical Exam  Nursing note reviewed.   Constitutional:       Appearance: Normal appearance.   Eyes:      General: No scleral icterus.  Cardiovascular:      Comments: No edema  Pulmonary:      Effort: Tachypnea and respiratory distress present.   Abdominal:      General: There is no distension.   Musculoskeletal:         General: No deformity.   Skin:     Findings: No rash.   Neurological:      Mental Status: He is alert.   Psychiatric:         Behavior: Behavior normal.       Fluids:  In: 1405.8 [P.O.:560; Dialysis:500]  Out: 3500     LABS:  Recent Labs     10/10/22  0300 10/10/22  1052 10/11/22  1014   SODIUM 138 139 137   POTASSIUM 5.7* 5.5 7.0*   CHLORIDE 95* 95* 94*   CO2 26 25 26    GLUCOSE 88 139* 95   BUN 41* 48* 43*   CREATININE 10.92* 11.32* 8.60*   CALCIUM 8.8 8.5 9.1       IMAGING:   All imaging reviewed from admission to present day    IMPRESSION:  # ESRD  - Etiology likely 2/2 HTN  - MWF iHD  # Acute respiratory failure with hypoxia  - On BiPaP  - Likely due to volume overload  - COPD exacerbation on differential if UF does not help  # HTN  - Goal BP < 140/90  - At goal  # Anemia of CKD  - Goal Hgb 10-11  - At goal  # CKD-MBD     PLAN:  - iHD today  - UF as tolerated  - No dietary protein restrictions  - Dose all meds per ESRD  - Low sodium diet

## 2022-10-11 NOTE — PROGRESS NOTES
Prior to HD, texted and called Dr Murillo for orders. Cannulated left fore arm AVF x 2 using 15 gauge needles without problem. Intra treatment, Dr Murillo was in and changed the order of dialysate flow to 800. Was having SOB at start which has gotten better intra treatment until the end of treatment with 02 sat  of 97 %. Held sites for 20 minutes and no bleeding noted at access sites. Gave report to JERMAINE Leblanc. See flow sheet for details.

## 2022-10-11 NOTE — FLOWSHEET NOTE
10/11/22 0718   Events/Summary/Plan   Events/Summary/Plan DPI given, off bipap v60   Skin Inspection Respiratory Device Intact   Vital Signs   Pulse 62   Respiration 20   Pulse Oximetry 99 %   $ Pulse Oximetry (Spot Check) Yes   Breath Sounds   RUL Breath Sounds Crackles   RML Breath Sounds Crackles   RLL Breath Sounds Diminished   AMALIA Breath Sounds Expiratory Wheezes   LLL Breath Sounds Diminished   Secretions   Cough Moist;Non Productive;Strong   How Sputum Obtained Spontaneous   Oxygen   O2 (LPM) 4   O2 Delivery Device Nasal Cannula

## 2022-10-11 NOTE — PROGRESS NOTES
Telemetry Shift Summary    Rhythm: SR  HR Range: 80-91  Ectopy: r PVC   Measurements: 0.16/0.08/0.40

## 2022-10-11 NOTE — CARE PLAN
The patient is Stable - Low risk of patient condition declining or worsening    Shift Goals  Clinical Goals: Wean off BiPAP, Dialysis  Patient Goals: Go Home  Family Goals: Family not present    Progress made toward(s) clinical / shift goals:    Problem: Knowledge Deficit - Standard  Goal: Patient and family/care givers will demonstrate understanding of plan of care, disease process/condition, diagnostic tests and medications  Outcome: Progressing     Problem: Respiratory  Goal: Patient will achieve/maintain optimum respiratory ventilation and gas exchange  Outcome: Progressing     Problem: Risk for Aspiration  Goal: Patient's risk for aspiration will be absent or decrease  Outcome: Progressing       Patient is not progressing towards the following goals:

## 2022-10-11 NOTE — PROGRESS NOTES
Hospital Medicine Daily Progress Note    Date of Service  10/11/2022    Chief Complaint  Shortness of breath    Hospital Course  Bridger Savage is a 72 y.o. male with ESRD on hemodialysis (Monday Wednesday Friday, [facility] nephrology), systolic CHF (EF 35%), COPD, admitted 10/10/2022 with increasing shortness of breath worse with exertion, with associated wheezing, nonproductive cough without fever, and sensation of chest congestion.  He had COVID infection in July.  He was afebrile, but tachycardic and tachypneic, along with hypoxia.  He is started on BiPAP.  Potassium 5.7, creatinine 10.92.  Troponin 84.  proBNP greater than 35,000.  No leukocytosis.  Chest x-ray showed increased ill-defined opacification in each lungs, consistent with worsening pulmonary edema.  There was some concern for COPD exacerbation in the ED, for which she was given nebs and steroids, but wheezing was felt to be due to volume overload.  Nephrology was contacted for hemodialysis.  Further steroids were held.    Interval Problem Update  10/11/2022 - I reviewed the patient's chart. There were no significant overnight events. Remains hemodynamically stable and afebrile. Down to 3L O2 NC.  Used BiPAP overnight.  Tachycardia has resolved.  Maintaining sinus rhythm.  Potassium 7.0, creatinine 8.6.  Anion gap 17.  CO2 26.  WBC 11,900.  He is getting hemodialysis today.    > I have personally seen and examined the patient today.  He is still short of breath, and wheezing.  States breathing is about the same as prior to dialysis.  Has nonproductive cough.  No fevers.  Denies any nausea, vomiting, abdominal pain.  No leg edema.    I have discussed this patient's plan of care and discharge plan at IDT rounds today with Case Management, Nursing, Nursing leadership, and other members of the IDT team.    Consultants/Specialty  critical care    Code Status  DNAR/DNI    Disposition  Patient is not medically cleared for discharge.   6 clicks 23-24.   Anticipate discharge to to home with close outpatient follow-up.  I have placed the appropriate orders for post-discharge needs.    Review of Systems  ROS     Pertinent positives/negatives as mentioned above.     A complete review of systems was personally done by me. All other systems were negative.       Physical Exam  Temp:  [36.4 °C (97.5 °F)-36.5 °C (97.7 °F)] 36.4 °C (97.5 °F)  Pulse:  [] 75  Resp:  [20-51] 20  BP: ()/(57-84) 99/61  SpO2:  [88 %-99 %] 91 %    Physical Exam  Vitals reviewed.   Constitutional:       General: He is not in acute distress.     Appearance: Normal appearance. He is not toxic-appearing or diaphoretic.   HENT:      Head: Normocephalic and atraumatic.      Right Ear: External ear normal.      Left Ear: External ear normal.      Mouth/Throat:      Mouth: Mucous membranes are moist.      Pharynx: No oropharyngeal exudate.   Eyes:      General: No scleral icterus.     Extraocular Movements: Extraocular movements intact.      Conjunctiva/sclera: Conjunctivae normal.      Pupils: Pupils are equal, round, and reactive to light.   Cardiovascular:      Rate and Rhythm: Normal rate and regular rhythm.      Heart sounds: Normal heart sounds. No murmur heard.    No gallop.   Pulmonary:      Effort: Pulmonary effort is normal. No respiratory distress.      Breath sounds: No stridor. Wheezing present. No rhonchi or rales.      Comments: Diminished air entry B/L bases, with end expiratory wheezing  Chest:      Chest wall: No tenderness.   Abdominal:      General: Bowel sounds are normal. There is no distension.      Palpations: Abdomen is soft. There is no mass.      Tenderness: There is no abdominal tenderness. There is no guarding or rebound.   Musculoskeletal:         General: No swelling. Normal range of motion.      Cervical back: Normal range of motion and neck supple.      Right lower leg: No edema.      Left lower leg: No edema.   Lymphadenopathy:      Cervical: No cervical  adenopathy.   Skin:     General: Skin is warm and dry.      Coloration: Skin is not jaundiced.      Findings: No rash.   Neurological:      General: No focal deficit present.      Mental Status: He is alert and oriented to person, place, and time.      Cranial Nerves: No cranial nerve deficit.   Psychiatric:         Mood and Affect: Mood normal.         Behavior: Behavior normal.         Thought Content: Thought content normal.         Judgment: Judgment normal.      Comments: Anxious       Fluids    Intake/Output Summary (Last 24 hours) at 10/11/2022 1204  Last data filed at 10/10/2022 1800  Gross per 24 hour   Intake 740 ml   Output 3500 ml   Net -2760 ml       Laboratory  Recent Labs     10/10/22  0300 10/11/22  1014   WBC 8.4 11.9*   RBC 4.07* 4.24*   HEMOGLOBIN 11.7* 12.2*   HEMATOCRIT 37.3* 39.9*   MCV 91.6 94.1   MCH 28.7 28.8   MCHC 31.4* 30.6*   RDW 55.4* 55.9*   PLATELETCT 203 229   MPV 9.9 10.2     Recent Labs     10/10/22  0300 10/10/22  1052 10/11/22  1014   SODIUM 138 139 137   POTASSIUM 5.7* 5.5 7.0*   CHLORIDE 95* 95* 94*   CO2 26 25 26   GLUCOSE 88 139* 95   BUN 41* 48* 43*   CREATININE 10.92* 11.32* 8.60*   CALCIUM 8.8 8.5 9.1                   Imaging  DX-CHEST-PORTABLE (1 VIEW)   Final Result         Ill-defined opacifications in each lung have increased compared to the prior radiograph.  This could indicate worsening of pulmonary edema or inflammation.      DX-CHEST-2 VIEWS    (Results Pending)        Assessment/Plan  * Acute hypoxemic respiratory failure (HCC)- (present on admission)  Assessment & Plan  - Was placed on BiPAP initially, but now down to 3 L of oxygen by nasal cannula.  Saturating well.  Has significant component of anxiety.  -Continue hemodialysis for volume removal as above.  -Continue management of COPD exacerbation with steroids, bronchodilators.  -Continue RT protocol for respiratory support.  Wean oxygen as able to keep saturations above 90%.  -Start as needed low-dose  Ativan 0.5 mg every 4 hours for anxiety.    Acute exacerbation of chronic obstructive pulmonary disease (COPD) ([facility])- (present on admission)  Assessment & Plan  -He still very wheezy despite fluid removal with hemodialysis.  He likely has significant component of COPD exacerbation.  -Start Solu-Medrol 62.5 mg every 6 hours, taper with improvement.  Continue DuoNeb nebulization, and Anoro Ellipta inhalers.  Continue RT protocol per respiratory support.    ACC/AHA stage C systolic heart failure (HCC)- (present on admission)  Assessment & Plan  -Most recent echocardiogram was done on 10/1/2019 and EF was 35%.  -Initial chest x-ray showed pulmonary edema.  -Getting hemodialysis today for additional fluid removal.  -Troponin mildly elevated, trended down, likely due to underlying ESRD.  Doubt ACS.  -Continue Coreg.  Holding off on ACE inhibitor due to ESRD/hyperkalemia.    Pulmonary edema  Assessment & Plan  -Initial x-ray showing ill-defined opacifications on each lung with increased patterns as compared to prior.  Worsening pulmonary edema or infiltration.  -Getting hemodialysis.  -Repeat chest x-ray this afternoon.    Hyperkalemia- (present on admission)  Assessment & Plan  - Due to ESRD.  Received calcium gluconate and insulin/glucose initially.  -Getting hemodialysis today, which should improve potassium levels.  -Repeat BMP in the morning.  -Monitor on telemetry.    Normocytic anemia- (present on admission)  Assessment & Plan  - Likely due to ESRD, anemia of chronic disease.  -Hemoglobin stable.    Essential hypertension- (present on admission)  Assessment & Plan  -Maintaining good control.  Continue Coreg.  Monitor blood pressure trend closely.    ESRD (end stage renal disease) on dialysis ([facility])- (present on admission)  Assessment & Plan  -Follows with [facility] nephrology, Dr. Chaudhari.  Outpatient HD Monday Wednesday Friday.  -Getting hemodialysis per nephrology.         VTE prophylaxis: heparin ppx

## 2022-10-11 NOTE — PROGRESS NOTES
Juan Francisco from Lab called with critical result of K 7.0/Cr 8.60 at 1120. Critical lab result read back to Juan Francisco.   Dr. Ybarra notified of critical lab result at 1121 via Voal[employer] messaging.

## 2022-10-12 LAB
ANION GAP SERPL CALC-SCNC: 19 MMOL/L (ref 7–16)
BUN SERPL-MCNC: 42 MG/DL (ref 8–22)
CALCIUM SERPL-MCNC: 9 MG/DL (ref 8.4–10.2)
CHLORIDE SERPL-SCNC: 94 MMOL/L (ref 96–112)
CO2 SERPL-SCNC: 23 MMOL/L (ref 20–33)
CREAT SERPL-MCNC: 7.25 MG/DL (ref 0.5–1.4)
GFR SERPLBLD CREATININE-BSD FMLA CKD-EPI: 8 ML/MIN/1.73 M 2
GLUCOSE SERPL-MCNC: 93 MG/DL (ref 65–99)
POTASSIUM SERPL-SCNC: 6.3 MMOL/L (ref 3.6–5.5)
SODIUM SERPL-SCNC: 136 MMOL/L (ref 135–145)

## 2022-10-12 PROCEDURE — 94660 CPAP INITIATION&MGMT: CPT

## 2022-10-12 PROCEDURE — 770020 HCHG ROOM/CARE - TELE (206)

## 2022-10-12 PROCEDURE — 700102 HCHG RX REV CODE 250 W/ 637 OVERRIDE(OP): Performed by: INTERNAL MEDICINE

## 2022-10-12 PROCEDURE — 90935 HEMODIALYSIS ONE EVALUATION: CPT

## 2022-10-12 PROCEDURE — A9270 NON-COVERED ITEM OR SERVICE: HCPCS | Performed by: HOSPITALIST

## 2022-10-12 PROCEDURE — A9270 NON-COVERED ITEM OR SERVICE: HCPCS | Performed by: INTERNAL MEDICINE

## 2022-10-12 PROCEDURE — 700111 HCHG RX REV CODE 636 W/ 250 OVERRIDE (IP): Performed by: INTERNAL MEDICINE

## 2022-10-12 PROCEDURE — 700102 HCHG RX REV CODE 250 W/ 637 OVERRIDE(OP): Performed by: HOSPITALIST

## 2022-10-12 PROCEDURE — 80048 BASIC METABOLIC PNL TOTAL CA: CPT

## 2022-10-12 PROCEDURE — 36415 COLL VENOUS BLD VENIPUNCTURE: CPT

## 2022-10-12 PROCEDURE — 94760 N-INVAS EAR/PLS OXIMETRY 1: CPT

## 2022-10-12 PROCEDURE — 99233 SBSQ HOSP IP/OBS HIGH 50: CPT | Performed by: INTERNAL MEDICINE

## 2022-10-12 RX ORDER — FLUTICASONE PROPIONATE 44 UG/1
2 AEROSOL, METERED RESPIRATORY (INHALATION)
Status: DISCONTINUED | OUTPATIENT
Start: 2022-10-12 | End: 2022-10-15 | Stop reason: HOSPADM

## 2022-10-12 RX ADMIN — METHYLPREDNISOLONE SODIUM SUCCINATE 62.5 MG: 125 INJECTION, POWDER, FOR SOLUTION INTRAMUSCULAR; INTRAVENOUS at 12:19

## 2022-10-12 RX ADMIN — CINACALCET HYDROCHLORIDE 60 MG: 30 TABLET, FILM COATED ORAL at 12:19

## 2022-10-12 RX ADMIN — SEVELAMER CARBONATE 3200 MG: 800 TABLET, FILM COATED ORAL at 17:14

## 2022-10-12 RX ADMIN — CALCIUM CARBONATE (ANTACID) CHEW TAB 500 MG 1000 MG: 500 CHEW TAB at 18:00

## 2022-10-12 RX ADMIN — UMECLIDINIUM BROMIDE AND VILANTEROL TRIFENATATE 1 PUFF: 62.5; 25 POWDER RESPIRATORY (INHALATION) at 05:36

## 2022-10-12 RX ADMIN — PATIROMER 8.4 G: 8.4 POWDER, FOR SUSPENSION ORAL at 12:19

## 2022-10-12 RX ADMIN — SEVELAMER CARBONATE 3200 MG: 800 TABLET, FILM COATED ORAL at 08:25

## 2022-10-12 RX ADMIN — CARVEDILOL 25 MG: 25 TABLET, FILM COATED ORAL at 17:15

## 2022-10-12 RX ADMIN — METHYLPREDNISOLONE SODIUM SUCCINATE 62.5 MG: 125 INJECTION, POWDER, FOR SOLUTION INTRAMUSCULAR; INTRAVENOUS at 17:15

## 2022-10-12 RX ADMIN — SEVELAMER CARBONATE 3200 MG: 800 TABLET, FILM COATED ORAL at 12:18

## 2022-10-12 RX ADMIN — METHYLPREDNISOLONE SODIUM SUCCINATE 62.5 MG: 125 INJECTION, POWDER, FOR SOLUTION INTRAMUSCULAR; INTRAVENOUS at 05:40

## 2022-10-12 ASSESSMENT — PAIN DESCRIPTION - PAIN TYPE: TYPE: ACUTE PAIN

## 2022-10-12 ASSESSMENT — FIBROSIS 4 INDEX: FIB4 SCORE: 1.78

## 2022-10-12 NOTE — PROGRESS NOTES
Hospital Medicine Daily Progress Note    Date of Service  10/12/2022    Chief Complaint  Shortness of breath    Hospital Course  Bridger Savage is a 72 y.o. male with ESRD on hemodialysis (Monday Wednesday Friday, [facility] nephrology), systolic CHF (EF 35%), COPD, admitted 10/10/2022 with increasing shortness of breath worse with exertion, with associated wheezing, nonproductive cough without fever, and sensation of chest congestion.  He had COVID infection in July.  He was afebrile, but tachycardic and tachypneic, along with hypoxia.  He is started on BiPAP.  Potassium 5.7, creatinine 10.92.  Troponin 84.  proBNP greater than 35,000.  No leukocytosis.  Chest x-ray showed increased ill-defined opacification in each lungs, consistent with worsening pulmonary edema.  He was felt to have COPD exacerbation, for which he was given nebs and steroids, but wheezing was felt to be due to volume overload.  Nephrology was contacted and he had hemodialysis.  He temporarily required BiPAP, but with volume removal his respiratory status improved and he was able to be weaned off BiPAP.    Interval Problem Update  10/12/2022 - I reviewed the patient's chart today. Uneventful night. VSS. Afebrile. Saturating well on 3L O2 NC.  Potassium 6.3.  Creatinine 7.25.  Repeat chest x-ray showed improving bibasilar pulmonary opacities (personally reviewed).    > I have personally seen and examined the patient today.  He feels that he is breathing better today but still short of breath with exertion.  Coughing is better and improved  No chest pain, nausea, vomiting, abdominal pain.  No diarrhea.  Wheezing has improved.    I have discussed this patient's plan of care and discharge plan at IDT rounds today with Case Management, Nursing, Nursing leadership, and other members of the IDT team.    Consultants/Specialty  critical care    Code Status  DNAR/DNI    Disposition  Patient is not medically cleared for discharge.   6 clicks 23-24.   Anticipate discharge to to home with close outpatient follow-up.  I have placed the appropriate orders for post-discharge needs.    Review of Systems  ROS     Pertinent positives/negatives as mentioned above.     A complete review of systems was personally done by me. All other systems were negative.       Physical Exam  Temp:  [36.2 °C (97.1 °F)-36.4 °C (97.5 °F)] 36.3 °C (97.3 °F)  Pulse:  [71-91] 77  Resp:  [18-20] 18  BP: (116-149)/(67-93) 116/70  SpO2:  [91 %-97 %] 95 %    Physical Exam  Vitals reviewed.   Constitutional:       General: He is not in acute distress.     Appearance: Normal appearance. He is not toxic-appearing or diaphoretic.   HENT:      Head: Normocephalic and atraumatic.      Right Ear: External ear normal.      Left Ear: External ear normal.      Mouth/Throat:      Mouth: Mucous membranes are moist.      Pharynx: No oropharyngeal exudate.   Eyes:      General: No scleral icterus.     Extraocular Movements: Extraocular movements intact.      Conjunctiva/sclera: Conjunctivae normal.      Pupils: Pupils are equal, round, and reactive to light.   Cardiovascular:      Rate and Rhythm: Normal rate and regular rhythm.      Heart sounds: Normal heart sounds. No murmur heard.    No gallop.   Pulmonary:      Effort: Pulmonary effort is normal. No respiratory distress.      Breath sounds: No stridor. Wheezing present. No rhonchi or rales.      Comments: Improved air entry on both lung fields, with improved expiratory wheezing  Chest:      Chest wall: No tenderness.   Abdominal:      General: Bowel sounds are normal. There is no distension.      Palpations: Abdomen is soft. There is no mass.      Tenderness: There is no abdominal tenderness. There is no guarding or rebound.   Musculoskeletal:         General: No swelling. Normal range of motion.      Cervical back: Normal range of motion and neck supple.      Right lower leg: No edema.      Left lower leg: No edema.   Lymphadenopathy:      Cervical: No  cervical adenopathy.   Skin:     General: Skin is warm and dry.      Coloration: Skin is not jaundiced.      Findings: No rash.   Neurological:      General: No focal deficit present.      Mental Status: He is alert and oriented to person, place, and time.      Cranial Nerves: No cranial nerve deficit.   Psychiatric:         Mood and Affect: Mood normal.         Behavior: Behavior normal.         Thought Content: Thought content normal.         Judgment: Judgment normal.      Comments: Less anxious       Fluids    Intake/Output Summary (Last 24 hours) at 10/12/2022 1119  Last data filed at 10/11/2022 1838  Gross per 24 hour   Intake 740 ml   Output 3500 ml   Net -2760 ml       Laboratory  Recent Labs     10/10/22  0300 10/11/22  1014   WBC 8.4 11.9*   RBC 4.07* 4.24*   HEMOGLOBIN 11.7* 12.2*   HEMATOCRIT 37.3* 39.9*   MCV 91.6 94.1   MCH 28.7 28.8   MCHC 31.4* 30.6*   RDW 55.4* 55.9*   PLATELETCT 203 229   MPV 9.9 10.2     Recent Labs     10/11/22  1014 10/11/22  1906 10/12/22  0159   SODIUM 137 134* 136   POTASSIUM 7.0* 6.0* 6.3*   CHLORIDE 94* 93* 94*   CO2 26 24 23   GLUCOSE 95 126* 93   BUN 43* 31* 42*   CREATININE 8.60* 6.29* 7.25*   CALCIUM 9.1 9.2 9.0                   Imaging  DX-CHEST-2 VIEWS   Final Result      Improving bibasilar pulmonary opacities.      DX-CHEST-PORTABLE (1 VIEW)   Final Result         Ill-defined opacifications in each lung have increased compared to the prior radiograph.  This could indicate worsening of pulmonary edema or inflammation.           Assessment/Plan  * Acute hypoxemic respiratory failure (HCC)- (present on admission)  Assessment & Plan  - Was placed on BiPAP initially, but now down to and stable at 3 L of oxygen by nasal cannula.  Saturating well.  Has significant component of anxiety.  -Continue hemodialysis for volume removal as above.  -Continue management of COPD exacerbation with steroids, bronchodilators.  -Continue RT protocol for respiratory support.  Wean oxygen  as able to keep saturations above 90%.  -Continue as needed low-dose Ativan 0.5 mg every 4 hours for anxiety.    Acute exacerbation of chronic obstructive pulmonary disease (COPD) (HCC)- (present on admission)  Assessment & Plan  - He likely has significant component of COPD exacerbation.  Was wheezing despite fluid removal with dialysis.  Suspect volume overload pushed him into COPD exacerbation.  -Less wheezy, and improved air entry today.  -Continue Solu-Medrol 62.5 mg every 6 hours, will taper with improvement.  Continue DuoNeb nebulization, and Anoro Ellipta inhalers.  Continue RT protocol per respiratory support.    ACC/AHA stage C systolic heart failure (HCC)- (present on admission)  Assessment & Plan  -Most recent echocardiogram was done on 10/1/2019 and EF was 35%.  -Initial chest x-ray showed pulmonary edema.  -Getting another hemodialysis today for additional fluid removal.  -Troponin mildly elevated, trended down, likely due to underlying ESRD.  Doubt ACS.  -Continue Coreg.  Holding off on ACE inhibitor due to ESRD/hyperkalemia.    Pulmonary edema  Assessment & Plan  -Initial x-ray showing ill-defined opacifications on each lung with increased patterns as compared to prior.  Worsening pulmonary edema or infiltration.  -Getting hemodialysis.  -Repeat chest x-ray tomorrow morning.    Hyperkalemia- (present on admission)  Assessment & Plan  - Due to ESRD.  Received calcium gluconate and insulin/glucose initially.  -Getting another hemodialysis today, which should stabilize potassium levels.  -Repeat BMP in the morning.  -Monitor on telemetry.    Normocytic anemia- (present on admission)  Assessment & Plan  - Likely due to ESRD, anemia of chronic disease.  -Hemoglobin stable.    Essential hypertension- (present on admission)  Assessment & Plan  -Maintaining good control.  Continue Coreg.  Monitor blood pressure trend closely.    ESRD (end stage renal disease) on dialysis (HCC)- (present on  admission)  Assessment & Plan  -Follows with [facility] nephrology, Dr. Chaudhari.  Outpatient HD Monday Wednesday Friday.  -Getting hemodialysis per nephrology.         VTE prophylaxis: heparin ppx

## 2022-10-12 NOTE — PROGRESS NOTES
Telemetry Shift Summary     Rhythm: SA/ST  Rate: , PSVT 164  Measurements: .16/.08/.44  Ectopy (reported by Monitor Tech): r PVC, r Trig      Normal Values  Rhythm: Sinus  HR:   Measurements: 0.12-0.20/0.06-0.10/0.30-0.52

## 2022-10-12 NOTE — PROGRESS NOTES
Hemodialysis done today, started @ 0903 and ended @ 1204 with net UF=  2000ml.Report given to JERMAINE Paniagua. See flow sheet for details.

## 2022-10-12 NOTE — CARE PLAN
Patient A&Ox4, VS stable, 3LNC, telemetry, dialysis, fistula, IV patient, call light within reach, bed low and locked    The patient is Stable - Low risk of patient condition declining or worsening    Shift Goals  Clinical Goals: Wean oxygen  Patient Goals: Dialysis  Family Goals: N/A    Progress made toward(s) clinical / shift goals:    Problem: Knowledge Deficit - Standard  Goal: Patient and family/care givers will demonstrate understanding of plan of care, disease process/condition, diagnostic tests and medications  Outcome: Progressing     Problem: Psychosocial  Goal: Patient's level of anxiety will decrease  Outcome: Progressing  Goal: Patient's ability to verbalize feelings about condition will improve  Outcome: Progressing  Goal: Patient's ability to re-evaluate and adapt role responsibilities will improve  Outcome: Progressing  Goal: Patient and family will demonstrate ability to cope with life altering diagnosis and/or procedure  Outcome: Progressing  Goal: Spiritual and cultural needs incorporated into hospitalization  Outcome: Progressing     Problem: Respiratory  Goal: Patient will achieve/maintain optimum respiratory ventilation and gas exchange  Outcome: Progressing     Problem: Mechanical Ventilation  Goal: Safe management of artificial airway and ventilation  Outcome: Progressing  Goal: Successful weaning off mechanical ventilator, spontaneously maintains adequate gas exchange  Outcome: Progressing  Goal: Patient will be able to express needs and understand communication  Outcome: Progressing     Problem: Risk for Aspiration  Goal: Patient's risk for aspiration will be absent or decrease  Outcome: Progressing     Problem: Urinary - Renal Perfusion  Goal: Ability to achieve and maintain adequate renal perfusion and functioning will improve  Outcome: Progressing     Problem: Pain - Standard  Goal: Alleviation of pain or a reduction in pain to the patient’s comfort goal  Outcome: Progressing        Patient is not progressing towards the following goals:

## 2022-10-12 NOTE — PROGRESS NOTES
[facility] -  PROGRESS NOTE               Author: Gus Murillo M.D. Date & Time: 10/12/2022  11:55 AM     HPI:  71 yo M PMH ESRD MWF iHD, HTN, anemia of CKD, CKD-MBD, and HLD who presents to ED with CC as above.  He has had worsening SOB over the past week and got to the point where it was unbearable and he came to ED via EMS.  He denies missing any recent HD treatments.  Claims compliance with diet/fluids.  Complained of feeling of fullness in chest.  Hx of COVID-19 infxn in July 2022.  Labs in ED showed BNP > 35K, K+ 5.5.  No ECG changes.  Placed on BiPaP to maintain O2 sats and was admitted to ICU.  Otherwise denies F/C/N/V/CP.  No melena, hematochezia, hematemesis.  No HA, visual changes, or abdominal pain.    DAILY NEPHROLOGY SUMMARY:  10/10: Consult done  10/11: NAEO, SOB improved but still breathing heavy; NYHA Class IV  10/12: NAEO, feels better, SOB improved, K+ remains elevated despite daily dialysis    REVIEW OF SYSTEMS:    10 point ROS reviewed and is as per HPI/daily summary or otherwise negative    PMH/PSH/SH/FH:   Reviewed and unchanged since admission note    CURRENT MEDICATIONS:   Reviewed from admission to present day    VS:  /70   Pulse 77   Temp 36.3 °C (97.3 °F) (Axillary)   Resp 18   Ht 1.829 m (6')   Wt 82.1 kg (181 lb)   SpO2 95%   BMI 24.55 kg/m²     Physical Exam  Nursing note reviewed.   Constitutional:       Appearance: Normal appearance.   Eyes:      General: No scleral icterus.  Cardiovascular:      Comments: No edema  Pulmonary:      Effort: Tachypnea and respiratory distress present.   Abdominal:      General: There is no distension.   Musculoskeletal:         General: No deformity.   Skin:     Findings: No rash.   Neurological:      Mental Status: He is alert.   Psychiatric:         Behavior: Behavior normal.       Fluids:  In: 820 [P.O.:320; Dialysis:500]  Out: 3500     LABS:  Recent Labs     10/11/22  1014 10/11/22  1906 10/12/22  0159    SODIUM 137 134* 136   POTASSIUM 7.0* 6.0* 6.3*   CHLORIDE 94* 93* 94*   CO2 26 24 23   GLUCOSE 95 126* 93   BUN 43* 31* 42*   CREATININE 8.60* 6.29* 7.25*   CALCIUM 9.1 9.2 9.0         IMAGING:   All imaging reviewed from admission to present day    IMPRESSION:  # ESRD  - Etiology likely 2/2 HTN  - MWF iHD  # Acute respiratory failure with hypoxia  - On BiPaP  - Likely due to volume overload  - COPD exacerbation on differential if UF does not help  # HTN  - Goal BP < 140/90  - At goal  # Anemia of CKD  - Goal Hgb 10-11  - At goal  # CKD-MBD     PLAN:  - MWF iHD  - UF as tolerated  - Likely needs another fistulagram for recirculation  - Patiromer 8.4g po daily  - No dietary protein restrictions  - Dose all meds per ESRD  - Low sodium diet

## 2022-10-13 ENCOUNTER — APPOINTMENT (OUTPATIENT)
Dept: RADIOLOGY | Facility: MEDICAL CENTER | Age: 72
DRG: 189 | End: 2022-10-13
Attending: INTERNAL MEDICINE
Payer: MEDICARE

## 2022-10-13 LAB
ANION GAP SERPL CALC-SCNC: 17 MMOL/L (ref 7–16)
BUN SERPL-MCNC: 56 MG/DL (ref 8–22)
CALCIUM SERPL-MCNC: 8.6 MG/DL (ref 8.4–10.2)
CHLORIDE SERPL-SCNC: 93 MMOL/L (ref 96–112)
CO2 SERPL-SCNC: 25 MMOL/L (ref 20–33)
CREAT SERPL-MCNC: 6.49 MG/DL (ref 0.5–1.4)
GFR SERPLBLD CREATININE-BSD FMLA CKD-EPI: 9 ML/MIN/1.73 M 2
GLUCOSE SERPL-MCNC: 121 MG/DL (ref 65–99)
POTASSIUM SERPL-SCNC: 5.6 MMOL/L (ref 3.6–5.5)
SODIUM SERPL-SCNC: 135 MMOL/L (ref 135–145)
TROPONIN T SERPL-MCNC: 65 NG/L (ref 6–19)
TROPONIN T SERPL-MCNC: 67 NG/L (ref 6–19)
TROPONIN T SERPL-MCNC: 70 NG/L (ref 6–19)

## 2022-10-13 PROCEDURE — 99233 SBSQ HOSP IP/OBS HIGH 50: CPT | Performed by: INTERNAL MEDICINE

## 2022-10-13 PROCEDURE — 700102 HCHG RX REV CODE 250 W/ 637 OVERRIDE(OP): Performed by: INTERNAL MEDICINE

## 2022-10-13 PROCEDURE — 84484 ASSAY OF TROPONIN QUANT: CPT | Mod: 91

## 2022-10-13 PROCEDURE — 94760 N-INVAS EAR/PLS OXIMETRY 1: CPT

## 2022-10-13 PROCEDURE — 36415 COLL VENOUS BLD VENIPUNCTURE: CPT

## 2022-10-13 PROCEDURE — A9270 NON-COVERED ITEM OR SERVICE: HCPCS | Performed by: INTERNAL MEDICINE

## 2022-10-13 PROCEDURE — 80048 BASIC METABOLIC PNL TOTAL CA: CPT

## 2022-10-13 PROCEDURE — 71045 X-RAY EXAM CHEST 1 VIEW: CPT

## 2022-10-13 PROCEDURE — 94640 AIRWAY INHALATION TREATMENT: CPT

## 2022-10-13 PROCEDURE — 700105 HCHG RX REV CODE 258: Performed by: INTERNAL MEDICINE

## 2022-10-13 PROCEDURE — 770020 HCHG ROOM/CARE - TELE (206)

## 2022-10-13 PROCEDURE — A9270 NON-COVERED ITEM OR SERVICE: HCPCS | Performed by: HOSPITALIST

## 2022-10-13 PROCEDURE — 700111 HCHG RX REV CODE 636 W/ 250 OVERRIDE (IP): Performed by: INTERNAL MEDICINE

## 2022-10-13 PROCEDURE — 700102 HCHG RX REV CODE 250 W/ 637 OVERRIDE(OP): Performed by: HOSPITALIST

## 2022-10-13 RX ORDER — ALUMINA, MAGNESIA, AND SIMETHICONE 2400; 2400; 240 MG/30ML; MG/30ML; MG/30ML
10 SUSPENSION ORAL ONCE
Status: COMPLETED | OUTPATIENT
Start: 2022-10-13 | End: 2022-10-13

## 2022-10-13 RX ORDER — AZITHROMYCIN 250 MG/1
500 TABLET, FILM COATED ORAL DAILY
Status: DISCONTINUED | OUTPATIENT
Start: 2022-10-13 | End: 2022-10-13

## 2022-10-13 RX ORDER — OMEPRAZOLE 20 MG/1
20 CAPSULE, DELAYED RELEASE ORAL DAILY
Status: DISCONTINUED | OUTPATIENT
Start: 2022-10-13 | End: 2022-10-15 | Stop reason: HOSPADM

## 2022-10-13 RX ADMIN — SEVELAMER CARBONATE 3200 MG: 800 TABLET, FILM COATED ORAL at 12:48

## 2022-10-13 RX ADMIN — OMEPRAZOLE 20 MG: 20 CAPSULE, DELAYED RELEASE ORAL at 12:48

## 2022-10-13 RX ADMIN — HEPARIN SODIUM 5000 UNITS: 5000 INJECTION, SOLUTION INTRAVENOUS; SUBCUTANEOUS at 14:55

## 2022-10-13 RX ADMIN — METHYLPREDNISOLONE SODIUM SUCCINATE 62.5 MG: 125 INJECTION, POWDER, FOR SOLUTION INTRAMUSCULAR; INTRAVENOUS at 01:27

## 2022-10-13 RX ADMIN — CINACALCET HYDROCHLORIDE 60 MG: 30 TABLET, FILM COATED ORAL at 05:43

## 2022-10-13 RX ADMIN — FAMOTIDINE 20 MG: 10 INJECTION, SOLUTION INTRAVENOUS at 12:48

## 2022-10-13 RX ADMIN — FLUTICASONE PROPIONATE 88 MCG: 44 AEROSOL, METERED RESPIRATORY (INHALATION) at 07:13

## 2022-10-13 RX ADMIN — METHYLPREDNISOLONE SODIUM SUCCINATE 62.5 MG: 125 INJECTION, POWDER, FOR SOLUTION INTRAMUSCULAR; INTRAVENOUS at 17:57

## 2022-10-13 RX ADMIN — SEVELAMER CARBONATE 3200 MG: 800 TABLET, FILM COATED ORAL at 17:57

## 2022-10-13 RX ADMIN — PATIROMER 8.4 G: 8.4 POWDER, FOR SUSPENSION ORAL at 14:56

## 2022-10-13 RX ADMIN — METHYLPREDNISOLONE SODIUM SUCCINATE 62.5 MG: 125 INJECTION, POWDER, FOR SOLUTION INTRAMUSCULAR; INTRAVENOUS at 12:49

## 2022-10-13 RX ADMIN — SEVELAMER CARBONATE 3200 MG: 800 TABLET, FILM COATED ORAL at 05:43

## 2022-10-13 RX ADMIN — ALUMINUM HYDROXIDE, MAGNESIUM HYDROXIDE, AND DIMETHICONE 10 ML: 400; 400; 40 SUSPENSION ORAL at 12:49

## 2022-10-13 RX ADMIN — METHYLPREDNISOLONE SODIUM SUCCINATE 62.5 MG: 125 INJECTION, POWDER, FOR SOLUTION INTRAMUSCULAR; INTRAVENOUS at 05:43

## 2022-10-13 RX ADMIN — CALCIUM CARBONATE (ANTACID) CHEW TAB 500 MG 1000 MG: 500 CHEW TAB at 09:56

## 2022-10-13 RX ADMIN — PIPERACILLIN AND TAZOBACTAM 3.38 G: 3; .375 INJECTION, POWDER, LYOPHILIZED, FOR SOLUTION INTRAVENOUS; PARENTERAL at 09:41

## 2022-10-13 NOTE — FLOWSHEET NOTE
10/13/22 0713   Events/Summary/Plan   Events/Summary/Plan MDIs given   Skin Inspection Respiratory Device Intact   Vital Signs   Pulse 60   Respiration 18   Pulse Oximetry 93 %   $ Pulse Oximetry (Spot Check) Yes   Respiratory Assessment   Respiratory Pattern Within Normal Limits   Level of Consciousness Alert   Chest Exam   Work Of Breathing / Effort Within Normal Limits   Breath Sounds   RUL Breath Sounds Clear   RML Breath Sounds Clear;Diminished   RLL Breath Sounds Diminished   AMALIA Breath Sounds Clear   LLL Breath Sounds Diminished   Secretions   Cough Non Productive   Oxygen   O2 (LPM) 2   O2 Delivery Device Silicone Nasal Cannula

## 2022-10-13 NOTE — PROGRESS NOTES
[facility] -  PROGRESS NOTE               Author: AMY Fisher Date & Time: 10/13/2022  9:07 AM     HPI:  73 yo M PMH ESRD MWF iHD, HTN, anemia of CKD, CKD-MBD, and HLD who presents to ED with CC as above.  He has had worsening SOB over the past week and got to the point where it was unbearable and he came to ED via EMS.  He denies missing any recent HD treatments.  Claims compliance with diet/fluids.  Complained of feeling of fullness in chest.  Hx of COVID-19 infxn in July 2022.  Labs in ED showed BNP > 35K, K+ 5.5.  No ECG changes.  Placed on BiPaP to maintain O2 sats and was admitted to ICU.  Otherwise denies F/C/N/V/CP.  No melena, hematochezia, hematemesis.  No HA, visual changes, or abdominal pain.    DAILY NEPHROLOGY SUMMARY:  10/10: Consult done  10/11: NAEO, SOB improved but still breathing heavy; NYHA Class IV  10/12: NAEO, feels better, SOB improved, K+ remains elevated despite daily dialysis  10/13: 2L Net UF. Resting in bed, no acute distress. Reports improved SOB, remains on 2L NC. K 5.6.    REVIEW OF SYSTEMS:    10 point ROS reviewed and is as per HPI/daily summary or otherwise negative    PMH/PSH/SH/FH:   Reviewed and unchanged since admission note    CURRENT MEDICATIONS:   Reviewed from admission to present day    VS:  BP (!) 93/60   Pulse 60   Temp 36.3 °C (97.4 °F) (Oral)   Resp 18   Ht 1.829 m (6')   Wt 82.1 kg (181 lb)   SpO2 93%   BMI 24.55 kg/m²     Physical Exam  Nursing note reviewed.   Constitutional:       Appearance: Normal appearance.   Eyes:      General: No scleral icterus.  Cardiovascular:      Rate and Rhythm: Normal rate and regular rhythm. Occasional Extrasystoles are present.     Heart sounds: Normal heart sounds. Heart sounds not distant. No murmur heard.    No friction rub. No gallop.      Comments: L AVF tortuous + Bruit/+ Thrill  No edema  Pulmonary:      Effort: Pulmonary effort is normal. No tachypnea or respiratory distress.       Breath sounds: Normal breath sounds.   Abdominal:      General: There is no distension.   Musculoskeletal:         General: No deformity.   Skin:     Findings: No rash.   Neurological:      Mental Status: He is alert.   Psychiatric:         Behavior: Behavior normal.       Fluids:  In: 500 [Dialysis:500]  Out: 2500     LABS:  Recent Labs     10/11/22  1906 10/12/22  0159 10/13/22  0416   SODIUM 134* 136 135   POTASSIUM 6.0* 6.3* 5.6*   CHLORIDE 93* 94* 93*   CO2 24 23 25   GLUCOSE 126* 93 121*   BUN 31* 42* 56*   CREATININE 6.29* 7.25* 6.49*   CALCIUM 9.2 9.0 8.6         IMAGING:   All imaging reviewed from admission to present day    IMPRESSION:  # ESRD  - Etiology likely 2/2 HTN  - MWF iHD  # Acute respiratory failure with hypoxia  - On BiPaP -> 2L NC tolerating O2 titration  - Likely due to volume overload  - COPD exacerbation on differential if UF does not help  # HTN  - Goal BP < 140/90  - At goal  # Anemia of CKD  - Goal Hgb 10-11  - At goal  # CKD-MBD     PLAN:  - MWF iHD, no iHD today (THURS)  - UF as tolerated  - Likely needs another fistulagram for recirculation  - Patiromer 8.4g po daily  - No dietary protein restrictions  - Dose all meds per ESRD  - Low sodium diet  - Low K Diet

## 2022-10-13 NOTE — PROGRESS NOTES
Telemetry Shift Summary     Rhythm: SR  Rate: 60-81  Measurements: 0.14/0.08/0.44  Ectopy (reported by Monitor Tech): rPVC, r-oPAC     Normal Values  Rhythm: Sinus  HR:   Measurements: 0.12-0.20/0.06-0.10/0.30-0.52

## 2022-10-13 NOTE — PROGRESS NOTES
Telemetry Shift Summary    Rhythm SR  HR Range 73-80  Ectopy rPVC  Measurements 0.14/0.08/0.54      Normal Values  Rhythm SR  HR Range:   Measurements: 0.12-0.20/0.06-0.10/0.30-0.52

## 2022-10-13 NOTE — RESPIRATORY CARE
"   COPD EDUCATION by COPD CLINICAL EDUCATOR  10/13/2022 at 1:38 PM by Dora Humphries, RRT     Patient reviewed by COPD education team. Patient does have a  history or diagnosis of COPD and is a smoker.  However, pt is unreachable for the COPD program education.     COPD Screen  COPD Risk Screening  Do you have a history of COPD?: Yes  Do you have a Pulmonologist?: Yes    COPD Assessment  COPD Clinical Specialists ONLY  COPD Education Initiated: No--Quick Screen (pt is unreachable in conversation with his own view and scripture from bible versus onerous to COPD and Quit Smoking Cessation, BNP 35,000,and non-compliance, he feels like hes dying everytime and wants to be prayed for.)  Is this a COPD exacerbation patient?: No    PFT Results    No results found for: PFT    Meds to Beds  Would the patient like to opt in for Bedside Medication Delivery at Discharge?: No     MY COPD ACTION PLAN     It is recommended that patients and physicians /healthcare providers complete this action plan together. This plan should be discussed at each physician visit and updated as needed.    The green, yellow and red zones show groups of symptoms of COPD. This list of symptoms is not comprehensive, and you may experience other symptoms. In the \"Actions\" column, your healthcare provider has recommended actions for you to take based on your symptoms.    Patient Name: Bridger Savage   YOB: 1950   Last Updated on:     Green Zone:  I am doing well today Actions     Usual activitiy and exercise level   Take daily medications     Usual amounts of cough and phlegm/mucus   Use oxygen as prescribed     Sleep well at night   Continue regular exercise/diet plan     Appetite is good   At all times avoid cigarette smoke, inhaled irritants     Daily Medications (these medications are taken every day):                Yellow Zone:  I am having a bad day or a COPD flare Actions     More breathless than usual   Continue daily " "medications     I have less energy for my daily activities   Use quick relief inhaler as ordered     Increased or thicker phlegm/mucus   Use oxygen as prescribed     Using quick relief inhaler/nebulizer more often   Get plenty of rest     Swelling of ankles more than usual   Use pursed lip breathing     More coughing than usual   At all times avoid cigarette smoke, inhaled irritants     I feel like I have a \"chest cold\"     Poor sleep and my symptoms woke me up     My appetite is not good     My medicine is not helping      Call provider immediately if symptoms don’t improve     Continue daily medications, add rescue medications:               Medications to be used during a flare up, (as Discussed with Provider):              Red Zone:  I need urgent medical care Actions     Severe shortness of breath even at rest   Call 911 or seek medical care immediately     Not able to do any activity because of breathing      Fever or shaking chills      Feeling confused or very drowsy       Chest pains      Coughing up blood                  "

## 2022-10-13 NOTE — DISCHARGE PLANNING
Case Management Discharge Planning    Admission Date: 10/10/2022  GMLOS: 3.5  ALOS: 3    6-Clicks ADL Score: 24  6-Clicks Mobility Score: 23      Anticipated Discharge Dispo: Discharge Disposition: Home  Discharge Address: [place]    DME Needed: No    Action(s) Taken: Discussed pt in IDT rounds. Per MD, goal is to wean oxygen. Per MD, pt can d/c in 1-2 days when pt is off oxygen.     Escalations Completed: None    Medically Clear: No    Next Steps: LSW to follow and assist as needed.    Barriers to Discharge: None    Is the patient up for discharge tomorrow: No      Care Transition Team Assessment    Information Source  Orientation Level: Oriented X4  Information Given By:  (chart review)  Who is responsible for making decisions for patient? : Patient         Elopement Risk  Legal Hold: No  Ambulatory or Self Mobile in Wheelchair: Yes  Disoriented: No  Psychiatric Symptoms: None  History of Wandering: No  Elopement this Admit: No  Vocalizing Wanting to Leave: No  Displays Behaviors, Body Language Wanting to Leave: No-Not at Risk for Elopement  Elopement Risk: Not at Risk for Elopement    Interdisciplinary Discharge Planning  Lives with - Patient's Self Care Capacity: Alone and Unable to Care For Self  Patient or legal guardian wants to designate a caregiver: No  Support Systems: Family Member(s), Children  Housing / Facility: 1 Story Apartment / Condo  Durable Medical Equipment: Not Applicable    Discharge Preparedness  What is your plan after discharge?: Home health care  What are your discharge supports?: Child, Sibling  Prior Functional Level: Ambulatory  Difficulity with ADLs: None    Functional Assesment  Prior Functional Level: Ambulatory    Finances  Financial Barriers to Discharge: No    Vision / Hearing Impairment  Vision Impairment : Yes  Right Eye Vision: Impaired, Wears Glasses  Left Eye Vision: Impaired, Wears Glasses  Hearing Impairment : Yes  Hearing Impairment: Right Ear, Hearing Device Not  Available  Does Pt Need Special Equipment for the Hearing Impaired?: No         Advance Directive  Advance Directive?: POLST    Domestic Abuse  Have you ever been the victim of abuse or violence?: No  Physical Abuse or Sexual Abuse: No  Verbal Abuse or Emotional Abuse: No  Possible Abuse/Neglect Reported to:: Not Applicable              Anticipated Discharge Information  Discharge Disposition: Discharged to home/self care (01)  Discharge Address: [address]

## 2022-10-13 NOTE — PROGRESS NOTES
Hospital Medicine Daily Progress Note    Date of Service  10/13/2022    Chief Complaint  Shortness of breath    Hospital Course  Bridger Savage is a 72 y.o. male with ESRD on hemodialysis (Monday Wednesday Friday, [facility] nephrology), systolic CHF (EF 35%), COPD, admitted 10/10/2022 with increasing shortness of breath worse with exertion, with associated wheezing, nonproductive cough without fever, and sensation of chest congestion.  He had COVID infection in July.  He was afebrile, but tachycardic and tachypneic, along with hypoxia.  He is started on BiPAP.  Potassium 5.7, creatinine 10.92.  Troponin 84.  proBNP greater than 35,000.  No leukocytosis.  Chest x-ray showed increased ill-defined opacification in each lungs, consistent with worsening pulmonary edema.  He was felt to have COPD exacerbation, for which he was given nebs and steroids, but wheezing was felt to be due to volume overload.  Nephrology was contacted and he had daily hemodialysis.  He temporarily required BiPAP, but with volume removal his respiratory status improved and he was able to be weaned off BiPAP. Repeat chest x-ray showed improving bibasilar pulmonary opacities.    Interval Problem Update  10/13/2022 - I reviewed the patient's chart. There were no significant overnight events. Remains hemodynamically stable and afebrile.  Improved to 2 L of oxygen by nasal cannula.  Maintaining sinus rhythm on telemetry.  Creatinine 6.49, potassium 5.6, anion gap 17, CO2 25.  Repeat chest x-ray showed unchanged bibasilar atelectasis and/or pneumonia (personally reviewed).  He did have elevated procalcitonin.  Respiratory cultures growing Klebsiella and E cloacae. He will be started on antibiotics.    > I have personally seen and examined the patient today.  Complained of heartburn, after which his heart rate dipped to the 30s-40s.  Denies any chest pain.  His shortness of breath has improved.  He has improved sense of wellbeing, and is in better  spirits today.  No nausea, vomiting, abdominal pain.      I have discussed this patient's plan of care and discharge plan at IDT rounds today with Case Management, Nursing, Nursing leadership, and other members of the IDT team.      Consultants/Specialty  critical care    Code Status  DNAR/DNI    Disposition  Patient is not medically cleared for discharge.   6 clicks 23-24.  Anticipate discharge to to home with close outpatient follow-up.  I have placed the appropriate orders for post-discharge needs.    Review of Systems  ROS     Pertinent positives/negatives as mentioned above.     A complete review of systems was personally done by me. All other systems were negative.       Physical Exam  Temp:  [36.3 °C (97.4 °F)-36.4 °C (97.6 °F)] 36.3 °C (97.4 °F)  Pulse:  [60-79] 60  Resp:  [16-19] 18  BP: ()/(56-81) 93/60  SpO2:  [90 %-98 %] 93 %    Physical Exam  Vitals reviewed.   Constitutional:       General: He is not in acute distress.     Appearance: Normal appearance. He is not toxic-appearing or diaphoretic.   HENT:      Head: Normocephalic and atraumatic.      Right Ear: External ear normal.      Left Ear: External ear normal.      Mouth/Throat:      Mouth: Mucous membranes are moist.      Pharynx: No oropharyngeal exudate.   Eyes:      General: No scleral icterus.     Extraocular Movements: Extraocular movements intact.      Conjunctiva/sclera: Conjunctivae normal.      Pupils: Pupils are equal, round, and reactive to light.   Cardiovascular:      Rate and Rhythm: Normal rate and regular rhythm.      Heart sounds: Normal heart sounds. No murmur heard.    No gallop.   Pulmonary:      Effort: Pulmonary effort is normal. No respiratory distress.      Breath sounds: No stridor. Wheezing present. No rhonchi or rales.      Comments: Further improved air entry on both lung fields, with further improved expiratory wheezing -now very occasional at the bases  Chest:      Chest wall: No tenderness.   Abdominal:       General: Bowel sounds are normal. There is no distension.      Palpations: Abdomen is soft. There is no mass.      Tenderness: There is no abdominal tenderness. There is no guarding or rebound.   Musculoskeletal:         General: No swelling. Normal range of motion.      Cervical back: Normal range of motion and neck supple.      Right lower leg: No edema.      Left lower leg: No edema.   Lymphadenopathy:      Cervical: No cervical adenopathy.   Skin:     General: Skin is warm and dry.      Coloration: Skin is not jaundiced.      Findings: No rash.   Neurological:      General: No focal deficit present.      Mental Status: He is alert and oriented to person, place, and time.      Cranial Nerves: No cranial nerve deficit.   Psychiatric:         Mood and Affect: Mood normal.         Behavior: Behavior normal.         Thought Content: Thought content normal.         Judgment: Judgment normal.      Comments: Less anxious       Fluids    Intake/Output Summary (Last 24 hours) at 10/13/2022 1127  Last data filed at 10/13/2022 0900  Gross per 24 hour   Intake 500 ml   Output 2500 ml   Net -2000 ml       Laboratory  Recent Labs     10/11/22  1014   WBC 11.9*   RBC 4.24*   HEMOGLOBIN 12.2*   HEMATOCRIT 39.9*   MCV 94.1   MCH 28.8   MCHC 30.6*   RDW 55.9*   PLATELETCT 229   MPV 10.2     Recent Labs     10/11/22  1906 10/12/22  0159 10/13/22  0416   SODIUM 134* 136 135   POTASSIUM 6.0* 6.3* 5.6*   CHLORIDE 93* 94* 93*   CO2 24 23 25   GLUCOSE 126* 93 121*   BUN 31* 42* 56*   CREATININE 6.29* 7.25* 6.49*   CALCIUM 9.2 9.0 8.6                   Imaging  DX-CHEST-PORTABLE (1 VIEW)   Final Result      Unchanged bibasilar atelectasis and/or pneumonia      DX-CHEST-2 VIEWS   Final Result      Improving bibasilar pulmonary opacities.      DX-CHEST-PORTABLE (1 VIEW)   Final Result         Ill-defined opacifications in each lung have increased compared to the prior radiograph.  This could indicate worsening of pulmonary edema or  inflammation.           Assessment/Plan  * Acute hypoxemic respiratory failure (HCC)- (present on admission)  Assessment & Plan  - Was placed on BiPAP initially, but now down to 2 L of oxygen by nasal cannula.  Saturating well.  Has significant component of anxiety.  -Continue hemodialysis for volume removal as above.  -Continue management of COPD exacerbation with steroids, bronchodilators.  -X-ray showing bibasilar infiltrates.  With elevated procalcitonin, and cultures growing Klebsiella and E cloacae, I will start him on antibiotics.  Discussed with pharmacy, recommending Zosyn which has been ordered.  -Continue RT protocol for respiratory support.  Wean oxygen as able to keep saturations above 90%.  May need home oxygen, but I am hoping that he will not pass I am concerned about his compliance.  Exercise oximetry tomorrow.  -Continue as needed low-dose Ativan 0.5 mg every 4 hours for anxiety.    Acute exacerbation of chronic obstructive pulmonary disease (COPD) (HCC)- (present on admission)  Assessment & Plan  - He likely has significant component of COPD exacerbation.  Was wheezing despite fluid removal with dialysis.  Suspect volume overload pushed him into COPD exacerbation.  -Progressively improving wheezing and air entry.    -Continue Solu-Medrol 62.5 mg every 6 hours today, anticipate that if he continues to improve, can start tapering and transition to oral prednisone tomorrow.    Continue DuoNeb nebulization, and Anoro Ellipta inhalers.  Continue RT protocol per respiratory support.    ACC/AHA stage C systolic heart failure (HCC)- (present on admission)  Assessment & Plan  -Most recent echocardiogram was done on 10/1/2019 and EF was 35%.  -Initial chest x-ray showed pulmonary edema.  -Nephrology on board for hemodialysis for additional fluid removal.  -Troponin mildly elevated, trended down, likely due to underlying ESRD.  Doubt ACS.  He did have some T wave inversions on the EKG on the lateral leads  (personally reviewed), will trend troponins today.  -Continue Coreg.  Holding off on ACE inhibitor due to ESRD/hyperkalemia.    Pulmonary edema- (present on admission)  Assessment & Plan  -Initial x-ray showing ill-defined opacifications on each lung with increased patterns as compared to prior.  Worsening pulmonary edema or infiltration.  -Getting hemodialysis.  -Suspect has component of pneumonia.  Respiratory cultures growing Klebsiella and E.cloacae.  Start IV Zosyn.  Trend procalcitonin.    Hyperkalemia- (present on admission)  Assessment & Plan  - Due to ESRD.  Received calcium gluconate and insulin/glucose initially.  -Improved levels with dialysis.  Further dialysis per nephrology.  -Repeat BMP in the morning.  -Monitor on telemetry.    Normocytic anemia- (present on admission)  Assessment & Plan  - Likely due to ESRD, anemia of chronic disease.  -Hemoglobin stable.    Essential hypertension- (present on admission)  Assessment & Plan  -Maintaining good control.  Continue Coreg.  Monitor blood pressure trend closely.    ESRD (end stage renal disease) on dialysis (HCC)- (present on admission)  Assessment & Plan  -Follows with [facility] nephrology, Dr. Chaudhari.  Outpatient HD Monday Wednesday Friday.  -Getting hemodialysis per nephrology.         VTE prophylaxis: heparin ppx

## 2022-10-13 NOTE — CARE PLAN
Patient A&Ox4, VS stable, 3.5LNC, telemetry, dialysis, fistula, IV patent, call light within reach, bed low and locked    The patient is Stable - Low risk of patient condition declining or worsening    Shift Goals  Clinical Goals: wean O2, dialysis  Patient Goals: eat, go home, rest  Family Goals: N/A    Progress made toward(s) clinical / shift goals:    Problem: Knowledge Deficit - Standard  Goal: Patient and family/care givers will demonstrate understanding of plan of care, disease process/condition, diagnostic tests and medications  Outcome: Progressing     Problem: Psychosocial  Goal: Patient's level of anxiety will decrease  Outcome: Progressing  Goal: Patient's ability to verbalize feelings about condition will improve  Outcome: Progressing  Goal: Patient's ability to re-evaluate and adapt role responsibilities will improve  Outcome: Progressing  Goal: Patient and family will demonstrate ability to cope with life altering diagnosis and/or procedure  Outcome: Progressing  Goal: Spiritual and cultural needs incorporated into hospitalization  Outcome: Progressing     Problem: Respiratory  Goal: Patient will achieve/maintain optimum respiratory ventilation and gas exchange  Outcome: Progressing     Problem: Mechanical Ventilation  Goal: Safe management of artificial airway and ventilation  Outcome: Progressing  Goal: Successful weaning off mechanical ventilator, spontaneously maintains adequate gas exchange  Outcome: Progressing  Goal: Patient will be able to express needs and understand communication  Outcome: Progressing     Problem: Risk for Aspiration  Goal: Patient's risk for aspiration will be absent or decrease  Outcome: Progressing     Problem: Urinary - Renal Perfusion  Goal: Ability to achieve and maintain adequate renal perfusion and functioning will improve  Outcome: Progressing     Problem: Pain - Standard  Goal: Alleviation of pain or a reduction in pain to the patient’s comfort goal  Outcome:  Progressing       Patient is not progressing towards the following goals:

## 2022-10-13 NOTE — CARE PLAN
The patient is Watcher - Medium risk of patient condition declining or worsening    Shift Goals  Clinical Goals: Wean O2, dialyse, monitor labs  Patient Goals: Go home  Family Goals: N/A    Progress made toward(s) clinical / shift goals:    Problem: Knowledge Deficit - Standard  Goal: Patient and family/care givers will demonstrate understanding of plan of care, disease process/condition, diagnostic tests and medications  Outcome: Progressing     Problem: Mechanical Ventilation  Goal: Safe management of artificial airway and ventilation  Outcome: Progressing  Goal: Successful weaning off mechanical ventilator, spontaneously maintains adequate gas exchange  Outcome: Progressing  Goal: Patient will be able to express needs and understand communication  Outcome: Progressing     Problem: Urinary - Renal Perfusion  Goal: Ability to achieve and maintain adequate renal perfusion and functioning will improve  Outcome: Progressing     Problem: Pain - Standard  Goal: Alleviation of pain or a reduction in pain to the patient’s comfort goal  Outcome: Progressing       Patient is not progressing towards the following goals:    Unable to decrease oxygen needs. RN attempted but pt unable to tolerate. Pt continues to be on 2L nasal cannula will continue to monitor.     Problem: Respiratory  Goal: Patient will achieve/maintain optimum respiratory ventilation and gas exchange  Outcome: Not Progressing

## 2022-10-14 LAB
ALBUMIN SERPL BCP-MCNC: 3.9 G/DL (ref 3.2–4.9)
ANION GAP SERPL CALC-SCNC: 23 MMOL/L (ref 7–16)
BACTERIA SPEC RESP CULT: ABNORMAL
BUN SERPL-MCNC: 91 MG/DL (ref 8–22)
CALCIUM SERPL-MCNC: 8.1 MG/DL (ref 8.4–10.2)
CHLORIDE SERPL-SCNC: 92 MMOL/L (ref 96–112)
CO2 SERPL-SCNC: 18 MMOL/L (ref 20–33)
CREAT SERPL-MCNC: 9.21 MG/DL (ref 0.5–1.4)
EKG IMPRESSION: NORMAL
ERYTHROCYTE [DISTWIDTH] IN BLOOD BY AUTOMATED COUNT: 49.2 FL (ref 35.9–50)
GFR SERPLBLD CREATININE-BSD FMLA CKD-EPI: 6 ML/MIN/1.73 M 2
GLUCOSE SERPL-MCNC: 120 MG/DL (ref 65–99)
GRAM STN SPEC: ABNORMAL
HCT VFR BLD AUTO: 39.8 % (ref 42–52)
HGB BLD-MCNC: 13 G/DL (ref 14–18)
MCH RBC QN AUTO: 28.7 PG (ref 27–33)
MCHC RBC AUTO-ENTMCNC: 32.7 G/DL (ref 33.7–35.3)
MCV RBC AUTO: 87.9 FL (ref 81.4–97.8)
PHOSPHATE SERPL-MCNC: 8.6 MG/DL (ref 2.5–4.5)
PLATELET # BLD AUTO: 237 K/UL (ref 164–446)
PMV BLD AUTO: 10.4 FL (ref 9–12.9)
POTASSIUM SERPL-SCNC: 5.3 MMOL/L (ref 3.6–5.5)
PROCALCITONIN SERPL-MCNC: 1.32 NG/ML
RBC # BLD AUTO: 4.53 M/UL (ref 4.7–6.1)
SIGNIFICANT IND 70042: ABNORMAL
SITE SITE: ABNORMAL
SODIUM SERPL-SCNC: 133 MMOL/L (ref 135–145)
SOURCE SOURCE: ABNORMAL
WBC # BLD AUTO: 11.2 K/UL (ref 4.8–10.8)

## 2022-10-14 PROCEDURE — 80069 RENAL FUNCTION PANEL: CPT

## 2022-10-14 PROCEDURE — 94640 AIRWAY INHALATION TREATMENT: CPT

## 2022-10-14 PROCEDURE — 700102 HCHG RX REV CODE 250 W/ 637 OVERRIDE(OP): Performed by: HOSPITALIST

## 2022-10-14 PROCEDURE — 84145 PROCALCITONIN (PCT): CPT

## 2022-10-14 PROCEDURE — 90935 HEMODIALYSIS ONE EVALUATION: CPT

## 2022-10-14 PROCEDURE — 700102 HCHG RX REV CODE 250 W/ 637 OVERRIDE(OP): Performed by: INTERNAL MEDICINE

## 2022-10-14 PROCEDURE — A9270 NON-COVERED ITEM OR SERVICE: HCPCS | Performed by: INTERNAL MEDICINE

## 2022-10-14 PROCEDURE — 770020 HCHG ROOM/CARE - TELE (206)

## 2022-10-14 PROCEDURE — 700111 HCHG RX REV CODE 636 W/ 250 OVERRIDE (IP): Performed by: INTERNAL MEDICINE

## 2022-10-14 PROCEDURE — 93010 ELECTROCARDIOGRAM REPORT: CPT | Performed by: INTERNAL MEDICINE

## 2022-10-14 PROCEDURE — 85027 COMPLETE CBC AUTOMATED: CPT

## 2022-10-14 PROCEDURE — A9270 NON-COVERED ITEM OR SERVICE: HCPCS | Performed by: HOSPITALIST

## 2022-10-14 PROCEDURE — 36415 COLL VENOUS BLD VENIPUNCTURE: CPT

## 2022-10-14 PROCEDURE — 99233 SBSQ HOSP IP/OBS HIGH 50: CPT | Performed by: INTERNAL MEDICINE

## 2022-10-14 PROCEDURE — 94760 N-INVAS EAR/PLS OXIMETRY 1: CPT

## 2022-10-14 RX ORDER — HEPARIN SODIUM 1000 [USP'U]/ML
2250 INJECTION, SOLUTION INTRAVENOUS; SUBCUTANEOUS PRN
Status: DISCONTINUED | OUTPATIENT
Start: 2022-10-14 | End: 2022-10-14

## 2022-10-14 RX ORDER — HEPARIN SODIUM 1000 [USP'U]/ML
1750 INJECTION, SOLUTION INTRAVENOUS; SUBCUTANEOUS PRN
Status: DISCONTINUED | OUTPATIENT
Start: 2022-10-14 | End: 2022-10-15 | Stop reason: HOSPADM

## 2022-10-14 RX ORDER — PREDNISONE 20 MG/1
40 TABLET ORAL DAILY
Status: DISCONTINUED | OUTPATIENT
Start: 2022-10-14 | End: 2022-10-15 | Stop reason: HOSPADM

## 2022-10-14 RX ADMIN — SEVELAMER CARBONATE 3200 MG: 800 TABLET, FILM COATED ORAL at 17:39

## 2022-10-14 RX ADMIN — HEPARIN SODIUM 2300 UNITS: 1000 INJECTION, SOLUTION INTRAVENOUS; SUBCUTANEOUS at 09:55

## 2022-10-14 RX ADMIN — SEVELAMER CARBONATE 3200 MG: 800 TABLET, FILM COATED ORAL at 11:26

## 2022-10-14 RX ADMIN — PATIROMER 8.4 G: 8.4 POWDER, FOR SUSPENSION ORAL at 12:25

## 2022-10-14 RX ADMIN — CARVEDILOL 25 MG: 25 TABLET, FILM COATED ORAL at 06:47

## 2022-10-14 RX ADMIN — CINACALCET HYDROCHLORIDE 60 MG: 30 TABLET, FILM COATED ORAL at 06:45

## 2022-10-14 RX ADMIN — HEPARIN SODIUM 5000 UNITS: 5000 INJECTION, SOLUTION INTRAVENOUS; SUBCUTANEOUS at 13:48

## 2022-10-14 RX ADMIN — METHYLPREDNISOLONE SODIUM SUCCINATE 62.5 MG: 125 INJECTION, POWDER, FOR SOLUTION INTRAMUSCULAR; INTRAVENOUS at 06:46

## 2022-10-14 RX ADMIN — HEPARIN SODIUM 5000 UNITS: 5000 INJECTION, SOLUTION INTRAVENOUS; SUBCUTANEOUS at 22:17

## 2022-10-14 RX ADMIN — METHYLPREDNISOLONE SODIUM SUCCINATE 62.5 MG: 125 INJECTION, POWDER, FOR SOLUTION INTRAMUSCULAR; INTRAVENOUS at 01:20

## 2022-10-14 RX ADMIN — PREDNISONE 40 MG: 20 TABLET ORAL at 10:35

## 2022-10-14 RX ADMIN — FLUTICASONE PROPIONATE 88 MCG: 44 AEROSOL, METERED RESPIRATORY (INHALATION) at 07:36

## 2022-10-14 RX ADMIN — SEVELAMER CARBONATE 3200 MG: 800 TABLET, FILM COATED ORAL at 06:45

## 2022-10-14 RX ADMIN — OMEPRAZOLE 20 MG: 20 CAPSULE, DELAYED RELEASE ORAL at 06:46

## 2022-10-14 ASSESSMENT — PAIN DESCRIPTION - PAIN TYPE: TYPE: ACUTE PAIN

## 2022-10-14 NOTE — FACE TO FACE
"Face to Face Note  -  Durable Medical Equipment    Isiah Ybarra M.D. - NPI: [id number]  I certify that this patient is under my care and that they had a durable medical equipment(DME)face to face encounter by myself that meets the physician DME face-to-face encounter requirements with this patient on:    Date of encounter:   Patient:                    MRN:                       YOB: 2020  Bridger Gallardo [facility]  [id number]  1950     The encounter with the patient was in whole, or in part, for the following medical condition, which is the primary reason for durable medical equipment:  COPD    I certify that, based on my findings, the following durable medical equipment is medically necessary:    Oxygen   HOME O2 Saturation Measurements:(Values must be present for Home Oxygen orders)  Room air sat at rest: 93  Room air sat with amb: 86  With liters of O2: 2, O2 sat at rest with O2: 98  With Liters of O2: 2, O2 sat with amb with O2 : 94  Is the patient mobile?: Yes  If patient feels more short of breath, they can go up to 6 liters per minute and contact healthcare provider.    Supporting Symptoms: The patient requires supplemental oxygen, as the following interventions have been tried with limited or no improvement: \"Bronchodilators and/or steroid inhalers, \"Oral and/or IV steroids, and \"Ambulation with oximetry.    My Clinical findings support the need for the above equipment due to:  Hypoxia  "

## 2022-10-14 NOTE — PROGRESS NOTES
Hospital Medicine Daily Progress Note    Date of Service  10/14/2022    Chief Complaint  Shortness of breath    Hospital Course  Bridger Savage is a 72 y.o. male with ESRD on hemodialysis (Monday Wednesday Friday, [facility] nephrology), systolic CHF (EF 35%), COPD, admitted 10/10/2022 with increasing shortness of breath worse with exertion, with associated wheezing, nonproductive cough without fever, and sensation of chest congestion.  He had COVID infection in July.  He was afebrile, but tachycardic and tachypneic, along with hypoxia.  He is started on BiPAP.  Potassium 5.7, creatinine 10.92.  Troponin 84.  proBNP greater than 35,000.  No leukocytosis.  Chest x-ray showed increased ill-defined opacification in each lungs, consistent with worsening pulmonary edema.  He was felt to have COPD exacerbation, for which he was given nebs and steroids, but wheezing was felt to be due to volume overload.  Nephrology was contacted and he had daily hemodialysis.  He temporarily required BiPAP, but with volume removal his respiratory status improved and he was able to be weaned off BiPAP.  Repeat chest x-ray showed improving bibasilar pulmonary opacities.  Respiratory cultures growing Klebsiella and E cloacae. He was initially given antibiotics, however as his respiratory status improved with hemodialysis and steroids prior to giving the antibiotics with no fevers and leukocytosis, it was felt that he is colonized and not having true pneumonia.  Mild elevation of procalcitonin was attributed to his ESRD.  Antibiotic stewardship recommended discontinuing antibiotics.    Interval Problem Update  10/14/2022 - I reviewed the patient's chart today. Uneventful night. VSS. Afebrile. Stable and saturating well on 2L O2 NC.  WBC 11,200, felt to be steroid related.  Creatinine 9.21.  Potassium is normal.  Troponins 65-70-67, likely from his ESRD.    > I have personally seen and examined the patient today.  He is breathing well today,  still gets winded with exertion but almost near his baseline.  He is in much better spirits today.  Denies any chest pain.  No nausea or vomiting.  He is getting dialysis today.  Exercise oximetry showed need for 2 L of oxygen continuously.  I discussed with him about need for home oxygen, and he states he is agreeable to it but does not want the big oxygen tanks but willing to use the portable concentrators.    I have discussed this patient's plan of care and discharge plan at IDT rounds today with Case Management, Nursing, Nursing leadership, and other members of the IDT team.      Consultants/Specialty  critical care    Code Status  DNAR/DNI    Disposition  Patient is not medically cleared for discharge.   6 clicks 23-24.  Anticipate discharge to to home with close outpatient follow-up.  I have placed the appropriate orders for post-discharge needs.    Review of Systems  ROS     Pertinent positives/negatives as mentioned above.     A complete review of systems was personally done by me. All other systems were negative.       Physical Exam  Temp:  [36.3 °C (97.3 °F)-36.6 °C (97.9 °F)] 36.3 °C (97.4 °F)  Pulse:  [59-74] 74  Resp:  [17-18] 18  BP: (104-116)/(64-78) 112/78  SpO2:  [90 %-97 %] 94 %    Physical Exam  Vitals reviewed.   Constitutional:       General: He is not in acute distress.     Appearance: Normal appearance. He is not toxic-appearing or diaphoretic.   HENT:      Head: Normocephalic and atraumatic.      Right Ear: External ear normal.      Left Ear: External ear normal.      Mouth/Throat:      Mouth: Mucous membranes are moist.      Pharynx: No oropharyngeal exudate.   Eyes:      General: No scleral icterus.     Extraocular Movements: Extraocular movements intact.      Conjunctiva/sclera: Conjunctivae normal.      Pupils: Pupils are equal, round, and reactive to light.   Cardiovascular:      Rate and Rhythm: Normal rate and regular rhythm.      Heart sounds: Normal heart sounds. No murmur heard.     No gallop.   Pulmonary:      Effort: Pulmonary effort is normal. No respiratory distress.      Breath sounds: No stridor. Wheezing present. No rhonchi or rales.      Comments: Adequate air entry on both lung fields, with occasional expiratory wheezes.  Chest:      Chest wall: No tenderness.   Abdominal:      General: Bowel sounds are normal. There is no distension.      Palpations: Abdomen is soft. There is no mass.      Tenderness: There is no abdominal tenderness. There is no guarding or rebound.   Musculoskeletal:         General: No swelling. Normal range of motion.      Cervical back: Normal range of motion and neck supple.      Right lower leg: No edema.      Left lower leg: No edema.   Lymphadenopathy:      Cervical: No cervical adenopathy.   Skin:     General: Skin is warm and dry.      Coloration: Skin is not jaundiced.      Findings: No rash.   Neurological:      General: No focal deficit present.      Mental Status: He is alert and oriented to person, place, and time.      Cranial Nerves: No cranial nerve deficit.   Psychiatric:         Mood and Affect: Mood normal.         Behavior: Behavior normal.         Thought Content: Thought content normal.         Judgment: Judgment normal.      Comments: Less anxious.  In good spirits today.       Fluids    Intake/Output Summary (Last 24 hours) at 10/14/2022 1013  Last data filed at 10/13/2022 1400  Gross per 24 hour   Intake 120 ml   Output --   Net 120 ml       Laboratory  Recent Labs     10/11/22  1014 10/14/22  0155   WBC 11.9* 11.2*   RBC 4.24* 4.53*   HEMOGLOBIN 12.2* 13.0*   HEMATOCRIT 39.9* 39.8*   MCV 94.1 87.9   MCH 28.8 28.7   MCHC 30.6* 32.7*   RDW 55.9* 49.2   PLATELETCT 229 237   MPV 10.2 10.4     Recent Labs     10/12/22  0159 10/13/22  0416 10/14/22  0155   SODIUM 136 135 133*   POTASSIUM 6.3* 5.6* 5.3   CHLORIDE 94* 93* 92*   CO2 23 25 18*   GLUCOSE 93 121* 120*   BUN 42* 56* 91*   CREATININE 7.25* 6.49* 9.21*   CALCIUM 9.0 8.6 8.1*                    Imaging  DX-CHEST-PORTABLE (1 VIEW)   Final Result      Unchanged bibasilar atelectasis and/or pneumonia      DX-CHEST-2 VIEWS   Final Result      Improving bibasilar pulmonary opacities.      DX-CHEST-PORTABLE (1 VIEW)   Final Result         Ill-defined opacifications in each lung have increased compared to the prior radiograph.  This could indicate worsening of pulmonary edema or inflammation.      EC-ECHOCARDIOGRAM COMPLETE W/O CONT    (Results Pending)        Assessment/Plan  * Acute hypoxemic respiratory failure (HCC)- (present on admission)  Assessment & Plan  - Was placed on BiPAP initially, but now down to 2 L of oxygen by nasal cannula.  Saturating well.  Has significant component of anxiety.  -Continue hemodialysis for volume removal as above.  -Continue management of COPD exacerbation with steroids, bronchodilators.  -X-ray showing bibasilar infiltrates.  Respiratory cultures growing Klebsiella and E cloacae however, no significant leukocytosis (only likely steroid related), and procalcitonin likely related to his underlying ESRD.  Antibiotic stewardship suspect colonization, and recommended to stop antibiotics and monitor off antibiotics.  -Continue RT protocol for respiratory support.  Wean oxygen as able to keep saturations above 90%.  Will need home oxygen, CM/SW to arrange.  -Continue as needed low-dose Ativan 0.5 mg every 4 hours for anxiety.    Acute exacerbation of chronic obstructive pulmonary disease (COPD) (HCC)- (present on admission)  Assessment & Plan  - He likely has significant component of COPD exacerbation.  Was wheezing despite fluid removal with dialysis.  Suspect volume overload pushed him into COPD exacerbation.  -Significantly improved, adequate air entry and decreased wheezing.    -Transition to oral prednisone 40 mg daily, will need slow taper.  -Continue DuoNeb nebulization, and Anoro Ellipta inhalers.  Continue RT protocol per respiratory support.  -Exercise oximetry showed  need for 2 L of oxygen continuously.  Will have CM/SW arrange for home oxygen.    ACC/AHA stage C systolic heart failure (HCC)- (present on admission)  Assessment & Plan  -Most recent echocardiogram was done on 10/1/2019 and EF was 35%.  -Initial chest x-ray showed pulmonary edema.  -Nephrology on board for hemodialysis for additional fluid removal.  -Troponin mildly elevated, but remained flat consistent with underlying ESRD.  Doubt ACS.    -Continue Coreg.  Holding off on ACE inhibitor due to ESRD/hyperkalemia.  -Will get an updated echocardiogram.    Pulmonary edema- (present on admission)  Assessment & Plan  -Initial x-ray showing ill-defined opacifications on each lung with increased patterns as compared to prior, with worsened pulmonary edema or infiltration.  -Improved with hemodialysis.      Hyperkalemia- (present on admission)  Assessment & Plan  - Due to ESRD.  Received calcium gluconate and insulin/glucose initially.  -Improved levels with dialysis.  Further dialysis per nephrology.  -Repeat BMP in the morning.  -Monitor on telemetry.    Normocytic anemia- (present on admission)  Assessment & Plan  - Likely due to ESRD, anemia of chronic disease.  -Hemoglobin stable.    Essential hypertension- (present on admission)  Assessment & Plan  -Maintaining good control.  Continue Coreg.  Monitor blood pressure trend closely.    ESRD (end stage renal disease) on dialysis (HCC)- (present on admission)  Assessment & Plan  -Follows with [facility] nephrology, Dr. Chaudhari.  Outpatient HD Monday Wednesday Friday.  -Getting hemodialysis per nephrology.         VTE prophylaxis: heparin ppx

## 2022-10-14 NOTE — DISCHARGE PLANNING
Received Choice form at [phone]  Agency/Facility Name: [facility]   Referral sent per Choice form @ [phone]

## 2022-10-14 NOTE — CARE PLAN
Patient A&Ox4, VS stable, 2LNC, telemetry, dialysis, fistula, IV patent, call light within reach, bed low and locked    The patient is Stable - Low risk of patient condition declining or worsening    Shift Goals  Clinical Goals: Wean O2, dialyse, monitor labs  Patient Goals: Go home  Family Goals: N/A    Progress made toward(s) clinical / shift goals:    Problem: Knowledge Deficit - Standard  Goal: Patient and family/care givers will demonstrate understanding of plan of care, disease process/condition, diagnostic tests and medications  Outcome: Progressing     Problem: Psychosocial  Goal: Patient's level of anxiety will decrease  Outcome: Progressing  Goal: Patient's ability to verbalize feelings about condition will improve  Outcome: Progressing  Goal: Patient's ability to re-evaluate and adapt role responsibilities will improve  Outcome: Progressing  Goal: Patient and family will demonstrate ability to cope with life altering diagnosis and/or procedure  Outcome: Progressing  Goal: Spiritual and cultural needs incorporated into hospitalization  Outcome: Progressing     Problem: Respiratory  Goal: Patient will achieve/maintain optimum respiratory ventilation and gas exchange  Outcome: Progressing     Problem: Mechanical Ventilation  Goal: Safe management of artificial airway and ventilation  Outcome: Progressing  Goal: Successful weaning off mechanical ventilator, spontaneously maintains adequate gas exchange  Outcome: Progressing  Goal: Patient will be able to express needs and understand communication  Outcome: Progressing     Problem: Risk for Aspiration  Goal: Patient's risk for aspiration will be absent or decrease  Outcome: Progressing     Problem: Urinary - Renal Perfusion  Goal: Ability to achieve and maintain adequate renal perfusion and functioning will improve  Outcome: Progressing     Problem: Pain - Standard  Goal: Alleviation of pain or a reduction in pain to the patient’s comfort goal  Outcome:  Progressing       Patient is not progressing towards the following goals:

## 2022-10-14 NOTE — PROGRESS NOTES
Monitor check called on pt for drop in heart rate to 29. RN checked on pt, pt asymptomatic. MD Ybarra notified.  New orders placed, EKG performed. Will continue to monitor pt.

## 2022-10-14 NOTE — PROGRESS NOTES
[facility] Progress Note     Hemodialysis treatment ordered today per  x 3.5 hours.   Treatment initiated at 0949 ended at 1330.      Patient did have hypotensive episodes multiple times during Tx. UF Goal was changed as necessary to support BP.   Patient tolerated tx; see e flow sheet for details.      Net UF  1750 mL.      Needles removed from access site. Dressings applied and sites held 10  minutes; verified no bleeding. Positive bruit/thrill post tx.   Staff RN to monitor AVF/G for breakthrough bleeding. Should breakthrough bleeding occur, staff RN to apply pressure to access sites until bleeding resolved.   Notify Dialysis and Nephrologist for follow-up.     Report given to Primary RN Elzbieta

## 2022-10-14 NOTE — DISCHARGE PLANNING
Case Management Discharge Planning    Admission Date: 10/10/2022  GMLOS: 3.5  ALOS: 4    6-Clicks ADL Score: 24  6-Clicks Mobility Score: 23      Anticipated Discharge Dispo: Discharge Disposition: Discharged to home/self care (01)  Discharge Address: [place], [place]    DME Needed: Yes    DME Ordered: Yes    Action(s) Taken: Choice obtained, Referral(s) sent, and DME Face to Face     Escalations Completed: None    Medically Clear: No    Next Steps: RNCM participated in IDT rounds. Patient is in need of new oxygen set up. RNCM faxed choice form to [facility].     Barriers to Discharge: Medical clearance and Oxygen Delivery    Is the patient up for discharge tomorrow: Patient is likely to discharge home in 1/2 day w/o any additional needs.      As per pt. he is adopted so does not know his FH.

## 2022-10-14 NOTE — PROGRESS NOTES
Telemetry Shift Summary     Rhythm: SR  Rate: 62-72  Measurements: 0.16/0.08/0.44  Ectopy (reported by Monitor Tech): r-oPAC, rCoup, oPVC     Normal Values  Rhythm: Sinus  HR:   Measurements: 0.12-0.20/0.06-0.10/0.30-0.52

## 2022-10-14 NOTE — PROGRESS NOTES
[facility] -  PROGRESS NOTE               Author: Gus Murillo M.D. Date & Time: 10/14/2022  8:20 AM     HPI:  73 yo M PMH ESRD MWF iHD, HTN, anemia of CKD, CKD-MBD, and HLD who presents to ED with CC as above.  He has had worsening SOB over the past week and got to the point where it was unbearable and he came to ED via EMS.  He denies missing any recent HD treatments.  Claims compliance with diet/fluids.  Complained of feeling of fullness in chest.  Hx of COVID-19 infxn in July 2022.  Labs in ED showed BNP > 35K, K+ 5.5.  No ECG changes.  Placed on BiPaP to maintain O2 sats and was admitted to ICU.  Otherwise denies F/C/N/V/CP.  No melena, hematochezia, hematemesis.  No HA, visual changes, or abdominal pain.    DAILY NEPHROLOGY SUMMARY:  10/10: Consult done  10/11: NAEO, SOB improved but still breathing heavy; NYHA Class IV  10/12: NAEO, feels better, SOB improved, K+ remains elevated despite daily dialysis  10/13: 2L Net UF. Resting in bed, no acute distress. Reports improved SOB, remains on 2L NC. K 5.6.  10/14: NAEO, no complaints, happy about his labs    REVIEW OF SYSTEMS:    10 point ROS reviewed and is as per HPI/daily summary or otherwise negative    PMH/PSH/SH/FH:   Reviewed and unchanged since admission note    CURRENT MEDICATIONS:   Reviewed from admission to present day    VS:  /78   Pulse 74   Temp 36.3 °C (97.4 °F) (Oral)   Resp 18   Ht 1.829 m (6')   Wt 82.1 kg (181 lb)   SpO2 94%   BMI 24.55 kg/m²     Physical Exam  Nursing note reviewed.   Constitutional:       Appearance: Normal appearance.   Eyes:      General: No scleral icterus.  Cardiovascular:      Comments: L AVF tortuous + Bruit/+ Thrill  No edema  Pulmonary:      Effort: Pulmonary effort is normal. No tachypnea or respiratory distress.   Abdominal:      General: There is no distension.   Musculoskeletal:         General: No deformity.   Skin:     Findings: No rash.   Neurological:      Mental  Status: He is alert.   Psychiatric:         Behavior: Behavior normal.     Fluids:  In: 120 [P.O.:120]  Out: -     LABS:  Recent Labs     10/12/22  0159 10/13/22  0416 10/14/22  0155   SODIUM 136 135 133*   POTASSIUM 6.3* 5.6* 5.3   CHLORIDE 94* 93* 92*   CO2 23 25 18*   GLUCOSE 93 121* 120*   BUN 42* 56* 91*   CREATININE 7.25* 6.49* 9.21*   CALCIUM 9.0 8.6 8.1*       IMAGING:   All imaging reviewed from admission to present day    IMPRESSION:  # ESRD  - Etiology likely 2/2 HTN  - MWF iHD  # Acute respiratory failure with hypoxia  - On BiPaP -> 2L NC tolerating O2 titration  - Likely due to volume overload  - COPD exacerbation on differential if UF does not help  # HTN  - Goal BP < 140/90  - At goal  # Anemia of CKD  - Goal Hgb 10-11  - At goal  # CKD-MBD     PLAN:  - MWF iHD  - UF as tolerated  - Likely needs another fistulagram for recirculation as OP  - Patiromer 8.4g po daily, DC if/when K+ < 4.5  - No dietary protein restrictions  - Dose all meds per ESRD  - Renal diet  - Ok to transition to OP care from renal standpoint

## 2022-10-14 NOTE — CARE PLAN
The patient is Stable - Low risk of patient condition declining or worsening    Shift Goals  Clinical Goals: Wean O2, dialyse, monitor labs  Patient Goals: Go home  Family Goals: N/A    Progress made toward(s) clinical / shift goals:  .    Patient is not progressing towards the following goals:

## 2022-10-14 NOTE — PROGRESS NOTES
Monitor Summary     Rhythm:SR 60-74, dropped 29  Measurements: 0.16/0.08/0.44  ECTOPIES: rPVC, rPAC, rJEB        Normal Values  Rhythm SR  HR Range    Measurements 0.12-0.20 / 0.06-0.10  / 0.30-0.52    Patient awaits placement, skin care done, alert and oriented, payne patent, regular diet.

## 2022-10-15 ENCOUNTER — APPOINTMENT (OUTPATIENT)
Dept: CARDIOLOGY | Facility: MEDICAL CENTER | Age: 72
DRG: 189 | End: 2022-10-15
Attending: INTERNAL MEDICINE
Payer: MEDICARE

## 2022-10-15 VITALS
SYSTOLIC BLOOD PRESSURE: 97 MMHG | TEMPERATURE: 97.6 F | BODY MASS INDEX: 24.52 KG/M2 | HEIGHT: 72 IN | HEART RATE: 53 BPM | RESPIRATION RATE: 18 BRPM | WEIGHT: 181 LBS | OXYGEN SATURATION: 95 % | DIASTOLIC BLOOD PRESSURE: 64 MMHG

## 2022-10-15 LAB
ALBUMIN SERPL BCP-MCNC: 3.8 G/DL (ref 3.2–4.9)
BUN SERPL-MCNC: 69 MG/DL (ref 8–22)
CALCIUM SERPL-MCNC: 7.9 MG/DL (ref 8.4–10.2)
CHLORIDE SERPL-SCNC: 95 MMOL/L (ref 96–112)
CO2 SERPL-SCNC: 23 MMOL/L (ref 20–33)
CREAT SERPL-MCNC: 7.85 MG/DL (ref 0.5–1.4)
ERYTHROCYTE [DISTWIDTH] IN BLOOD BY AUTOMATED COUNT: 50.1 FL (ref 35.9–50)
GFR SERPLBLD CREATININE-BSD FMLA CKD-EPI: 7 ML/MIN/1.73 M 2
GLUCOSE SERPL-MCNC: 118 MG/DL (ref 65–99)
HCT VFR BLD AUTO: 43.7 % (ref 42–52)
HGB BLD-MCNC: 14.2 G/DL (ref 14–18)
LV EJECT FRACT  99904: 28
LV EJECT FRACT MOD 2C 99903: 36.38
LV EJECT FRACT MOD 4C 99902: 23.8
LV EJECT FRACT MOD BP 99901: 27.68
MCH RBC QN AUTO: 29 PG (ref 27–33)
MCHC RBC AUTO-ENTMCNC: 32.5 G/DL (ref 33.7–35.3)
MCV RBC AUTO: 89.4 FL (ref 81.4–97.8)
PHOSPHATE SERPL-MCNC: 6.8 MG/DL (ref 2.5–4.5)
PLATELET # BLD AUTO: 216 K/UL (ref 164–446)
PMV BLD AUTO: 10 FL (ref 9–12.9)
POTASSIUM SERPL-SCNC: 4.7 MMOL/L (ref 3.6–5.5)
RBC # BLD AUTO: 4.89 M/UL (ref 4.7–6.1)
SODIUM SERPL-SCNC: 136 MMOL/L (ref 135–145)
WBC # BLD AUTO: 9.5 K/UL (ref 4.8–10.8)

## 2022-10-15 PROCEDURE — 99239 HOSP IP/OBS DSCHRG MGMT >30: CPT | Performed by: INTERNAL MEDICINE

## 2022-10-15 PROCEDURE — A9270 NON-COVERED ITEM OR SERVICE: HCPCS | Performed by: INTERNAL MEDICINE

## 2022-10-15 PROCEDURE — 94640 AIRWAY INHALATION TREATMENT: CPT

## 2022-10-15 PROCEDURE — 700102 HCHG RX REV CODE 250 W/ 637 OVERRIDE(OP): Performed by: INTERNAL MEDICINE

## 2022-10-15 PROCEDURE — 94660 CPAP INITIATION&MGMT: CPT

## 2022-10-15 PROCEDURE — 93306 TTE W/DOPPLER COMPLETE: CPT | Mod: 26 | Performed by: INTERNAL MEDICINE

## 2022-10-15 PROCEDURE — 80069 RENAL FUNCTION PANEL: CPT

## 2022-10-15 PROCEDURE — 700102 HCHG RX REV CODE 250 W/ 637 OVERRIDE(OP): Performed by: HOSPITALIST

## 2022-10-15 PROCEDURE — 85027 COMPLETE CBC AUTOMATED: CPT

## 2022-10-15 PROCEDURE — 36415 COLL VENOUS BLD VENIPUNCTURE: CPT

## 2022-10-15 PROCEDURE — A9270 NON-COVERED ITEM OR SERVICE: HCPCS | Performed by: HOSPITALIST

## 2022-10-15 PROCEDURE — 700111 HCHG RX REV CODE 636 W/ 250 OVERRIDE (IP): Performed by: INTERNAL MEDICINE

## 2022-10-15 PROCEDURE — 93306 TTE W/DOPPLER COMPLETE: CPT

## 2022-10-15 PROCEDURE — 94760 N-INVAS EAR/PLS OXIMETRY 1: CPT

## 2022-10-15 RX ORDER — SEVELAMER CARBONATE 800 MG/1
1600 TABLET, FILM COATED ORAL 3 TIMES DAILY
Status: DISCONTINUED | OUTPATIENT
Start: 2022-10-15 | End: 2022-10-15 | Stop reason: HOSPADM

## 2022-10-15 RX ORDER — PREDNISONE 20 MG/1
40 TABLET ORAL DAILY
Qty: 10 TABLET | Refills: 0 | Status: SHIPPED | OUTPATIENT
Start: 2022-10-15 | End: 2022-10-20

## 2022-10-15 RX ORDER — CARVEDILOL 25 MG/1
25 TABLET ORAL 2 TIMES DAILY
Qty: 60 TABLET | Refills: 0 | Status: SHIPPED | OUTPATIENT
Start: 2022-10-15 | End: 2023-09-05

## 2022-10-15 RX ORDER — CALCIUM CARBONATE 500 MG/1
500 TABLET, CHEWABLE ORAL DAILY
Status: DISCONTINUED | OUTPATIENT
Start: 2022-10-15 | End: 2022-10-15

## 2022-10-15 RX ORDER — ALBUTEROL SULFATE 90 UG/1
2 AEROSOL, METERED RESPIRATORY (INHALATION) EVERY 6 HOURS PRN
Qty: 8.5 G | Refills: 0 | Status: SHIPPED
Start: 2022-10-15

## 2022-10-15 RX ORDER — CALCIUM CARBONATE 500 MG/1
1500 TABLET, CHEWABLE ORAL
Status: DISCONTINUED | OUTPATIENT
Start: 2022-10-15 | End: 2022-10-15 | Stop reason: HOSPADM

## 2022-10-15 RX ADMIN — CALCIUM CARBONATE (ANTACID) CHEW TAB 500 MG 1500 MG: 500 CHEW TAB at 12:55

## 2022-10-15 RX ADMIN — CINACALCET HYDROCHLORIDE 60 MG: 30 TABLET, FILM COATED ORAL at 06:17

## 2022-10-15 RX ADMIN — SEVELAMER CARBONATE 3200 MG: 800 TABLET, FILM COATED ORAL at 06:17

## 2022-10-15 RX ADMIN — CALCIUM CARBONATE (ANTACID) CHEW TAB 500 MG 1500 MG: 500 CHEW TAB at 08:35

## 2022-10-15 RX ADMIN — OMEPRAZOLE 20 MG: 20 CAPSULE, DELAYED RELEASE ORAL at 06:17

## 2022-10-15 RX ADMIN — SEVELAMER CARBONATE 1600 MG: 800 TABLET, FILM COATED ORAL at 12:55

## 2022-10-15 RX ADMIN — FLUTICASONE PROPIONATE 88 MCG: 44 AEROSOL, METERED RESPIRATORY (INHALATION) at 07:10

## 2022-10-15 RX ADMIN — PREDNISONE 40 MG: 20 TABLET ORAL at 06:17

## 2022-10-15 RX ADMIN — PATIROMER 8.4 G: 8.4 POWDER, FOR SUSPENSION ORAL at 12:14

## 2022-10-15 ASSESSMENT — PAIN DESCRIPTION - PAIN TYPE
TYPE: ACUTE PAIN

## 2022-10-15 NOTE — FLOWSHEET NOTE
10/14/22 2100   Events/Summary/Plan   Events/Summary/Plan BiPAP Check  (Refusing BiPAP)   Skin Inspection Respiratory Device Intact     Pt refusing BiPAP. States it is too loud and keeps him awake.  Appears stable without distress, SOB or dyspnea.  Will continue to monitor. RN made aware.

## 2022-10-15 NOTE — PROGRESS NOTES
[facility] -  PROGRESS NOTE               Author: Errol Neff M.D. Date & Time: 10/15/2022  7:52 AM     HPI:  73 yo M PMH ESRD MWF iHD, HTN, anemia of CKD, CKD-MBD, and HLD who presents to ED with CC as above.  He has had worsening SOB over the past week and got to the point where it was unbearable and he came to ED via EMS.  He denies missing any recent HD treatments.  Claims compliance with diet/fluids.  Complained of feeling of fullness in chest.  Hx of COVID-19 infxn in July 2022.  Labs in ED showed BNP > 35K, K+ 5.5.  No ECG changes.  Placed on BiPaP to maintain O2 sats and was admitted to ICU.  Otherwise denies F/C/N/V/CP.  No melena, hematochezia, hematemesis.  No HA, visual changes, or abdominal pain.    DAILY NEPHROLOGY SUMMARY:  10/10: Consult done  10/11: NAEO, SOB improved but still breathing heavy; NYHA Class IV  10/12: NAEO, feels better, SOB improved, K+ remains elevated despite daily dialysis  10/13: 2L Net UF. Resting in bed, no acute distress. Reports improved SOB, remains on 2L NC. K 5.6.  10/14: NAEO, no complaints, happy about his labs  10/15: no acute events overnight, no new complaints    REVIEW OF SYSTEMS:    10 point ROS reviewed and is as per HPI/daily summary or otherwise negative    PMH/PSH/SH/FH:   Reviewed and unchanged since admission note    CURRENT MEDICATIONS:   Reviewed from admission to present day    VS:  BP (!) 98/64   Pulse (!) 55   Temp 36.3 °C (97.4 °F) (Oral)   Resp 18   Ht 1.829 m (6')   Wt 82.1 kg (181 lb)   SpO2 98%   BMI 24.55 kg/m²     Physical Exam  Nursing note reviewed.   Constitutional:       Appearance: Normal appearance.   Eyes:      General: No scleral icterus.  Cardiovascular:      Comments: L AVF tortuous + Bruit/+ Thrill  No edema  Pulmonary:      Effort: Pulmonary effort is normal. No tachypnea or respiratory distress.   Abdominal:      General: There is no distension.   Musculoskeletal:         General: No deformity.    Skin:     Findings: No rash.   Neurological:      Mental Status: He is alert.   Psychiatric:         Behavior: Behavior normal.     Fluids:  In: 860 [P.O.:210; Dialysis:650]  Out: 2400     LABS:  Recent Labs     10/13/22  0416 10/14/22  0155 10/15/22  0139   SODIUM 135 133* 136   POTASSIUM 5.6* 5.3 4.7   CHLORIDE 93* 92* 95*   CO2 25 18* 23   GLUCOSE 121* 120* 118*   BUN 56* 91* 69*   CREATININE 6.49* 9.21* 7.85*   CALCIUM 8.6 8.1* 7.9*       IMAGING:   All imaging reviewed from admission to present day    IMPRESSION:  # ESRD  - Etiology likely 2/2 HTN  - MWF iHD  # Acute respiratory failure with hypoxia  - On BiPaP -> 2L NC tolerating O2 titration  - Likely due to volume overload  - COPD exacerbation on differential if UF does not help  # HTN  - Goal BP < 140/90  - At goal  # Anemia of CKD  - Goal Hgb 10-11  - At goal  # CKD-MBD     PLAN:  - No need for HD today, continue MWF iHD  - UF as tolerated  - Likely needs another fistulagram for recirculation - we will arrange this as outpatient   - Start Ca Carbonate 1500 mg TID with meals to address hypocalcemia     Reduce Sevelamer dose to 1600 TID as adding above   - Patiromer 8.4g po daily, DC if/when K+ < 4.5  - No dietary protein restrictions  - Dose all meds per ESRD  - Renal diet  - Ok to transition to discharge from renal standpoint whenever Ok with primary team

## 2022-10-15 NOTE — CARE PLAN
Problem: Psychosocial  Goal: Patient's ability to verbalize feelings about condition will improve  Outcome: Progressing     Problem: Respiratory  Goal: Patient will achieve/maintain optimum respiratory ventilation and gas exchange  Outcome: Progressing   The patient is Stable - Low risk of patient condition declining or worsening    Shift Goals  Clinical Goals: Maintain O2 sats>92% for the duration of the shift  Patient Goals: D/C  Family Goals: N/A    Progress made toward(s) clinical / shift goals: O2 sats maintained >92% for thr duration of the shift. Pt requiring minimal liters of O2.    Patient is not progressing towards the following goals:

## 2022-10-15 NOTE — CARE PLAN
The patient is Stable - Low risk of patient condition declining or worsening    Shift Goals  Clinical Goals: Monitor O2 sats, ECHO today  Patient Goals: Go home  Family Goals: N/A    Progress made toward(s) clinical / shift goals:  Pt sating 98% on 2L nc. ECHO today, pt to be discharged today pending echo results.     Problem: Psychosocial  Goal: Patient's level of anxiety will decrease  Outcome: Progressing     Problem: Respiratory  Goal: Patient will achieve/maintain optimum respiratory ventilation and gas exchange  Outcome: Progressing     Problem: Communication  Goal: The ability to communicate needs accurately and effectively will improve  Outcome: Progressing       Patient is not progressing towards the following goals:

## 2022-10-15 NOTE — PROGRESS NOTES
12-hour chart check complete.    Monitor Summary  Rhythm: SR, SB  Rate: 60s  Ectopy: rPVCs, rPACs  Measurements: .16/.08/.44

## 2022-10-15 NOTE — PROGRESS NOTES
Echo report came back and Dr. Ybarra came to bedside to discuss the results.  Pt ok to discharge and f/u with cardiology as outpatient.      Discharging Patient home per physician order.  Discharged with himself to home at 1442.  Demonstrated understanding of discharge instructions, follow up appointments, home medications, prescriptions sent to [facility], and nursing care instructions for when to return back to ED.  Ambulating without assistance, voiding without difficulty, pain well controlled, tolerating oral medications, oxygen saturation greater than 90% , tolerating diet.   Educational handouts given and discussed.  Verbalized understanding of discharge instructions and educational handouts.  All questions answered.  Belongings with patient at time of discharge. Pt was sent home with oxygen in place.

## 2022-10-15 NOTE — PROGRESS NOTES
Telemetry Shift Summary    Rhythm SB-SR  HR Range 56-61  Ectopy fPAC, rPVC  Measurements 0.12/0.08/0.44        Normal Values  Rhythm SR  HR Range    Measurements 0.12-0.20 / 0.06-0.10  / 0.30-0.52

## 2022-10-15 NOTE — DISCHARGE SUMMARY
Discharge Summary    CHIEF COMPLAINT ON ADMISSION  Chief Complaint   Patient presents with    Shortness of Breath     73 y/o male presents with a chief complaint of Shortness of breath. Patient stated that he has been symptomatic over the past week, and today experienced worse symptoms. He stated that he called 911 in which they placed him on oxygen. Patient stated that they did not transport because he stated that he would take himself. Patient stated that he has laos had nasal drainage. Patient denied any cough, fevers, or chills, chest pain, palpitations, or abdominal pain.     Nasal Drainage       Reason for Admission  Shortness of Breath     Admission Date  10/10/2022    CODE STATUS  DNAR/DNI    HPI & HOSPITAL COURSE  Bridger Savage is a 72 y.o. male with ESRD on hemodialysis (Monday Wednesday Friday, [facility] nephrology), systolic CHF (EF 35%), COPD, admitted 10/10/2022 with increasing shortness of breath worse with exertion, with associated wheezing, nonproductive cough without fever, and sensation of chest congestion.  He had COVID infection in July.  He was afebrile, but tachycardic and tachypneic, along with hypoxia.  He is started on BiPAP.  Potassium 5.7, creatinine 10.92.  Troponin 84.  proBNP greater than 35,000.  No leukocytosis.  Chest x-ray showed increased ill-defined opacification in each lungs, consistent with worsening pulmonary edema.  He was felt to have COPD exacerbation, for which he was given nebs and steroids, but wheezing was felt to be due to volume overload.  Nephrology was contacted and he had daily hemodialysis.  He temporarily required BiPAP, but with volume removal his respiratory status improved and he was able to be weaned off BiPAP.  Repeat chest x-ray showed improving bibasilar pulmonary opacities.  Respiratory cultures growing Klebsiella and E cloacae. He was initially given antibiotics, however as his respiratory status improved with hemodialysis and steroids prior to giving  the antibiotics with no fevers and leukocytosis, it was felt that he is colonized and not having true pneumonia.  Mild elevation of procalcitonin was attributed to his ESRD.  Antibiotic stewardship recommended discontinuing antibiotics.      He has clinically improved.  He was able to be weaned down to 2 L of oxygen by nasal cannula, with improvement in his respiratory status.  His wheezing has significantly improved.  He is now euvolemic.  He has been transitioned to oral prednisone.  Exercise oximetry showed need for 2 L of oxygen continuously.  Home oxygen was arranged.  His WBC count has normalized.  Electrolytes remain normal.  Creatinine stable consistent with his ESRD. Nephrology cleared him to transition to outpatient care from renal standpoint.    I have personally seen and examined the patient on the day of discharge. With his clinical improvement, he was deemed ready to discharge from the hospital as he did not have any further hospitalization needs. Patient felt comfortable going home. The discharge plan was discussed with the patient, with which he was agreeable to.     Therefore, he is discharged in good and stable condition to home with close outpatient follow-up.    The patient met 2-midnight criteria for an inpatient stay at the time of discharge.    Discharge Date  10/15/2022      FOLLOW UP ITEMS POST DISCHARGE  -He will continue on 5 days course of prednisone.  He will continue on his rescue inhalers, and Trelegy inhalers.  He will continue to use home oxygen which was arranged.  -Follow-up with PCP  - counseled to seek immediate medical attention, or return to the ED for recurrent or worsening symptoms.      DISCHARGE DIAGNOSES  Principal Problem:    Acute hypoxemic respiratory failure (HCC) POA: Yes  Active Problems:    ACC/AHA stage C systolic heart failure (HCC) POA: Yes    Acute exacerbation of chronic obstructive pulmonary disease (COPD) (HCC) POA: Yes    Hyperkalemia POA: Yes    Pulmonary  edema POA: Yes    ESRD (end stage renal disease) on dialysis (HCC) POA: Yes    Essential hypertension POA: Yes    Normocytic anemia POA: Yes  Resolved Problems:    ACP (advance care planning) POA: Yes      FOLLOW UP  Future Appointments   Date Time Provider Department Center   10/15/2022  9:05 AM [facility] ECHO PORTABLE ECSM MARCOS Lutz     No follow-up provider specified.    MEDICATIONS ON DISCHARGE     Medication List        START taking these medications        Instructions   patiromer 8.4 g Pack  Commonly known as: VELTASSA   Take 8.4 g by mouth every day.  Dose: 8.4 g     predniSONE 20 MG Tabs  Commonly known as: DELTASONE   Take 2 Tablets by mouth every day for 5 days.  Dose: 40 mg            CHANGE how you take these medications        Instructions   Fluticasone-Umeclidin-Vilant 100-62.5-25 MCG/INH Aepb inhalation  What changed: how much to take  Commonly known as: TRELEGY   Inhale 1 Inhalation 3 times a day.  Dose: 1 Inhalation            CONTINUE taking these medications        Instructions   albuterol 108 (90 Base) MCG/ACT Aers inhalation aerosol   Inhale 2 Puffs every 6 hours as needed for Shortness of Breath.  Dose: 2 Puff     carvedilol 25 MG Tabs  Commonly known as: COREG   Take 1 Tablet by mouth 2 times a day.  Dose: 25 mg     midodrine 10 MG tablet  Commonly known as: PROAMATINE   Take 10 mg by mouth every day. Take 1 tablet by mouth three times a day  Dose: 10 mg     omeprazole 20 MG delayed-release capsule  Commonly known as: PRILOSEC   Take 20 mg by mouth every day.  Dose: 20 mg     ROPINIRole 0.25 MG Tabs  Commonly known as: REQUIP   Take 0.25 mg by mouth every day.  Dose: 0.25 mg     sevelamer carbonate 800 MG Tabs tablet  Commonly known as: RENVELA   Take 3,200 mg by mouth 2 times a day with meals. Pt last filled on 6/2021 from [facility], pt reports taking 4 tablets BID  Dose: 3,200 mg              Allergies  Allergies   Allergen Reactions    Chloraprep One Step Itching    Chlorhexidine Rash     Fluoroplex Rash       DIET  Orders Placed This Encounter   Procedures    Diet Order Diet: Renal (Low Potassium)     Standing Status:   Standing     Number of Occurrences:   1     Order Specific Question:   Diet:     Answer:   Renal [8]     Comments:   Low Potassium       ACTIVITY  As tolerated.  Weight bearing as tolerated    CONSULTATIONS  Nephrology  Critical Care    PROCEDURES  As above    LABORATORY  Lab Results   Component Value Date    SODIUM 136 10/15/2022    POTASSIUM 4.7 10/15/2022    CHLORIDE 95 (L) 10/15/2022    CO2 23 10/15/2022    GLUCOSE 118 (H) 10/15/2022    BUN 69 (H) 10/15/2022    CREATININE 7.85 (HH) 10/15/2022        Lab Results   Component Value Date    WBC 9.5 10/15/2022    HEMOGLOBIN 14.2 10/15/2022    HEMATOCRIT 43.7 10/15/2022    PLATELETCT 216 10/15/2022        Total time of the discharge process = 40 minutes.

## 2023-01-21 ENCOUNTER — OFFICE VISIT (OUTPATIENT)
Dept: URGENT CARE | Facility: CLINIC | Age: 73
End: 2023-01-21
Payer: MEDICARE

## 2023-01-21 VITALS
HEIGHT: 72 IN | RESPIRATION RATE: 18 BRPM | TEMPERATURE: 98.2 F | WEIGHT: 180 LBS | BODY MASS INDEX: 24.38 KG/M2 | OXYGEN SATURATION: 98 % | SYSTOLIC BLOOD PRESSURE: 132 MMHG | DIASTOLIC BLOOD PRESSURE: 80 MMHG | HEART RATE: 64 BPM

## 2023-01-21 DIAGNOSIS — H93.12 TINNITUS OF LEFT EAR: ICD-10-CM

## 2023-01-21 DIAGNOSIS — H61.22 IMPACTED CERUMEN OF LEFT EAR: ICD-10-CM

## 2023-01-21 PROCEDURE — 99213 OFFICE O/P EST LOW 20 MIN: CPT

## 2023-01-21 ASSESSMENT — FIBROSIS 4 INDEX: FIB4 SCORE: 1.89

## 2023-01-21 NOTE — PROGRESS NOTES
Subjective:   Bridger Savage is a 72 y.o. male who presents for Otalgia (LEFT x2 weeks)      HPI:    Patient presents to urgent care with concerns of left ear pain x 2 weeks. He reports head cold prior to the onset of the ear pain. He also states he has had reduced hearing from his left ear during this period. He states he has right sided hearing impairment since he was a child. Denies radiation of pain. Denies runny nose, nasal congestion, sore throat, cough. Denies fever, chills, night sweats, nausea, vomiting, diarrhea.      ROS As above in HPI    Medications:    Current Outpatient Medications on File Prior to Visit   Medication Sig Dispense Refill    carvedilol (COREG) 25 MG Tab Take 1 Tablet by mouth 2 times a day. 60 Tablet 0    patiromer (VELTASSA) 8.4 g Pack Take 8.4 g by mouth every day. 30 Each 0    albuterol 108 (90 Base) MCG/ACT Aero Soln inhalation aerosol Inhale 2 Puffs every 6 hours as needed for Shortness of Breath. 8.5 g 0    Fluticasone-Umeclidin-Vilant (TRELEGY) 100-62.5-25 MCG/INH AEROSOL POWDER, BREATH ACTIVATED inhalation Inhale 1 Inhalation 3 times a day. 1 Each 1    ROPINIRole (REQUIP) 0.25 MG Tab Take 0.25 mg by mouth every day.      omeprazole (PRILOSEC) 20 MG delayed-release capsule Take 20 mg by mouth every day.      sevelamer carbonate (RENVELA) 800 MG Tab tablet Take 3,200 mg by mouth 2 times a day with meals. Pt last filled on 6/2021 from [facility], pt reports taking 4 tablets BID      midodrine (PROAMATINE) 10 MG tablet Take 10 mg by mouth every day. Take 1 tablet by mouth three times a day       No current facility-administered medications on file prior to visit.        Allergies:   Chloraprep one step, Chlorhexidine, and Fluoroplex    Problem List:   Patient Active Problem List   Diagnosis    ESRD (end stage renal disease) on dialysis (HCC)    Esophageal obstruction due to food impaction    ACC/AHA stage C systolic heart failure (HCC)    Left ventricular systolic dysfunction, NYHA  class 2    Non-ischemic cardiomyopathy (HCC)    ESRD (end stage renal disease) (HCC)    Chest pain    COPD (chronic obstructive pulmonary disease) (HCC)    CHF (congestive heart failure) (HCC)    Essential hypertension    Acute on chronic respiratory failure (HCC)    Hyperkalemia    Bacteremia    NYHA class 1 heart failure with reduced ejection fraction (HCC)    Leukocytosis    High anion gap metabolic acidosis    Tobacco abuse    Shortness of breath    Anemia in chronic kidney disease    Coagulation defect, unspecified (HCC)    Dependence on renal dialysis (HCC)    Acute hypoxemic respiratory failure (HCC)    Pulmonary edema    Normocytic anemia    Acute exacerbation of chronic obstructive pulmonary disease (COPD) (HCC)        Surgical History:  Past Surgical History:   Procedure Laterality Date    GASTROSCOPY-ENDO  1/30/2020    Procedure: GASTROSCOPY;  Surgeon: Richard Cano D.O.;  Location: SURGERY [facility];  Service: Gastroenterology    GASTROSCOPY  2/1/2018    Procedure: GASTROSCOPY;  Surgeon: Morteza Olivo M.D.;  Location: [facility];  Service: Gastroenterology    COLONOSCOPY  2/1/2018    Procedure: COLONOSCOPY;  Surgeon: Morteza Olivo M.D.;  Location: [facility];  Service: Gastroenterology    GASTROSCOPY-ENDO N/A 8/28/2017    Procedure: GASTROSCOPY-ENDO;  Surgeon: Pankaj Choudhury M.D.;  Location: [facility];  Service: EUS    OTHER      Patient had an arm fistula put in for dialysis       Past Social Hx:   Social History     Tobacco Use    Smoking status: Some Days     Packs/day: 0.50     Types: Cigarettes    Smokeless tobacco: Never   Vaping Use    Vaping Use: Never used   Substance Use Topics    Alcohol use: No    Drug use: No          Problem list, medications, and allergies reviewed by myself today in Epic.     Objective:     /80   Pulse 64   Temp 36.8 °C (98.2 °F) (Temporal)   Resp 18   Ht 1.829 m (6')   Wt 81.6 kg (180 lb)   SpO2 98%    BMI 24.41 kg/m²     Physical Exam  Vitals and nursing note reviewed.   Constitutional:       General: He is not in acute distress.     Appearance: Normal appearance. He is not ill-appearing or diaphoretic.   HENT:      Head: Normocephalic and atraumatic.      Right Ear: Tympanic membrane normal.      Left Ear: There is impacted cerumen.      Nose: No congestion or rhinorrhea.      Mouth/Throat:      Mouth: Mucous membranes are moist.      Pharynx: Oropharynx is clear. Posterior oropharyngeal erythema present. No oropharyngeal exudate.   Eyes:      General:         Right eye: No discharge.         Left eye: No discharge.      Conjunctiva/sclera: Conjunctivae normal.      Pupils: Pupils are equal, round, and reactive to light.   Cardiovascular:      Rate and Rhythm: Normal rate and regular rhythm.      Heart sounds: Normal heart sounds.   Neurological:      Mental Status: He is alert.       Assessment/Plan:     Diagnosis and associated orders:   1. Impacted cerumen of left ear  - Ear Irrigation (MA Only); Future  - carbamide peroxide (DEBROX) 6.5 % Solution; Administer 6 Drops into the left ear 2 times a day. Administer drops in both ears.  Dispense: 15 mL; Refill: 0  - Referral to ENT        Comments/MDM:     Patient did not tolerate ear irrigation due to pain. Unable to visualize TM. Patient ordered Debrox to assist with softening of cerumen and return to  to re-attempt irrigation. Referral to ENT placed for concerns of tinnitus at his request.       Pt is clinically stable at today's acute urgent care visit.  No acute distress noted. Appropriate for outpatient management at this time.       Discussed DDx, management options (risks,benefits, and alternatives to planned treatment), natural progression and supportive care.  Expressed understanding and the treatment plan was agreed upon. Questions were encouraged and answered   Return to urgent care prn if new or worsening sx or if there is no improvement in  condition prn.    Educated in Red flags and indications to immediately call 911 or present to the Emergency Department.   Advised the patient to follow-up with the primary care physician for recheck, reevaluation, and consideration of further management.      Please note that this dictation was created using voice recognition software. I have made a reasonable attempt to correct obvious errors, but I expect that there are errors of grammar and possibly content that I did not discover before finalizing the note.    This note was electronically signed by Tess Calvert DNP

## 2023-02-14 ENCOUNTER — HOSPITAL ENCOUNTER (EMERGENCY)
Facility: MEDICAL CENTER | Age: 73
End: 2023-02-14
Attending: EMERGENCY MEDICINE
Payer: MEDICARE

## 2023-02-14 VITALS
BODY MASS INDEX: 26.22 KG/M2 | HEIGHT: 72 IN | RESPIRATION RATE: 18 BRPM | WEIGHT: 193.56 LBS | TEMPERATURE: 98.9 F | OXYGEN SATURATION: 96 % | DIASTOLIC BLOOD PRESSURE: 88 MMHG | SYSTOLIC BLOOD PRESSURE: 144 MMHG | HEART RATE: 64 BPM

## 2023-02-14 DIAGNOSIS — H60.502 ACUTE OTITIS EXTERNA OF LEFT EAR, UNSPECIFIED TYPE: ICD-10-CM

## 2023-02-14 DIAGNOSIS — H61.22 IMPACTED CERUMEN OF LEFT EAR: ICD-10-CM

## 2023-02-14 PROCEDURE — 99282 EMERGENCY DEPT VISIT SF MDM: CPT

## 2023-02-14 PROCEDURE — 69210 REMOVE IMPACTED EAR WAX UNI: CPT

## 2023-02-14 PROCEDURE — 700102 HCHG RX REV CODE 250 W/ 637 OVERRIDE(OP): Performed by: EMERGENCY MEDICINE

## 2023-02-14 PROCEDURE — A9270 NON-COVERED ITEM OR SERVICE: HCPCS | Performed by: EMERGENCY MEDICINE

## 2023-02-14 RX ORDER — NEOMYCIN SULFATE, POLYMYXIN B SULFATE AND HYDROCORTISONE 10; 3.5; 1 MG/ML; MG/ML; [USP'U]/ML
4 SUSPENSION/ DROPS AURICULAR (OTIC) 3 TIMES DAILY
Qty: 10 ML | Refills: 0 | Status: SHIPPED | OUTPATIENT
Start: 2023-02-14 | End: 2023-03-24

## 2023-02-14 RX ORDER — DOCUSATE SODIUM 50 MG/5ML
100 LIQUID ORAL ONCE
Status: COMPLETED | OUTPATIENT
Start: 2023-02-14 | End: 2023-02-14

## 2023-02-14 RX ADMIN — DOCUSATE SODIUM LIQUID 100 MG: 100 LIQUID ORAL at 12:09

## 2023-02-14 ASSESSMENT — PAIN DESCRIPTION - PAIN TYPE: TYPE: ACUTE PAIN

## 2023-02-14 ASSESSMENT — FIBROSIS 4 INDEX: FIB4 SCORE: 1.89

## 2023-02-14 NOTE — ED PROVIDER NOTES
ED Provider Note    CHIEF COMPLAINT  Chief Complaint   Patient presents with    Ear Pain       EXTERNAL RECORDS REVIEWED  Outpatient Notes clinic notes cerumen impaction, otitis media    HPI/ROS  LIMITATION TO HISTORY   Select: : None      Bridger Savage is a 72 y.o. male who presents complaining of left-sided ear pain.  The patient has a history of hearing loss in his right ear since he was a child.  For the last few weeks however, has been having pain in his left ear.  Has been diagnosed as both cerumen impaction as well as otitis media.  He took a course of antibiotics and that did not really help.  He was referred to ENT which he plans to do but the appointment is not until April.  He went to get checked out sooner than that as his ear was still hurting.  He denies any fever or chills.  There is no headache.  There is no difficulty with balance or his gait.  No change in bowel or bladder.  No difficulty eating and his appetite is good.  There is no other complaint.    PAST MEDICAL HISTORY   has a past medical history of Asthma, Breath shortness, Chronic obstructive pulmonary disease (HCC), Cold, Congestive heart failure (HCC), Dental disorder, Dialysis patient (HCC), Emphysema of lung (HCC), Hemodialysis patient (HCC), Renal disorder, Snoring, and Urinary incontinence.    SURGICAL HISTORY   has a past surgical history that includes gastroscopy-endo (N/A, 8/28/2017); gastroscopy (2/1/2018); colonoscopy (2/1/2018); other; and gastroscopy-endo (1/30/2020).    FAMILY HISTORY  Family History   Problem Relation Age of Onset    Heart Disease Mother     Diabetes Mother     Kidney Disease Mother         kidney failure    Asthma Father     Diabetes Brother     No Known Problems Maternal Grandmother     No Known Problems Maternal Grandfather     No Known Problems Paternal Grandmother     No Known Problems Paternal Grandfather        SOCIAL HISTORY  Social History     Tobacco Use    Smoking status: Some Days      Packs/day: 0.50     Types: Cigarettes    Smokeless tobacco: Never   Vaping Use    Vaping Use: Never used   Substance and Sexual Activity    Alcohol use: No    Drug use: No    Sexual activity: Not on file       CURRENT MEDICATIONS  Home Medications    **Home medications have not yet been reviewed for this encounter**         ALLERGIES  Allergies   Allergen Reactions    Chloraprep One Step Itching    Chlorhexidine Rash    Fluoroplex Rash       PHYSICAL EXAM  VITAL SIGNS: BP (!) 156/88   Pulse 75   Temp 36.7 °C (98.1 °F) (Temporal)   Resp 17   Ht 1.829 m (6')   Wt 87.8 kg (193 lb 9 oz)   SpO2 97%   BMI 26.25 kg/m²    Constitutional: Well appearing patient in no acute distress.  HENT: Head is without trauma.  Oropharynx is clear.  Mucous membranes are moist.  Right TM is normal.  Left TM is initial obscured by wax.  Later, I can see about 50% of it, and there is no middle ear effusion.  No erythema.  He is exquisitely tender over the tragus, palpation which causes him quite a bit of pain.  Also some tenderness to manipulation of the pinna itself.  There is no mastoid tenderness.  Eyes: Sclerae are nonicteric, pupils are equally round.  Neck: Supple with grossly normal range of motion.  No cervical lymphadenopathy.  Cardiovascular: Heart is regular rate and rhythm without murmur rub or gallop.  Peripheral pulses are intact and symmetric throughout.  Thorax & Lungs: Breathing easily.  Good air movement.  There is a very slight scattered wheeze.  Abdomen: Bowel sounds normal, soft, non-distended, nontender, no mass nor pulsatile mass. I do not appreciate hepatosplenomegaly.  Skin: No apparent rash.  I do not see petechiae or purpura.  Extremities: No evidence of acute trauma.    Neurologic: Alert. Moving all extremities.  Intact sensation and strength throughout.  Gait is normal.  Psychiatric: Normal for situation      DIAGNOSTIC STUDIES / PROCEDURES  His ear was filled with Colace, and nurses tried flushing.  I  have gone back, and using a ear curette, was able to pull out a cerumen plug.  With this I cannot see the eardrum.  The external canal is edematous, and as noted he is quite tender over the pinna and tragus.          COURSE & MEDICAL DECISION MAKING    ED Observation Status? No; Patient does not meet criteria for ED Observation.     INITIAL ASSESSMENT, COURSE AND PLAN  Care Narrative: This patient presents with pain in his ear.  Clinically I suspect a otitis externa.  He does have as well cerumen impaction.  I have disimpacted him and remove that wax plug.  At this point, the middle ear looks normal, and I do suspect otitis externa.  There does not seem to be evidence of mastoiditis.  I do not suspect intracranial process.  I am going to put him on Cortisporin otic.  There is a little bit of residual wax, which I recommend he flush out in the shower.  We talked about stronger analgesia, he has declined this.    FINAL DIAGNOSIS  1. Acute otitis externa of left ear, unspecified type    2. Impacted cerumen of left ear    3.  End-stage renal disease on hemodialysis       Electronically signed by: Inocente Swann M.D., 2/14/2023 1:08 PM

## 2023-02-14 NOTE — DISCHARGE INSTRUCTIONS
Go ahead and use the liquid that we gave you to put in your ear to help soften up the remaining wax, and then after 20 or 30 minutes, go ahead and gently flush out with warm water in the shower.  After this put the antibiotic drops in your ear, 3 times per day.  I would do these drops for the next 7 to 10 days.

## 2023-02-14 NOTE — ED NOTES
1320 VS rechecked and stable D/C instn reviewed Pt educated on how to irrigate his ear with verbalized understanding D/C ambul Return for fever or any new concerning  S/S Otherwise F/U with PMD D/C ambul Stable improved condn

## 2023-02-14 NOTE — ED TRIAGE NOTES
Pt amb to triage c/o L ear pain >1mo; pt seen at pcp and referred to ENT, pt states f/u appt x2 mo scheduled. Pt reports that pain is too severe to wait for his f/u appt.

## 2023-03-24 ENCOUNTER — HOSPITAL ENCOUNTER (EMERGENCY)
Facility: MEDICAL CENTER | Age: 73
End: 2023-03-24
Attending: EMERGENCY MEDICINE
Payer: MEDICARE

## 2023-03-24 ENCOUNTER — APPOINTMENT (OUTPATIENT)
Dept: RADIOLOGY | Facility: MEDICAL CENTER | Age: 73
End: 2023-03-24
Attending: EMERGENCY MEDICINE
Payer: MEDICARE

## 2023-03-24 VITALS
RESPIRATION RATE: 16 BRPM | WEIGHT: 185.19 LBS | BODY MASS INDEX: 25.08 KG/M2 | HEART RATE: 67 BPM | SYSTOLIC BLOOD PRESSURE: 108 MMHG | OXYGEN SATURATION: 95 % | DIASTOLIC BLOOD PRESSURE: 64 MMHG | TEMPERATURE: 97.8 F | HEIGHT: 72 IN

## 2023-03-24 DIAGNOSIS — H65.192 OTHER ACUTE NONSUPPURATIVE OTITIS MEDIA OF LEFT EAR, RECURRENCE NOT SPECIFIED: ICD-10-CM

## 2023-03-24 DIAGNOSIS — H60.392 OTHER INFECTIVE CHRONIC OTITIS EXTERNA OF LEFT EAR: ICD-10-CM

## 2023-03-24 PROCEDURE — 70480 CT ORBIT/EAR/FOSSA W/O DYE: CPT

## 2023-03-24 PROCEDURE — 700102 HCHG RX REV CODE 250 W/ 637 OVERRIDE(OP): Performed by: EMERGENCY MEDICINE

## 2023-03-24 PROCEDURE — A9270 NON-COVERED ITEM OR SERVICE: HCPCS | Performed by: EMERGENCY MEDICINE

## 2023-03-24 PROCEDURE — 70450 CT HEAD/BRAIN W/O DYE: CPT

## 2023-03-24 PROCEDURE — 99284 EMERGENCY DEPT VISIT MOD MDM: CPT

## 2023-03-24 RX ORDER — AMOXICILLIN AND CLAVULANATE POTASSIUM 875; 125 MG/1; MG/1
1 TABLET, FILM COATED ORAL 2 TIMES DAILY
Qty: 20 TABLET | Refills: 0 | Status: SHIPPED | OUTPATIENT
Start: 2023-03-24 | End: 2023-04-03

## 2023-03-24 RX ORDER — NEOMYCIN SULFATE, POLYMYXIN B SULFATE AND HYDROCORTISONE 10; 3.5; 1 MG/ML; MG/ML; [USP'U]/ML
4 SUSPENSION/ DROPS AURICULAR (OTIC) 3 TIMES DAILY
Qty: 9 ML | Refills: 0 | Status: SHIPPED | OUTPATIENT
Start: 2023-03-24 | End: 2023-03-31

## 2023-03-24 RX ORDER — AMOXICILLIN AND CLAVULANATE POTASSIUM 875; 125 MG/1; MG/1
1 TABLET, FILM COATED ORAL ONCE
Status: COMPLETED | OUTPATIENT
Start: 2023-03-24 | End: 2023-03-24

## 2023-03-24 RX ADMIN — AMOXICILLIN AND CLAVULANATE POTASSIUM 1 TABLET: 875; 125 TABLET, FILM COATED ORAL at 19:15

## 2023-03-24 ASSESSMENT — FIBROSIS 4 INDEX: FIB4 SCORE: 1.89

## 2023-03-24 ASSESSMENT — PAIN DESCRIPTION - DESCRIPTORS: DESCRIPTORS: ACHING;GNAWING;SHARP

## 2023-03-25 NOTE — ED TRIAGE NOTES
Pt amb to triage c/o L ear pain >1mo, worsening since last night. Pt reports incr dizziness, ataxia and decr hearing to L ear.

## 2023-03-25 NOTE — ED NOTES
Pt cleared for DC home education for RX pt has no questions or concern pt advised to keep his appointment with ENT  No s/s of acute distress VSS

## 2023-03-25 NOTE — DISCHARGE INSTRUCTIONS
Take oral antibiotics as prescribed.  Use eardrops.  Follow-up with your primary care doctor and ENT.  Return for worsening pain, if fever, or other concerns.  Return if you have more dizzy spells, or for any other numbness tingling weakness or lack of coordination or other concerns.  Follow-up with your doctor in the ENT as scheduled.

## 2023-03-25 NOTE — ED PROVIDER NOTES
ED Provider Note    CHIEF COMPLAINT  Chief Complaint   Patient presents with    Ear Pain       EXTERNAL RECORDS REVIEWED  Reviewed recent ED visit, and based on labs and previous imaging for comparison    HPI/ROS  LIMITATION TO HISTORY   Select: : None  OUTSIDE HISTORIAN(S):  None    Bridger Savage is a 72 y.o. male who presents the emergency department with persistent left-sided ear pain, pain in front of left ear and behind the left ear.  Patient states he has had pain in his left ear for more than a month.  He was seen here in the ED and treated for an infection in the canal.  He was referred to ENT but the appointment is not for 2 months for the time of that visit so is still a month away.  He has had increasing pain in the left ear and around left ear.  Is worsened with manipulation of the left ear and chewing.  He has pain in front of and behind the ear.  Its been associate with a headache.  He states that the headache which was moderate to severe last night.  Does not have a history of chronic headaches.  No falls or trauma.  He also states he felt dizzy and off balance after dialysis.  Dialysis was earlier this morning.  The symptoms around 6 AM to 11.  He still has pain in his ear but the dizziness and other symptoms have resolved.  He denies any other acute concerns or complaints.    PAST MEDICAL HISTORY   has a past medical history of Asthma, Breath shortness, Chronic obstructive pulmonary disease (HCC), Cold, Congestive heart failure (HCC), Dental disorder, Dialysis patient (HCC), Emphysema of lung (HCC), Hemodialysis patient (HCC), Renal disorder, Snoring, and Urinary incontinence.    SURGICAL HISTORY   has a past surgical history that includes gastroscopy-endo (N/A, 8/28/2017); gastroscopy (2/1/2018); colonoscopy (2/1/2018); other; and gastroscopy-endo (1/30/2020).    FAMILY HISTORY  Family History   Problem Relation Age of Onset    Heart Disease Mother     Diabetes Mother     Kidney Disease Mother          kidney failure    Asthma Father     Diabetes Brother     No Known Problems Maternal Grandmother     No Known Problems Maternal Grandfather     No Known Problems Paternal Grandmother     No Known Problems Paternal Grandfather        SOCIAL HISTORY  Social History     Tobacco Use    Smoking status: Some Days     Packs/day: 0.50     Types: Cigarettes    Smokeless tobacco: Never   Vaping Use    Vaping Use: Never used   Substance and Sexual Activity    Alcohol use: No    Drug use: No    Sexual activity: Not on file       CURRENT MEDICATIONS  Home Medications    **Home medications have not yet been reviewed for this encounter**         ALLERGIES  Allergies   Allergen Reactions    Chloraprep One Step Itching    Chlorhexidine Rash    Fluoroplex Rash       PHYSICAL EXAM  VITAL SIGNS: /75   Pulse 98   Temp 36.7 °C (98.1 °F) (Temporal)   Resp 18   Ht 1.829 m (6')   Wt 84 kg (185 lb 3 oz)   SpO2 97%   BMI 25.12 kg/m²    Constitutional: Well developed, Well nourished, No acute distress, Non-toxic appearance.   HENT: Normocephalic, Atraumatic, Bilateral external ears normal, Oropharynx moist, pain with manipulation of the external ear.  The swelling and redness in the canal on the left compared to the right.  The TM does not look inflamed.  Eyes: PERRL, EOMI, Conjunctiva normal, No discharge.   Neck: Normal range of motion, No tenderness, Supple, No stridor.   Cardiovascular: Normal heart rate, Normal rhythm, No murmurs, No rubs, No gallops.   Thorax & Lungs: Normal breath sounds, No respiratory distress, No wheezing,  Abdomen: Bowel sounds normal, Soft, No tenderness  Musculoskeletal: Good range of motion in all major joints.  Neurologic: Alert, No focal deficits noted.  Normal finger-to-nose.  No focal weakness currently 2 through 12 are intact.  Patient has a completely stable gait.  No ataxia noted.  Psychiatric: Affect normal      DIAGNOSTIC STUDIES / PROCEDURES    RADIOLOGY    CT-HEAD W/O   Final Result       No acute intracranial abnormality.         CT-POST-FOSSA-EAR W/O    (Results Pending)     CT read of the posterior fossa remains pending even at the time of this dictation.  There is some technical difficulty with radiology and got a text that this was negative for anything acute but there is some chronic inflammatory abnormality in the left ear canal.      COURSE & MEDICAL DECISION MAKING    ED Observation Status? No; Patient does not meet criteria for ED Observation.     INITIAL ASSESSMENT, COURSE AND PLAN  Care Narrative:   Patient presents emergency department with ear pain.  He was diagnosed with otitis externa last time was here and was referred to ENT and put on eardrops.  His left external canal is swollen and erythematous and tender especially compared to the other side.  His TM does not have a normal light reflex.  He has pain in both in front and around the TM and over the mastoid.  This raises concern for chronic inflammation or possibly even mastoiditis.  For this reason a CT scan was ordered and is obtained and is negative per the radiologist.  I put the patient on oral antibiotics for the chronic nature and the inflammation redness in the canal as well as in the TM and we will treat him with eardrops as well for his ear.    The patient describes being dizzy and ataxic after his dialysis.  He has a completely normal neurologic examination at this time.  Cannot appreciate any focal ataxia or weakness.  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  He has a stable gait.  Its unclear to me what exactly is chilel.  I suppose it is possible that he had a TIA but I think it is unlikely with a completely normal neurologic exam at this time.  At this point he is comfortable going home.  Put him on oral antibiotics and eardrops.  Last return for pain, if he has a fever, headache, or any new neurologic symptoms or concerns.  Questions were  answered, he is agreeable with the plan.    He is discharged in good condition.  He is to follow-up with primary care and ENT.              ADDITIONAL PROBLEM LIST  End-stage renal disease  On hemodialysis.    DISPOSITION AND DISCUSSIONS      Escalation of care considered, and ultimately not performed:blood analysis consider blood test but the patient states he went to dialysis today.  I do not think this would be helpful.    No follow-up provider specified.  The patient will follow-up with his primary care doctor.  Is also referred to ENT during his last visit he has an appointment scheduled.  It is about a month out.  I think this is reasonable.    1. Other infective chronic otitis externa of left ear    2. Other acute nonsuppurative otitis media of left ear, recurrence not specified           Electronically signed by: Cheng Echols M.D., 3/24/2023 5:34 PM

## 2023-04-24 ENCOUNTER — HOSPITAL ENCOUNTER (OUTPATIENT)
Facility: MEDICAL CENTER | Age: 73
End: 2023-04-24
Attending: OTOLARYNGOLOGY | Admitting: OTOLARYNGOLOGY
Payer: MEDICARE

## 2023-05-09 ENCOUNTER — APPOINTMENT (OUTPATIENT)
Dept: ADMISSIONS | Facility: MEDICAL CENTER | Age: 73
End: 2023-05-09
Attending: OTOLARYNGOLOGY
Payer: MEDICARE

## 2023-05-16 ENCOUNTER — PRE-ADMISSION TESTING (OUTPATIENT)
Dept: ADMISSIONS | Facility: MEDICAL CENTER | Age: 73
End: 2023-05-16
Attending: OTOLARYNGOLOGY
Payer: MEDICARE

## 2023-05-16 NOTE — OR NURSING
Started PAT appt. Patient stated he would not move forward with surgery if there was a risk to his hearing. Stated this issue was not resolved at pre surgical consult. Patient has an appt today 5/16/23 with his surgeon. Patient will call surgical pre admit after appt and let us know if he is moving forward and, if so, will schedule a time to complete his PAT appt.

## 2023-05-16 NOTE — OR NURSING
Patient called to state that he saw Dr. Barrios and wants to proceed with surgery.  Spoke to Vee at Dr. Barrios's office.  Vee reports that this patient needs an Echocardiogram prior to surgery per Dr. Barrios, so his surgery will be rescheduled in order to allow for that to be completed.  Patient called to update.  Patient aware his surgery will be rescheduled as he is still at Dr. Barrios's office.  Updated patient that preregistration will be contacting patient once surgery rescheduled to set up a telephone preadmit appointment.  Patient understands.

## 2023-07-04 ENCOUNTER — HOSPITAL ENCOUNTER (OUTPATIENT)
Dept: CARDIOLOGY | Facility: MEDICAL CENTER | Age: 73
End: 2023-07-04
Attending: OTOLARYNGOLOGY
Payer: MEDICARE

## 2023-07-04 DIAGNOSIS — I50.1 LEFT HEART FAILURE (HCC): ICD-10-CM

## 2023-07-04 LAB
LV EJECT FRACT  99904: 60
LV EJECT FRACT MOD 2C 99903: 52.43
LV EJECT FRACT MOD 4C 99902: 64.43
LV EJECT FRACT MOD BP 99901: 59.81

## 2023-07-04 PROCEDURE — 93306 TTE W/DOPPLER COMPLETE: CPT | Mod: 26 | Performed by: INTERNAL MEDICINE

## 2023-07-04 PROCEDURE — 93306 TTE W/DOPPLER COMPLETE: CPT

## 2023-08-29 ENCOUNTER — APPOINTMENT (OUTPATIENT)
Dept: ADMISSIONS | Facility: MEDICAL CENTER | Age: 73
End: 2023-08-29
Attending: OTOLARYNGOLOGY
Payer: MEDICARE

## 2023-09-05 ENCOUNTER — PRE-ADMISSION TESTING (OUTPATIENT)
Dept: ADMISSIONS | Facility: MEDICAL CENTER | Age: 73
End: 2023-09-05
Attending: OTOLARYNGOLOGY
Payer: MEDICARE

## 2023-09-05 RX ORDER — CINACALCET 60 MG/1
TABLET, FILM COATED ORAL
COMMUNITY
Start: 2023-07-31

## 2023-09-05 NOTE — OR NURSING
Patient stated he completed lab work recently at [facility], contacted [facility], most recent lab work was from May2023. Returned call to patient, scheduled for pre-op lab work 9/7/2023.

## 2023-09-07 ENCOUNTER — PRE-ADMISSION TESTING (OUTPATIENT)
Dept: ADMISSIONS | Facility: MEDICAL CENTER | Age: 73
End: 2023-09-07
Attending: OTOLARYNGOLOGY
Payer: MEDICARE

## 2023-09-07 DIAGNOSIS — Z01.810 PRE-OPERATIVE CARDIOVASCULAR EXAMINATION: ICD-10-CM

## 2023-09-07 DIAGNOSIS — Z01.812 PRE-OPERATIVE LABORATORY EXAMINATION: ICD-10-CM

## 2023-09-07 LAB
ANION GAP SERPL CALC-SCNC: 14 MMOL/L (ref 7–16)
BUN SERPL-MCNC: 25 MG/DL (ref 8–22)
CALCIUM SERPL-MCNC: 9.9 MG/DL (ref 8.5–10.5)
CHLORIDE SERPL-SCNC: 92 MMOL/L (ref 96–112)
CO2 SERPL-SCNC: 26 MMOL/L (ref 20–33)
CREAT SERPL-MCNC: 8.44 MG/DL (ref 0.5–1.4)
EKG IMPRESSION: NORMAL
ERYTHROCYTE [DISTWIDTH] IN BLOOD BY AUTOMATED COUNT: 49.4 FL (ref 35.9–50)
GFR SERPLBLD CREATININE-BSD FMLA CKD-EPI: 6 ML/MIN/1.73 M 2
GLUCOSE SERPL-MCNC: 67 MG/DL (ref 65–99)
HCT VFR BLD AUTO: 47.4 % (ref 42–52)
HGB BLD-MCNC: 14.4 G/DL (ref 14–18)
MCH RBC QN AUTO: 24.6 PG (ref 27–33)
MCHC RBC AUTO-ENTMCNC: 30.4 G/DL (ref 32.3–36.5)
MCV RBC AUTO: 81 FL (ref 81.4–97.8)
PLATELET # BLD AUTO: 320 K/UL (ref 164–446)
PMV BLD AUTO: 10.1 FL (ref 9–12.9)
POTASSIUM SERPL-SCNC: 5.4 MMOL/L (ref 3.6–5.5)
RBC # BLD AUTO: 5.85 M/UL (ref 4.7–6.1)
SODIUM SERPL-SCNC: 132 MMOL/L (ref 135–145)
WBC # BLD AUTO: 8.5 K/UL (ref 4.8–10.8)

## 2023-09-07 PROCEDURE — 85027 COMPLETE CBC AUTOMATED: CPT

## 2023-09-07 PROCEDURE — 36415 COLL VENOUS BLD VENIPUNCTURE: CPT

## 2023-09-07 PROCEDURE — 93005 ELECTROCARDIOGRAM TRACING: CPT

## 2023-09-07 PROCEDURE — 80048 BASIC METABOLIC PNL TOTAL CA: CPT

## 2023-09-07 PROCEDURE — 93010 ELECTROCARDIOGRAM REPORT: CPT | Performed by: INTERNAL MEDICINE

## 2023-09-19 ENCOUNTER — HOSPITAL ENCOUNTER (OUTPATIENT)
Facility: MEDICAL CENTER | Age: 73
End: 2023-09-19
Attending: OTOLARYNGOLOGY | Admitting: OTOLARYNGOLOGY
Payer: MEDICARE

## 2023-09-19 RX ORDER — SODIUM CHLORIDE 9 MG/ML
INJECTION, SOLUTION INTRAVENOUS ONCE
Status: DISCONTINUED | OUTPATIENT
Start: 2023-09-19 | End: 2023-09-20 | Stop reason: HOSPADM

## 2023-12-04 NOTE — ED NOTES
----- Message from Hunterkatie Castle sent at 12/4/2023  9:26 AM CST -----  Regarding: advice  Contact: MALA GONZALES [[id number]]  Type: Needs Medical Advice  Who Called:  Misael Mak    Symptoms (please be specific):  na    How long has patient had these symptoms:  na    Pharmacy name and phone #:    [id number] [place] [id number] AT SEC OF [facility] & [address] [address]  Phone: [phone] Fax: [phone]    Best Call Back Number: see above    Additional Information: RX needs to be changed to Allyson 2 reader not 3. Please call to advise.           Respiratory called for treatments, x ray in room with patient.

## 2024-01-07 ENCOUNTER — HOSPITAL ENCOUNTER (EMERGENCY)
Facility: MEDICAL CENTER | Age: 74
End: 2024-01-07
Attending: STUDENT IN AN ORGANIZED HEALTH CARE EDUCATION/TRAINING PROGRAM
Payer: MEDICARE

## 2024-01-07 VITALS
RESPIRATION RATE: 18 BRPM | DIASTOLIC BLOOD PRESSURE: 72 MMHG | OXYGEN SATURATION: 98 % | HEIGHT: 72 IN | HEART RATE: 78 BPM | BODY MASS INDEX: 26.19 KG/M2 | TEMPERATURE: 97.5 F | SYSTOLIC BLOOD PRESSURE: 118 MMHG | WEIGHT: 193.34 LBS

## 2024-01-07 DIAGNOSIS — H60.312 ACUTE DIFFUSE OTITIS EXTERNA OF LEFT EAR: ICD-10-CM

## 2024-01-07 DIAGNOSIS — H61.892 POLYP OF EAR CANAL, LEFT: ICD-10-CM

## 2024-01-07 PROCEDURE — 99282 EMERGENCY DEPT VISIT SF MDM: CPT

## 2024-01-07 PROCEDURE — 69209 REMOVE IMPACTED EAR WAX UNI: CPT

## 2024-01-07 RX ORDER — CIPROFLOXACIN/HYDROCORTISONE 0.2 %-1 %
3 SUSPENSION, DROPS(FINAL DOSAGE FORM)(ML) OTIC (EAR) 2 TIMES DAILY
Qty: 10 ML | Refills: 0 | Status: ACTIVE | OUTPATIENT
Start: 2024-01-07

## 2024-01-07 ASSESSMENT — FIBROSIS 4 INDEX: FIB4 SCORE: 1.29

## 2024-01-08 NOTE — ED PROVIDER NOTES
"ED Provider Note    CHIEF COMPLAINT  Chief Complaint   Patient presents with    Ear Pain     L ear. Reports difficulty hearing so \"I tried to clean it out and now it hurts\".       EXTERNAL RECORDS REVIEWED  Inpatient Notes 9/19/2023, patient was planned for biopsy of the left auditory canal, fortunately he drink coffee prior to presentation and they were unable to perform the biopsy.      HPI/ROS  LIMITATION TO HISTORY   Select: : None      Bridger Savage is a 73 y.o. 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  He was supposed to have a biopsy, but he reports he drinks some coffee, they are unable to perform the biopsy, he became frustrated with this, and decided to not schedule an appointment he decides he has lived long enough, and he does not want to know if it is cancer or not  As he would not elect to go through treatment.  He reports his primary concern today is that he cannot hear his alarm clock, and he is supposed to go to dialysis at 4 AM in the morning tomorrow.  He denies fever, chills, vomiting, significant pain in the ear.  He reports he has had cerumen impactions previously that have been irrigated successfully.  He notes today, he bought an over-the-counter kit at the store, and irrigated this ear with a liquid that sounds like likely a type of peroxide and a bulb syringe.  He reports taking something in the ear and feeling pain, and no improvement in hearing leading to ED presentation today.  No missed dialysis.  Denies any other acute problems.    PAST MEDICAL HISTORY   has a past medical history of Asthma, Breath shortness, Chronic obstructive pulmonary disease (HCC), Cold, Congestive heart failure (HCC), Dental disorder, Dialysis patient (HCC), Emphysema of lung (HCC), Hemodialysis patient (HCC), Renal disorder, Snoring, and Urinary incontinence.    SURGICAL HISTORY   has a past surgical " history that includes gastroscopy-endo (N/A, 8/28/2017); gastroscopy (2/1/2018); colonoscopy (2/1/2018); other; and gastroscopy-endo (1/30/2020).    FAMILY HISTORY  Family History   Problem Relation Age of Onset    Heart Disease Mother     Diabetes Mother     Kidney Disease Mother         kidney failure    Asthma Father     Diabetes Brother     No Known Problems Maternal Grandmother     No Known Problems Maternal Grandfather     No Known Problems Paternal Grandmother     No Known Problems Paternal Grandfather        SOCIAL HISTORY  Social History     Tobacco Use    Smoking status: Some Days     Current packs/day: 0.50     Types: Cigarettes    Smokeless tobacco: Never   Vaping Use    Vaping Use: Never used   Substance and Sexual Activity    Alcohol use: No    Drug use: No    Sexual activity: Not on file       CURRENT MEDICATIONS  Home Medications       Reviewed by Zoey Last R.N. (Registered Nurse) on 01/07/24 at 2226  Med List Status: Not Addressed     Medication Last Dose Status   albuterol 108 (90 Base) MCG/ACT Aero Soln inhalation aerosol  Active   carbamide peroxide (DEBROX) 6.5 % Solution  Active   Cinacalcet HCl 60 MG Tab  Active   Fluticasone-Umeclidin-Vilant (TRELEGY) 100-62.5-25 MCG/INH AEROSOL POWDER, BREATH ACTIVATED inhalation  Active   midodrine (PROAMATINE) 10 MG tablet  Active   omeprazole (PRILOSEC) 20 MG delayed-release capsule  Active   patiromer (VELTASSA) 8.4 g Pack  Active   ROPINIRole (REQUIP) 0.25 MG Tab  Active   sevelamer carbonate (RENVELA) 800 MG Tab tablet  Active                    ALLERGIES  Allergies   Allergen Reactions    Chloraprep One Step Itching    Chlorhexidine Rash    Fluoroplex Rash       PHYSICAL EXAM  VITAL SIGNS: /76   Pulse 73   Temp (!) 35.8 °C (96.4 °F) (Temporal)   Resp 16   Ht 1.829 m (6')   Wt 87.7 kg (193 lb 5.5 oz)   SpO2 94%   BMI 26.22 kg/m²    General: Pleasant male, alert, oriented and nontoxic-appearing  Head: Normocephalic  atraumatic  HEENT: Left ear with cerumen impaction, debris in the auditory canal which is granular appearing.  Mild pain with manipulation of the pinna.  After irrigation, there is a polyp in the auditory canal, the TM is intact with normal light reflex is hyperemia of the TM.  No effusion.  Eyes: Extraocular motion intact  Neck: Supple, no rigidity  Cardiovascular: Regular rate and rhythm via peripheral pulses   Respiratory: equal chest rise and fall, no increased work of breathing   Musculoskeletal: Warm and well perfused, no peripheral edema  Neuro: cranial nerves II through XII intact. Alert, no focal deficits  Integumentary: No wounds or rashes      DIAGNOSTIC STUDIES / PROCEDURES    Procedure:    Cerumen removal    Patient verbally consented and we discussed risks of procedure, auditory canal was irrigated with 50 cc of normal saline through flexible 14-gauge catheter with large cerumen plug and granular debris which is malodorous removed.      COURSE & MEDICAL DECISION MAKING    ED Observation Status? No; Patient does not meet criteria for ED Observation.     INITIAL ASSESSMENT, COURSE AND PLAN  Care Narrative: 73-year-old male with ESRD COPD and a known left auditory canal polyp, presenting with acute loss of hearing in the left ear today he tried to remove any debris himself unsuccessfully now has pain in the ear.  He denies fever chills nausea vomiting, his vital signs are thankfully reassuring, reports he has not missed dialysis, on exam, he is a cerumen impaction in the left ear, he has debris and mild maceration of the tissue thankfully his TM is intact, there is no tenderness to the mastoid, cranial nerves II through XII are intact which suggests against cavernous david sinus ous thrombosis.    I offered repeat imaging to evaluate the mass again, however patient reports she does not want treatment if it is cancerous.  I recommended he follow-up with his ENT again to discuss biopsy and  rescheduling.    After irrigation patient's hearing improved, he states his symptoms have resolved, given the maceration of the external auditory canal and debris, will treat for otitis externa with Ciprodex drops, patient given return precautions he was told to follow-up with PCP or ENT in 2 to 3 days for reevaluation, as well as to discuss further workup of his polyp, and we discussed return precautions.  Patient verbalized understanding and agreement of plan of care and he is planning on going to dialysis tomorrow morning.        DISPOSITION AND DISCUSSIONS  Discharged home with strict return precautions all questions answered      Decision tools and prescription drugs considered including, but not limited to: Antibiotics treating with Ciprodex drops, no evidence of malignant otitis externa, no evidence of mastoiditis no evidence of otitis media. .    FINAL DIAGNOSIS  1. Acute diffuse otitis externa of left ear    2. Polyp of ear canal, left           Electronically signed by: Giuseppe Goodman M.D., 1/7/2024 11:44 PM

## 2024-01-08 NOTE — ED TRIAGE NOTES
"Chief Complaint   Patient presents with    Ear Pain     L ear. Reports difficulty hearing so \"I tried to clean it out and now it hurts\".       "

## 2024-01-08 NOTE — DISCHARGE INSTRUCTIONS
Make sure you follow-up with your ENT doctor, as the mass could be cancerous or and there may be treatment available.  You need to have your ear reexamined in the next 2 to 3 days, return if you develop fever, severe pain other new symptoms you find concerning.

## 2024-01-08 NOTE — ED NOTES
Discharged in stable condition, alert and oriented, ambulatory. Prescribed medication and follow up appointment instructed. Health education imparted. Instructed to come back once symptoms worsened. Pt verbalized understanding of the information given

## 2024-08-14 NOTE — CONSULTS
DATE OF SERVICE:  01/28/2020    REQUESTING PHYSICIAN:  Hospitalist.    REASON FOR CONSULTATION:  Evaluation and management of end-stage renal disease   and hyperkalemia.    HISTORY OF PRESENT ILLNESS:  This is a 69-year-old  male with past   medical history significant for end-stage renal disease, on chronic   hemodialysis Monday, Wednesday, and Fridays, hypertension, anemia secondary to   chronic kidney disease, COPD, CKD bone mineral disorder/renal osteodystrophy,   secondary hyperparathyroidism, cardiomyopathy with an EF of 35% by   echocardiogram in 10/2019 who was admitted with complaints of shortness of   breath after missing his outpatient hemodialysis treatment and was found to   have significant hyperkalemia with potassium of 7.9 and now with concern for   GI bleed as the patient is having melena and drop in hemoglobin.  The patient   reports that he normally dialyzes Monday, Wednesday, and Fridays via a left   arm AV fistula.  His last outpatient dialysis treatment was on Friday,   01/24/2020.  The patient was supposed to go to his regular dialysis yesterday   on 01/27/2020; however, because of diarrhea, he was unable to go to his   outpatient dialysis.  The patient reports that he has had this diarrhea for   about 2 weeks and he has noted some black tarry stools at home.  He reports   that his appetite has been very poor.  He has not really been eating much.  He   presented to the emergency room with complaints of worsening shortness of   breath and labs revealed significant hyperkalemia with potassium of 7.9 and   elevated BUN level of 169.  Urgent hemodialysis has been ordered and the   patient is receiving it at this time.  Also, on admission, he was noted to   have anemia with hemoglobin of 7.1 and it did drop this morning to 5.9.  The   patient reports that he has had these black tarry stools for about 2 weeks.    He is a somewhat poor historian.  Apparently, he did have some dental work a    few weeks ago and has not been feeling well since packed red blood cell   transfusion has been ordered to be given with dialysis today.    REVIEW OF SYSTEMS:  GENERAL:  The patient reports malaise and fatigue.  He denies any fevers or   chills.  HEENT:  The patient denies any congestion or sore throat.  NECK:  The patient denies any masses or swelling.  RESPIRATORY:  The patient does report a dry cough as well as shortness of   breath.  He denies any hemoptysis.  CARDIOVASCULAR:  The patient denies any chest pain or palpitations.  He denies   any lower extremity edema.  GASTROINTESTINAL:  The patient denies any abdominal pain.  He does report   occasional nausea with cough.  He does report diarrhea for the past 2 weeks as   well as black tarry stools off and on for the past 2 weeks.  MUSCULOSKELETAL:  The patient denies any myalgias or joint pains or joint   swelling.  SKIN:  The patient denies any rash or ulcerations or change to his skin color.  NEUROLOGIC:  The patient denies any dizziness, focal weakness or headaches.    He does report generalized weakness.  PSYCHIATRIC:  The patient denies any depression or anxiety.    PAST MEDICAL HISTORY:  1.  End-stage renal disease.  The patient is on chronic hemodialysis Monday,   Wednesday, and Fridays via a left arm AV fistula.  2.  Hypertension.  The patient is noted to be on a regimen of lisinopril and   carvedilol at home as well as midodrine t.i.d.  3.  COPD.  The patient is reportedly on oxygen at home.  4.  History of CHF.  The patient did have an echocardiogram in 10/2019 that   showed an ejection fraction of 35%.  5.  Anemia secondary to chronic kidney disease.  6.  CKD bone mineral disorder/renal osteodystrophy.  7.  Secondary hyperparathyroidism.    PAST SURGICAL HISTORY:  1.  Left arm AV fistula creation.  2.  Endoscopy.  3.  Colonoscopy.    ALLERGIES:  THE PATIENT IS ALLERGIC TO CHLORAPREP.    SOCIAL HISTORY:  The patient reports that he smokes cigarettes  and he smokes   about half pack per day.  He has been smoking since age 22.  He denies any   alcohol or illicit drug use.    FAMILY HISTORY:  The patient reports that his mother had kidney problems,   unclear etiology.    CURRENT MEDICATIONS:  1.  Aspirin 81 mg daily.  2.  Calcium gluconate 1 g IV x1 dose.  3.  Coreg 25 mg once daily.  4.  Protonix 40 mg IV daily.  5.  Senokot 2 tablets twice daily.  6.  Sevelamer 800 mg 5 times a day.    PHYSICAL EXAMINATION:  VITAL SIGNS:  Temperature is 97.7 degrees Fahrenheit, pulse 72, respirations   22, blood pressure 116/67, satting 93% on 2 liters nasal cannula.  GENERAL:  The patient is drowsy, but arousable.  He is oriented x3.  He   appears chronically ill and older than his stated age.  HEENT:  Pupils are equal, round and reactive to light.  Extraocular muscles   are intact.  Mucous membranes appear moist.  NECK:  Exam reveals no JVD.  Trachea is midline.  There is no thyromegaly.  CARDIOVASCULAR:  Heart is regular rate and rhythm.  No murmurs, rubs or   gallops.  RESPIRATORY:  Lungs are with decreased breath sounds at bilateral bases.    There are expiratory wheezes at the bilateral upper fields.  There is no   tachypnea.  There is no increased work of breathing.  GASTROINTESTINAL:  Abdomen is soft, nontender, nondistended.  Bowel sounds are   present.  EXTREMITIES:  Reveals no clubbing, cyanosis or edema.  There is a left arm AV   fistula in place with palpable thrill and audible bruit.  SKIN:  Reveals no rashes or ulcerations.  There is normal skin turgor.  NEUROLOGIC:  Reveals no focal motor deficits.  Sensation is grossly intact to   light touch.  LYMPHATIC:  Exam reveals no cervical lymphadenopathy, no axillary   lymphadenopathy.  PSYCHIATRIC:  The patient is drowsy, but arousable.  He is oriented x3.  He   has a flat affect.  He is calm and cooperative.    LABORATORY DATA:  Labs reveal a white blood cell count of 11.2, hemoglobin   6.1, platelet count is 293.   Differential reveals 70% neutrophils, 20%   lymphocytes.  Chemistries reveal a sodium of 138, potassium 7.6, chloride is   98, bicarbonate is 22, BUN is 181, creatinine is 14.6, calcium is 10.2,   albumin is 2.8.    IMAGING:  Chest x-ray on 01/27/2020 reveals no evidence of acute   cardiopulmonary process.  Echocardiogram on 10/01/2019 reveals moderately   reduced left ventricular systolic function of 35%.  There is moderate   concentric LVH.  It was a technically difficult study.    ASSESSMENT:  1.  End-stage renal disease.  The patient dialyzes Monday, Wednesday, and   Fridays as an outpatient via a left arm arteriovenous fistula.  His last   hemodialysis treatment was on 01/24/2020 and he missed his dialysis yesterday.  2.  Hyperkalemia.  The patient was treated with temporizing measures earlier   today.  He missed his outpatient hemodialysis treatment.  3.  Anemia secondary to chronic kidney disease, but also concern for   gastrointestinal bleed now due to melena and drop in hemoglobin.  4.  Gastrointestinal bleed.  The patient has been having black tarry   stools/melena and hemoglobin has dropped since admission.  5.  Leukocytosis.  The patient was afebrile overnight.  6.  Chronic kidney disease, bone mineral disorder/renal osteodystrophy.  The   patient is on a binder 5 times a day apparently per his MAR.  7.  Hypertension.  As an outpatient, the patient is on midodrine t.i.d. as   well as lisinopril and carvedilol.  8.  Secondary hyperparathyroidism.  The patient is on Sensipar as an   outpatient, unclear if he is compliant.  9.  Chronic obstructive pulmonary disease.  10.  Cardiomyopathy.  The patient with history of congestive heart failure.    His ejection fraction is 35% by echocardiogram in 10/2019.  He currently does   not appear fluid overloaded.  11.  Severe protein-calorie malnutrition.    PLAN:  1.  Urgent hemodialysis this morning on a low potassium bath.  2.  Hemodialysis again tomorrow.  3.  The  patient is currently receiving packed red blood cell transfusion with   hemodialysis.  4.  Check serial CBCs and transfuse p.r.n. to keep hemoglobin above 7.  5.  We will administer Epogen with hemodialysis.  6.  GI to be consulted.  7.  Hold Sensipar for now.  8.  Recommend holding blood pressure medications for now and monitoring.  9.  Dose adjust all medications for his decreased GFR.  10.  Culture if patient spikes a fever.  11.  No dietary protein restrictions at this time.    Thank you for this very interesting consult and thank you for involving me in   the care of this very pleasant patient.  If you have any questions or need any   further information, please do not hesitate to contact me.       ____________________________________     MD FELICIA HOLMAN / NTS    DD:  01/28/2020 11:39:42  DT:  01/28/2020 12:16:07    D#:  [id number]  Job#:  [id number]   No

## 2024-09-26 ENCOUNTER — APPOINTMENT (OUTPATIENT)
Dept: VASCULAR SURGERY | Facility: MEDICAL CENTER | Age: 74
End: 2024-09-26
Payer: MEDICARE

## 2024-10-08 NOTE — ED NOTES
3rd amp sodium bicarb 50meg given ivp   1/60/-3. Admission orders placed.

## 2024-10-15 ENCOUNTER — APPOINTMENT (OUTPATIENT)
Dept: VASCULAR SURGERY | Facility: MEDICAL CENTER | Age: 74
End: 2024-10-15
Payer: MEDICARE

## 2024-10-15 VITALS
HEART RATE: 65 BPM | TEMPERATURE: 98.3 F | SYSTOLIC BLOOD PRESSURE: 120 MMHG | OXYGEN SATURATION: 92 % | WEIGHT: 197 LBS | HEIGHT: 72 IN | BODY MASS INDEX: 26.68 KG/M2 | DIASTOLIC BLOOD PRESSURE: 62 MMHG

## 2024-10-15 DIAGNOSIS — I10 PRIMARY HYPERTENSION: ICD-10-CM

## 2024-10-15 DIAGNOSIS — N18.6 ESRD (END STAGE RENAL DISEASE) (HCC): ICD-10-CM

## 2024-10-15 DIAGNOSIS — J44.9 CHRONIC OBSTRUCTIVE PULMONARY DISEASE, UNSPECIFIED COPD TYPE (HCC): ICD-10-CM

## 2024-10-15 DIAGNOSIS — T82.9XXA COMPLICATION OF ARTERIOVENOUS DIALYSIS FISTULA, INITIAL ENCOUNTER: ICD-10-CM

## 2024-10-15 PROCEDURE — 3074F SYST BP LT 130 MM HG: CPT | Performed by: SURGERY

## 2024-10-15 PROCEDURE — 99204 OFFICE O/P NEW MOD 45 MIN: CPT | Performed by: SURGERY

## 2024-10-15 PROCEDURE — 3078F DIAST BP <80 MM HG: CPT | Performed by: SURGERY

## 2024-10-16 ENCOUNTER — TELEPHONE (OUTPATIENT)
Dept: VASCULAR SURGERY | Facility: MEDICAL CENTER | Age: 74
End: 2024-10-16
Payer: MEDICARE

## 2024-10-17 ENCOUNTER — TELEPHONE (OUTPATIENT)
Dept: VASCULAR SURGERY | Facility: MEDICAL CENTER | Age: 74
End: 2024-10-17
Payer: MEDICARE

## 2024-11-11 ENCOUNTER — TELEPHONE (OUTPATIENT)
Dept: VASCULAR SURGERY | Facility: MEDICAL CENTER | Age: 74
End: 2024-11-11
Payer: MEDICARE

## 2024-11-11 NOTE — TELEPHONE ENCOUNTER
I called [facility] and spoke with Raina. I explained to Raina that I was trying to schedule the patient for Wed and if someone could reassure the patient that they can reschedule him to T, TH, Sat for dialysis. Raina said that he can do that and will talk to the patient.     Patient is currently at dialysis M, W, F.

## 2024-11-12 ENCOUNTER — TELEPHONE (OUTPATIENT)
Dept: VASCULAR SURGERY | Facility: MEDICAL CENTER | Age: 74
End: 2024-11-12
Payer: MEDICARE

## 2024-11-12 NOTE — TELEPHONE ENCOUNTER
I called patient and left a message for patient to call back to schedule procedure with Dr. Ellison.

## 2024-11-12 NOTE — TELEPHONE ENCOUNTER
Patient returned my VM:     Patient is scheduled for procedure with Dr. Ellison for 11/27 @ 10:00 am. Patient is to check in @ 8:00 am in the TT 1st floor.     Patient instructed nothing to eat or drink 8 hours prior and will need a .     I called [facility] and spoke w/ Phuong. She stated that she will notify one of the nurses of the message.

## 2024-11-13 ENCOUNTER — APPOINTMENT (OUTPATIENT)
Dept: ADMISSIONS | Facility: MEDICAL CENTER | Age: 74
End: 2024-11-13
Attending: SURGERY
Payer: MEDICARE

## 2024-11-19 ENCOUNTER — PRE-ADMISSION TESTING (OUTPATIENT)
Dept: ADMISSIONS | Facility: MEDICAL CENTER | Age: 74
End: 2024-11-19
Attending: SURGERY
Payer: MEDICARE

## 2024-11-19 RX ORDER — ACETAMINOPHEN 500 MG
500-1000 TABLET ORAL EVERY 6 HOURS PRN
COMMUNITY

## 2024-11-19 RX ORDER — MULTIVIT WITH MINERALS/LUTEIN
1 TABLET ORAL DAILY
Status: ON HOLD | COMMUNITY
End: 2024-11-27

## 2024-11-19 NOTE — OR NURSING
"Pre-Admit RN appointment completed with Bridger Savage, by this RN, via phone, for procedure 11/27/24. Pt receives dialysis M-W-F at [facility] [address] in [place]. Pt declines knowing a plan for dialysis after surgery, but stated they said something about on 11/28, which \"won't work for him\". He is requesting further instruction or receiving dialysis after surgery while in the hospital. Pt also stated he lives alone and might be able to have his son take him home. Information for pt services post-op declined by pt. He states he doesn't have enough money for that. Pt is aware he needs a responsible adult and ride from the hospital and is going to speak with his son to assist with this. Pt stated he does have some feelings of depression from being and living alone, but denies any feelings of doing harm to himself and does have a strong connection with his [employer] group. We discussed pre-procedure instructions. We also discussed fasting guidelines including 16oz of clear liquid until 2 hours prior to surgery unless he has any diet, fluid restrictions, or physician states otherwise. Discussed post-op expectations for pain, and approximate duration of time in PACU etc. Instructed pt on medication instructions from \"Guideline for Pre-Operative Medication Management\" & \"[facility] Perioperative Medication Management Clinical Practice Guideline\", unless otherwise instructed by prescribing MD or Surgeon. Patient verbalized understanding of all instructions and denies any questions or concerns. Copy of Medication Reconciliation with instructions provided to pt via mail.  "

## 2024-11-20 ENCOUNTER — APPOINTMENT (OUTPATIENT)
Dept: ADMISSIONS | Facility: MEDICAL CENTER | Age: 74
End: 2024-11-20
Payer: MEDICARE

## 2024-11-20 NOTE — DISCHARGE PLANNING
"Nurse navigator appt made by preadmission RN  Opal. Spoke to Bridger, he goes by \"Giuseppe.\"  He is unsure of his plan of care after surgery with dialysis. He normally drives himself to dialysis. He wants to have dialysis after surgery and stay overnight. He is unsure if his son will be able to bring him or pick him up after surgery. He prefers to drive himself to hospital and drive home the next day. He stated \"they\" were trying to change his dialysis days to Tuesday, Thursday and Saturday and \"that doesn't work for me.\" He stated he has PTSD and he needs to stay on the same routine or his PTSD is triggered.   Also discussed if he was still seeing psychiatrist and he stated \"no, I'm Anglican and she is not.\"  Offered other behavior Health resources but he declined. He has lots od support from [employer] members. He declined any senior  resources as he \"doesn't want anyone in his home\" after a bad experience.   1324- Spoke to EDUARDO Jama at Dr Ellison's office re: above plan of care and pt desire to stay overnight. She will call pt.   "

## 2024-11-27 ENCOUNTER — APPOINTMENT (OUTPATIENT)
Dept: RADIOLOGY | Facility: MEDICAL CENTER | Age: 74
End: 2024-11-27
Attending: SURGERY
Payer: MEDICARE

## 2024-11-27 ENCOUNTER — HOSPITAL ENCOUNTER (OUTPATIENT)
Facility: MEDICAL CENTER | Age: 74
End: 2024-11-27
Attending: SURGERY | Admitting: SURGERY
Payer: MEDICARE

## 2024-11-27 VITALS
TEMPERATURE: 97.1 F | RESPIRATION RATE: 18 BRPM | SYSTOLIC BLOOD PRESSURE: 124 MMHG | WEIGHT: 191.36 LBS | OXYGEN SATURATION: 94 % | DIASTOLIC BLOOD PRESSURE: 69 MMHG | BODY MASS INDEX: 25.92 KG/M2 | HEIGHT: 72 IN | HEART RATE: 73 BPM

## 2024-11-27 LAB
ANION GAP SERPL CALC-SCNC: 19 MMOL/L (ref 7–16)
BUN SERPL-MCNC: 45 MG/DL (ref 8–22)
CALCIUM SERPL-MCNC: 9.9 MG/DL (ref 8.5–10.5)
CHLORIDE SERPL-SCNC: 93 MMOL/L (ref 96–112)
CO2 SERPL-SCNC: 28 MMOL/L (ref 20–33)
CREAT SERPL-MCNC: 10.7 MG/DL (ref 0.5–1.4)
EKG IMPRESSION: NORMAL
ERYTHROCYTE [DISTWIDTH] IN BLOOD BY AUTOMATED COUNT: 53.6 FL (ref 35.9–50)
GFR SERPLBLD CREATININE-BSD FMLA CKD-EPI: 5 ML/MIN/1.73 M 2
GLUCOSE SERPL-MCNC: 88 MG/DL (ref 65–99)
HCT VFR BLD AUTO: 49.3 % (ref 42–52)
HGB BLD-MCNC: 15 G/DL (ref 14–18)
INR PPP: 1.09 (ref 0.87–1.13)
MCH RBC QN AUTO: 24.8 PG (ref 27–33)
MCHC RBC AUTO-ENTMCNC: 30.4 G/DL (ref 32.3–36.5)
MCV RBC AUTO: 81.6 FL (ref 81.4–97.8)
PLATELET # BLD AUTO: 262 K/UL (ref 164–446)
PMV BLD AUTO: 10.1 FL (ref 9–12.9)
POTASSIUM SERPL-SCNC: 6.2 MMOL/L (ref 3.6–5.5)
PROTHROMBIN TIME: 14.1 SEC (ref 12–14.6)
RBC # BLD AUTO: 6.04 M/UL (ref 4.7–6.1)
SODIUM SERPL-SCNC: 140 MMOL/L (ref 135–145)
WBC # BLD AUTO: 9.5 K/UL (ref 4.8–10.8)

## 2024-11-27 PROCEDURE — 93005 ELECTROCARDIOGRAM TRACING: CPT | Performed by: SURGERY

## 2024-11-27 PROCEDURE — 93010 ELECTROCARDIOGRAM REPORT: CPT | Performed by: STUDENT IN AN ORGANIZED HEALTH CARE EDUCATION/TRAINING PROGRAM

## 2024-11-27 PROCEDURE — 85610 PROTHROMBIN TIME: CPT

## 2024-11-27 PROCEDURE — 80048 BASIC METABOLIC PNL TOTAL CA: CPT

## 2024-11-27 PROCEDURE — 85027 COMPLETE CBC AUTOMATED: CPT

## 2024-11-27 RX ORDER — SODIUM CHLORIDE 9 MG/ML
INJECTION, SOLUTION INTRAVENOUS ONCE
Status: DISCONTINUED | OUTPATIENT
Start: 2024-11-27 | End: 2024-11-27 | Stop reason: HOSPADM

## 2024-11-27 NOTE — H&P
Referring Provider: Caesar López MD     Consulting Physician: Selina Ellison MD - [facility]      -------------------------------------------------------------------------------------------------     Reason for consultation:  Access creation.     HPI:  This is a 74 y.o. right-handed male with multiple medical problems including end-stage renal disease on hemodialysis Monday, Wednesday, and Friday starting at 6 AM to 10:30 AM via left distal radiocephalic fistula which has been present for quite a number of years.  The fistula develops multiple areas of aneurysm.  Patient is referred to see me for new fistula creation and excision of the aneurysmal fistula.        Past Medical History        Past Medical History:   Diagnosis Date    Asthma      Breath shortness       uses oxygen 3.5 L as needed    Chronic obstructive pulmonary disease (HCC)      Cold       12/27/17 cold    Congestive heart failure (HCC)      Dental disorder       upper and lower dentures    Dialysis patient (HCC)       MWF    Emphysema of lung (HCC)      Hemodialysis patient ([facility])       M, W, F    Renal disorder      Snoring      Urinary incontinence              Past Surgical History         Past Surgical History:   Procedure Laterality Date    GASTROSCOPY-ENDO   1/30/2020     Procedure: GASTROSCOPY;  Surgeon: Richard Cano D.O.;  Location: [facility];  Service: Gastroenterology    GASTROSCOPY   2/1/2018     Procedure: GASTROSCOPY;  Surgeon: Morteza Olivo M.D.;  Location: [facility];  Service: Gastroenterology    COLONOSCOPY   2/1/2018     Procedure: COLONOSCOPY;  Surgeon: Morteza Olivo M.D.;  Location: [facility];  Service: Gastroenterology    GASTROSCOPY-ENDO N/A 8/28/2017     Procedure: GASTROSCOPY-ENDO;  Surgeon: Pankaj Choudhury M.D.;  Location: [facility];  Service: EUS    OTHER         Patient had an arm fistula put in for dialysis            Current Medications           Current Outpatient Medications   Medication Sig Dispense Refill    ciprofloxacin (CIPRO HC) 0.2-1 % Suspension Administer 3 Drops into affected ear(s) 2 times a day. Administer drops to both ears. 10 mL 0    Cinacalcet HCl 60 MG Tab TAKE 1 TABLET BY MOUTH DAILY WITH LUNCH (DO NOT TAKE LESS THAN 12 HOURS PRIOR TO DIALYSIS)        carbamide peroxide (DEBROX) 6.5 % Solution Administer 6 Drops into the left ear 2 times a day. Administer drops in both ears. (Patient not taking: Reported on 9/5/2023) 15 mL 0    patiromer (VELTASSA) 8.4 g Pack Take 8.4 g by mouth every day. (Patient not taking: Reported on 9/5/2023) 30 Each 0    albuterol 108 (90 Base) MCG/ACT Aero Soln inhalation aerosol Inhale 2 Puffs every 6 hours as needed for Shortness of Breath. 8.5 g 0    Fluticasone-Umeclidin-Vilant (TRELEGY) 100-62.5-25 MCG/INH AEROSOL POWDER, BREATH ACTIVATED inhalation Inhale 1 Inhalation 3 times a day. (Patient not taking: Reported on 9/5/2023) 1 Each 1    ROPINIRole (REQUIP) 0.25 MG Tab Take 0.25 mg by mouth every day.        omeprazole (PRILOSEC) 20 MG delayed-release capsule Take 20 mg by mouth every day.        sevelamer carbonate (RENVELA) 800 MG Tab tablet Take 3,200 mg by mouth 2 times a day with meals. Pt last filled on 6/2021 from [facility], pt reports taking 4 tablets BID        midodrine (PROAMATINE) 10 MG tablet Take 10 mg by mouth every day. Take 1 tablet by mouth three times a day          No current facility-administered medications for this visit.            Social History               Socioeconomic History    Marital status:        Spouse name: Not on file    Number of children: Not on file    Years of education: Not on file    Highest education level: Not on file   Occupational History    Not on file   Tobacco Use    Smoking status: Some Days       Current packs/day: 0.50       Types: Cigarettes    Smokeless tobacco: Never   Vaping Use    Vaping status: Never Used   Substance and Sexual Activity     Alcohol use: No    Drug use: No    Sexual activity: Not on file   Other Topics Concern    Not on file   Social History Narrative     ** Merged History Encounter **            Social Determinants of Health      Financial Resource Strain: Not on file   Food Insecurity: Not on file   Transportation Needs: Not on file   Physical Activity: Not on file   Stress: Not on file   Social Connections: Not on file   Intimate Partner Violence: Not on file   Housing Stability: Not on file            Family History         Family History   Problem Relation Age of Onset    Heart Disease Mother      Diabetes Mother      Kidney Disease Mother           kidney failure    Asthma Father      Diabetes Brother      No Known Problems Maternal Grandmother      No Known Problems Maternal Grandfather      No Known Problems Paternal Grandmother      No Known Problems Paternal Grandfather              Allergies:  Chloraprep one step, Chlorhexidine, and Fluoroplex     Review of Systems:     Constitutional:          Negative for fever, chills, weight loss,   HENT:                                     Negative for hearing loss or tinnitus    Eyes:                           Negative for blurred vision, double vision, or loss of vision  Respiratory:               Negative for cough, hemoptysis, or wheezing    Cardiac:                      Negative for chest pain or palpitations or orthopnea  Vascular:                    Negative for claudication or rest pain   Gastrointestinal:       Negative for nausea, vomiting, or abdominal pain                                      Negative for hematochezia or melena   Genitourinary:           Negative for dysuria, frequency, or hematuria   Musculoskeletal:       Negative for myalgias, back pain, or joint pain  Skin:                           Negative for itching or rash  Neurological:             Negative for dizziness, headaches, or tremors                                      Negative for speech disturbance                                       Negative for extremity weakness or paresthesias  Endo/Heme:               Negative for easy bruising or bleeding  Psychiatric:                Negative for depression, suicidal ideas, or hallucinations     Physical Exam:  /62 (BP Location: Right arm, Patient Position: Sitting, BP Cuff Size: Adult)   Pulse 65   Temp 36.8 °C (98.3 °F) (Temporal)   Ht 1.829 m (6')   Wt 89.4 kg (197 lb)   SpO2 92%      Constitutional:          Alert, oriented, no acute distress  HEENT:                       Normocephalic and atraumatic, EOMI  Neck:                          Supple, no JVD  Cardiovascular:         Regular rate and rhythm  Pulmonary:                Good air entry bilaterally  Abdominal:                Soft, non-tender, non-distended                                      Aortic impulse not widened  Musculoskeletal:       No edema, no tenderness  Neurological:             CN II-XII grossly intact, no focal deficits  Skin:                           Skin is warm and dry. No rash noted.  Psychiatric:                Normal mood and affect.  Left upper extremity: Left distal radiocephalic fistula with multiple areas of aneurysmal dilatation.  There is no tenting of overlying skin.  The outflow of the fistula in the upper arm appears to be through the basilic vein which has good thrills.     Labs:  Reviewed.     Radiology:  Fistula creation duplex performed at outside facility show the left upper arm basilic vein to be dilated.     Assessment:  -End-stage renal disease.  -Severely aneurysmal left forearm dialysis fistula.  -Hypertension.  -COPD.     Plan:  I had a long discussion with patient.  Study results were reviewed with him.  The simplest option would be to transpose the left upper arm basilic vein and anastomose it to the brachial artery.  Patient would like to have the aneurysmal left forearm fistula removed at the same time.  I told patient that I would have to place the tunneled  dialysis catheter so that patient can be dialyzed until the left upper arm brachiobasilic fistula heals.  Risks of the procedure were discussed which include, but not limited to, bleeding, infection, nerve injury, seroma formation, seroma leak, catheter malplacement, catheter malfunctioning, arterial steal syndrome which if severe enough would require ligation of the fistula, and perioperative anesthetic complications.  Patient indicated understanding and would like to proceed.  All questions were answered.

## 2024-11-27 NOTE — OR NURSING
"Surgery cancelled by MD due to 6.2 potassium. Patient informed to go to dialysis appointment today, patient indicated understanding. Patient had no ride at this time, patient was offered a cab voucher but needed to wait until CM cleared it. Patient denied and stated not wanting to wait any longer and states that he has \"other friends to call for a ride\". PIV flushed and removed, tip intact. Patient left ambulatory and steady.  "

## 2024-11-27 NOTE — PROGRESS NOTES
Patient's potassium was 6.2.  Surgery therefore was canceled.    I contacted his dialysis center and made arrangement for him to be dialyzed today.  I instructed patient to go to dialysis center today to be dialyzed.  Patient indicated understanding.    I also sent an email to my office to have patient rescheduled to one Wednesday afternoon.  I told patient that it is important that he would need to go to dialysis in the morning the day of surgery.  Patient indicated understanding.

## 2024-11-27 NOTE — PROGRESS NOTES
Medication history reviewed with PT at bedside    [facility] is complete per PT reporting    Allergies reviewed.     Patient denies any outpatient antibiotics in the last 30 days.     Patient is not taking anticoagulants.    PT receives dialysis on Mon, Wed and Fri at [facility] on [address] ([phone]). Last treatment was 11/25/2024.    Preferred pharmacy for this visit - [facility] on [address] [facility] ([phone])

## 2024-12-02 ENCOUNTER — APPOINTMENT (OUTPATIENT)
Dept: ADMISSIONS | Facility: MEDICAL CENTER | Age: 74
End: 2024-12-02
Attending: SURGERY
Payer: MEDICARE

## 2024-12-05 ENCOUNTER — PRE-ADMISSION TESTING (OUTPATIENT)
Dept: ADMISSIONS | Facility: MEDICAL CENTER | Age: 74
End: 2024-12-05
Attending: SURGERY
Payer: MEDICARE

## 2024-12-05 RX ORDER — MULTIVIT WITH MINERALS/LUTEIN
1000 TABLET ORAL DAILY
COMMUNITY

## 2024-12-05 RX ORDER — ROPINIROLE 0.5 MG/1
0.5 TABLET, FILM COATED ORAL 3 TIMES DAILY
COMMUNITY

## 2024-12-05 NOTE — OR NURSING
RN tele pre admit appointment not completed:  Two attempts made, with voice messages, to call patient regarding RN tele pre admit appointment that was scheduled for today at 8:30.  Both calls went straight to voice mail. Surgery date 12/18/2024.      RN tele pre admit appointment completed with patient:  Medication instructions given according to the Guideline for Pre-Operative Medication Management.  Copy of medication list mailed today to verified mailing address on file.  Preparing For Your Procedure packet reviewed with patient including fasting and bathing guidelines.  Patient instructed to stop taking all vitamins and herbal supplements 7 days prior to surgery and instructed to stop any NSAIDS 5 days prior to surgery unless otherwise instructed by medical provider. Surgery date 12/18/2024.    Patient aware he may have clear liquids until 2 hours prior to procedure.     Patient verbalizes understanding of all instructions given. No further questions at this time.

## 2024-12-18 ENCOUNTER — ANESTHESIA EVENT (OUTPATIENT)
Dept: SURGERY | Facility: MEDICAL CENTER | Age: 74
End: 2024-12-18
Payer: MEDICARE

## 2024-12-18 ENCOUNTER — HOSPITAL ENCOUNTER (OUTPATIENT)
Facility: MEDICAL CENTER | Age: 74
End: 2024-12-19
Attending: SURGERY | Admitting: STUDENT IN AN ORGANIZED HEALTH CARE EDUCATION/TRAINING PROGRAM
Payer: MEDICARE

## 2024-12-18 ENCOUNTER — APPOINTMENT (OUTPATIENT)
Dept: RADIOLOGY | Facility: MEDICAL CENTER | Age: 74
End: 2024-12-18
Attending: SURGERY
Payer: MEDICARE

## 2024-12-18 ENCOUNTER — ANESTHESIA (OUTPATIENT)
Dept: SURGERY | Facility: MEDICAL CENTER | Age: 74
End: 2024-12-18
Payer: MEDICARE

## 2024-12-18 DIAGNOSIS — T82.9XXA COMPLICATION OF ARTERIOVENOUS DIALYSIS FISTULA, INITIAL ENCOUNTER: ICD-10-CM

## 2024-12-18 PROBLEM — J96.01 ACUTE RESPIRATORY FAILURE WITH HYPOXIA (HCC): Status: ACTIVE | Noted: 2024-12-18

## 2024-12-18 LAB
ANION GAP SERPL CALC-SCNC: 17 MMOL/L (ref 7–16)
BUN SERPL-MCNC: 16 MG/DL (ref 8–22)
CALCIUM SERPL-MCNC: 10.1 MG/DL (ref 8.5–10.5)
CHLORIDE SERPL-SCNC: 93 MMOL/L (ref 96–112)
CO2 SERPL-SCNC: 27 MMOL/L (ref 20–33)
CREAT SERPL-MCNC: 5.27 MG/DL (ref 0.5–1.4)
EKG IMPRESSION: NORMAL
EKG IMPRESSION: NORMAL
ERYTHROCYTE [DISTWIDTH] IN BLOOD BY AUTOMATED COUNT: 53.8 FL (ref 35.9–50)
GFR SERPLBLD CREATININE-BSD FMLA CKD-EPI: 11 ML/MIN/1.73 M 2
GLUCOSE SERPL-MCNC: 81 MG/DL (ref 65–99)
HCT VFR BLD AUTO: 48.9 % (ref 42–52)
HGB BLD-MCNC: 15.1 G/DL (ref 14–18)
MAGNESIUM SERPL-MCNC: 2.2 MG/DL (ref 1.5–2.5)
MCH RBC QN AUTO: 24.8 PG (ref 27–33)
MCHC RBC AUTO-ENTMCNC: 30.9 G/DL (ref 32.3–36.5)
MCV RBC AUTO: 80.2 FL (ref 81.4–97.8)
NT-PROBNP SERPL IA-MCNC: 2011 PG/ML (ref 0–125)
PHOSPHATE SERPL-MCNC: 3.9 MG/DL (ref 2.5–4.5)
PLATELET # BLD AUTO: 260 K/UL (ref 164–446)
PMV BLD AUTO: 9.4 FL (ref 9–12.9)
POTASSIUM SERPL-SCNC: 4 MMOL/L (ref 3.6–5.5)
PROCALCITONIN SERPL-MCNC: 1.03 NG/ML
RBC # BLD AUTO: 6.1 M/UL (ref 4.7–6.1)
SODIUM SERPL-SCNC: 137 MMOL/L (ref 135–145)
WBC # BLD AUTO: 6.4 K/UL (ref 4.8–10.8)

## 2024-12-18 PROCEDURE — 80048 BASIC METABOLIC PNL TOTAL CA: CPT

## 2024-12-18 PROCEDURE — 700111 HCHG RX REV CODE 636 W/ 250 OVERRIDE (IP): Mod: JG | Performed by: SURGERY

## 2024-12-18 PROCEDURE — 700111 HCHG RX REV CODE 636 W/ 250 OVERRIDE (IP): Performed by: STUDENT IN AN ORGANIZED HEALTH CARE EDUCATION/TRAINING PROGRAM

## 2024-12-18 PROCEDURE — 93010 ELECTROCARDIOGRAM REPORT: CPT | Performed by: INTERNAL MEDICINE

## 2024-12-18 PROCEDURE — 83735 ASSAY OF MAGNESIUM: CPT

## 2024-12-18 PROCEDURE — 700102 HCHG RX REV CODE 250 W/ 637 OVERRIDE(OP): Performed by: STUDENT IN AN ORGANIZED HEALTH CARE EDUCATION/TRAINING PROGRAM

## 2024-12-18 PROCEDURE — 160002 HCHG RECOVERY MINUTES (STAT): Performed by: SURGERY

## 2024-12-18 PROCEDURE — 85027 COMPLETE CBC AUTOMATED: CPT

## 2024-12-18 PROCEDURE — 160035 HCHG PACU - 1ST 60 MINS PHASE I: Performed by: SURGERY

## 2024-12-18 PROCEDURE — 37607 LIG/BANDING ANGIOACS AV FSTL: CPT | Performed by: SURGERY

## 2024-12-18 PROCEDURE — C1729 CATH, DRAINAGE: HCPCS | Performed by: SURGERY

## 2024-12-18 PROCEDURE — 93005 ELECTROCARDIOGRAM TRACING: CPT | Mod: TC | Performed by: SURGERY

## 2024-12-18 PROCEDURE — 76937 US GUIDE VASCULAR ACCESS: CPT | Performed by: SURGERY

## 2024-12-18 PROCEDURE — G0378 HOSPITAL OBSERVATION PER HR: HCPCS

## 2024-12-18 PROCEDURE — 160039 HCHG SURGERY MINUTES - EA ADDL 1 MIN LEVEL 3: Performed by: SURGERY

## 2024-12-18 PROCEDURE — 160048 HCHG OR STATISTICAL LEVEL 1-5: Performed by: SURGERY

## 2024-12-18 PROCEDURE — 700111 HCHG RX REV CODE 636 W/ 250 OVERRIDE (IP): Mod: JZ | Performed by: STUDENT IN AN ORGANIZED HEALTH CARE EDUCATION/TRAINING PROGRAM

## 2024-12-18 PROCEDURE — 160036 HCHG PACU - EA ADDL 30 MINS PHASE I: Performed by: SURGERY

## 2024-12-18 PROCEDURE — 99497 ADVNCD CARE PLAN 30 MIN: CPT | Mod: 25 | Performed by: STUDENT IN AN ORGANIZED HEALTH CARE EDUCATION/TRAINING PROGRAM

## 2024-12-18 PROCEDURE — 77001 FLUOROGUIDE FOR VEIN DEVICE: CPT | Performed by: SURGERY

## 2024-12-18 PROCEDURE — C1713 ANCHOR/SCREW BN/BN,TIS/BN: HCPCS | Performed by: SURGERY

## 2024-12-18 PROCEDURE — C1750 CATH, HEMODIALYSIS,LONG-TERM: HCPCS | Performed by: SURGERY

## 2024-12-18 PROCEDURE — 160009 HCHG ANES TIME/MIN: Performed by: SURGERY

## 2024-12-18 PROCEDURE — 36558 INSERT TUNNELED CV CATH: CPT | Performed by: SURGERY

## 2024-12-18 PROCEDURE — 700101 HCHG RX REV CODE 250: Performed by: STUDENT IN AN ORGANIZED HEALTH CARE EDUCATION/TRAINING PROGRAM

## 2024-12-18 PROCEDURE — A9270 NON-COVERED ITEM OR SERVICE: HCPCS | Performed by: STUDENT IN AN ORGANIZED HEALTH CARE EDUCATION/TRAINING PROGRAM

## 2024-12-18 PROCEDURE — 83880 ASSAY OF NATRIURETIC PEPTIDE: CPT

## 2024-12-18 PROCEDURE — 700105 HCHG RX REV CODE 258: Performed by: SURGERY

## 2024-12-18 PROCEDURE — RXMED WILLOW AMBULATORY MEDICATION CHARGE: Performed by: SURGERY

## 2024-12-18 PROCEDURE — 84145 PROCALCITONIN (PCT): CPT

## 2024-12-18 PROCEDURE — 84100 ASSAY OF PHOSPHORUS: CPT

## 2024-12-18 PROCEDURE — 36819 AV FUSE UPPR ARM BASILIC: CPT | Performed by: SURGERY

## 2024-12-18 PROCEDURE — 99223 1ST HOSP IP/OBS HIGH 75: CPT | Mod: 25 | Performed by: STUDENT IN AN ORGANIZED HEALTH CARE EDUCATION/TRAINING PROGRAM

## 2024-12-18 PROCEDURE — 160028 HCHG SURGERY MINUTES - 1ST 30 MINS LEVEL 3: Performed by: SURGERY

## 2024-12-18 DEVICE — CATHETER HEMOSPLIT 23CM (5EA/CA): Type: IMPLANTABLE DEVICE | Site: CHEST | Status: FUNCTIONAL

## 2024-12-18 RX ORDER — HEPARIN SODIUM,PORCINE 1000/ML
VIAL (ML) INJECTION
Status: DISCONTINUED | OUTPATIENT
Start: 2024-12-18 | End: 2024-12-18 | Stop reason: HOSPADM

## 2024-12-18 RX ORDER — PHENYLEPHRINE HYDROCHLORIDE 10 MG/ML
INJECTION, SOLUTION INTRAMUSCULAR; INTRAVENOUS; SUBCUTANEOUS PRN
Status: DISCONTINUED | OUTPATIENT
Start: 2024-12-18 | End: 2024-12-18 | Stop reason: SURG

## 2024-12-18 RX ORDER — DEXAMETHASONE SODIUM PHOSPHATE 4 MG/ML
INJECTION, SOLUTION INTRA-ARTICULAR; INTRALESIONAL; INTRAMUSCULAR; INTRAVENOUS; SOFT TISSUE PRN
Status: DISCONTINUED | OUTPATIENT
Start: 2024-12-18 | End: 2024-12-18 | Stop reason: SURG

## 2024-12-18 RX ORDER — BUPIVACAINE HYDROCHLORIDE 5 MG/ML
INJECTION, SOLUTION EPIDURAL; INTRACAUDAL
Status: DISCONTINUED | OUTPATIENT
Start: 2024-12-18 | End: 2024-12-18 | Stop reason: HOSPADM

## 2024-12-18 RX ORDER — CEFAZOLIN SODIUM 1 G/3ML
INJECTION, POWDER, FOR SOLUTION INTRAMUSCULAR; INTRAVENOUS PRN
Status: DISCONTINUED | OUTPATIENT
Start: 2024-12-18 | End: 2024-12-18 | Stop reason: SURG

## 2024-12-18 RX ORDER — EPHEDRINE SULFATE 50 MG/ML
5 INJECTION, SOLUTION INTRAVENOUS
Status: DISCONTINUED | OUTPATIENT
Start: 2024-12-18 | End: 2024-12-18 | Stop reason: HOSPADM

## 2024-12-18 RX ORDER — SODIUM CHLORIDE 9 MG/ML
INJECTION, SOLUTION INTRAVENOUS ONCE
Status: COMPLETED | OUTPATIENT
Start: 2024-12-18 | End: 2024-12-18

## 2024-12-18 RX ORDER — ONDANSETRON 2 MG/ML
INJECTION INTRAMUSCULAR; INTRAVENOUS PRN
Status: DISCONTINUED | OUTPATIENT
Start: 2024-12-18 | End: 2024-12-18 | Stop reason: SURG

## 2024-12-18 RX ORDER — HEPARIN SODIUM,PORCINE 1000/ML
VIAL (ML) INJECTION PRN
Status: DISCONTINUED | OUTPATIENT
Start: 2024-12-18 | End: 2024-12-18 | Stop reason: SURG

## 2024-12-18 RX ORDER — HYDROCODONE BITARTRATE AND ACETAMINOPHEN 5; 325 MG/1; MG/1
1 TABLET ORAL EVERY 6 HOURS PRN
Qty: 20 TABLET | Refills: 0 | Status: SHIPPED | OUTPATIENT
Start: 2024-12-18 | End: 2024-12-24

## 2024-12-18 RX ORDER — METOPROLOL TARTRATE 1 MG/ML
INJECTION, SOLUTION INTRAVENOUS PRN
Status: DISCONTINUED | OUTPATIENT
Start: 2024-12-18 | End: 2024-12-18 | Stop reason: SURG

## 2024-12-18 RX ORDER — ALBUTEROL SULFATE 90 UG/1
2 INHALANT RESPIRATORY (INHALATION) EVERY 6 HOURS PRN
Status: DISCONTINUED | OUTPATIENT
Start: 2024-12-18 | End: 2024-12-19 | Stop reason: HOSPADM

## 2024-12-18 RX ORDER — ALBUTEROL SULFATE 5 MG/ML
2.5 SOLUTION RESPIRATORY (INHALATION)
Status: DISCONTINUED | OUTPATIENT
Start: 2024-12-18 | End: 2024-12-18

## 2024-12-18 RX ORDER — SEVELAMER CARBONATE 800 MG/1
3200 TABLET, FILM COATED ORAL 2 TIMES DAILY WITH MEALS
Status: DISCONTINUED | OUTPATIENT
Start: 2024-12-19 | End: 2024-12-19 | Stop reason: HOSPADM

## 2024-12-18 RX ORDER — DEXMEDETOMIDINE HYDROCHLORIDE 100 UG/ML
INJECTION, SOLUTION INTRAVENOUS PRN
Status: DISCONTINUED | OUTPATIENT
Start: 2024-12-18 | End: 2024-12-18 | Stop reason: SURG

## 2024-12-18 RX ORDER — ONDANSETRON 2 MG/ML
4 INJECTION INTRAMUSCULAR; INTRAVENOUS EVERY 4 HOURS PRN
Status: DISCONTINUED | OUTPATIENT
Start: 2024-12-18 | End: 2024-12-19 | Stop reason: HOSPADM

## 2024-12-18 RX ORDER — HYDRALAZINE HYDROCHLORIDE 20 MG/ML
5 INJECTION INTRAMUSCULAR; INTRAVENOUS
Status: DISCONTINUED | OUTPATIENT
Start: 2024-12-18 | End: 2024-12-18 | Stop reason: HOSPADM

## 2024-12-18 RX ORDER — HEPARIN SODIUM 5000 [USP'U]/ML
5000 INJECTION, SOLUTION INTRAVENOUS; SUBCUTANEOUS EVERY 8 HOURS
Status: DISCONTINUED | OUTPATIENT
Start: 2024-12-19 | End: 2024-12-19 | Stop reason: HOSPADM

## 2024-12-18 RX ORDER — ONDANSETRON 4 MG/1
4 TABLET, ORALLY DISINTEGRATING ORAL EVERY 4 HOURS PRN
Status: DISCONTINUED | OUTPATIENT
Start: 2024-12-18 | End: 2024-12-19 | Stop reason: HOSPADM

## 2024-12-18 RX ORDER — LABETALOL HYDROCHLORIDE 5 MG/ML
10 INJECTION, SOLUTION INTRAVENOUS EVERY 4 HOURS PRN
Status: DISCONTINUED | OUTPATIENT
Start: 2024-12-18 | End: 2024-12-19 | Stop reason: HOSPADM

## 2024-12-18 RX ORDER — LABETALOL HYDROCHLORIDE 5 MG/ML
5 INJECTION, SOLUTION INTRAVENOUS
Status: DISCONTINUED | OUTPATIENT
Start: 2024-12-18 | End: 2024-12-18

## 2024-12-18 RX ORDER — ONDANSETRON 2 MG/ML
4 INJECTION INTRAMUSCULAR; INTRAVENOUS
Status: DISCONTINUED | OUTPATIENT
Start: 2024-12-18 | End: 2024-12-18

## 2024-12-18 RX ORDER — POLYETHYLENE GLYCOL 3350 17 G/17G
1 POWDER, FOR SOLUTION ORAL
Status: DISCONTINUED | OUTPATIENT
Start: 2024-12-18 | End: 2024-12-19 | Stop reason: HOSPADM

## 2024-12-18 RX ORDER — ROCURONIUM BROMIDE 10 MG/ML
INJECTION, SOLUTION INTRAVENOUS PRN
Status: DISCONTINUED | OUTPATIENT
Start: 2024-12-18 | End: 2024-12-18 | Stop reason: SURG

## 2024-12-18 RX ORDER — ESMOLOL HYDROCHLORIDE 10 MG/ML
INJECTION INTRAVENOUS PRN
Status: DISCONTINUED | OUTPATIENT
Start: 2024-12-18 | End: 2024-12-18 | Stop reason: SURG

## 2024-12-18 RX ORDER — DIPHENHYDRAMINE HYDROCHLORIDE 50 MG/ML
12.5 INJECTION INTRAMUSCULAR; INTRAVENOUS
Status: DISCONTINUED | OUTPATIENT
Start: 2024-12-18 | End: 2024-12-18 | Stop reason: HOSPADM

## 2024-12-18 RX ORDER — PROTAMINE SULFATE 10 MG/ML
INJECTION, SOLUTION INTRAVENOUS PRN
Status: DISCONTINUED | OUTPATIENT
Start: 2024-12-18 | End: 2024-12-18 | Stop reason: SURG

## 2024-12-18 RX ORDER — HALOPERIDOL 5 MG/ML
1 INJECTION INTRAMUSCULAR
Status: DISCONTINUED | OUTPATIENT
Start: 2024-12-18 | End: 2024-12-18 | Stop reason: HOSPADM

## 2024-12-18 RX ORDER — OXYCODONE HCL 5 MG/5 ML
5 SOLUTION, ORAL ORAL
Status: COMPLETED | OUTPATIENT
Start: 2024-12-18 | End: 2024-12-18

## 2024-12-18 RX ORDER — OXYCODONE HCL 5 MG/5 ML
10 SOLUTION, ORAL ORAL
Status: COMPLETED | OUTPATIENT
Start: 2024-12-18 | End: 2024-12-18

## 2024-12-18 RX ORDER — ROPINIROLE 0.5 MG/1
0.5 TABLET, FILM COATED ORAL 3 TIMES DAILY
Status: DISCONTINUED | OUTPATIENT
Start: 2024-12-18 | End: 2024-12-19 | Stop reason: HOSPADM

## 2024-12-18 RX ORDER — MORPHINE SULFATE 4 MG/ML
4 INJECTION INTRAVENOUS EVERY 4 HOURS PRN
Status: DISCONTINUED | OUTPATIENT
Start: 2024-12-18 | End: 2024-12-19 | Stop reason: HOSPADM

## 2024-12-18 RX ORDER — ACETAMINOPHEN 325 MG/1
650 TABLET ORAL EVERY 6 HOURS PRN
Status: DISCONTINUED | OUTPATIENT
Start: 2024-12-18 | End: 2024-12-19 | Stop reason: HOSPADM

## 2024-12-18 RX ORDER — IPRATROPIUM BROMIDE AND ALBUTEROL SULFATE 2.5; .5 MG/3ML; MG/3ML
3 SOLUTION RESPIRATORY (INHALATION)
Status: DISCONTINUED | OUTPATIENT
Start: 2024-12-18 | End: 2024-12-19 | Stop reason: HOSPADM

## 2024-12-18 RX ORDER — MIDODRINE HYDROCHLORIDE 5 MG/1
10 TABLET ORAL
Status: DISCONTINUED | OUTPATIENT
Start: 2024-12-19 | End: 2024-12-19 | Stop reason: HOSPADM

## 2024-12-18 RX ORDER — AMOXICILLIN 250 MG
2 CAPSULE ORAL EVERY EVENING
Status: DISCONTINUED | OUTPATIENT
Start: 2024-12-18 | End: 2024-12-19 | Stop reason: HOSPADM

## 2024-12-18 RX ORDER — HYDROCODONE BITARTRATE AND ACETAMINOPHEN 5; 325 MG/1; MG/1
1 TABLET ORAL EVERY 6 HOURS PRN
Status: DISCONTINUED | OUTPATIENT
Start: 2024-12-18 | End: 2024-12-19 | Stop reason: HOSPADM

## 2024-12-18 RX ADMIN — ROPINIROLE HYDROCHLORIDE 0.5 MG: 0.5 TABLET, FILM COATED ORAL at 21:35

## 2024-12-18 RX ADMIN — DEXMEDETOMIDINE HYDROCHLORIDE 15 MCG: 100 INJECTION, SOLUTION INTRAVENOUS at 15:17

## 2024-12-18 RX ADMIN — SUGAMMADEX 200 MG: 100 INJECTION, SOLUTION INTRAVENOUS at 16:48

## 2024-12-18 RX ADMIN — PROTAMINE SULFATE 20 MG: 10 INJECTION, SOLUTION INTRAVENOUS at 16:09

## 2024-12-18 RX ADMIN — METOPROLOL TARTRATE 1 MG: 5 INJECTION INTRAVENOUS at 16:31

## 2024-12-18 RX ADMIN — FENTANYL CITRATE 100 MCG: 50 INJECTION, SOLUTION INTRAMUSCULAR; INTRAVENOUS at 15:47

## 2024-12-18 RX ADMIN — ROCURONIUM BROMIDE 50 MG: 50 INJECTION, SOLUTION INTRAVENOUS at 15:14

## 2024-12-18 RX ADMIN — PHENYLEPHRINE HYDROCHLORIDE 200 MCG: 10 INJECTION INTRAVENOUS at 15:28

## 2024-12-18 RX ADMIN — PROPOFOL 150 MG: 10 INJECTION, EMULSION INTRAVENOUS at 15:13

## 2024-12-18 RX ADMIN — DEXAMETHASONE SODIUM PHOSPHATE 4 MG: 4 INJECTION INTRA-ARTICULAR; INTRALESIONAL; INTRAMUSCULAR; INTRAVENOUS; SOFT TISSUE at 15:17

## 2024-12-18 RX ADMIN — ONDANSETRON 4 MG: 2 INJECTION INTRAMUSCULAR; INTRAVENOUS at 16:48

## 2024-12-18 RX ADMIN — FENTANYL CITRATE 100 MCG: 50 INJECTION, SOLUTION INTRAMUSCULAR; INTRAVENOUS at 15:12

## 2024-12-18 RX ADMIN — CEFAZOLIN 2 G: 1 INJECTION, POWDER, FOR SOLUTION INTRAMUSCULAR; INTRAVENOUS at 15:18

## 2024-12-18 RX ADMIN — ALBUTEROL SULFATE 2.5 MG: 2.5 SOLUTION RESPIRATORY (INHALATION) at 18:49

## 2024-12-18 RX ADMIN — ESMOLOL HYDROCHLORIDE 30 MG: 100 INJECTION, SOLUTION INTRAVENOUS at 15:51

## 2024-12-18 RX ADMIN — FENTANYL CITRATE 25 MCG: 50 INJECTION, SOLUTION INTRAMUSCULAR; INTRAVENOUS at 18:15

## 2024-12-18 RX ADMIN — GLYCOPYRROLATE 0.2 MG: 0.2 INJECTION INTRAMUSCULAR; INTRAVENOUS at 15:12

## 2024-12-18 RX ADMIN — FENTANYL CITRATE 25 MCG: 50 INJECTION, SOLUTION INTRAMUSCULAR; INTRAVENOUS at 17:21

## 2024-12-18 RX ADMIN — OXYCODONE HYDROCHLORIDE 5 MG: 5 SOLUTION ORAL at 17:21

## 2024-12-18 RX ADMIN — PHENYLEPHRINE HYDROCHLORIDE 200 MCG: 10 INJECTION INTRAVENOUS at 16:07

## 2024-12-18 RX ADMIN — HEPARIN SODIUM 3000 UNITS: 1000 INJECTION, SOLUTION INTRAVENOUS; SUBCUTANEOUS at 15:45

## 2024-12-18 RX ADMIN — SODIUM CHLORIDE: 9 INJECTION, SOLUTION INTRAVENOUS at 15:08

## 2024-12-18 RX ADMIN — ROCURONIUM BROMIDE 30 MG: 50 INJECTION, SOLUTION INTRAVENOUS at 15:54

## 2024-12-18 SDOH — ECONOMIC STABILITY: TRANSPORTATION INSECURITY
IN THE PAST 12 MONTHS, HAS THE LACK OF TRANSPORTATION KEPT YOU FROM MEDICAL APPOINTMENTS OR FROM GETTING MEDICATIONS?: NO

## 2024-12-18 SDOH — ECONOMIC STABILITY: TRANSPORTATION INSECURITY
IN THE PAST 12 MONTHS, HAS LACK OF RELIABLE TRANSPORTATION KEPT YOU FROM MEDICAL APPOINTMENTS, MEETINGS, WORK OR FROM GETTING THINGS NEEDED FOR DAILY LIVING?: NO

## 2024-12-18 ASSESSMENT — LIFESTYLE VARIABLES
EVER HAD A DRINK FIRST THING IN THE MORNING TO STEADY YOUR NERVES TO GET RID OF A HANGOVER: NO
TOTAL SCORE: 0
DOES PATIENT WANT TO STOP DRINKING: NO
AVERAGE NUMBER OF DAYS PER WEEK YOU HAVE A DRINK CONTAINING ALCOHOL: 0
EVER FELT BAD OR GUILTY ABOUT YOUR DRINKING: NO
TOTAL SCORE: 0
HAVE YOU EVER FELT YOU SHOULD CUT DOWN ON YOUR DRINKING: NO
ALCOHOL_USE: NO
ON A TYPICAL DAY WHEN YOU DRINK ALCOHOL HOW MANY DRINKS DO YOU HAVE: 0
HOW MANY TIMES IN THE PAST YEAR HAVE YOU HAD 5 OR MORE DRINKS IN A DAY: 0
CONSUMPTION TOTAL: NEGATIVE
TOTAL SCORE: 0
HAVE PEOPLE ANNOYED YOU BY CRITICIZING YOUR DRINKING: NO

## 2024-12-18 ASSESSMENT — SOCIAL DETERMINANTS OF HEALTH (SDOH)
IN THE PAST 12 MONTHS, HAS THE ELECTRIC, GAS, OIL, OR WATER COMPANY THREATENED TO SHUT OFF SERVICE IN YOUR HOME?: NO
WITHIN THE PAST 12 MONTHS, YOU WORRIED THAT YOUR FOOD WOULD RUN OUT BEFORE YOU GOT THE MONEY TO BUY MORE: NEVER TRUE
WITHIN THE LAST YEAR, HAVE YOU BEEN KICKED, HIT, SLAPPED, OR OTHERWISE PHYSICALLY HURT BY YOUR PARTNER OR EX-PARTNER?: NO
WITHIN THE LAST YEAR, HAVE YOU BEEN HUMILIATED OR EMOTIONALLY ABUSED IN OTHER WAYS BY YOUR PARTNER OR EX-PARTNER?: NO
WITHIN THE PAST 12 MONTHS, THE FOOD YOU BOUGHT JUST DIDN'T LAST AND YOU DIDN'T HAVE MONEY TO GET MORE: NEVER TRUE
WITHIN THE LAST YEAR, HAVE YOU BEEN AFRAID OF YOUR PARTNER OR EX-PARTNER?: NO
WITHIN THE LAST YEAR, HAVE TO BEEN RAPED OR FORCED TO HAVE ANY KIND OF SEXUAL ACTIVITY BY YOUR PARTNER OR EX-PARTNER?: NO

## 2024-12-18 ASSESSMENT — PAIN DESCRIPTION - PAIN TYPE
TYPE: SURGICAL PAIN

## 2024-12-18 NOTE — ANESTHESIA PROCEDURE NOTES
Airway    Date/Time: 12/18/2024 3:16 PM    Performed by: Marek Barrera M.D.  Authorized by: Marek Barrera M.D.    Location:  OR  Urgency:  Elective  Indications for Airway Management:  Anesthesia      Spontaneous Ventilation: absent    Sedation Level:  Deep  Preoxygenated: Yes    Mask Difficulty Assessment:  1 - vent by mask  Final Airway Type:  Endotracheal airway  Final Endotracheal Airway:  ETT  Cuffed: Yes    Technique Used for Successful ETT Placement:  Direct laryngoscopy    Insertion Site:  Oral  Blade Type:  Jer  Laryngoscope Blade/Videolaryngoscope Blade Size:  4  ETT Size (mm):  7.5  Measured from:  Lips  ETT to Lips (cm):  23  Placement Verified by: auscultation and capnometry    Cormack-Lehane Classification:  Grade I - full view of glottis  Number of Attempts at Approach:  1

## 2024-12-18 NOTE — ANESTHESIA PREPROCEDURE EVALUATION
Case: [id number] Date/Time: 12/18/24 1315    Procedures:       INSERTION OF HEMODIALYSIS CATHETER WITH FLUOROSCOPY, SITE TO BE DETERMINED, LEFT UPPER EXTREMITY FISTULA CREATION BY BASILIC VEIN TRANSPOSITION, RESECTION OF LEFT FOREARM ANEURYSM FISTULA      REVISION, AV FISTULA    Pre-op diagnosis: END STAGE RENAL DISEASE    Location: [facility] OR 14 / SURGERY [facility]    Surgeons: Selina Ellison M.D.            Relevant Problems   PULMONARY   (positive) Acute exacerbation of chronic obstructive pulmonary disease (COPD) (HCC)   (positive) COPD (chronic obstructive pulmonary disease) (HCC)   (positive) Shortness of breath      CARDIAC   (positive) CHF (congestive heart failure) (HCC)   (positive) Essential hypertension         (positive) Dependence on renal dialysis (HCC)   (positive) ESRD (end stage renal disease) (HCC)   (positive) ESRD (end stage renal disease) on dialysis (HCC)       Physical Exam    Airway   Mallampati: II  TM distance: >3 FB  Neck ROM: full       Cardiovascular - normal exam  Rhythm: regular  Rate: normal  (-) murmur     Dental - normal exam  (+) upper dentures           Pulmonary - normal exam  Breath sounds clear to auscultation     Abdominal    Neurological - normal exam                   Anesthesia Plan    ASA 3   ASA physical status 3 criteria: ESRD undergoing regularly scheduled dialysis    Plan - general       Airway plan will be ETT          Induction: intravenous    Postoperative Plan: Postoperative administration of opioids is intended.    Pertinent diagnostic labs and testing reviewed    Informed Consent:    Anesthetic plan and risks discussed with patient.    Use of blood products discussed with: patient whom consented to blood products.

## 2024-12-18 NOTE — PROGRESS NOTES
Medication history reviewed with PT at bedside    [facility] is complete per PT reporting    Allergies reviewed.     Patient denies any outpatient antibiotics in the last 30 days.     Patient is not taking anticoagulants.    PT gets dialysis from [facility] on [address] ([phone]) on Mon, Wed and Fri. Last treatment was 12/18/2024.    Preferred pharmacy for this visit - [facility] [address] and [facility] ([phone])

## 2024-12-19 ENCOUNTER — PHARMACY VISIT (OUTPATIENT)
Dept: PHARMACY | Facility: MEDICAL CENTER | Age: 74
End: 2024-12-19
Payer: COMMERCIAL

## 2024-12-19 ENCOUNTER — APPOINTMENT (OUTPATIENT)
Dept: RADIOLOGY | Facility: MEDICAL CENTER | Age: 74
End: 2024-12-19
Attending: INTERNAL MEDICINE
Payer: MEDICARE

## 2024-12-19 VITALS
SYSTOLIC BLOOD PRESSURE: 100 MMHG | HEART RATE: 81 BPM | BODY MASS INDEX: 23.59 KG/M2 | DIASTOLIC BLOOD PRESSURE: 54 MMHG | HEIGHT: 72 IN | RESPIRATION RATE: 18 BRPM | WEIGHT: 174.16 LBS | OXYGEN SATURATION: 92 % | TEMPERATURE: 98.8 F

## 2024-12-19 PROBLEM — J96.01 ACUTE RESPIRATORY FAILURE WITH HYPOXIA (HCC): Status: RESOLVED | Noted: 2024-12-18 | Resolved: 2024-12-19

## 2024-12-19 LAB
HAV IGM SERPL QL IA: NONREACTIVE
HBV CORE IGM SER QL: NONREACTIVE
HBV SURFACE AB SERPL IA-ACNC: 55.9 MIU/ML (ref 0–10)
HBV SURFACE AG SER QL: NONREACTIVE
HCV AB SER QL: NONREACTIVE

## 2024-12-19 PROCEDURE — 71045 X-RAY EXAM CHEST 1 VIEW: CPT

## 2024-12-19 PROCEDURE — A9270 NON-COVERED ITEM OR SERVICE: HCPCS | Performed by: STUDENT IN AN ORGANIZED HEALTH CARE EDUCATION/TRAINING PROGRAM

## 2024-12-19 PROCEDURE — 96372 THER/PROPH/DIAG INJ SC/IM: CPT | Mod: XU

## 2024-12-19 PROCEDURE — 700111 HCHG RX REV CODE 636 W/ 250 OVERRIDE (IP)

## 2024-12-19 PROCEDURE — 94669 MECHANICAL CHEST WALL OSCILL: CPT

## 2024-12-19 PROCEDURE — 700102 HCHG RX REV CODE 250 W/ 637 OVERRIDE(OP): Performed by: STUDENT IN AN ORGANIZED HEALTH CARE EDUCATION/TRAINING PROGRAM

## 2024-12-19 PROCEDURE — 86706 HEP B SURFACE ANTIBODY: CPT

## 2024-12-19 PROCEDURE — 99406 BEHAV CHNG SMOKING 3-10 MIN: CPT

## 2024-12-19 PROCEDURE — 700111 HCHG RX REV CODE 636 W/ 250 OVERRIDE (IP): Performed by: STUDENT IN AN ORGANIZED HEALTH CARE EDUCATION/TRAINING PROGRAM

## 2024-12-19 PROCEDURE — 94664 DEMO&/EVAL PT USE INHALER: CPT

## 2024-12-19 PROCEDURE — 700111 HCHG RX REV CODE 636 W/ 250 OVERRIDE (IP): Performed by: NURSE PRACTITIONER

## 2024-12-19 PROCEDURE — 90935 HEMODIALYSIS ONE EVALUATION: CPT

## 2024-12-19 PROCEDURE — 80074 ACUTE HEPATITIS PANEL: CPT

## 2024-12-19 PROCEDURE — G0378 HOSPITAL OBSERVATION PER HR: HCPCS

## 2024-12-19 PROCEDURE — 99239 HOSP IP/OBS DSCHRG MGMT >30: CPT | Performed by: INTERNAL MEDICINE

## 2024-12-19 RX ORDER — HEPARIN SODIUM 1000 [USP'U]/ML
INJECTION, SOLUTION INTRAVENOUS; SUBCUTANEOUS
Status: COMPLETED
Start: 2024-12-19 | End: 2024-12-19

## 2024-12-19 RX ORDER — HEPARIN SODIUM 1000 [USP'U]/ML
1800 INJECTION, SOLUTION INTRAVENOUS; SUBCUTANEOUS
Status: DISCONTINUED | OUTPATIENT
Start: 2024-12-19 | End: 2024-12-19 | Stop reason: HOSPADM

## 2024-12-19 RX ORDER — HEPARIN SODIUM 1000 [USP'U]/ML
1900 INJECTION, SOLUTION INTRAVENOUS; SUBCUTANEOUS
Status: DISCONTINUED | OUTPATIENT
Start: 2024-12-19 | End: 2024-12-19 | Stop reason: HOSPADM

## 2024-12-19 RX ORDER — SODIUM CHLORIDE 9 MG/ML
100 INJECTION, SOLUTION INTRAVENOUS
Status: DISCONTINUED | OUTPATIENT
Start: 2024-12-19 | End: 2024-12-19 | Stop reason: HOSPADM

## 2024-12-19 RX ORDER — HEPARIN SODIUM 1000 [USP'U]/ML
1500 INJECTION, SOLUTION INTRAVENOUS; SUBCUTANEOUS
Status: DISCONTINUED | OUTPATIENT
Start: 2024-12-19 | End: 2024-12-19 | Stop reason: HOSPADM

## 2024-12-19 RX ORDER — HEPARIN SODIUM 1000 [USP'U]/ML
500 INJECTION, SOLUTION INTRAVENOUS; SUBCUTANEOUS
Status: DISCONTINUED | OUTPATIENT
Start: 2024-12-19 | End: 2024-12-19 | Stop reason: HOSPADM

## 2024-12-19 RX ADMIN — MIDODRINE HYDROCHLORIDE 10 MG: 5 TABLET ORAL at 07:56

## 2024-12-19 RX ADMIN — HEPARIN SODIUM 1900 UNITS: 1000 INJECTION, SOLUTION INTRAVENOUS; SUBCUTANEOUS at 17:05

## 2024-12-19 RX ADMIN — HEPARIN SODIUM 500 UNITS: 1000 INJECTION, SOLUTION INTRAVENOUS; SUBCUTANEOUS at 13:45

## 2024-12-19 RX ADMIN — HEPARIN SODIUM 5000 UNITS: 5000 INJECTION, SOLUTION INTRAVENOUS; SUBCUTANEOUS at 15:23

## 2024-12-19 RX ADMIN — ROPINIROLE HYDROCHLORIDE 0.5 MG: 0.5 TABLET, FILM COATED ORAL at 06:05

## 2024-12-19 RX ADMIN — HEPARIN SODIUM 5000 UNITS: 5000 INJECTION, SOLUTION INTRAVENOUS; SUBCUTANEOUS at 06:03

## 2024-12-19 RX ADMIN — HEPARIN SODIUM 1800 UNITS: 1000 INJECTION, SOLUTION INTRAVENOUS; SUBCUTANEOUS at 17:05

## 2024-12-19 RX ADMIN — ROPINIROLE HYDROCHLORIDE 0.5 MG: 0.5 TABLET, FILM COATED ORAL at 11:05

## 2024-12-19 RX ADMIN — ACETAMINOPHEN 650 MG: 325 TABLET ORAL at 15:34

## 2024-12-19 RX ADMIN — ROPINIROLE HYDROCHLORIDE 0.5 MG: 0.5 TABLET, FILM COATED ORAL at 17:29

## 2024-12-19 RX ADMIN — ALBUTEROL SULFATE 2 PUFF: 90 AEROSOL, METERED RESPIRATORY (INHALATION) at 06:09

## 2024-12-19 RX ADMIN — ALBUTEROL SULFATE 2 PUFF: 90 AEROSOL, METERED RESPIRATORY (INHALATION) at 11:04

## 2024-12-19 RX ADMIN — HEPARIN SODIUM 1500 UNITS: 1000 INJECTION, SOLUTION INTRAVENOUS; SUBCUTANEOUS at 13:49

## 2024-12-19 RX ADMIN — OMEPRAZOLE 20 MG: 20 CAPSULE, DELAYED RELEASE ORAL at 06:05

## 2024-12-19 RX ADMIN — SEVELAMER CARBONATE 3200 MG: 800 TABLET, FILM COATED ORAL at 07:57

## 2024-12-19 RX ADMIN — MIDODRINE HYDROCHLORIDE 10 MG: 5 TABLET ORAL at 17:29

## 2024-12-19 RX ADMIN — MIDODRINE HYDROCHLORIDE 10 MG: 5 TABLET ORAL at 11:04

## 2024-12-19 ASSESSMENT — ENCOUNTER SYMPTOMS
COUGH: 0
SHORTNESS OF BREATH: 0
FEVER: 0
SPUTUM PRODUCTION: 0
VOMITING: 0
NAUSEA: 0
ABDOMINAL PAIN: 0
CHILLS: 0

## 2024-12-19 ASSESSMENT — PAIN DESCRIPTION - PAIN TYPE
TYPE: ACUTE PAIN
TYPE: ACUTE PAIN;SURGICAL PAIN

## 2024-12-19 ASSESSMENT — LIFESTYLE VARIABLES: PACK_YEARS: 57

## 2024-12-19 NOTE — ANESTHESIA TIME REPORT
Anesthesia Start and Stop Event Times       Date Time Event    12/18/2024 1502 Ready for Procedure     1508 Anesthesia Start     1655 Anesthesia Stop          Responsible Staff  12/18/24      Name Role Begin Tony Jara Ma, M.D. Anesth 1508 1658          Overtime Reason:  no overtime (within assigned shift)    Comments:

## 2024-12-19 NOTE — OP REPORT
DATE OF SERVICE:  12/18/2024     SURGEON:  Selina Ellison MD     ASSISTANT:  Roseline Barajas PA-C     ANESTHESIOLOGIST:  Marek Barrera MD     TYPE OF ANESTHESIA:  General anesthesia and local anesthesia with 30 mL of   0.5% Marcaine solution.     PREOPERATIVE DIAGNOSIS:    1) Severely aneurysmal left forearm dialysis fistula.  2) End-staged kidney disease.     POSTOPERATIVE DIAGNOSIS:    1) Severely aneurysmal left forearm dialysis fistula.  2) End-staged kidney disease.     PROCEDURES:    1.  Creation of new left upper arm brachiobasilic fistula by basilic vein   transposition.  2.  Ligation of severely aneurysmal left forearm dialysis fistula.  3.  Percutaneous cannulation of right internal jugular vein under ultrasound   guidance.  4.  Placement of a 23 cm long tunnel HemoSplit dialysis catheter under   fluoroscopic guidance.    INDICATIONS FOR PROCEDURE:  This is a 74-year-old male with multiple medical   problems including end-stage renal disease, on hemodialysis via a left forearm   dialysis fistula; however, the fistula has become severely aneurysmal.  The   patient was referred to see me for a new fistula creation and ligation of the   aneurysmal fistula.  Discussion was made with the patient.  He would like to   undergo above procedures, fully understanding all risks.  The original plan was   to resect the aneurysmal forearm fistula at the same time; however, if that   was done, the patient would have a long incision extended from the upper arm   to the forearm and would be in more pain and therefore, the resection of the   aneurysmal fistula would be performed at a later date.     DESCRIPTION OF PROCEDURE:  Informed consent was obtained.  The patient was   taken to the operating room and was placed in the supine position.  Sequential   compression devices were applied.  The patient was given Ancef intravenously.    General anesthesia was induced.     Next, his left upper extremity was sterilely prepped and  draped in the normal   fashion.  A timeout procedure was done.  An incision was made in the left arm   from the proximal forearm to the proximal upper arm, over the course of the basilic   vein.  The incision was extended through subcutaneous tissue using the   electrocautery.  The basilic vein was identified, carefully dissected free   from the surrounding tissues.  The side branches were ligated with 2-0 silk   ties, clipped with Hemoclips and divided.  The basilic vein was quite dilated   since it was the outflow of the fistula in the forearm.  The brachial artery   in the proximal forearm was also identified, carefully dissected free from the   surrounding tissues.     Next, an incision was made over the old scar in the wrist, on top of the old   fistula anastomosis.  The incision was extended through subcutaneous tissue   using the electrocautery.  The fistula was identified, carefully dissected   free from surrounding tissue.  It was then ligated with two 3-0 silk ties.     The patient was given heparin 3000 units intravenously.  After the heparin was   allowed to circulate systemically for 3 minutes, the basilic vein was ligated   with 3-0 silk ties in the proximal forearm and divided above the ligation.    It was then tunneled anteriorly and superficially with care taken to avoid   twisting of the vein.  Vascular control of the brachial artery was obtained   with vascular clamps.  An arteriotomy was made on the brachial artery. The   basilic vein was anastomosed end-to-side to the brachial artery using running   6-0 Prolene suture.  Prior to completing the anastomosis, backbleeding and   flushing were obtained.  The anastomosis was completed.  Flow was first   restored into the fistula and then into the distal brachial artery.  A sterile   Doppler probe was brought into the operative field.  Excellent Doppler flow   Signals were obtained over the fistula as well as over the brachial artery above   and below  the anastomosis with significant diastolic flow component.     The patient was given protamine 20 mg intravenously.  The wounds were   irrigated and were found to have good hemostasis.  The wounds were then   anesthetized with Marcaine solution.  The upper arm and proximal forearm wound was closed subcutaneously in   layers with running and interrupted 3-0 Vicryl suture and subcuticularly with   4-0 Monocryl suture.  The wrist wound was closed with interrupted 3-0 nylon   vertical mattresses.  The wounds were cleaned and sterile dressings were   applied.     Next, the right neck was sterilely prepped and draped in the normal fashion.    Duplex evaluation of the right internal jugular vein was performed.  It was   found to be patent.  Under ultrasound guidance, the right internal jugular   vein was percutaneously cannulated using an access needle, after the skin and   subcutaneous tissue were anesthetized with Marcaine solution.  A guidewire was   passed through the needle.  The needle was removed.  Following this, a 23 cm   long HemoSplit dialysis catheter was brought through subcutaneous tunnel from   the right chest up to the neck wound, after the skin and subcutaneous tissues were anesthetized with Marcaine solution.  Sequential dilatation was performed over the guidewire under fluoroscopic guidance.  Following this, over the   guidewire, a peel-away sheath was advanced into the central venous system   under fluoroscopic guidance.  The introducer and guidewire were removed.  The   catheter was advanced into the central venous system through the peel-away   sheath.  The peel-away sheath was removed.  There was no kinking of the   catheter.  The tip of the catheter was at the junction of the superior vena   cava/right atrial area.  Each lumen of the catheter was easily aspirated and   flushed with heparinized saline solution as well as packed with concentrated   heparin solution at the 's  recommendation.     The neck wound was closed with 3-0 nylon suture.  The catheter was secured in   place with 3-0 nylon suture.  A Biopatch was also placed over the catheter   exit site.  Sterile dressing was applied.  A sticker was placed over the   catheter to remind the dialysis tech to aspirate and waste 2 mL from each   lumen prior to use to avoid systemic administration of concentrated heparin   solution.     ESTIMATED BLOOD LOSS:  20 mL.     COUNTS:  Sponge and instrument count was correct x2.     The patient was then awakened, extubated, taken to recovery area in stable   condition with excellent thrills over the fistula and intact neurovascular   examination of the left hand.        ______________________________  MD ADELINE Charles/ROSSANA    DD:  12/18/2024 17:13  DT:  12/18/2024 17:48    Job#:  [id number]

## 2024-12-19 NOTE — DISCHARGE INSTRUCTIONS
HOME CARE INSTRUCTIONS    ACTIVITY: Rest and take it easy for the first 24 hours.  A responsible adult is recommended to remain with you during that time.  It is normal to feel sleepy.  We encourage you to not do anything that requires balance, judgment or coordination.    FOR 24 HOURS DO NOT:  Drive, operate machinery or run household appliances.  Drink beer or alcoholic beverages.  Make important decisions or sign legal documents.    SPECIAL INSTRUCTIONS:     Keep left arm straight and elevated.   No lifting more than half gallon of milk using left hand for 2 weeks.   May remove dressing after 3 days.   NO ice pack to incision.   Do not use fistula for dialysis until cleared by Dr. Ellison.    Use permacath for dialysis in the meantime.   Resume home medications.  May take Tylenol as needed for mild pain.    For moderate pain, take Norco as prescribed.  Do not drive or operate machinery while taking Norco.    Do not take Norco on an empty stomach.   Follow-up with Dr. Ellison in 2 to 3 weeks.    Call [phone] for appointment and for any problem.     DIET: To avoid nausea, slowly advance diet as tolerated, avoiding spicy or greasy foods for the first day.  Add more substantial food to your diet according to your physician's instructions.  Babies can be fed formula or breast milk as soon as they are hungry.  INCREASE FLUIDS AND FIBER TO AVOID CONSTIPATION.    SURGICAL DRESSING/BATHING: May shower tomorrow - please cover your incision with plastic bag when showering. DO not submerge in water until cleared by MD     MEDICATIONS: Resume taking daily medication.  Take prescribed pain medication with food.  If no medication is prescribed, you may take non-aspirin pain medication if needed.  PAIN MEDICATION CAN BE VERY CONSTIPATING.  Take a stool softener or laxative such as senokot, pericolace, or milk of magnesia if needed.    Prescription given for Norco .    Last pain medication given Oxycodone 5mg at  5:20 pm.    A  follow-up appointment should be arranged with your doctor in 1-2 weeks ; call to schedule.    You should CALL YOUR PHYSICIAN if you develop:  Fever greater than 101 degrees F.  Pain not relieved by medication, or persistent nausea or vomiting.  Excessive bleeding (blood soaking through dressing) or unexpected drainage from the wound.  Extreme redness or swelling around the incision site, drainage of pus or foul smelling drainage.  Inability to urinate or empty your bladder within 8 hours.  Problems with breathing or chest pain.    You should call 911 if you develop problems with breathing or chest pain.  If you are unable to contact your doctor or surgical center, you should go to the nearest emergency room or urgent care center.  Physician's telephone #: Dr Ellison [phone]     MILD FLU-LIKE SYMPTOMS ARE NORMAL.  YOU MAY EXPERIENCE GENERALIZED MUSCLE ACHES, THROAT IRRITATION, HEADACHE AND/OR SOME NAUSEA.    If any questions arise, call your doctor.  If your doctor is not available, please feel free to call the Surgical Center at [phone].  The Center is open Monday through Friday from 7AM to 7PM.      A registered nurse may call you a few days after your surgery to see how you are doing after your procedure.    You may also receive a survey in the mail within the next two weeks and we ask that you take a few moments to complete the survey and return it to us.  Our goal is to provide you with very good care and we value your comments.     Depression / Suicide Risk    As you are discharged from this [facility] facility, it is important to learn how to keep safe from harming yourself.    Recognize the warning signs:  Abrupt changes in personality, positive or negative- including increase in energy   Giving away possessions  Change in eating patterns- significant weight changes-  positive or negative  Change in sleeping patterns- unable to sleep or sleeping all the time   Unwillingness or inability to  communicate  Depression  Unusual sadness, discouragement and loneliness  Talk of wanting to die  Neglect of personal appearance   Rebelliousness- reckless behavior  Withdrawal from people/activities they love  Confusion- inability to concentrate     If you or a loved one observes any of these behaviors or has concerns about self-harm, here's what you can do:  Talk about it- your feelings and reasons for harming yourself  Remove any means that you might use to hurt yourself (examples: pills, rope, extension cords, firearm)  Get professional help from the community (Mental Health, Substance Abuse, psychological counseling)  Do not be alone:Call your Safe Contact- someone whom you trust who will be there for you.  Call your local CRISIS HOTLINE 077-6086 or 022-875-4404  Call your local Children's Mobile Crisis Response Team [place] (858) 191-3108 or www.[employer]  Call the toll free National Suicide Prevention Hotlines   National Suicide Prevention Lifeline 914-999-AXGW (3187)  [place] [facility] [employer] [facility] Line Network 800-SUICIDE (799-5885)    I acknowledge receipt and understanding of these Home Care instructions.    FOLLOW UP ITEMS POST DISCHARGE  Please call [phone] to schedule PCP appointment for patient.    Required specialty appointments include:       Discharge Instructions per Nabil Chappell, A.P.R.N.    Follow-up with PCP s/p hospitalization  Continue all home medications  Continue established HD treatment at [facility]  Continue following with nephrology as indicated    DIET: Cardiac, renal, diabetic    ACTIVITY: As tolerated    DIAGNOSIS: Hypoxia post anesthesia    Return to ER if symptoms persist, chest pain, palpitations, shortness of breath, numbness, tingling, weakness, and high fevers.    1) Activities: Keep left arm straight and elevated.   No lifting more than half gallon of milk using left hand for 2 weeks.   May remove dressing after 3 days.   NO ice pack to incision.   2) Do not use  fistula for dialysis until cleared by Dr. Ellison.  Use permacath for dialysis in the meantime.         D/C home after dialysis   3) Medications: Resume home medications.  May take Tylenol as needed for mild pain.  For moderate pain, take Norco as prescribed.  Do not drive or operate machinery while taking Norco.  Do not take Norco on an empty stomach.   4) Follow-up with Dr. Ellison in 2 to 3 weeks.  Call [phone] for appointment and for any problem.

## 2024-12-19 NOTE — PROGRESS NOTES
Patient is off the floor with transport, going to dialysis. His vital signs are stable and the patient is alert and oriented x4.

## 2024-12-19 NOTE — ANESTHESIA POSTPROCEDURE EVALUATION
Patient: Bridger Gallardo Janette    Procedure Summary       Date: 12/18/24 Room / Location: [place] [id number] / SURGERY [facility]    Anesthesia Start: 1508 Anesthesia Stop: 1655    Procedures:       INSERTION OF HEMODIALYSIS CATHETER WITH FLUOROSCOPY, SITE TO BE DETERMINED, LEFT UPPER EXTREMITY FISTULA CREATION BY BASILIC VEIN TRANSPOSITION, RESECTION OF LEFT FOREARM ANEURYSM FISTULA (Right: Chest)      REVISION, AV FISTULA (Left: Arm Upper) Diagnosis: (END STAGE RENAL DISEASE)    Surgeons: Selina Ellison M.D. Responsible Provider: Marek Barrera M.D.    Anesthesia Type: general ASA Status: 3            Final Anesthesia Type: general  Last vitals  BP   Blood Pressure : 110/62    Temp   37 °C (98.6 °F)    Pulse   60   Resp   18    SpO2   93 %      Anesthesia Post Evaluation    Patient location during evaluation: PACU  Patient participation: complete - patient participated  Level of consciousness: awake and alert    Airway patency: patent  Anesthetic complications: no  Cardiovascular status: hemodynamically stable  Respiratory status: acceptable  Hydration status: euvolemic    PONV: none          No notable events documented.     Nurse Pain Score: 1 (NPRS)

## 2024-12-19 NOTE — HOSPITAL COURSE
Mr. Bridger Savage is a 74 y.o. male with COPD, history of respiratory failure, CHF, ESRD on HD, PTSD who presented 12/18/2024 for tunneled line HD catheter placement developed hypoxia following procedure in PACU.    Dr. Ellison from vascular surgery contacted after he placed tunneled HD line in Mr. Savage and patient developed hypoxia in PACU.  Patient reports he has a history of respiratory failure, was discharged with home oxygen and was instructed to use it as needed if his oxygen saturations dropped but noted every time he checked his oxygen saturations were always in the low to mid 90s, used it only a handful of times before it was returned, he believes the company was [employer].  He denies any complaints, reports he was in his usual state of health prior to coming in for the procedure, denies any recent fevers or chills headache or vision changes chest pain dyspnea cough nausea vomiting abdominal pain dysuria diarrhea.    Dr. Ellison will discuss with nephrology to arrange HD.     Patient underwent 2 treatments of hemodialysis during this admission due to noted fluid overload causing his shortness of breath.  Patient tolerated HD treatment well.    Patient seen and examined strengthening discharge.  Patient to resume all established therapies and medication as an outpatient.  Patient to continue with established HD treatment with [facility].  All questions and concerns answered prior to being discharged.  Patient discharged home.

## 2024-12-19 NOTE — ASSESSMENT & PLAN NOTE
I discussed advance care planning with the patient for at least 16 minutes, including diagnosis, prognosis, plan of care, risks and benefits of any therapies that could be offered, as well as alternatives including palliation and hospice, as appropriate.  DNR/I per patient wishes

## 2024-12-19 NOTE — PROGRESS NOTES
Bedside shift report received from night shift RN. Patient A&Ox4, in bed resting comfortably. Bed in lowest, locked position with call light and belongings within reach. Fall precautions reviewed with patient. Patient updated on POC.

## 2024-12-19 NOTE — PROGRESS NOTES
2 RN Skin Assessment Completed by JERMAINE Pittman and JAZMIN Sainz-NADJA.     Head: WDL  Ears: WDL  Nose: WDL  Mouth: WDL  Neck: WDL  Breasts/Chest: WDL escept rt chest permacath. Unable to visualize site, dressing CDI.   Shoulder Blades: WDL  Spine: WDL  (R) Arm/Elbow/hand: WDL  (L) Arm/Elbow/hand: Aneurysmal fistual, noted multiple areas of bulging. New proximal fistula dressing CDI. + bruit/thrill. Unable to visualize site. Left hand fith digit calloused growth--reports growth present for years.   Abdomen:WDL  Groin: WDL  Sacrum/Coccyx/Buttocks: blanching  (R) Leg: WDL  (L) Leg: WDL  (R) Heel/Foot/Toe: blanching, dry flaky  (L) Heel/Foot/Toe: blanching, dry, flaky     Devices in place: BP Cuff, Pulse Ox, rt chest permacath     Interventions in place: Gray ear foams and Pillows     Possible skin injury found: Yes     Pictures uploaded into Epic: Yes  Wound Consult Placed: N/A

## 2024-12-19 NOTE — PROGRESS NOTES
Report received from JERMAINE Martinez. Pt arrived to unit via [facility] at 2015. A&Ox4, oriented to room, unit, and plan of care. Able to transfer self from [facility] to bed with SBA. Provided food per pt request. Dressings CDI to LUE and rt chest permacath. All questions answered at this time. Call light within reach; fall precautions in place.

## 2024-12-19 NOTE — ASSESSMENT & PLAN NOTE
History of respiratory failure, had home oxygen delivered and his contractor was at his house for 6 months he reported only using it a handful of times.  Developed acute hypoxic respiratory failure while in the PACU following tunneled line catheter placement.  Admission requested for ongoing hypoxia.  Incentive spirometer, check procalcitonin, BNP.  Patient denies any cough, increased fatigue or malaise, does not feel like he has pulmonary infection.  Vascular team will reach out to nephrology to arrange for HD  Continuous pulse ox, incentive spirometer, DuoNebs as needed  Mobilization in a.m. when out of PACU

## 2024-12-19 NOTE — RESPIRATORY CARE
COPD EDUCATION by COPD CLINICAL EDUCATOR  12/19/2024 at 11:09 AM by Leah Wolf RRT     Patient interviewed by COPD education team. Patient refused COPD program at this time. An Action Plan was updated in the EMR to reflect current Respiratory Medication use.                 Patient is followed by [facility], per Dr. Orellana's last office visit note patient may need oxygen at night when sleeping but has declined having overnight oximetry study done. Patient states he will go when God calls him and is not interested in trying to prevent this, his advanced care plan documents on file reflex this. We did discuss the best way to use his Albuterol inhaler, and patient was provided a spacer and instruction on use. Patient was also provided a flutter with instruction on use to assist in airway clearance. We discussed that while he may not want to do things to prolong his life, there are things he can do to help his breathing which can improve his quality of life.       COPD Screen  COPD Risk Screening  Do you have a history of COPD?: Yes  Do you have a Pulmonologist?: Yes    COPD Assessment  COPD Clinical Specialists ONLY  COPD Education Initiated: Yes--Short Intervention ([facility], declined COPD and Smoking Cessation literature, given spacer and flutter.)  Is this a COPD exacerbation patient?: No  DME Company: TBD (Talya prior)  DME Equipment Type: TBD (has required home oxygen in the past, not very compliant in using)  Physician Follow Up Appointment:  (declined apt assistance)  Physician Name: Jasmine Shields MD  Pulmonary Follow Up Appointment:  (declined apt assistance)  Pulmonologist Name: Dr. Orellana at [facility], last seen 10/10/2023  Is POLST on file?: Yes  Referrals Initiated: Yes  Pulmonary Rehab: N/A  Smoking Cessation: Yes  $ Smoking Cessation 3-10 Minutes: Asymptomatic (5 min, declined literature)  Smoking Pack Years: 57  Hospice: N/A  Home Health Care: N/A  Mobile  "Urgent Care Services: N/A  Geriatric Specialty Group: N/A  Private In-Home Care Agency: N/A  $ Demo/Eval of SVN's, MDI's and Aerosols: Yes (spacer and flutter given)  (OP) Pulmonary Function Testing: Yes (done at [facility], data not available)  Interdisciplinary Rounds: Attendance at Rounds (30 Min)    PFT Results    No results found for: \"PFT\"    Meds to Beds  [facility] for all eligible patients at discharge and you have been automatically enrolled in the Meds to Beds Program. Would you like to opt out of this program for any reason?: No - Stay Opted In     MY COPD ACTION PLAN     It is recommended that patients and physicians /healthcare providers complete this action plan together. This plan should be discussed at each physician visit and updated as needed.    The green, yellow and red zones show groups of symptoms of COPD. This list of symptoms is not comprehensive, and you may experience other symptoms. In the \"Actions\" column, your healthcare provider has recommended actions for you to take based on your symptoms.    Patient Name: Bridger Savage   YOB: 1950   Last Updated on: 12/19/2024 11:08 AM   Green Zone:  I am doing well today Actions     Usual activitiy and exercise level   Take daily medications     Usual amounts of cough and phlegm/mucus   Use oxygen as prescribed     Sleep well at night   Continue regular exercise/diet plan     Appetite is good   At all times avoid cigarette smoke, inhaled irritants     Daily Medications (these medications are taken every day):                Yellow Zone:  I am having a bad day or a COPD flare Actions     More breathless than usual   Continue daily medications     I have less energy for my daily activities   Use quick relief inhaler as ordered     Increased or thicker phlegm/mucus   Use oxygen as prescribed     Using quick relief inhaler/nebulizer more often   Get plenty of rest     Swelling of ankles more than usual " "  Use pursed lip breathing     More coughing than usual   At all times avoid cigarette smoke, inhaled irritants     I feel like I have a \"chest cold\"     Poor sleep and my symptoms woke me up     My appetite is not good     My medicine is not helping      Call provider immediately if symptoms don’t improve     Continue daily medications, add rescue medications:   Albuterol 2 Puffs Every 4 hours       Medications to be used during a flare up, (as Discussed with Provider):           Additional Information:  Try using the spacer with your rescue inhaler.     Use Aerobika flutter for chest congestion and cough    Red Zone:  I need urgent medical care Actions     Severe shortness of breath even at rest   Call 911 or seek medical care immediately     Not able to do any activity because of breathing      Fever or shaking chills      Feeling confused or very drowsy       Chest pains      Coughing up blood                  "

## 2024-12-19 NOTE — OR SURGEON
Immediate Post OP Note    PreOp Diagnosis: Severely aneurysmal left forearm dialysis fistula.      PostOp Diagnosis: Same.      Procedure(s):  Creation of new left upper arm brachiobasilic fistula by basilic vein transposition.  Placement of right internal jugular vein tunneled dialysis catheter under fluoroscopic guidance - Wound Class: Clean    Surgeon(s):  Selina Ellison M.D.    Anesthesiologist/Type of Anesthesia:  Anesthesiologist: Marek Barrera M.D./General    Surgical Staff:  Assistant: Roseline Barajas P.A.-C.  Circulator: Michelle Lafleur R.N.  Relief Circulator: Austin Gastelum R.N.  Scrub Person: Mikel Guo; Bharati Cody    Specimens removed if any:  * No specimens in log *    Estimated Blood Loss: 20 mL.    Findings: Good thrills post creation.    Complications: None.    Dictated, #[id number].        12/18/2024 5:05 PM Selina Ellison M.D.

## 2024-12-19 NOTE — DISCHARGE SUMMARY
Discharge Summary    CHIEF COMPLAINT ON ADMISSION  No chief complaint on file.      Reason for Admission  Acute respiratory failure with hyp*     Admission Date  12/18/2024    CODE STATUS  DNAR/DNI    HPI & HOSPITAL COURSE    Mr. Bridger Savage is a 74 y.o. male with COPD, history of respiratory failure, CHF, ESRD on HD, PTSD who presented 12/18/2024 for tunneled line HD catheter placement developed hypoxia following procedure in PACU.    Dr. Ellison from vascular surgery contacted after he placed tunneled HD line in Mr. Savage and patient developed hypoxia in PACU.  Patient reports he has a history of respiratory failure, was discharged with home oxygen and was instructed to use it as needed if his oxygen saturations dropped but noted every time he checked his oxygen saturations were always in the low to mid 90s, used it only a handful of times before it was returned, he believes the company was [employer].  He denies any complaints, reports he was in his usual state of health prior to coming in for the procedure, denies any recent fevers or chills headache or vision changes chest pain dyspnea cough nausea vomiting abdominal pain dysuria diarrhea.    Dr. Ellison will discuss with nephrology to arrange HD.     Patient underwent 2 treatments of hemodialysis during this admission due to noted fluid overload causing his shortness of breath.  Patient tolerated HD treatment well.    Patient seen and examined strengthening discharge.  Patient to resume all established therapies and medication as an outpatient.  Patient to continue with established HD treatment with [facility].  All questions and concerns answered prior to being discharged.  Patient discharged home.    Therefore, he is discharged in good and stable condition to home with close outpatient follow-up.    The patient recovered much more quickly than anticipated on admission.    Discharge Date  12/19/24      FOLLOW UP ITEMS POST DISCHARGE  Please call [phone] to  schedule PCP appointment for patient.    Required specialty appointments include:       Discharge Instructions per AMY Daigle    Follow-up with PCP s/p hospitalization  Continue all home medications  Continue established HD treatment at [facility]  Continue following with nephrology as indicated    DIET: Cardiac, renal, diabetic    ACTIVITY: As tolerated    DIAGNOSIS: Hypoxia post anesthesia    Return to ER if symptoms persist, chest pain, palpitations, shortness of breath, numbness, tingling, weakness, and high fevers.      DISCHARGE DIAGNOSES  Principal Problem (Resolved):    Acute respiratory failure with hypoxia (HCC) (POA: Yes)  Active Problems:    ESRD (end stage renal disease) on dialysis (HCC) (POA: Yes)    COPD (chronic obstructive pulmonary disease) (HCC) (POA: Yes)    ACP (advance care planning) (POA: Yes)      FOLLOW UP  No future appointments.  Selina Ellison M.D.  [address] [place] [address]  [facility] [id number] [phone]    Call in 2 week(s)        MEDICATIONS ON DISCHARGE     Medication List        START taking these medications        Instructions   HYDROcodone-acetaminophen 5-325 MG Tabs per tablet  Commonly known as: Norco   Take 1 Tablet by mouth every 6 hours as needed (Moderate pain) for up to 5 days.  Dose: 1 Tablet            CONTINUE taking these medications        Instructions   acetaminophen 500 MG Tabs  Commonly known as: Tylenol   Take 500-1,000 mg by mouth every 6 hours as needed for Mild Pain.   Dose: 500-1,000 mg     albuterol 108 (90 Base) MCG/ACT Aers inhalation aerosol   Inhale 2 Puffs every 6 hours as needed for Shortness of Breath.  Dose: 2 Puff     midodrine 10 MG tablet  Commonly known as: Proamatine   Take 10 mg by mouth 3 times a day with meals.  Dose: 10 mg     omeprazole 20 MG delayed-release capsule  Commonly known as: PriLOSEC   Take 20 mg by mouth every day.   Dose: 20 mg     ROPINIRole 0.5 MG Tabs  Commonly known as: Requip   Take 0.5 mg by mouth  3 times a day.       Indications: Restless Leg Syndrome  Dose: 0.5 mg     sevelamer carbonate 800 MG Tabs tablet  Commonly known as: Renvela   Take 3,200 mg by mouth 2 times a day with meals.   Dose: 3,200 mg     Vitamin C 1000 MG Tabs   Take 1,000 mg by mouth every day.   Dose: 1,000 mg              Allergies  Allergies   Allergen Reactions    Chlorhexidine Rash     Per PT    Fluoroplex Rash     Per PT    Chloraprep One Step Itching       DIET  Orders Placed This Encounter   Procedures    Diet Order Diet: Regular     Standing Status:   Standing     Number of Occurrences:   1     Order Specific Question:   Diet:     Answer:   Regular [1]       ACTIVITY  As tolerated.  Weight bearing as tolerated    CONSULTATIONS  Vascular surgery  Nephrology    PROCEDURES  Left arm aVF revision and HD catheter placement    IMAGING    DX-CHEST-PORTABLE (1 VIEW)   Final Result         1.  Pulmonary edema and/or infiltrates are identified, which appear somewhat increased since the prior exam.   2.  Atherosclerosis      DX-PORTABLE FLUORO > 1 HOUR   Final Result      Portable fluoroscopy utilized for 1 second.         INTERPRETING LOCATION: [address] [place] [address], [facility]            LABORATORY  Lab Results   Component Value Date    SODIUM 137 12/18/2024    POTASSIUM 4.0 12/18/2024    CHLORIDE 93 (L) 12/18/2024    CO2 27 12/18/2024    GLUCOSE 81 12/18/2024    BUN 16 12/18/2024    CREATININE 5.27 (HH) 12/18/2024        Lab Results   Component Value Date    WBC 6.4 12/18/2024    HEMOGLOBIN 15.1 12/18/2024    HEMATOCRIT 48.9 12/18/2024    PLATELETCT 260 12/18/2024

## 2024-12-19 NOTE — CARE PLAN
The patient is Stable - Low risk of patient condition declining or worsening    Shift Goals  Clinical Goals: Wean O2, pain control, post-op monitoring  Patient Goals: Rest, food    Progress made toward(s) clinical / shift goals:      Problem: Pain - Standard  Goal: Alleviation of pain or a reduction in pain to the patient’s comfort goal  Description: Target End Date:  Prior to discharge or change in level of care    Document on Vitals flowsheet    1.  Document pain using the appropriate pain scale per order or unit policy  2.  Educate and implement non-pharmacologic comfort measures (i.e. relaxation, distraction, massage, cold/heat therapy, etc.)  3.  Pain management medications as ordered  4.  Reassess pain after pain med administration per policy  5.  If opiods administered assess patient's response to pain medication is appropriate per POSS sedation scale  6.  Follow pain management plan developed in collaboration with patient and interdisciplinary team (including palliative care or pain specialists if applicable)  12/19/2024 0246 by Toya Hameed R.N.  Outcome: Progressing, reports mild pain, denies need for pain intervention at this time.      Problem: Knowledge Deficit - Standard  Goal: Patient and family/care givers will demonstrate understanding of plan of care, disease process/condition, diagnostic tests and medications  Description: Target End Date:  1-3 days or as soon as patient condition allows    Document in Patient Education    1.  Patient and family/caregiver oriented to unit, equipment, visitation policy and means for communicating concern  2.  Complete/review Learning Assessment  3.  Assess knowledge level of disease process/condition, treatment plan, diagnostic tests and medications  4.  Explain disease process/condition, treatment plan, diagnostic tests and medications  12/19/2024 0246 by Toya Hameed R.N.  Outcome: Progressing, verbalized understanding of education provided     Problem:  Respiratory:  Goal: Respiratory status will improve  12/19/2024 0246 by Toya Hameed R.N.  Outcome: Progressing, weaned from 3L to 1L at this time. No respiratory distress noted. IS use observed.

## 2024-12-19 NOTE — H&P
Hospital Medicine History & Physical Note    Date of Service  12/18/2024    Primary Care Physician  Pcp Pt States None    Consultants  vascular surgery    Specialist Names: Dr. Ellison    Code Status  DNAR/DNI    Chief Complaint  No chief complaint on file.      History of Presenting Illness  Bridger Savage is a 74 y.o. male with COPD, history of respiratory failure, CHF, ESRD on HD, PTSD who presented 12/18/2024 for tunneled line HD catheter placement developed hypoxia following procedure in PACU.    I was contacted by Dr. Ellison from vascular surgery after he placed tunneled HD line in Mr. Savage and patient developed hypoxia in PACU.  Patient reports he has a history of respiratory failure, was discharged with home oxygen and was instructed to use it as needed if his oxygen saturations dropped but noted every time he checked his oxygen saturations were always in the low to mid 90s, used it only a handful of times before it was returned, he believes the company was [facility].  He denies any complaints, reports he was in his usual state of health prior to coming in for the procedure, denies any recent fevers or chills headache or vision changes chest pain dyspnea cough nausea vomiting abdominal pain dysuria diarrhea.  Dr. Ellison will discuss with nephrology to arrange HD tomorrow, I will place admission order and obtain basic labs, breathing treatments.    I discussed the plan of care with patient, bedside RN, charge RN, , and pharmacy.    Review of Systems  Review of Systems   Constitutional:  Negative for chills, fever and malaise/fatigue.   Respiratory:  Negative for cough, sputum production and shortness of breath.    Gastrointestinal:  Negative for abdominal pain, nausea and vomiting.       Past Medical History   has a past medical history of Asthma, Breath shortness, Chronic obstructive pulmonary disease (HCC), Congestive heart failure (HCC), COPD (chronic obstructive pulmonary disease) (HCC), Dental  disorder, Depression, Dialysis patient (HCC), Emphysema of lung (HCC), Heart valve disease, Hemodialysis patient (HCC), Pain, PTSD (post-traumatic stress disorder), Renal disorder, Sleep apnea, Snoring, and Urinary incontinence.    Surgical History   has a past surgical history that includes gastroscopy-endo (N/A, 8/28/2017); gastroscopy (2/1/2018); colonoscopy (2/1/2018); other; and gastroscopy-endo (1/30/2020).     Family History  family history includes Asthma in his father; Diabetes in his brother and mother; Heart Disease in his mother; Kidney Disease in his mother; No Known Problems in his maternal grandfather, maternal grandmother, paternal grandfather, and paternal grandmother.      Social History   reports that he has been smoking cigarettes. He started smoking about 57 years ago. He has a 28.5 pack-year smoking history. He has never used smokeless tobacco. He reports that he does not drink alcohol and does not use drugs.    Allergies  Allergies   Allergen Reactions    Chlorhexidine Rash     Per PT    Fluoroplex Rash     Per PT    Chloraprep One Step Itching       Medications  Prior to Admission Medications   Prescriptions Last Dose Informant Patient Reported? Taking?   Ascorbic Acid (VITAMIN C) 1000 MG Tab 12/11/2024 Morning Patient Yes No   Sig: Take 1,000 mg by mouth every day.      ROPINIRole (REQUIP) 0.5 MG Tab 12/17/2024 Evening Patient Yes Yes   Sig: Take 0.5 mg by mouth 3 times a day.       Indications: Restless Leg Syndrome   acetaminophen (TYLENOL) 500 MG Tab 12/18/2024 at  9:00 AM Patient Yes Yes   Sig: Take 500-1,000 mg by mouth every 6 hours as needed for Mild Pain.      albuterol 108 (90 Base) MCG/ACT Aero Soln inhalation aerosol 12/18/2024 Patient No Yes   Sig: Inhale 2 Puffs every 6 hours as needed for Shortness of Breath.   midodrine (PROAMATINE) 10 MG tablet 12/18/2024 at 12:10 PM Patient Yes Yes   Sig: Take 10 mg by mouth 3 times a day with meals.   omeprazole (PRILOSEC) 20 MG  delayed-release capsule 12/17/2024 Morning Patient Yes Yes   Sig: Take 20 mg by mouth every day.      sevelamer carbonate (RENVELA) 800 MG Tab tablet 12/17/2024 Evening Patient Yes Yes   Sig: Take 3,200 mg by mouth 2 times a day with meals.         Facility-Administered Medications: None       Physical Exam  Temp:  [36.3 °C (97.3 °F)-37 °C (98.6 °F)] 37 °C (98.6 °F)  Pulse:  [57-88] 60  Resp:  [12-20] 18  BP: ()/(51-72) 110/62  SpO2:  [86 %-99 %] 93 %  Blood Pressure : 110/62   Temperature: 37 °C (98.6 °F)   Pulse: 60   Respiration: 18   Pulse Oximetry: 93 %       Physical Exam  Vitals and nursing note reviewed.   Constitutional:       General: He is not in acute distress.     Appearance: He is not ill-appearing or toxic-appearing.   HENT:      Head: Normocephalic and atraumatic.      Nose: Nose normal. No rhinorrhea.      Mouth/Throat:      Mouth: Mucous membranes are dry.      Pharynx: Oropharynx is clear.   Eyes:      General: No scleral icterus.     Extraocular Movements: Extraocular movements intact.      Conjunctiva/sclera: Conjunctivae normal.   Cardiovascular:      Rate and Rhythm: Normal rate and regular rhythm.      Pulses: Normal pulses.   Pulmonary:      Effort: Pulmonary effort is normal. No respiratory distress.      Breath sounds: Normal breath sounds. No wheezing, rhonchi or rales.   Abdominal:      General: There is no distension.      Palpations: Abdomen is soft.      Tenderness: There is no abdominal tenderness. There is no guarding or rebound.   Musculoskeletal:         General: Normal range of motion.      Cervical back: Normal range of motion and neck supple. No rigidity.      Right lower leg: No edema.      Left lower leg: No edema.   Skin:     General: Skin is warm and dry.      Capillary Refill: Capillary refill takes less than 2 seconds.      Findings: No erythema or rash.   Neurological:      General: No focal deficit present.      Mental Status: He is alert and oriented to person,  "place, and time. Mental status is at baseline.      Cranial Nerves: No cranial nerve deficit.      Sensory: No sensory deficit.      Motor: No weakness.      Coordination: Coordination normal.   Psychiatric:         Mood and Affect: Mood normal.         Behavior: Behavior normal.         Thought Content: Thought content normal.         Judgment: Judgment normal.         Laboratory:  Recent Labs     12/18/24  1232   WBC 6.4   RBC 6.10   HEMOGLOBIN 15.1   HEMATOCRIT 48.9   MCV 80.2*   MCH 24.8*   MCHC 30.9*   RDW 53.8*   PLATELETCT 260   MPV 9.4     Recent Labs     12/18/24  1232   SODIUM 137   POTASSIUM 4.0   CHLORIDE 93*   CO2 27   GLUCOSE 81   BUN 16   CREATININE 5.27*   CALCIUM 10.1     Recent Labs     12/18/24  1232   GLUCOSE 81         Recent Labs     12/18/24  1232   NTPROBNP 2011*         No results for input(s): \"TROPONINT\" in the last 72 hours.    Imaging:  DX-PORTABLE FLUORO > 1 HOUR    (Results Pending)       EKG:  I have personally reviewed the images and compared with prior images.    Assessment/Plan:  Justification for Admission Status  I anticipate this patient is appropriate for observation status at this time because postprocedure hypoxia in the setting of previous respiratory failure      * Acute respiratory failure with hypoxia (HCC)- (present on admission)  Assessment & Plan  History of respiratory failure, had home oxygen delivered and his contractor was at his house for 6 months he reported only using it a handful of times.  Developed acute hypoxic respiratory failure while in the PACU following tunneled line catheter placement.  Admission requested for ongoing hypoxia.  Incentive spirometer, check procalcitonin, BNP.  Patient denies any cough, increased fatigue or malaise, does not feel like he has pulmonary infection.  Vascular team will reach out to nephrology to arrange for HD  Continuous pulse ox, incentive spirometer, DuoNebs as needed  Mobilization in a.m. when out of PACU    COPD " (chronic obstructive pulmonary disease) (HCC)- (present on admission)  Assessment & Plan  Not in acute exacerbation, continue home albuterol, DuoNebs as needed    ESRD (end stage renal disease) on dialysis ([facility])- (present on admission)  Assessment & Plan  Vascular team to reach out to nephrology to arrange HD    ACP (advance care planning)- (present on admission)  Assessment & Plan  I discussed advance care planning with the patient for at least 16 minutes, including diagnosis, prognosis, plan of care, risks and benefits of any therapies that could be offered, as well as alternatives including palliation and hospice, as appropriate.  DNR/I per patient wishes        VTE prophylaxis: SCDs/TEDs and heparin ppx  Total time spent on admission 77 minutes.    This included my review with ER physician, review of ED events, patient's history, outside records, recent records, face to face interview, physical examination; my review of lab results (CBC, chemistry panel), imaging review (CXR) and ECG. Also includes counseling patient on admission, treatment plan, and documentation of encounter.  In addition, speaking with specialist Dr. Ellison

## 2024-12-20 NOTE — CARE PLAN
The patient is Stable - Low risk of patient condition declining or worsening    Shift Goals  Clinical Goals: Dialysis, pain management, wean O2  Patient Goals: Rest, get off oxygen        Problem: Pain - Standard  Goal: Alleviation of pain or a reduction in pain to the patient’s comfort goal  Description: Target End Date:  Prior to discharge or change in level of care    Document on Vitals flowsheet    1.  Document pain using the appropriate pain scale per order or unit policy  2.  Educate and implement non-pharmacologic comfort measures (i.e. relaxation, distraction, massage, cold/heat therapy, etc.)  3.  Pain management medications as ordered  4.  Reassess pain after pain med administration per policy  5.  If opiods administered assess patient's response to pain medication is appropriate per POSS sedation scale  6.  Follow pain management plan developed in collaboration with patient and interdisciplinary team (including palliative care or pain specialists if applicable)  Outcome: Progressing     Problem: Knowledge Deficit - Standard  Goal: Patient and family/care givers will demonstrate understanding of plan of care, disease process/condition, diagnostic tests and medications  Description: Target End Date:  1-3 days or as soon as patient condition allows    Document in Patient Education    1.  Patient and family/caregiver oriented to unit, equipment, visitation policy and means for communicating concern  2.  Complete/review Learning Assessment  3.  Assess knowledge level of disease process/condition, treatment plan, diagnostic tests and medications  4.  Explain disease process/condition, treatment plan, diagnostic tests and medications  Outcome: Progressing     Problem: HEMODYNAMIC STATUS  Goal: Stable Vital Signs and Fluid Balance  Outcome: Progressing     Problem: Respiratory:  Goal: Respiratory status will improve  Outcome: Progressing

## 2024-12-20 NOTE — PROGRESS NOTES
3hr HD started @ 1348 ane ended @ 1700,signs of venous chamber clotting noted into the first hour of tx,rinsed back,no blood loss. Tx resumed with new circuit and completed without further clotting issue. Net UF = 2200ml, episodes of low bp noted during HD, asymptomatic.V/S post tx: 94/55,89, 16,96.6F, 99% @ 2L O2.RIJ TDC dressing CDI.newly created LUAAVF + B/T,dressing CDD- c/o 1010 pain relieved by Tylenol. Report given to Natalie Verma RN.

## 2024-12-20 NOTE — PROGRESS NOTES
Blood pressure improved. Patient cleared for discharge. Discharge instructions printed and reviewed with patient. Prescribed medication given to patient. All questions answered. Belongings sent with patient.

## 2025-01-06 ENCOUNTER — TELEPHONE (OUTPATIENT)
Dept: VASCULAR SURGERY | Facility: MEDICAL CENTER | Age: 75
End: 2025-01-06
Payer: MEDICARE

## 2025-01-07 ENCOUNTER — OFFICE VISIT (OUTPATIENT)
Dept: VASCULAR SURGERY | Facility: MEDICAL CENTER | Age: 75
End: 2025-01-07
Payer: MEDICARE

## 2025-01-07 VITALS
OXYGEN SATURATION: 95 % | DIASTOLIC BLOOD PRESSURE: 66 MMHG | BODY MASS INDEX: 25.68 KG/M2 | TEMPERATURE: 98.3 F | HEART RATE: 50 BPM | WEIGHT: 189.6 LBS | SYSTOLIC BLOOD PRESSURE: 118 MMHG | HEIGHT: 72 IN

## 2025-01-07 DIAGNOSIS — Z99.2 END STAGE RENAL DISEASE ON DIALYSIS (HCC): ICD-10-CM

## 2025-01-07 DIAGNOSIS — N18.6 END STAGE RENAL DISEASE ON DIALYSIS (HCC): ICD-10-CM

## 2025-01-07 PROCEDURE — 3078F DIAST BP <80 MM HG: CPT | Performed by: PHYSICIAN ASSISTANT

## 2025-01-07 PROCEDURE — 3074F SYST BP LT 130 MM HG: CPT | Performed by: PHYSICIAN ASSISTANT

## 2025-01-07 PROCEDURE — 99024 POSTOP FOLLOW-UP VISIT: CPT | Performed by: PHYSICIAN ASSISTANT

## 2025-01-07 NOTE — PROGRESS NOTES
VASCULAR SURGERY                 Clinic Progress Note  _________________________________    Vitals for Today's Visit (1/7/2025)  /66 (BP Location: Right arm, Patient Position: Sitting, BP Cuff Size: Adult)   Pulse (!) 50   Temp 36.8 °C (98.3 °F) (Temporal)   Ht 1.829 m (6')   Wt 86 kg (189 lb 9.5 oz)   SpO2 95%   BMI 25.71 kg/m²   _________________________________      1/7/2025  Mr. Savage presents for a post operative visit today. He underwent left brachiobasilic fistula transposition and ligation of severely aneurysmal forearm fistula as well as insertion of right IJ permcath with Dr. Ellison on 12/18/2024.  He has been using the PC successfully for HD since that time. He notes pain to the left arm around the incision site and in the forearm where his thrombosed AVF is present. He notes the pain has improved. He denies any pain, numbness, or tingling of the left hand.     Exam:  Pleasant male, no acute distress  Left arm: upper portion of arm with incision c/d/I, mild scabbing present. Fistula with good thrill in upper arm. Forearm with aneurysmal thrombosed AVF, no flow appreciated through AVF. Radial pulse present. Hand warm, motor and sensation intact.   Right chest with PC present.       A/P:  ESRD     Mr. Savage will continue to use the PC for HD for the next 1-2 weeks and then he will transition to using the new upper arm AVF.   I advised him to use a heating pad over the forearm to help with the discomfort of the thrombosed AVF. He will call us once he is using the AVF for HD and we can take the permcath out in the office. Ample time was spent addressing his questions today.       Roseline Barajas P.A.-C.  [facility]  [phone]  [address], [place] [address]

## (undated) DEVICE — SUTURE GENERAL

## (undated) DEVICE — BITE BLOCK ADULT 60FR (100EA/CA)

## (undated) DEVICE — SYRINGE 30 ML LL (56/BX)

## (undated) DEVICE — CATHETER IV SAFETY 20 GA X 1-1/4 (50/BX)

## (undated) DEVICE — GLOVE SZ 7 BIOGEL PI MICRO - PF LF (50PR/BX 4BX/CA)

## (undated) DEVICE — SYRINGE NON SAFETY 5 CC 20 GA X 1-1/2 IN (100/BX 4BX/CA)

## (undated) DEVICE — SOD. CHL. INJ. 0.9% 250 ML - (36/CA 50CA/PF)

## (undated) DEVICE — SET EXTENSION WITH 2 PORTS (48EA/CA) ***PART #2C8610 IS A SUBSTITUTE*****

## (undated) DEVICE — SUCTION INSTRUMENT YANKAUER BULBOUS TIP W/O VENT (50EA/CA)

## (undated) DEVICE — SYRINGE DISP. 50CC LS - (40/BX)

## (undated) DEVICE — BASIN EMESIS DISP. - (250/CA)

## (undated) DEVICE — SUTURE 6-0 PROLENE BV-1 D/A 30 (36PK/BX)"

## (undated) DEVICE — BAG SPONGE COUNT 10.25 X 32 - BLUE (250/CA)

## (undated) DEVICE — TUBE E-T HI-LO CUFF 7.5MM (10EA/PK)

## (undated) DEVICE — SODIUM CHL IRRIGATION 0.9% 1000ML (12EA/CA)

## (undated) DEVICE — BLADE SURGICAL #11 - (50/BX)

## (undated) DEVICE — DECANTER FLD BLS - (50/CA)

## (undated) DEVICE — TUBING CLEARLINK DUO-VENT - C-FLO (48EA/CA)

## (undated) DEVICE — ELECTRODE DUAL RETURN W/ CORD - (50/PK)

## (undated) DEVICE — SHEET THYROID - (10EA/CA)

## (undated) DEVICE — SYRINGE 20 ML LL (50EA/BX 4BX/CA)

## (undated) DEVICE — KIT ANESTHESIA W/CIRCUIT & 3/LT BAG W/FILTER (20EA/CA)

## (undated) DEVICE — SLEEVE, VASO, THIGH, MED

## (undated) DEVICE — ELECTRODE 850 FOAM ADHESIVE - HYDROGEL RADIOTRNSPRNT (50/PK)

## (undated) DEVICE — SENSOR OXIMETER ADULT SPO2 RD SET (20EA/BX)

## (undated) DEVICE — DRAPE C-ARM LARGE 41IN X 74 IN - (10/BX 2BX/CA)

## (undated) DEVICE — MASK ANESTHESIA ADULT  - (100/CA)

## (undated) DEVICE — DRESSING TRANSPARENT FILM TEGADERM 2.375 X 2.75" (100EA/BX)"

## (undated) DEVICE — CANNULA W/ SUPPLY TUBING O2 - (50/CA)

## (undated) DEVICE — PAD PREP 24 X 48 CUFFED - (100/CA)

## (undated) DEVICE — CHLORAPREP 26 ML APPLICATOR - ORANGE TINT(25/CA)

## (undated) DEVICE — MASK OXYGEN VNYL ADLT MED CONC WITH 7 FOOT TUBING  - (50EA/CA)

## (undated) DEVICE — SUTURE 3-0 VICRYL PLUS SH - 27 INCH (36/BX)

## (undated) DEVICE — GOWN SURGEONS LARGE - (32/CA)

## (undated) DEVICE — LACTATED RINGERS INJ 1000 ML - (14EA/CA 60CA/PF)

## (undated) DEVICE — GOWN WARMING STANDARD FLEX - (30/CA)

## (undated) DEVICE — GLOVE, LITE (PAIR)

## (undated) DEVICE — SITE INJ 7/8IN IV M LL ADPR - (100/CA) THIS IS A CUSTOM ITEM AND HAS A 30 DAY LEAD TIME

## (undated) DEVICE — PACK ENT OR - (2EA/CA)

## (undated) DEVICE — TOWELS CLOTH SURGICAL - (4/PK 20PK/CA)

## (undated) DEVICE — GLOVE BIOGEL INDICATOR SZ 7.5 SURGICAL PF LTX - (50PR/BX 4BX/CA)

## (undated) DEVICE — CAPTIVATOR II-10MM ROUND STIFF

## (undated) DEVICE — GOWN SURGEONS X-LARGE - DISP. (30/CA)

## (undated) DEVICE — CLOSURE SKIN STRIP 1/2 X 4 IN - (STERI STRIP) (50/BX 4BX/CA)

## (undated) DEVICE — TUBE SUCTION YANKAUER  1/4 X 6FT (20EA/CA)"

## (undated) DEVICE — CANISTER SUCTION RIGID RED 1500CC (40EA/CA)

## (undated) DEVICE — SUTURE 3-0 ETHILON FS-1 - (36/BX) 30 INCH

## (undated) DEVICE — TUBE CONNECTING SUCTION - CLEAR PLASTIC STERILE 72 IN (50EA/CA)

## (undated) DEVICE — STAY ELASTIC BLUNT RETRACTOR 12MM (8EA/PK)

## (undated) DEVICE — KIT  I.V. START (100EA/CA)

## (undated) DEVICE — SENSOR SPO2 ADULT LNCS ADTX (20/BX) ORDER ITEM #19593

## (undated) DEVICE — SYRINGE SAFETY 5 ML 18 GA X 1-1/2 BLUNT LL (100/BX 4BX/CA)

## (undated) DEVICE — SUTURE 6-0 PROLENE BV-1 D/A 24 (36PK/BX)"

## (undated) DEVICE — PACK MINOR BASIN - (2EA/CA)

## (undated) DEVICE — BOVIE BLADE COATED &INSULATED - 25/PK

## (undated) DEVICE — SUTURE 3-0 VICRYL PLUS SH - 8X 18 INCH (12/BX)

## (undated) DEVICE — CORDS BIPOLAR COAGULATION - 12FT STERILE DISP. (10EA/BX)

## (undated) DEVICE — CANNULA O2 COMFORT SOFT EAR ADULT 7 FT TUBING (50/CA)

## (undated) DEVICE — GLOVE BIOGEL SZ 7 SURGICAL PF LTX - (50PR/BX 4BX/CA)

## (undated) DEVICE — SUTURE 3-0 SILK 12 X 18 IN - (36/BX)

## (undated) DEVICE — SPONGE GAUZE NON-STERILE 4X4 - (2000/CA 10PK/CA)

## (undated) DEVICE — MEDICINE CUP STERILE 2 OZ - (100/CA)

## (undated) DEVICE — COVER LIGHT HANDLE ALC PLUS DISP (18EA/BX)

## (undated) DEVICE — DRAPE STRLE REG TOWEL 18X24 - (10/BX 4BX/CA)"

## (undated) DEVICE — JELLY, KY 2 0Z STERILE

## (undated) DEVICE — Device

## (undated) DEVICE — GLOVESZ 8.5 BIOGEL PI MICRO - PF LF (50PR/BX)

## (undated) DEVICE — SYRINGE DISP. 60 CC LL - (30/BX, 12BX/CA)**WHEN THESE ARE GONE ORDER #500206**

## (undated) DEVICE — MASK WITH FACE SHIELD (25/BX 4BX/CA)

## (undated) DEVICE — DRAPE 36X28IN RAD CARM BND BG - (25/CA) O

## (undated) DEVICE — GLOVE BIOGEL INDICATOR SZ 6.5 SURGICAL PF LTX - (50PR/BX 4BX/CA)

## (undated) DEVICE — INSTRUMENT JAWCOVER SUTURE - BOOTS (5PK/BX)

## (undated) DEVICE — SUTURE 4-0 SILK 12 X 18 INCH - (36/BX)

## (undated) DEVICE — SYRINGE SAFETY 10 ML 18 GA X 1 1/2 BLUNT LL (100/BX 4BX/CA)

## (undated) DEVICE — CATHETER TROCAR 24FR - (10/CA)

## (undated) DEVICE — SUTURE 4-0 MONOCRYL PLUS PS-1 - 27 INCH (36/BX)

## (undated) DEVICE — GLOVE BIOGEL PI INDICATOR SZ 7.5 SURGICAL PF LF -(50/BX 4BX/CA)

## (undated) DEVICE — NEPTUNE 4 PORT MANIFOLD - (20/PK)

## (undated) DEVICE — SHEET TRANSVERSE LAP - (12EA/CA)

## (undated) DEVICE — COVER PROBE INTRAOPERATIVE KIT (10EA/CA)

## (undated) DEVICE — SPONGE GAUZESTER. 2X2 4-PL - (2/PK 50PK/BX 30BX/CS)

## (undated) DEVICE — SUTURE CV

## (undated) DEVICE — STERI STRIP COMPOUND BENZOIN - TINCTURE 0.6ML WITH APPLICATOR (40EA/BX)

## (undated) DEVICE — PACK AV FISTULA (2EA/CA)

## (undated) DEVICE — SET LEADWIRE 5 LEAD BEDSIDE DISPOSABLE ECG (1SET OF 5/EA)

## (undated) DEVICE — SUTURE 4-0 MONOCRYL PLUS PS-2 - 27 INCH (36/BX)

## (undated) DEVICE — SUTURE 3-0 PROLENE SH 30 (36PK/BX)"

## (undated) DEVICE — CANISTER SUCTION 3000ML MECHANICAL FILTER AUTO SHUTOFF MEDI-VAC NONSTERILE LF DISP (40EA/CA)

## (undated) DEVICE — FORCEP RADIAL JAW 4 STANDARD CAPACITY W/NEEDLE 240CM (40EA/BX)

## (undated) DEVICE — WATER IRRIGATION STERILE 1000ML (12EA/CA)

## (undated) DEVICE — SODIUM CHL. INJ. 0.9% 500ML (24EA/CA 50CA/PF)

## (undated) DEVICE — CATHERTER CLEAR SINGLE USE INJECTION THERAPY NEEDLE 25GA X 4MM  2.3MM X 240CM (5EA/BX)

## (undated) DEVICE — GLOVE BIOGEL SZ 6.5 SURGICAL PF LTX (50PR/BX 4BX/CA)

## (undated) DEVICE — DRAPE LARGE 3 QUARTER - (20/CA)

## (undated) DEVICE — VESSELOOP MAXI BLUE STERILE- SURG-I-LOOP (10EA/BX)

## (undated) DEVICE — SYRINGE SAFETY 3 ML 18 GA X 1 1/2 BLUNT LL (100/BX 8BX/CA)

## (undated) DEVICE — SLEEVE VASO CALF MED - (10PR/CA)

## (undated) DEVICE — SYRINGE STERILE 10 ML LL (200/BX)

## (undated) DEVICE — SUTURE 4-0 MONOCRYL PLUSPC-3 - 18 INCH (12/BX)

## (undated) DEVICE — TOWEL STOP TIMEOUT SAFETY FLAG (40EA/CA)

## (undated) DEVICE — PAD LAP STERILE 18 X 18 - (5/PK 40PK/CA)

## (undated) DEVICE — TRAY SRGPRP PVP IOD WT PRP - (20/CA)

## (undated) DEVICE — TRAP POLYP E-TRAP (25EA/BX)

## (undated) DEVICE — CANISTER SUCTION 3000ML MECHANICAL FILTER AUTO SHUTOFF MEDI-VAC NONSTERILE LF DISP  (40EA/CA)

## (undated) DEVICE — FORCEP RADIAL JAW 4 STANDARD CAPACITY W/NEEDLE 240CM